# Patient Record
Sex: FEMALE | Race: WHITE | HISPANIC OR LATINO | Employment: OTHER | URBAN - METROPOLITAN AREA
[De-identification: names, ages, dates, MRNs, and addresses within clinical notes are randomized per-mention and may not be internally consistent; named-entity substitution may affect disease eponyms.]

---

## 2020-10-01 ENCOUNTER — VIRTUAL VISIT (OUTPATIENT)
Dept: FAMILY MEDICINE | Facility: OTHER | Age: 65
End: 2020-10-01

## 2020-10-01 DIAGNOSIS — Z20.822 SUSPECTED 2019 NOVEL CORONAVIRUS INFECTION: Primary | ICD-10-CM

## 2020-10-01 NOTE — PROGRESS NOTES
"Date: 10/01/2020 08:55:37  Clinician: Rah Osborn  Clinician NPI: [id number]  Patient: Adriana St  Patient : 1955  Patient Address: [address] [place] [address]  Patient Phone: [phone]  Visit Protocol: URI  Patient Summary:  Adraina Wei is a 65 year old ( : 1955 ) female who initiated a [facility] Visit for COVID-19 (Coronavirus) evaluation and screening. When asked the question \"Please sign me up to receive news, health information and promotions from [facility].\", Adriana Wei responded \"Yes\".    Adriana Wei states her symptoms started today.   Her symptoms consist of a headache, enlarged lymph nodes, a cough, anosmia, myalgia, chills, malaise, and a sore throat. Adriana Wei also feels feverish but was unable to measure her temperature.   Symptom details     Cough: Adriana Wei coughs a few times an hour and her cough is more bothersome at night. Phlegm comes into her throat when she coughs. She believes her cough is caused by post-nasal drip. The color of the phlegm is white and green.     Sore throat: Adriana Wei reports having moderate throat pain (4-6 on a 10 point pain scale), does not have exudate on her tonsils, and can swallow liquids. The lymph nodes in her neck are enlarged. A rash has not appeared on the skin since the sore throat started.     Headache: She states the headache is moderate (4-6 on a 10 point pain scale).      Adriana Wei denies having ear pain, wheezing, nasal congestion, vomiting, rhinitis, nausea, facial pain or pressure, teeth pain, ageusia, and diarrhea. She also denies having a sinus infection within the past year, taking antibiotic medication in the past month, and having recent facial or sinus surgery in the past 60 days. She is not experiencing dyspnea.   Precipitating events  Adriana Wei is not sure if she has been exposed to someone with strep throat. She has not recently been exposed to someone " with influenza. Adriana Wei has been in close contact with the following high risk individuals: adults 65 or older and children under the age of 5.   Pertinent COVID-19 (Coronavirus) information  In the past 14 days, Adriana Wei has not worked in a congregate living setting.   She does not work or volunteer as healthcare worker or a  and does not work or volunteer in a healthcare facility.   Adriana Wei also has not lived in a congregate living setting in the past 14 days. She does not live with a healthcare worker.   Adriana Wei has not had a close contact with a laboratory-confirmed COVID-19 patient within 14 days of symptom onset.   Since December 2019, Adriana Wei and has had upper respiratory infection (URI) or influenza-like illness. Has not been diagnosed with lab-confirmed COVID-19 test      Date(s) of previous URI or influenza-like illness (free-text): February     Symptoms Adriana Wei experienced during previous URI or influenza-like illness as reported by the patient (free-text): High fever, body aching, coughing        Pertinent medical history  Adriana Wei does not get yeast infections when she takes antibiotics.   Adriana Wei does not need a return to work/school note.   Weight: 165 lbs   Adriana Wei does not smoke or use smokeless tobacco.   Weight: 165 lbs  Reason for repeat visit for the same protocol within 24 hours:  The rash I have in my foot began way before now  See the History of referred by protocol and completed visits section for details on previous visits (visits currently in queue to be diagnosed will not appear in this section).    MEDICATIONS: No current medications, ALLERGIES: NKDA  Clinician Response:  Dear Adriana Wei,   Your symptoms show that you may have coronavirus (COVID-19). This illness can cause fever, cough and trouble breathing. Many people get a mild case and get better on their own. Some people can get very sick.   "What should I do?  We would like to test you for this virus.   1. Please call [phone] to schedule your visit. Explain that you were referred by [facility] to have a COVID-19 test. Be ready to share your [facility] visit ID number.  The following will serve as your written order for this COVID Test, ordered by me, for the indication of suspected COVID [Z20.828]: The test will be ordered in [employer], our electronic health record, after you are scheduled. It will show as ordered and authorized by Florentin Whitaker MD.  Order: COVID-19 (Coronavirus) PCR for SYMPTOMATIC testing from [facility].      2. When it's time for your COVID test:  Stay at least 6 feet away from others. (If someone will drive you to your test, stay in the backseat, as far away from the  as you can.)   Cover your mouth and nose with a mask, tissue or washcloth.  Go straight to the testing site. Don't make any stops on the way there or back.      3.Starting now: Stay home and away from others (self-isolate) until:   You've had no fever---and no medicine that reduces fever---for one full day (24 hours). And...   Your other symptoms have gotten better. For example, your cough or breathing has improved. And...   At least 10 days have passed since your symptoms started.       During this time, don't leave the house except for testing or medical care.   Stay in your own room, even for meals. Use your own bathroom if you can.   Stay away from others in your home. No hugging, kissing or shaking hands. No visitors.  Don't go to work, school or anywhere else.    Clean \"high touch\" surfaces often (doorknobs, counters, handles, etc.). Use a household cleaning spray or wipes. You'll find a full list of  on the EPA website: www.epa.gov/pesticide-registration/list-n-disinfectants-use-against-sars-cov-2.   Cover your mouth and nose with a mask, tissue or washcloth to avoid spreading germs.  Wash your hands and face often. Use soap and water.  Caregivers in these " groups are at risk for severe illness due to COVID-19:  o People 65 years and older  o People who live in a nursing home or long-term care facility  o People with chronic disease (lung, heart, cancer, diabetes, kidney, liver, immunologic)  o People who have a weakened immune system, including those who:   Are in cancer treatment  Take medicine that weakens the immune system, such as corticosteroids  Had a bone marrow or organ transplant  Have an immune deficiency  Have poorly controlled HIV or AIDS  Are obese (body mass index of 40 or higher)  Smoke regularly   o Caregivers should wear gloves while washing dishes, handling laundry and cleaning bedrooms and bathrooms.  o Use caution when washing and drying laundry: Don't shake dirty laundry, and use the warmest water setting that you can.  o For more tips, go to www.cdc.gov/coronavirus/2019-ncov/downloads/10Things.pdf.    How can I take care of myself?    Get lots of rest. Drink extra fluids (unless a doctor has told you not to).   Take Tylenol (acetaminophen) for fever or pain. If you have liver or kidney problems, ask your family doctor if it's okay to take Tylenol.   Adults can take either:    650 mg (two 325 mg pills) every 4 to 6 hours, or...   1,000 mg (two 500 mg pills) every 8 hours as needed.    Note: Don't take more than 3,000 mg in one day. Acetaminophen is found in many medicines (both prescribed and over-the-counter medicines). Read all labels to be sure you don't take too much.   For children, check the Tylenol bottle for the right dose. The dose is based on the child's age or weight.    If you have other health problems (like cancer, heart failure, an organ transplant or severe kidney disease): Call your specialty clinic if you don't feel better in the next 2 days.       Know when to call 911. Emergency warning signs include:    Trouble breathing or shortness of breath Pain or pressure in the chest that doesn't go away Feeling confused like you haven't  felt before, or not being able to wake up Bluish-colored lips or face.  Where can I get more information?   [facility] -- About COVID-19: [email or web address]   CDC -- What to Do If You're Sick: www.cdc.gov/coronavirus/2019-ncov/about/steps-when-sick.html   [facility] -- Ending Home Isolation: www.cdc.gov/coronavirus/2019-ncov/hcp/disposition-in-home-patients.html   [facility] -- Caring for Someone: www.cdc.gov/coronavirus/2019-ncov/if-you-are-sick/care-for-someone.html   [facility] -- Interim Guidance for Hospital Discharge to Home: www.[facility].[place]/diseases/coronavirus/hcp/hospdischarge.pdf   [facility] clinical trials (COVID-19 research studies): clinicalaffairs.[facility]-clinical-trials    Below are the COVID-19 hotlines at the [facility] of Health ([facility]). Interpreters are available.    For health questions: Call 606-021-7045 or 1-796.433.7415 (7 a.m. to 7 p.m.) For questions about schools and childcare: Call 446-263-6500 or 1-454.393.8616 (7 a.m. to 7 p.m.)    Diagnosis: Other malaise  Diagnosis ICD: R53.81

## 2020-10-02 DIAGNOSIS — Z20.822 SUSPECTED 2019 NOVEL CORONAVIRUS INFECTION: ICD-10-CM

## 2020-10-02 PROCEDURE — U0003 INFECTIOUS AGENT DETECTION BY NUCLEIC ACID (DNA OR RNA); SEVERE ACUTE RESPIRATORY SYNDROME CORONAVIRUS 2 (SARS-COV-2) (CORONAVIRUS DISEASE [COVID-19]), AMPLIFIED PROBE TECHNIQUE, MAKING USE OF HIGH THROUGHPUT TECHNOLOGIES AS DESCRIBED BY CMS-2020-01-R: HCPCS | Performed by: FAMILY MEDICINE

## 2020-10-03 LAB
SARS-COV-2 RNA SPEC QL NAA+PROBE: NOT DETECTED
SPECIMEN SOURCE: NORMAL

## 2020-10-06 ENCOUNTER — NURSE TRIAGE (OUTPATIENT)
Dept: NURSING | Facility: CLINIC | Age: 65
End: 2020-10-06

## 2020-10-06 NOTE — TELEPHONE ENCOUNTER
Pt calling     Coronavirus (COVID-19) Notification    Lab Result   Lab test 2019-nCoV rRt-PCR OR SARS-COV-2 PCR    Nasopharyngeal AND/OR Oropharyngeal swab is NEGATIVE for 2019-nCoV RNA [OR] SARS-COV-2 RNA (COVID-19) RNA    Your result was negative. This means that we didn't find the virus that causes COVID-19 in your sample. A test may show negative when you do actually have the virus. This can happen when the virus is in the early stages of infection, before you feel illness symptoms.    If you have symptoms   Stay home and away from others (self-isolate) until you meet ALL of the guidelines below:    You've had no fever--and no medicine that reduces fever--for 1 full day (24 hours). And      Your other symptoms have gotten better. For example, your cough or breathing has improved. And   ; At least 10 days have passed since your symptoms started. (If you've been told by a doctor that you have a weak immune system, wait 20 days.)         During this time:    Stay home. Don't go to work, school or anywhere else.     Stay in your own room, including for meals. Use your own bathroom if you can.    Stay away from others in your home. No hugging, kissing or shaking hands. No visitors.    Clean  high touch  surfaces often (doorknobs, counters, handles, etc.). Use a household cleaning spray or wipes. You can find a full list on the EPA website at www.epa.gov/pesticide-registration/list-n-disinfectants-use-against-sars-cov-2.    Cover your mouth and nose with a mask, tissue or other face covering to avoid spreading germs.    Wash your hands and face often with soap and water.    Going back to work  Check with your employer for any guidelines to follow for going back to work.  You are sent a letter for your Employer which will serve as formal document notice that you, the employee, tested negative for COVID-19, as of the testing date shown above.    If your symptoms worsen or other concerning symptoms, contact PCP, [facility]  or consider returning to Emergency Dept.    Where can I get more information?    [facility]: www.[email or web address]/[email or web address]/    Coronavirus Basics: www.health.[facility].[place]/diseases/coronavirus/basics.html    [facility] Hotline (349-760-9269)    Tea Byrnes RN  [facility]      Additional Information    Negative: Nursing judgment    Negative: Nursing judgment    Negative: Nursing judgment    Negative: Nursing judgment    Information only question and nurse able to answer    Protocols used: NO PROTOCOL AVAILABLE - INFORMATION ONLY-A-[place]

## 2024-05-23 ENCOUNTER — HOSPITAL ENCOUNTER (OUTPATIENT)
Dept: MAMMOGRAPHY | Facility: CLINIC | Age: 69
Discharge: HOME | End: 2024-05-23
Attending: NURSE PRACTITIONER
Payer: COMMERCIAL

## 2024-05-23 DIAGNOSIS — N63.10 MASS OF RIGHT BREAST, UNSPECIFIED QUADRANT: ICD-10-CM

## 2024-05-23 PROCEDURE — 76642 ULTRASOUND BREAST LIMITED: CPT | Mod: RT

## 2024-05-23 PROCEDURE — 77062 BREAST TOMOSYNTHESIS BI: CPT

## 2024-05-23 NOTE — PROGRESS NOTES
Radiologist, Dr Mikel Borrero, recommends right breast ultrasound guided needle biopsy. Dr Borrero also requested appts for surgical and oncology consults.     While patient was at [facility], I scheduled pt for breast biopsy appt on Thurs 5/30/24, arrival at 0715am for 0730am appt, at [facility], [address], [address], [place], [place]. [phone]. Pt is also scheduled with Dr Jyoti Magana, breast surgeon, and Dr Magaly Bender, medical oncologist, on Tues 6/11/24, arriving at 2:00pm at the [facility].      I reviewed the procedure and the written pre and post breast biopsy pt handouts with patient and gave patient the written pre and post biopsy patient handouts to take home.     Pt verbalizes understanding of procedure and appt locations, dates, and times. Calls welcomed.    Annemarie Guan, RN, BSN, CBCN  [facility]

## 2024-05-23 NOTE — LETTER
Adriana St  [address]   [place] [facility] [address]            May 23, 2024  Date of Exam: 5/23/24    Dear Adriana:    Thank you for your recent visit.    Breast Imaging Result: Based on your recent breast imaging, you have a suspicious area that usually requires a biopsy, at which time a small tissue sample would be taken from your breast.      Your breast tissue is dense:  Breast tissue can be either dense or not dense. Dense tissue makes it harder to find breast cancer on a mammogram and also raises the risk of developing breast cancer. Your breast tissue is dense. In some people with dense tissue, other imaging tests in addition to a mammogram may help find cancers. Talk to your healthcare provider about breast density, risks for breast cancer, and your individual situation.    If you have already made these arrangements, please disregard this letter.    A report of your breast imaging results was sent to: [facility] Sierra    Your breast imaging will become part of your medical file here at [facility] for at least 10 years. You are responsible for informing any new health care team or breast imaging facility of the date and location of this examination.    We appreciate the opportunity to participate in your health care.    Sincerely,  Mikel Borrero MD   [facility]

## 2024-05-30 ENCOUNTER — HOSPITAL ENCOUNTER (OUTPATIENT)
Dept: MAMMOGRAPHY | Facility: CLINIC | Age: 69
Discharge: HOME | End: 2024-05-30
Attending: NURSE PRACTITIONER
Payer: COMMERCIAL

## 2024-05-30 DIAGNOSIS — N63.10 MASS OF RIGHT BREAST, UNSPECIFIED QUADRANT: ICD-10-CM

## 2024-05-30 PROCEDURE — 999N000065 MA POST PROCEDURE RIGHT

## 2024-05-30 PROCEDURE — 272N000431 US BREAST BIOPSY CORE NEEDLE RIGHT

## 2024-05-30 PROCEDURE — 88341 IMHCHEM/IMCYTCHM EA ADD ANTB: CPT | Mod: 26 | Performed by: PATHOLOGY

## 2024-05-30 PROCEDURE — 88305 TISSUE EXAM BY PATHOLOGIST: CPT | Mod: 26 | Performed by: PATHOLOGY

## 2024-05-30 PROCEDURE — 88360 TUMOR IMMUNOHISTOCHEM/MANUAL: CPT | Mod: 26 | Performed by: PATHOLOGY

## 2024-05-30 PROCEDURE — 88342 IMHCHEM/IMCYTCHM 1ST ANTB: CPT | Mod: TC,XU

## 2024-05-30 PROCEDURE — 88342 IMHCHEM/IMCYTCHM 1ST ANTB: CPT | Mod: 26 | Performed by: PATHOLOGY

## 2024-05-30 PROCEDURE — 250N000009 HC RX 250: Performed by: STUDENT IN AN ORGANIZED HEALTH CARE EDUCATION/TRAINING PROGRAM

## 2024-05-30 RX ORDER — LIDOCAINE HYDROCHLORIDE AND EPINEPHRINE 10; 10 MG/ML; UG/ML
10 INJECTION, SOLUTION INFILTRATION; PERINEURAL ONCE
Status: COMPLETED | OUTPATIENT
Start: 2024-05-30 | End: 2024-05-30

## 2024-05-30 RX ORDER — ESTRADIOL 1 MG/1
1 TABLET ORAL DAILY
COMMUNITY
End: 2024-07-01

## 2024-05-30 RX ADMIN — LIDOCAINE HYDROCHLORIDE 10 ML: 10 SOLUTION INTRAVENOUS at 07:56

## 2024-05-30 RX ADMIN — LIDOCAINE HYDROCHLORIDE,EPINEPHRINE BITARTRATE 10 ML: 10; .01 INJECTION, SOLUTION INFILTRATION; PERINEURAL at 07:56

## 2024-05-30 NOTE — PROGRESS NOTES
Adriana arrived at [facility] accompanied by her son. I escorted pt to the [facility] consult room. Pt was identified using full name and . Wristband is on pt wrist. Pt was able to state which procedure and correct side breast biopsy was occurring today. I reviewed the consent form with the pt and pt was given the consent form to review before signing. Pt stated she reads English.     I reviewed post breast biopsy care. Pt acknowledged understanding of post biopsy care. Pt was given written post breast biopsy care handout to take home.    I explained results are expected in 3-5 business days and Breast Center RN will call pt at number pt provided today. Pt does not have active [employer]. Pt verbalizes understanding. Pt requests Release of information to obtain records from visit and pathology today. Seema Montalvo, , at  of [facility], has form for pt to sign.     Pt had no questions or concerns.     I escorted pt to the changing room and pt changed into gown. Pt placed personal belongings in a locker and pt has the glass. I escorted pt to US room and pt sat on chair. I offered pt the aroma therapy of Calming Blend oil and a warm blanket, and pt accepted only the aroma therapy. I notified Eyeg[employer]ove, Sehrman Navarro, pt was ready and waiting in US room.     Annemarie Guan, RN, BSN, CBCN  [facility]

## 2024-06-05 ENCOUNTER — TELEPHONE (OUTPATIENT)
Dept: MAMMOGRAPHY | Facility: CLINIC | Age: 69
End: 2024-06-05
Payer: COMMERCIAL

## 2024-06-05 LAB
PATH REPORT.COMMENTS IMP SPEC: ABNORMAL
PATH REPORT.COMMENTS IMP SPEC: YES
PATH REPORT.FINAL DX SPEC: ABNORMAL
PATH REPORT.GROSS SPEC: ABNORMAL
PATH REPORT.MICROSCOPIC SPEC OTHER STN: ABNORMAL
PATH REPORT.RELEVANT HX SPEC: ABNORMAL
PHOTO IMAGE: ABNORMAL

## 2024-06-05 NOTE — TELEPHONE ENCOUNTER
I telephoned patient, confirming pt identity using name and , to inform patient of malignant pathology from 24, right breast biopsy performed at [facility]. We discussed breast anatomy, pathology findings, and next steps. Patient's questions were answered to the best of my ability.     Patient is scheduled for consults with Breast Surgeon, Dr Jyoti Magana and Medical Oncologist, Dr Magaly Bender, on Tuesday, 24, arriving at 2:00pm, at [facility]. Patient verbalized understanding of appointment details, location, and visitor policy of up to two people being allowed to accompany pt to appt.     I provided emotional support and encouragement. Pt verbalized understanding of information given and acceptance of plan. Calls welcomed.    I left a courtesy message regarding the above with [facility], RN Triage line, for Miranda Connolly APRN, CNP. My phone number was given in the event there are questions or if assistance is needed.    Annemarie Guan, RN, BSN, CBCN  [facility]

## 2024-06-06 NOTE — TELEPHONE ENCOUNTER
RECORDS STATUS - BREAST    [facility]   DATE RE[facility]ED: 6/11     MRI PACS 6/6/24: [facility]   XRAYS PACS 6/6/24: [facility]

## 2024-06-07 ENCOUNTER — TELEPHONE (OUTPATIENT)
Dept: ONCOLOGY | Facility: HOSPITAL | Age: 69
End: 2024-06-07
Payer: COMMERCIAL

## 2024-06-07 ENCOUNTER — PATIENT OUTREACH (OUTPATIENT)
Dept: ONCOLOGY | Facility: HOSPITAL | Age: 69
End: 2024-06-07
Payer: COMMERCIAL

## 2024-06-07 NOTE — TELEPHONE ENCOUNTER
I was asked to help with next steps for this pt with a pathologic fx in her L1. I had Dr. Alexander review to see if this pt needs to go to the ED, is this an urgent situation. He responded that the pt needs a PET scan that the MRI images are not enough to make an assessment. And then she can be seen on Mon at [facility]. I passed this information along to MARTITA Gould. She will work on getting things going for the work up so the pt can hopefully get some pain relief from radiation. FACUNDO Stevens RN, OCN, CBCN

## 2024-06-07 NOTE — PROGRESS NOTES
Received call from Dr. Gilmer Walters MD with [facility], about new patient, Adriana, to be seen in clinic 5/11/24 by Dr. Bender. Informed Dr. Walters that Dr. Bender is out of office but this writer can take message to relay to Dr. Bender when she returns to clinic next week.     Per Dr. Walters, Adriana was seen in clinic yesterday for severe back pain.  MRI done which showed pathologic L1 burst fracture and other metastatic disease. Patient has been in severe pain and was started on oxycodone by Dr. Walters yesterday. Dr. Walters wants Dr. Bender to be aware incase other studies or referrals need to be made. Dr. Gilmer Walters can be reached (Cell # [phone]) with further questions.     Discussed patient situation with Mell Calderon CNP, provider in clinic today. Mell suggests patient should have [facility] ortho onc consults and/or [facility] consult. Would need to be seen in ED with any neuro signs or severe pain in case of spinal cord instability. MARTITA Santana, discussed with Dr. Alexander (separate encounter) and reports that Dr. Alexander suggests Rad Onc referral and PET scan.     Reported suggestions back to Dr. Walters who states she prefers not to place orders at this time. This RN writer said she will pass along update to Dr. Bender who can review when she returns to clinic next week. Dr. Walters is agreeable to this plan. Dr. Walters reports she will discuss with patient when to seek emergency care. High priority inbasket  sent to Dr. Bender and VY Anguiano to review when back in clinic.     Bryanna Gustafson RN  06/07/24  12:41 PM

## 2024-06-10 PROBLEM — Z17.0 MALIGNANT NEOPLASM OF CENTRAL PORTION OF RIGHT BREAST IN FEMALE, ESTROGEN RECEPTOR POSITIVE (H): Status: ACTIVE | Noted: 2024-06-10

## 2024-06-10 PROBLEM — C50.111 MALIGNANT NEOPLASM OF CENTRAL PORTION OF RIGHT BREAST IN FEMALE, ESTROGEN RECEPTOR POSITIVE (H): Status: ACTIVE | Noted: 2024-06-10

## 2024-06-10 NOTE — PROGRESS NOTES
History:  This is a 68 year old female who I'm asked to see by Dr. Walters for evaluation of a right breast cancer.  She presents with her soon-to-be , Kyle, and her son.  This was picked up by the patient in February.  She noticed a lump about the size of a fingernail.  The mass has rapidly grown.  About 5 to 6 weeks ago she then developed some low back pain.  The breast mass was worked up through mammogram and ultrasound.  A needle biopsy was done which shows a grade III invasive ductal carcinoma.  It is estrogen receptor positive, progesterone receptor positive, and HER-2 negative.  She has had decreased appetite.  Once her breast cancer was confirmed, she sought evaluation for her back pain.  She was found to have a pathologic L1 burst compression fracture.  Multiple osseous metastasis were seen.    Past medical history:  Had been on hormone replacement therapy for about 15 years.  Went off of it about a month ago.    Past surgical history:  Appendectomy  Breast augmentation with implant removal  Mastopexy   Cholecystectomy    Medications:    HYDROmorphone (DILAUDID) 2 MG tablet, Take 2 mg by mouth, Disp: , Rfl:     bisacodyl (DULCOLAX) 5 MG EC tablet, Take 5 mg by mouth daily as needed (Patient not taking: Reported on 6/11/2024), Disp: , Rfl:     estradiol (ESTRACE) 1 MG tablet, Take 1 mg by mouth daily (Patient not taking: Reported on 6/11/2024), Disp: , Rfl:     polyethylene glycol (MIRALAX) 17 GM/Dose powder, Take 17 g by mouth (Patient not taking: Reported on 6/11/2024), Disp: , Rfl:     Semaglutide (OZEMPIC, 0.25 OR 0.5 MG/DOSE, SC), Inject 0.25 mg Subcutaneous once a week Apply 0.25mg (19 clicks) subcutaneous in abdominal region in the morning, ONLY once a week for 04 weeks. Subsequently adjust the does as instructed. (Patient not taking: Reported on 6/11/2024), Disp: , Rfl:     UNABLE TO FIND, Take by mouth once a week MEDICATION NAME: Citoneurim 5000 UI. Take once a week. (Patient not taking:  "Reported on 6/11/2024), Disp: , Rfl:     UNABLE TO FIND, MEDICATION NAME: Tylex (paracetamol) 500mg. Pt is taking 1/2 tab or one tab every 4 hrs for pain. (Patient not taking: Reported on 6/11/2024), Disp: , Rfl:     UNABLE TO FIND, MEDICATION NAME: Addera Max 2000U.I. per day. Calcium, vitamin d3, magnesium. (Patient not taking: Reported on 6/11/2024), Disp: , Rfl:     Allergies:  Codeine    Social History:  Denies active alcohol, tobacco, marijuana, and illicit drug use    Family History:  Paternal grandmother had some kind of cancer.  Sister lung cancer, son melanoma.  No family history of breast cancer.    Review of systems:  General ROS: No complaints or constitutional symptoms  Skin: No complaints or symptoms   Hematologic/Lymphatic: No symptoms or complaints  Psychiatric: No symptoms or complaints  Endocrine: No excessive fatigue, no hypermetabolic symptoms reported  Respiratory ROS: No cough, shortness of breath, or wheezing  Cardiovascular ROS: No chest pain or dyspnea on exertion  Breast ROS: Rapidly enlarging right breast mass.  No nipple discharge.  Gastrointestinal ROS: No abdominal pain, nausea, diarrhea, or constipation  Musculoskeletal ROS: Significant for low back pain  Neurological ROS: No focal neurologic defects reported.  Denies new onset headaches.    Physical exam:  Ht 1.6 m (5' 3\")   Wt 73.1 kg (161 lb 3.2 oz)   BMI 28.56 kg/m    General: Alert, cooperative, appears stated age   Skin: Skin color, texture, turgor normal, no rashes or lesions   Lymphatic: No obvious adenopathy, no swelling   Eyes: No scleral icterus, pupils equal  HENT: No traumatic injury to the head or face, no gross abnormalities  Lungs: Normal respiratory effort, breath sounds equal bilaterally  Heart: Regular rate and rhythm  Breasts: Scarring from mastopexy.  Baseball sized mass upper breast 12-1:00 with skin thinning.  No open wounds.  No palpable lymphadenopathy  Abdomen: Soft, non-distended and non-tender to " palpation  Neurologic: Grossly intact    Imaging:  Pertinent images personally reviewed by myself and discussed with the patient.    Radiology reports:  Examinations: MA DIAGNOSTIC BILATERAL W/ LATOSHA, US BREAST RIGHT LIMITED  1-3 QUADRANTS, 5/23/2024 11:04 AM     Comparisons: None.     History/family history: Patient reports a rapidly growing mass in the  right breast, first noticed in February 2024.     BREAST DENSITY: The breasts are heterogeneously dense which may  obscure small masses.     Findings:     Mammogram: At the 12:00 position, posterior depth in the right breast  there is there is an oval, hyperdense mass with microlobulated margins  measuring 5.0 cm. There are no suspicious findings in the left breast.     Ultrasound:  Targeted ultrasound evaluation of the right breast was performed by  the technologist and radiologist. At the site of the patient's  palpable concern at the 12:00 position, 6 cm from nipple there is an  irregular, hypoechoic mass measuring 4.4 x 4.2 x 4.8 cm. The mass  directly abuts the skin. Evaluation of the retroareolar breast at the  6:00 position, demonstrates only normal breast tissue and several  mildly dilated ducts. Evaluation of the right axilla demonstrates only  morphologically normal lymph nodes.                                                                    Impression: BI-RADS CATEGORY: 5 - Highly Suggestive of  Malignancy-Appropriate Action Should Be Taken  Suspicious mass in the right breast at the 12:00 position.  Ultrasound-guided biopsy is recommended.      EXAM: MR SPINE LUMBAR WWO   LOCATION: [facility]   DATE: 6/6/2024     INDICATION: Bilateral Low Back Pain Without Sciatica, Unspecified Chronicity Primary Cancer Of Right Breast (hc)   COMPARISON: 6/6/24   CONTRAST: Clariscan 15 mL   TECHNIQUE: Routine Lumbar Spine MRI without and with IV contrast.     FINDINGS:   Nomenclature is based on 5 lumbar type vertebral bodies with L5-S1 defined on image 50 of  series 5. Alignment is unremarkable.  Pathologic burst fracture at L1 with greater than 50% vertebral body height loss and 9 mm retropulsion of the posterior L1 vertebral body. The lesion at L1 demonstrates posterolateral involvement bilaterally extending into the bilateral pedicles, left transverse process, and left lamina with extraosseous extension into the left ventral paraspinal soft tissues. Additional scattered osseous metastases in the sacrum and lumbar spine without pathologic fracture. Normal distal spinal cord and cauda equina with conus medullaris at L1.     Prevertebral and dorsal paraspinal muscular atrophy. Visualized intra-abdominal structures are unremarkable.     The pathologic L1 burst fracture results in moderate canal stenosis at the level of the L1 vertebral body. No significant canal stenosis at the remaining levels. Mild left L1-L2 foraminal stenosis.     My interpretation:  Large mass in the right upper breast    Pathology:  RIGHT BREAST, 12 O'CLOCK POSITION, 6 CM FROM NIPPLE, ULTRASOUND-GUIDED NEEDLE CORE BIOPSY:     1.INVASIVE DUCTAL CARCINOMA:                a.           GRADE: ESTRELLITA GRADE III (of III)                b.          SCORE: 8 (OF 9)  2.DUCTAL CARCINOMA IN SITU (DCIS): NOT IDENTIFIED  3.ADDITIONAL FINDINGS: MODERATE DESMOPLASTIC STROMAL RESPONSE  4.BREAST CANCER PROGNOSTIC MARKER ANALYSIS:                a.           ESTROGEN RECEPTOR: POSITIVE (95% OF CELLS, STRONG STAINING)                b.          PROGESTERONE RECEPTOR: POSITIVE (15% OF CELLS,                             STRONG STAINING)                c.           HER-2/CATRINA: NEGATIVE (1+ OUT OF 3 MEMBRANE STAINING    IMPRESSION:  Stage IV metastatic right breast cancer    PLAN:   Discussed the treatment options for breast cancer which include surgery, chemotherapy, radiation, and endocrine therapy.  Surgical treatment for stage IV breast cancer would be considered if she needed a palliative mastectomy.  This would be  considered if she continued to progress on systemic therapy and the tumor started to lead to skin necrosis.  Although the tumor is large, a mastectomy could physically be performed.      She will be meeting with Dr. Bender next to discuss systemic therapies.  Dr. Bender will be ordering further staging imaging.  A referral has been placed to radiation to discuss palliative treatment.    She can follow-up with breast surgery as needed.    Jyoti Magana DO  General Surgeon  [facility]  Breast [address] [address]  Office: [phone]  Employed by - [facility] [employer] [facility]

## 2024-06-10 NOTE — PROGRESS NOTES
[facility] Hematology and Oncology Consult Note    Patient: Adriana St  MRN: [id number]  Date of Service: Jun 11, 2024       Reason for Visit    Chief Complaint   Patient presents with    Oncology Clinic Visit     Malignant neoplasm of central portion of right breast in female, estrogen receptor positive         Assessment/Plan    ECOG Performance 0 - Independent    #.  Metastatic right breast cancer ER positive, MT positive, HER2 1+ by IHC.  #.  Pathologic L1 fracture  #.  Large right breast mass  #.  Cancer related pain     I reviewed the complex clinical history, pathology result in detail with them.  I reviewed the nature of the breast cancer.  Current finding is consistent with very advanced stage right breast cancer.  This is no longer curable.  This cancer can be treated with palliative intent therapy.  I discussed about treatment strategies including systemic therapy, radiation therapy and surgery.   I recommended the followings and they agree with the plan.    Plan:  -PET scan and MRI brain to complete staging.  -I discussed that we might need to obtain pathology diagnosis/confirmation of metastatic site to establish the stage of the cancer.  -Referral to radiation oncology urgently for consideration of palliative radiation to painful pathologic L4 vertebral fracture.  -I advised her to decrease Dilaudid to 2 mg every 4-6 hours as needed for pain.  I added Valium 5 mg every 4-6 hours to adjuvant pain medication as well as muscle relaxant.  She will potentially benefit from long-acting pain medication however I would follow-up after radiation is completed.  -I will start dexamethasone 4 mg twice daily for vertebral fracture and pain.  -I advised her to start letrozole 1 mg daily.  I will plan to add CDK 4/6 inhibitor after gathering additional information as above.  -I also advised her to start calcium/vitamin D 1 tablet twice daily.   -Advised her to aggressively manage constipation  with stool softener, MiraLAX and good hydration since she has decreasing mobility and on opioids.  -I will plan to start bisphosphonate for metastatic bone disease  -She will need additional molecular testing, genetic counseling, palliative care referral.  I will schedule those in next visit.    The longitudinal plan of care for the diagnosis(es)/condition(s) as documented were addressed during this visit. Due to the added complexity in care, I will continue to support Adriana Wei in the subsequent management and with ongoing continuity of care.    Encounter Diagnoses:    Problem List Items Addressed This Visit          Oncology Diagnoses    Malignant neoplasm of central portion of right breast in female, estrogen receptor positive (H) - Primary    Relevant Medications    letrozole (FEMARA) 2.5 MG tablet    Other Relevant Orders    PET Oncology (Eyes to Thighs)    MR Brain w/o & w Contrast     Other Visit Diagnoses       Malignant neoplasm metastatic to bone (H)        Relevant Medications    letrozole (FEMARA) 2.5 MG tablet    dexAMETHasone (DECADRON) 4 MG tablet    Other Relevant Orders    PET Oncology (Eyes to Thighs)    MR Brain w/o & w Contrast    Bilateral low back pain without sciatica, unspecified chronicity        Relevant Medications    diazepam (VALIUM) 5 MG tablet    dexAMETHasone (DECADRON) 4 MG tablet    Back muscle spasm        Relevant Medications    diazepam (VALIUM) 5 MG tablet    dexAMETHasone (DECADRON) 4 MG tablet    Pathological fracture of lumbar vertebra, initial encounter        Relevant Medications    dexAMETHasone (DECADRON) 4 MG tablet            ______________________________________________________________________________    Staging History   Cancer Staging   Malignant neoplasm of central portion of right breast in female, estrogen receptor positive (H)  Staging form: Breast, AJCC 8th Edition  - Clinical stage from 6/10/2024: Stage IV (cT2, cN0, cM1, G3, ER+, NM+, HER2-) - Signed by  Magaly Bender MD on 6/10/2024        History    Ms. Adriana St is a very pleasant 68 year old female presented today accompanied by her son and her soon-to-be , Kyle for further evaluation and management of recent diagnosis of breast cancer.  She reported she noticed a small lump in her right breast in February.  It has grown fairly quickly.  She noted back pain about 6 weeks ago which limit her mobility significantly.  She stopped working because of the back pain and unable to walk very well.  She however does not have any tingling, numbness in lower extremities or radiculopathy.  She does not have sensory changes in the perineum.  She does not have bowel or bladder incontinence.  She has constipation.  She has poor appetite.  She was on Ozempic but because of the significant weight loss, she stopped it a month ago.  She currently takes hydromorphone 4 mg every 5 hours for back pain.  It causes a skin itch, therefore she takes Benadryl with it.  She has some constipation but she has been taking MiraLAX.    She subsequently did workup further breast mass and it show invasive ductal carcinoma, grade 3, ER positive (95%, strong), NJ positive (15%, strong), HER2 negative (1+ by IHC).    She presented to the emergency room with worsening back pain on 6/6/2024.  MRI lumbar spine with and without contrast showed pathologic L1 burst fracture with > 50% vertebral body height loss.  Additional scattered osseous metastasis were seen as well.    She has history of prediabetes.  No chronic major medical problems.    She quit smoking 18 years ago.  She occasionally drinks alcohol.  She does not use recreational drugs.  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  She is no longer able to work due to her severe back pain.  She immigrated to [place] about 6 years ago.  She lives with her son and his family.    Family history is significant for some kind of cancer in paternal  "grandmother, lung cancer in her sister, melanoma in her son.  No family history of breast cancer, ovarian cancer, prostate cancer or pancreatic cancer.    Review of systems.  Apart from describing in history, the remainder of comprehensive ROS was negative.    Past History    No past medical history on file.  No past surgical history on file.  No family history on file.  Social History     Socioeconomic History    Marital status:      Social Determinants of Health     Financial Resource Strain: Low Risk  (6/6/2024)    [facility]    Financial Resource Strain     Difficulty of Paying Living Expenses: 3   Food Insecurity: No Food Insecurity (6/6/2024)    [facility]    Food Insecurity     Worried About Running Out of Food in the Last Year: 1   Transportation Needs: No Transportation Needs (6/6/2024)    [facility]    Transportation Needs     Lack of Transportation (Medical): 1   Social Connections: Socially Integrated (6/6/2024)    Received from [facility]    Social Connections     Frequency of Communication with Friends and Family: 0   Housing Stability: Low Risk  (6/6/2024)    Received from [employer]    Housing Stability     Unable to Pay for Housing in the Last Year: 1       Allergies    Allergies   Allergen Reactions    Codeine Itching       Physical Exam    BP (!) 164/96   Pulse 107   Temp 98.7  F (37.1  C) (Tympanic)   Resp 18   Ht 1.6 m (5' 3\")   Wt 73.1 kg (161 lb 3.2 oz)   SpO2 94%   BMI 28.56 kg/m      General: alert, awake, not in acute distress.  She is not laying in exam table.  She came in a wheelchair since she could not walk very far.  HEENT: Head: Normal, normocephalic, atraumatic.  Eye: Normal external eye, conjunctiva, lids cornea, ALEJANDRA.  Nose: Normal external nose, mucus " membranes and septum.  Pharynx: Normal buccal mucosa. Normal pharynx.  Neck / Thyroid: Supple, no masses, nodes, nodules or enlargement.  Lymphatics: No abnormally enlarged lymph nodes.  Chest: Normal chest wall and respirations. Clear to auscultation.  Breasts: About 10 cm protruding mass in the upper inner quadrant of the right breast with purplish skin discoloration.  No open wound.  Heart: S1 S2 RRR, no murmur.   Abdomen: abdomen is soft without significant tenderness, masses, organomegaly or guarding  Extremities: normal strength, tone, and muscle mass  Skin: normal. no rash or abnormalities  CNS: non focal.    Lab Results    No results found for this or any previous visit (from the past 168 hour(s)).    Imaging Results    MR Lumbar Spine w/o & w Contrast    Result Date: 6/6/2024  For Patients: As a result of the 21st Century Cures Act, medical imaging exams and procedure reports are released immediately into your electronic medical record. You may view this report before your referring provider. If you have questions, please contact your health care provider. EXAM: MR SPINE LUMBAR WWO LOCATION: [facility] DATE: 6/6/2024 INDICATION: Bilateral Low Back Pain Without Sciatica, Unspecified Chronicity Primary Cancer Of Right Breast (hc) COMPARISON: 6/6/24 CONTRAST: Clariscan 15 mL TECHNIQUE: Routine Lumbar Spine MRI without and with IV contrast. FINDINGS: Nomenclature is based on 5 lumbar type vertebral bodies with L5-S1 defined on image 50 of series 5. Alignment is unremarkable.  Pathologic burst fracture at L1 with greater than 50% vertebral body height loss and 9 mm retropulsion of the posterior L1 vertebral body. The lesion at L1 demonstrates posterolateral involvement bilaterally extending into the bilateral pedicles, left transverse process, and left lamina with extraosseous extension into the left ventral paraspinal soft tissues. Additional scattered osseous metastases in the sacrum and lumbar spine  without pathologic fracture. Normal distal spinal cord and cauda equina with conus medullaris at L1. Prevertebral and dorsal paraspinal muscular atrophy. Visualized intra-abdominal structures are unremarkable. The pathologic L1 burst fracture results in moderate canal stenosis at the level of the L1 vertebral body. No significant canal stenosis at the remaining levels. Mild left L1-L2 foraminal stenosis.    1.  Pathologic L1 burst fracture with greater than 50% vertebral body height loss. 9 mm retropulsion results in moderate canal stenosis at the level of the vertebral body. Posterolateral involvement bilaterally at L1. 2.  Additional scattered osseous metastases without vertebral body height loss or canal involvement.    XR Lumbar Spine 3 Views [employer]    Result Date: 6/6/2024  For Patients: As a result of the 21st Century Cures Act, medical imaging exams and procedure reports are released immediately into your electronic medical record. You may view this report before your referring provider. If you have questions, please contact your health care provider. EXAM: XR SPINE LUMBAR 3 VIEWS LOCATION: [facility] DATE: 6/6/2024 INDICATION: Bilateral Low Back Pain Without Sciatica, Unspecified Chronicity COMPARISON: None.    L1 compression deformity with approximately 40% height loss, some anterior fragmentation, and retropulsion of fracture fragments into the spinal canal. Fracture likely involves the anterior wall, superior endplate, and possibly the posterior wall. Finding could represent acute, subacute, or chronic process. CT of the lumbar spine should be considered. Alignment otherwise within normal limits. No other findings to suggest acute fracture. Vertebral body heights otherwise maintained. Mild-moderate degenerative changes throughout the lumbar spine with facet arthropathy, vertebral body osteophyte formation, and disc height loss. PHYSICIAN ALERT    US Breast Biopsy Core Needle  Right    Addendum Date: 6/6/2024    Pathology shows invasive ductal carcinoma. This is consistent with imaging findings. A verbal report was given to the patient. Surgical and oncological consultation is recommended.    Result Date: 6/6/2024  US Breast Biopsy Core Needle Right INDICATION: Right breast mass. PROCEDURE: Informed consent was obtained from the patient. Ultrasound was used to localize the mass at the 12 o'clock position of the right breast, 6 cm from the nipple.  The skin was marked, then prepped and draped in a sterile fashion. 1% lidocaine was used for local anesthesia. Under direct sonographic guidance, a 14-gauge coaxial needle was used to obtain 3 core biopsies.  A biopsy marking clip was then placed.  Digital mammograms performed in a separate room, using separate equipment, to document clip placement. The mammogram showed the clip to be in good position. The patient tolerated this well; there are no immediate complications.    IMPRESSION: 1. Ultrasound-guided biopsy of mass in the right breast. Pathology pending. 2. Post procedure mammogram for marker placement     MA Post Procedure Right    Addendum Date: 6/6/2024    Pathology shows invasive ductal carcinoma. This is consistent with imaging findings. A verbal report was given to the patient. Surgical and oncological consultation is recommended.    Result Date: 6/6/2024  US Breast Biopsy Core Needle Right INDICATION: Right breast mass. PROCEDURE: Informed consent was obtained from the patient. Ultrasound was used to localize the mass at the 12 o'clock position of the right breast, 6 cm from the nipple.  The skin was marked, then prepped and draped in a sterile fashion. 1% lidocaine was used for local anesthesia. Under direct sonographic guidance, a 14-gauge coaxial needle was used to obtain 3 core biopsies.  A biopsy marking clip was then placed.  Digital mammograms performed in a separate room, using separate equipment, to document clip  placement. The mammogram showed the clip to be in good position. The patient tolerated this well; there are no immediate complications.    IMPRESSION: 1. Ultrasound-guided biopsy of mass in the right breast. Pathology pending. 2. Post procedure mammogram for marker placement     MA Diagnostic Bilateral w/Latosha    Result Date: 5/23/2024  Examinations: MA DIAGNOSTIC BILATERAL W/ LATOSHA, US BREAST RIGHT LIMITED 1-3 QUADRANTS, 5/23/2024 11:04 AM Comparisons: None. History/family history: Patient reports a rapidly growing mass in the right breast, first noticed in February 2024. BREAST DENSITY: The breasts are heterogeneously dense which may obscure small masses. Findings:   Mammogram: At the 12:00 position, posterior depth in the right breast there is there is an oval, hyperdense mass with microlobulated margins measuring 5.0 cm. There are no suspicious findings in the left breast. Ultrasound: Targeted ultrasound evaluation of the right breast was performed by the technologist and radiologist. At the site of the patient's palpable concern at the 12:00 position, 6 cm from nipple there is an irregular, hypoechoic mass measuring 4.4 x 4.2 x 4.8 cm. The mass directly abuts the skin. Evaluation of the retroareolar breast at the 6:00 position, demonstrates only normal breast tissue and several mildly dilated ducts. Evaluation of the right axilla demonstrates only morphologically normal lymph nodes.      Impression: BI-RADS CATEGORY: 5 - Highly Suggestive of Malignancy-Appropriate Action Should Be Taken Suspicious mass in the right breast at the 12:00 position. Ultrasound-guided biopsy is recommended. RECOMMENDED FOLLOW-UP: Biopsy. The patient was given the results of the examination. KIRSTIN SANTO MD   SYSTEM ID:  [id number]    US Breast Right Limited 1-3 Quadrants    Result Date: 5/23/2024  Examinations: MA DIAGNOSTIC BILATERAL W/ LATOSHA, US BREAST RIGHT LIMITED 1-3 QUADRANTS, 5/23/2024 11:04 AM Comparisons: None. History/family  history: Patient reports a rapidly growing mass in the right breast, first noticed in February 2024. BREAST DENSITY: The breasts are heterogeneously dense which may obscure small masses. Findings:   Mammogram: At the 12:00 position, posterior depth in the right breast there is there is an oval, hyperdense mass with microlobulated margins measuring 5.0 cm. There are no suspicious findings in the left breast. Ultrasound: Targeted ultrasound evaluation of the right breast was performed by the technologist and radiologist. At the site of the patient's palpable concern at the 12:00 position, 6 cm from nipple there is an irregular, hypoechoic mass measuring 4.4 x 4.2 x 4.8 cm. The mass directly abuts the skin. Evaluation of the retroareolar breast at the 6:00 position, demonstrates only normal breast tissue and several mildly dilated ducts. Evaluation of the right axilla demonstrates only morphologically normal lymph nodes.      Impression: BI-RADS CATEGORY: 5 - Highly Suggestive of Malignancy-Appropriate Action Should Be Taken Suspicious mass in the right breast at the 12:00 position. Ultrasound-guided biopsy is recommended. RECOMMENDED FOLLOW-UP: Biopsy. The patient was given the results of the examination. KIRSTIN SANTO MD   SYSTEM ID:  [id number]     60 minutes spent on the date of the encounter doing chart review, history and exam, documentation, communication of the treatment plan with the care team and further activities as noted above.    Signed by: Magaly Bender MD

## 2024-06-11 ENCOUNTER — PATIENT OUTREACH (OUTPATIENT)
Dept: ONCOLOGY | Facility: HOSPITAL | Age: 69
End: 2024-06-11

## 2024-06-11 ENCOUNTER — PRE VISIT (OUTPATIENT)
Dept: ONCOLOGY | Facility: HOSPITAL | Age: 69
End: 2024-06-11
Payer: COMMERCIAL

## 2024-06-11 ENCOUNTER — OFFICE VISIT (OUTPATIENT)
Dept: SURGERY | Facility: HOSPITAL | Age: 69
End: 2024-06-11
Attending: NURSE PRACTITIONER
Payer: COMMERCIAL

## 2024-06-11 ENCOUNTER — OFFICE VISIT (OUTPATIENT)
Dept: ONCOLOGY | Facility: HOSPITAL | Age: 69
End: 2024-06-11
Attending: NURSE PRACTITIONER
Payer: COMMERCIAL

## 2024-06-11 VITALS
HEART RATE: 107 BPM | TEMPERATURE: 98.7 F | RESPIRATION RATE: 18 BRPM | BODY MASS INDEX: 28.56 KG/M2 | DIASTOLIC BLOOD PRESSURE: 96 MMHG | SYSTOLIC BLOOD PRESSURE: 164 MMHG | WEIGHT: 161.2 LBS | HEIGHT: 63 IN | OXYGEN SATURATION: 94 %

## 2024-06-11 VITALS — WEIGHT: 161.2 LBS | BODY MASS INDEX: 28.56 KG/M2 | HEIGHT: 63 IN

## 2024-06-11 DIAGNOSIS — M84.48XA PATHOLOGICAL FRACTURE OF LUMBAR VERTEBRA, INITIAL ENCOUNTER: ICD-10-CM

## 2024-06-11 DIAGNOSIS — C79.51 CARCINOMA OF RIGHT BREAST METASTATIC TO BONE (H): Primary | ICD-10-CM

## 2024-06-11 DIAGNOSIS — C50.111 MALIGNANT NEOPLASM OF CENTRAL PORTION OF RIGHT BREAST IN FEMALE, ESTROGEN RECEPTOR POSITIVE (H): Primary | ICD-10-CM

## 2024-06-11 DIAGNOSIS — C79.51 MALIGNANT NEOPLASM METASTATIC TO BONE (H): ICD-10-CM

## 2024-06-11 DIAGNOSIS — M62.830 BACK MUSCLE SPASM: ICD-10-CM

## 2024-06-11 DIAGNOSIS — C50.911 CARCINOMA OF RIGHT BREAST METASTATIC TO BONE (H): Primary | ICD-10-CM

## 2024-06-11 DIAGNOSIS — M54.50 BILATERAL LOW BACK PAIN WITHOUT SCIATICA, UNSPECIFIED CHRONICITY: ICD-10-CM

## 2024-06-11 DIAGNOSIS — Z17.0 MALIGNANT NEOPLASM OF CENTRAL PORTION OF RIGHT BREAST IN FEMALE, ESTROGEN RECEPTOR POSITIVE (H): Primary | ICD-10-CM

## 2024-06-11 PROCEDURE — G0463 HOSPITAL OUTPT CLINIC VISIT: HCPCS | Mod: 27 | Performed by: INTERNAL MEDICINE

## 2024-06-11 PROCEDURE — 99205 OFFICE O/P NEW HI 60 MIN: CPT | Performed by: INTERNAL MEDICINE

## 2024-06-11 PROCEDURE — G2211 COMPLEX E/M VISIT ADD ON: HCPCS | Performed by: INTERNAL MEDICINE

## 2024-06-11 PROCEDURE — G0463 HOSPITAL OUTPT CLINIC VISIT: HCPCS | Performed by: SURGERY

## 2024-06-11 PROCEDURE — 99244 OFF/OP CNSLTJ NEW/EST MOD 40: CPT | Performed by: SURGERY

## 2024-06-11 RX ORDER — LETROZOLE 2.5 MG/1
2.5 TABLET, FILM COATED ORAL DAILY
Qty: 90 TABLET | Refills: 1 | Status: SHIPPED | OUTPATIENT
Start: 2024-06-11 | End: 2024-09-09

## 2024-06-11 RX ORDER — DIAZEPAM 5 MG
5 TABLET ORAL EVERY 6 HOURS PRN
Qty: 30 TABLET | Refills: 0 | Status: SHIPPED | OUTPATIENT
Start: 2024-06-11 | End: 2024-07-01

## 2024-06-11 RX ORDER — HYDROMORPHONE HYDROCHLORIDE 2 MG/1
2 TABLET ORAL 2 TIMES DAILY
COMMUNITY
Start: 2024-06-11 | End: 2024-09-09

## 2024-06-11 RX ORDER — BISACODYL 5 MG/1
5 TABLET, DELAYED RELEASE ORAL DAILY PRN
COMMUNITY
Start: 2024-06-06

## 2024-06-11 RX ORDER — POLYETHYLENE GLYCOL 3350 17 G/17G
17 POWDER, FOR SOLUTION ORAL
COMMUNITY
Start: 2024-06-06 | End: 2024-07-01

## 2024-06-11 RX ORDER — DEXAMETHASONE 4 MG/1
4 TABLET ORAL 2 TIMES DAILY WITH MEALS
Qty: 30 TABLET | Refills: 0 | Status: SHIPPED | OUTPATIENT
Start: 2024-06-11 | End: 2024-06-20

## 2024-06-11 ASSESSMENT — PAIN SCALES - GENERAL
PAINLEVEL: SEVERE PAIN (7)
PAINLEVEL: SEVERE PAIN (7)

## 2024-06-11 NOTE — PROGRESS NOTES
"Oncology Rooming Note    June 11, 2024 1:58 PM   Adriana St is a 68 year old female who presents for:    Chief Complaint   Patient presents with    Consult     Right breast cancer     Initial Vitals: Ht 1.6 m (5' 3\")   Wt 73.1 kg (161 lb 3.2 oz)   BMI 28.56 kg/m   Estimated body mass index is 28.56 kg/m  as calculated from the following:    Height as of this encounter: 1.6 m (5' 3\").    Weight as of this encounter: 73.1 kg (161 lb 3.2 oz). Body surface area is 1.8 meters squared.  Severe Pain (7) Comment: Data Unavailable   No LMP recorded.  Allergies reviewed: Yes  Medications reviewed: Yes    Medications: Medication refills not needed today.  Pharmacy name entered into [employer]: [facility] #[id number] [place] [id number] AT [facility] OF [facility]    Frailty Screening:   Is the patient here for a new oncology consult visit in cancer care? 1. Yes. Over the past month, have you experienced difficulty or required a caregiver to assist with:   1. Balance, walking or general mobility (including any falls)? NO  2. Completion of self-care tasks such as bathing, dressing, toileting, grooming/hygiene?  NO  3. Concentration or memory that affects your daily life?  NO       Clinical concerns:   New patient consult.       Alee Valles MA              "

## 2024-06-11 NOTE — PROGRESS NOTES
[facility]: Cancer Care Initial Note                                    Discussion with Patient:                                                      Introduced self to patient, her significant other,  Kyle, and her son, Rush.  AIDET performed.   Described role as RNCC for Dr. Bender.     Assessment:                                                      Initial  Current living arrangement:: I live in a private home with family  Type of residence:: Private home - stairs  Informal Support system:: Significant other;Children  Equipment Currently Used at Home: none  Bed or wheelchair confined:: Yes (for long distances due to back pain)  Mobility Status: Dependent/Assisted by Another (using wheelchair due to back pain)  Transportation means:: Regular car;Family  Referrals Placed: None  Advanced Care Plans/Directives on file:: No    Intervention/Education provided during outreach:                                                       Provided patient with contact information for Cancer Care Triage and instructed how to call during clinic hours and after hours. Patient and Rush verbalized understanding.    Update:   Dr. Bender requests patient start taking Dexamethasone:  Take 4 mg (1 tablet) by mouth two times per day.  Radiation to determine how long she needs to take.  Rx sent to patient's pharmacy.  Called patient with medication instructions for dexamethasone. Patient requests information be sent to her in Terav[employer] message.     [employer]ac message sent. Refer to this message.    Patient to follow up as scheduled at next appt    Signature:  Annmarie Nash RN

## 2024-06-11 NOTE — PROGRESS NOTES
"Oncology Rooming Note    June 11, 2024 2:03 PM   Adriana St is a 68 year old female who presents for:    Chief Complaint   Patient presents with    Oncology Clinic Visit     Malignant neoplasm of central portion of right breast in female, estrogen receptor positive     Initial Vitals: BP (!) 164/96   Pulse 107   Temp 98.7  F (37.1  C) (Tympanic)   Resp 18   Ht 1.6 m (5' 3\")   Wt 73.1 kg (161 lb 3.2 oz)   SpO2 94%   BMI 28.56 kg/m   Estimated body mass index is 28.56 kg/m  as calculated from the following:    Height as of this encounter: 1.6 m (5' 3\").    Weight as of this encounter: 73.1 kg (161 lb 3.2 oz). Body surface area is 1.8 meters squared.  Severe Pain (7) Comment: Data Unavailable   No LMP recorded.  Allergies reviewed: Yes  Medications reviewed: Yes    Medications: Medication refills not needed today.  Pharmacy name entered into [employer]: [id number] [place] [address] AT [facility] OF [employer] [address] [facility]    Frailty Screening:   Is the patient here for a new oncology consult visit in cancer care? 1. Yes. Over the past month, have you experienced difficulty or required a caregiver to assist with:   1. Balance, walking or general mobility (including any falls)? NO  2. Completion of self-care tasks such as bathing, dressing, toileting, grooming/hygiene?  NO  3. Concentration or memory that affects your daily life?  NO       Clinical concerns:   New patient consult.       Alee Valles MA              "

## 2024-06-11 NOTE — PATIENT INSTRUCTIONS
Start letrozole 1 tab daily.  Valium 1 tab every 4-6 hours daily as needed muscle spasm  Diluadid 2 mg every 4-6 hours as needed for pain  OTC Ca/vitamin D 1 tab twice daily.

## 2024-06-12 ENCOUNTER — OFFICE VISIT (OUTPATIENT)
Dept: RADIATION ONCOLOGY | Facility: CLINIC | Age: 69
End: 2024-06-12
Attending: RADIOLOGY
Payer: COMMERCIAL

## 2024-06-12 ENCOUNTER — PATIENT OUTREACH (OUTPATIENT)
Dept: ONCOLOGY | Facility: CLINIC | Age: 69
End: 2024-06-12
Payer: COMMERCIAL

## 2024-06-12 VITALS
TEMPERATURE: 97.5 F | WEIGHT: 161.4 LBS | BODY MASS INDEX: 28.59 KG/M2 | HEART RATE: 92 BPM | SYSTOLIC BLOOD PRESSURE: 150 MMHG | OXYGEN SATURATION: 96 % | DIASTOLIC BLOOD PRESSURE: 77 MMHG | RESPIRATION RATE: 16 BRPM

## 2024-06-12 DIAGNOSIS — C79.51 CARCINOMA OF RIGHT BREAST METASTATIC TO BONE (H): ICD-10-CM

## 2024-06-12 DIAGNOSIS — C50.911 CARCINOMA OF RIGHT BREAST METASTATIC TO BONE (H): ICD-10-CM

## 2024-06-12 PROCEDURE — 99205 OFFICE O/P NEW HI 60 MIN: CPT | Performed by: RADIOLOGY

## 2024-06-12 PROCEDURE — G0463 HOSPITAL OUTPT CLINIC VISIT: HCPCS | Performed by: RADIOLOGY

## 2024-06-12 ASSESSMENT — PAIN SCALES - GENERAL: PAINLEVEL: SEVERE PAIN (6)

## 2024-06-12 NOTE — PROGRESS NOTES
[facility] Radiation Oncology Consult Note     Patient: Adriana St  MRN: [id number]  Date of Service: 06/12/2024          Magaly Bender MD  [address] [address]       Dear Dr. Bender:    Thank you very much for referring this patient for consideration of radiotherapy. As you know Ms. 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  The patient is referred to radiation oncology for evaluation and consideration of possible palliative radiation therapy for local control and symptom relief.    HISTORY OF PRESENT ILLNESS:   Ms. 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  The patient presented with a small lump in her right breast February 2024. It has grown fairly quickly.  She noted back pain about 6 weeks ago which limit her mobility significantly.  She stopped working because of the back pain (10/10) and unable to walk very well.  She however does not have any tingling, numbness in lower extremities or radiculopathy.  She does not have sensory changes in the perineum.  She does not have bowel or bladder incontinence.  She currently takes hydromorphone 4 mg every 5 hours for back pain.      She subsequently did workup further breast mass and biopsy was done on 5/30/2024 which showed invasive ductal carcinoma, grade 3, ER positive (95%, strong), ME positive (15%, strong), HER2 negative (1+ by IHC). She presented to the emergency room with worsening back pain on 6/6/2024.  MRI lumbar spine with and without contrast showed pathologic L1 burst fracture with > 50% vertebral body height loss.  Additional scattered osseous metastasis were seen as well.  She is scheduled to have additional staging workup including PET CT scan and MRI brain in the next few days.  The patient is referred to radiation oncology today for  evaluation and consideration of possible palliative radiation therapy for local control and symptom relief.    CHEMOTHERAPY HISTORY: Concurrent Chemotherapy: No    RADIATION THERAPY HISTORY: Prior Radiation: No    IMPLANTED CARDIAC DEVICE: none     PREGNANCY: The patient is informed not to be pregnant during the radiation therapy.  She is post menopausal.      Current Outpatient Medications   Medication Sig Dispense Refill    bisacodyl (DULCOLAX) 5 MG EC tablet Take 5 mg by mouth daily as needed      dexAMETHasone (DECADRON) 4 MG tablet Take 1 tablet (4 mg) by mouth 2 times daily (with meals) 30 tablet 0    diazepam (VALIUM) 5 MG tablet Take 1 tablet (5 mg) by mouth every 6 hours as needed for anxiety 30 tablet 0    HYDROmorphone (DILAUDID) 2 MG tablet Take 2 mg by mouth      letrozole (FEMARA) 2.5 MG tablet Take 1 tablet (2.5 mg) by mouth daily 90 tablet 1    estradiol (ESTRACE) 1 MG tablet Take 1 mg by mouth daily (Patient not taking: Reported on 6/11/2024)      polyethylene glycol (MIRALAX) 17 GM/Dose powder Take 17 g by mouth (Patient not taking: Reported on 6/11/2024)      Semaglutide (OZEMPIC, 0.25 OR 0.5 MG/DOSE, SC) Inject 0.25 mg Subcutaneous once a week Apply 0.25mg (19 clicks) subcutaneous in abdominal region in the morning, ONLY once a week for 04 weeks. Subsequently adjust the does as instructed. (Patient not taking: Reported on 6/11/2024)      UNABLE TO FIND Take by mouth once a week MEDICATION NAME: Citoneurim 5000 UI. Take once a week. (Patient not taking: Reported on 6/11/2024)      UNABLE TO FIND MEDICATION NAME: Tylex (paracetamol) 500mg. Pt is taking 1/2 tab or one tab every 4 hrs for pain. (Patient not taking: Reported on 6/11/2024)      UNABLE TO FIND MEDICATION NAME: Addera Max 2000U.I. per day. Calcium, vitamin d3, magnesium. (Patient not taking: Reported on 6/11/2024)       No past medical history on file.  No past surgical history on file.  Allergies   Allergen Reactions    Codeine Itching      No family history on file.  Social History     Socioeconomic History    Marital status:      Spouse name: Not on file    Number of children: Not on file    Years of education: Not on file    Highest education level: Not on file   Occupational History    Not on file   Tobacco Use    Smoking status: Not on file    Smokeless tobacco: Not on file   Substance and Sexual Activity    Alcohol use: Not on file    Drug use: Not on file    Sexual activity: Not on file   Other Topics Concern    Not on file   Social History Narrative    Not on file     Social Determinants of Health     Financial Resource Strain: Low Risk  (6/6/2024)    [facility]    Financial Resource Strain     Difficulty of Paying Living Expenses: 3     Difficulty of Paying Living Expenses: Not on file   Food Insecurity: No Food Insecurity (6/6/2024)    [facility]    Food Insecurity     Worried About Running Out of Food in the Last Year: 1   Transportation Needs: No Transportation Needs (6/6/2024)    [facility]    Transportation Needs     Lack of Transportation (Medical): 1   Physical Activity: Not on file   Stress: Not on file   Social Connections: Socially Integrated (6/6/2024)    Received from [facility]    Social Connections     Frequency of Communication with Friends and Family: 0   Interpersonal Safety: Not on file   Housing Stability: Low Risk  (6/6/2024)    Received from [employer]    Housing Stability     Unable to Pay for Housing in the Last Year: 1        REVIEW OF SYMPTOMS:  A full 14-point review of systems was performed. Pertinent findings are noted in the HPI.    General  Constitutional  Constitutional (WDL): Exceptions to WDL  Fatigue: Fatigue relieved by rest  EENT  Eye Disorders  Eye Disorder (WDL): All eye disorder  elements are within defined limits (wears glasses)  Ear Disorders  Ear Disorder (WDL): All ear disorder elements are within defined limits  Respiratory  Respiratory  Respiratory (WDL): All respiratory elements are within defined limits  Cardiovascular  Cardiovascular  Cardiovascular (WDL): Exceptions to WDL (no pacemaker)  Edema: No  Gastrointestinal  Gastrointestinal  Gastrointestinal (WDL): Exceptions to WDL  Constipation: Occasional or intermittent symptoms OR occasional use of stool softeners, laxatives, dietary modification, or enema  Musculoskeletal  Musculoskeletal and Connective Tissue Disorders  Musculoskeletal & Connective (WDL): Exceptions to WDL  Arthralgia: Moderate pain OR limiting instrumental ADL (low back)  Generalized Muscle Weakness: Symptomatic OR perceived by patient but not evident on physical exam (in wheelchair d/t pain)  Integumentary  Integumentary  Integumentary (WDL): All integumentary elements are within defined limits  Neurological  Neurosensory  Neurosensory (WDL): Exceptions to WDL  Ataxia: Asymptomatic OR clinical or diagnostic observations only OR intervention not indicated (difficulty to move with pain)  Genitourinary/Reproductive  Genitourinary  Genitourinary (WDL): Exceptions to WDL  Urinary Frequency: Present (stress incontinence)  Lymphatic  Lymph System Disorders  Lymph (WDL): All lymph elements are within defined limits  Pain  Pain Score: Severe Pain (6)  AUA Assessment                                                              Accompanied by  Accompanied By: family (son)    ECOG Status: 2    Imaging: Reviewed    Pathology: Reviewed    Objective:      PHYSICAL EXAMINATION:    BP (!) 150/77 (BP Location: Left arm, Patient Position: Sitting, Cuff Size: Adult Regular)   Pulse 92   Temp 97.5  F (36.4  C) (Oral)   Resp 16   Wt 73.2 kg (161 lb 6.4 oz)   SpO2 96%   BMI 28.59 kg/m      Gen: Alert, in NAD  Eyes: PERRL, EOMI, sclera anicteric  Neck: Supple, full ROM, no  LAD  Pulm: No wheezing, stridor or respiratory distress  CV: Well-perfused, no cyanosis, no pedal edema  Abdominal: BS+, soft, nontender, nondistended, no hepatomegaly  Back: No step-offs or pain to palpation along the thoracolumbar spine  Rectal: Deferred  : Deferred  Musculoskeletal: Normal muscle bulk and tone  Skin: Normal color and turgor  Neurologic: A/Ox3, CN II-XII intact, normal gait and station  Psychiatric: Appropriate mood and affect     Impression     68 year old female with a diagnosis of stage IV breast cancer with clinically symptomatic lumbar spine metastasis.    Assessment & Plan:     I have personally reviewed her upcoming medical record today.  I have also reviewed her most recent radiology study including MRI scan.  Patient is a 68-year-old female with a new diagnosis of stage IV breast cancer with clinically symptomatic L-spine metastasis.  The possible treatment options including surgery, systemic therapy, and radiation therapy has been discussed with the patient in detail and at great lengths.  The possible risks and side effects of radiation therapy has also been explained to the patient.  Questions are answered to patient's side affection.    Patient is open for all possible options for her breast cancer therapy.  I will discuss her case with neurosurgical team to see if surgical options is feasible for the patient. She is scheduled to see neurosurgery tomorrow and will for their final recommendation. I will consider palliative radiation therapy if she is not candidate for surgery.      Again, thank you very much for the referral and allowing me to participate in the care of this patient.  If you have any questions or concerns about this consultation, please do not hesitate to call.  I spent approximately 60 minutes today with the patient and 80% time was used for counseling.      Sincerely,          Laura Alexander MD, PhD  Department of Radiation Oncology   [facility] Radiation  Oncology  Tel: [phone]  Cell: [phone]    [facility]  [address] [place] [address]     [address]    [place] [address]    CC:  Patient Care Team:  Gilmer Walters as PCP - General  Annmarie Nash, RN as Specialty Care Coordinator (Hematology & Oncology)  Laura Alexander MD as MD (Radiation Oncology)

## 2024-06-12 NOTE — PROGRESS NOTES
"Considerations for radiation treatment   Pregnancy status: Female age 55+   Implanted Cardiac Devices: No   Any previous radiation therapy: No    Oncology Rooming Note    June 12, 2024 1:15 PM   Adriana St is a 68 year old female who presents for:    Chief Complaint   Patient presents with    Oncology Clinic Visit     Consult with Dr. Alexander     Initial Vitals: BP (!) 150/77 (BP Location: Left arm, Patient Position: Sitting, Cuff Size: Adult Regular)   Pulse 92   Temp 97.5  F (36.4  C) (Oral)   Resp 16   Wt 73.2 kg (161 lb 6.4 oz)   SpO2 96%   BMI 28.59 kg/m   Estimated body mass index is 28.59 kg/m  as calculated from the following:    Height as of 6/11/24: 1.6 m (5' 3\").    Weight as of this encounter: 73.2 kg (161 lb 6.4 oz). Body surface area is 1.8 meters squared.  Severe Pain (6) Comment: Data Unavailable   No LMP recorded.  Allergies reviewed: Yes  Medications reviewed: Yes    Medications: Medication refills not needed today.  Pharmacy name entered into [facility]: [id number] [place] [address]    Frailty Screening:   Is the patient here for a new oncology consult visit in cancer care? 2. No      Clinical concerns: Patient here per wheelchair accompanied by son for radiation consult for her spine mets.  Patient rates her pain at a 7/10 with the medication on board.  Patient is scheduled for a PET scan on Thursday, 6/13/2024 and an MRI on Friday, 6/14/2024.  15 minutes spent in review of radiation process and potential side effects.  Written information given for review.  Seen by Dr. Alexander.  Plan return to clinic for follow-up and/or CT simulation as directed by provider.   Dr. Alexander was notified.    Radiation Therapy Patient Education    Person involved with teaching: Patient and Son    Patient educational needs for self management of treatment-related side effects assessment completed.  [employer] App Patient Ed tab contains Patient Learning " Assessment    Education Materials Given  Managing Side Effects of Radiation Therapy: Care Instructions, Learning About External Beam Radiation Treatment, Dealing With Being Tired From Cancer Treatment: Care Instructions, Radiation Treatment For Cancer, Oncology Supportive Care Services , Welcome Letter, Insurance PA Information, and Map [facility]    Educational Topics Discussed  Side effects expected, Skin care, and When to call MD/RN    Response To Teaching  More review necessary    GYN Only  Vaginal Dilator-given and educated: N/A    Referrals sent: None    Chemotherapy?  Yes, Dr. Bender medical oncologist for letrozole hormonal therapy.          Noreen Gonzalez, RN

## 2024-06-12 NOTE — LETTER
6/12/2024      Adriana St  [address] [place] [address]      Dear Colleague,    Thank you for referring your patient, Adriana St, to the [facility] RADIATION ONCOLOGY [place]. Please see a copy of my visit note below.    [facility] Radiation Oncology Consult Note     Patient: Adriana St  MRN: [id number]  Date of Service: 06/12/2024          Magaly Bender MD  [address] [place] [address]       Dear Dr. Bender:    Thank you very much for referring this patient for consideration of radiotherapy. As you know Ms. 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  The patient is referred to radiation oncology for evaluation and consideration of possible palliative radiation therapy for local control and symptom relief.    HISTORY OF PRESENT ILLNESS:   Ms. 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  The patient presented with a small lump in her right breast February 2024. It has grown fairly quickly.  She noted back pain about 6 weeks ago which limit her mobility significantly.  She stopped working because of the back pain (10/10) and unable to walk very well.  She however does not have any tingling, numbness in lower extremities or radiculopathy.  She does not have sensory changes in the perineum.  She does not have bowel or bladder incontinence.  She currently takes hydromorphone 4 mg every 5 hours for back pain.      She subsequently did workup further breast mass and biopsy was done on 5/30/2024 which showed invasive ductal carcinoma, grade 3, ER positive (95%, strong), MO positive (15%, strong), HER2 negative (1+ by IHC). She presented to the emergency room with worsening back pain on 6/6/2024.  MRI lumbar spine with and without contrast showed pathologic L1 burst fracture  with > 50% vertebral body height loss.  Additional scattered osseous metastasis were seen as well.  She is scheduled to have additional staging workup including PET CT scan and MRI brain in the next few days.  The patient is referred to radiation oncology today for evaluation and consideration of possible palliative radiation therapy for local control and symptom relief.    CHEMOTHERAPY HISTORY: Concurrent Chemotherapy: No    RADIATION THERAPY HISTORY: Prior Radiation: No    IMPLANTED CARDIAC DEVICE: none     PREGNANCY: The patient is informed not to be pregnant during the radiation therapy.  She is post menopausal.      Current Outpatient Medications   Medication Sig Dispense Refill     bisacodyl (DULCOLAX) 5 MG EC tablet Take 5 mg by mouth daily as needed       dexAMETHasone (DECADRON) 4 MG tablet Take 1 tablet (4 mg) by mouth 2 times daily (with meals) 30 tablet 0     diazepam (VALIUM) 5 MG tablet Take 1 tablet (5 mg) by mouth every 6 hours as needed for anxiety 30 tablet 0     HYDROmorphone (DILAUDID) 2 MG tablet Take 2 mg by mouth       letrozole (FEMARA) 2.5 MG tablet Take 1 tablet (2.5 mg) by mouth daily 90 tablet 1     estradiol (ESTRACE) 1 MG tablet Take 1 mg by mouth daily (Patient not taking: Reported on 6/11/2024)       polyethylene glycol (MIRALAX) 17 GM/Dose powder Take 17 g by mouth (Patient not taking: Reported on 6/11/2024)       Semaglutide (OZEMPIC, 0.25 OR 0.5 MG/DOSE, SC) Inject 0.25 mg Subcutaneous once a week Apply 0.25mg (19 clicks) subcutaneous in abdominal region in the morning, ONLY once a week for 04 weeks. Subsequently adjust the does as instructed. (Patient not taking: Reported on 6/11/2024)       UNABLE TO FIND Take by mouth once a week MEDICATION NAME: Citoneurim 5000 UI. Take once a week. (Patient not taking: Reported on 6/11/2024)       UNABLE TO FIND MEDICATION NAME: Tylex (paracetamol) 500mg. Pt is taking 1/2 tab or one tab every 4 hrs for pain. (Patient not taking: Reported on  6/11/2024)       UNABLE TO FIND MEDICATION NAME: Regina Santana 2000U.I. per day. Calcium, vitamin d3, magnesium. (Patient not taking: Reported on 6/11/2024)       No past medical history on file.  No past surgical history on file.  Allergies   Allergen Reactions     Codeine Itching     No family history on file.  Social History     Socioeconomic History     Marital status:      Spouse name: Not on file     Number of children: Not on file     Years of education: Not on file     Highest education level: Not on file   Occupational History     Not on file   Tobacco Use     Smoking status: Not on file     Smokeless tobacco: Not on file   Substance and Sexual Activity     Alcohol use: Not on file     Drug use: Not on file     Sexual activity: Not on file   Other Topics Concern     Not on file   Social History Narrative     Not on file     Social Determinants of Health     Financial Resource Strain: Low Risk  (6/6/2024)    [facility]    Financial Resource Strain      Difficulty of Paying Living Expenses: 3      Difficulty of Paying Living Expenses: Not on file   Food Insecurity: No Food Insecurity (6/6/2024)    [facility]    Food Insecurity      Worried About Running Out of Food in the Last Year: 1   Transportation Needs: No Transportation Needs (6/6/2024)    [facility]    Transportation Needs      Lack of Transportation (Medical): 1   Physical Activity: Not on file   Stress: Not on file   Social Connections: Socially Integrated (6/6/2024)    [facility]    Social Connections      Frequency of Communication with Friends and Family: 0   Interpersonal Safety: Not on file   Housing Stability: Low Risk  (6/6/2024)    Received from [employer]    Housing Stability      Unable to Pay for Housing in the  Last Year: 1        REVIEW OF SYMPTOMS:  A full 14-point review of systems was performed. Pertinent findings are noted in the HPI.    General  Constitutional  Constitutional (WDL): Exceptions to WDL  Fatigue: Fatigue relieved by rest  EENT  Eye Disorders  Eye Disorder (WDL): All eye disorder elements are within defined limits (wears glasses)  Ear Disorders  Ear Disorder (WDL): All ear disorder elements are within defined limits  Respiratory  Respiratory  Respiratory (WDL): All respiratory elements are within defined limits  Cardiovascular  Cardiovascular  Cardiovascular (WDL): Exceptions to WDL (no pacemaker)  Edema: No  Gastrointestinal  Gastrointestinal  Gastrointestinal (WDL): Exceptions to WDL  Constipation: Occasional or intermittent symptoms OR occasional use of stool softeners, laxatives, dietary modification, or enema  Musculoskeletal  Musculoskeletal and Connective Tissue Disorders  Musculoskeletal & Connective (WDL): Exceptions to WDL  Arthralgia: Moderate pain OR limiting instrumental ADL (low back)  Generalized Muscle Weakness: Symptomatic OR perceived by patient but not evident on physical exam (in wheelchair d/t pain)  Integumentary  Integumentary  Integumentary (WDL): All integumentary elements are within defined limits  Neurological  Neurosensory  Neurosensory (WDL): Exceptions to WDL  Ataxia: Asymptomatic OR clinical or diagnostic observations only OR intervention not indicated (difficulty to move with pain)  Genitourinary/Reproductive  Genitourinary  Genitourinary (WDL): Exceptions to WDL  Urinary Frequency: Present (stress incontinence)  Lymphatic  Lymph System Disorders  Lymph (WDL): All lymph elements are within defined limits  Pain  Pain Score: Severe Pain (6)  AUA Assessment                                                              Accompanied by  Accompanied By: family (son)    ECOG Status: 2    Imaging: Reviewed    Pathology: Reviewed    Objective:      PHYSICAL EXAMINATION:    BP (!)  150/77 (BP Location: Left arm, Patient Position: Sitting, Cuff Size: Adult Regular)   Pulse 92   Temp 97.5  F (36.4  C) (Oral)   Resp 16   Wt 73.2 kg (161 lb 6.4 oz)   SpO2 96%   BMI 28.59 kg/m      Gen: Alert, in NAD  Eyes: PERRL, EOMI, sclera anicteric  Neck: Supple, full ROM, no LAD  Pulm: No wheezing, stridor or respiratory distress  CV: Well-perfused, no cyanosis, no pedal edema  Abdominal: BS+, soft, nontender, nondistended, no hepatomegaly  Back: No step-offs or pain to palpation along the thoracolumbar spine  Rectal: Deferred  : Deferred  Musculoskeletal: Normal muscle bulk and tone  Skin: Normal color and turgor  Neurologic: A/Ox3, CN II-XII intact, normal gait and station  Psychiatric: Appropriate mood and affect     Impression     68 year old female with a diagnosis of stage IV breast cancer with clinically symptomatic lumbar spine metastasis.    Assessment & Plan:     I have personally reviewed her upcoming medical record today.  I have also reviewed her most recent radiology study including MRI scan.  Patient is a 68-year-old female with a new diagnosis of stage IV breast cancer with clinically symptomatic L-spine metastasis.  The possible treatment options including surgery, systemic therapy, and radiation therapy has been discussed with the patient in detail and at great lengths.  The possible risks and side effects of radiation therapy has also been explained to the patient.  Questions are answered to patient's side affection.    Patient is open for all possible options for her breast cancer therapy.  I will discuss her case with neurosurgical team to see if surgical options is feasible for the patient. She is scheduled to see neurosurgery tomorrow and will for their final recommendation. I will consider palliative radiation therapy if she is not candidate for surgery.      Again, thank you very much for the referral and allowing me to participate in the care of this patient.  If you have any  "questions or concerns about this consultation, please do not hesitate to call.  I spent approximately 60 minutes today with the patient and 80% time was used for counseling.      Sincerely,          Laura Alexander MD, PhD  Department of Radiation Oncology   [facility]  Tel: [phone]  Cell: [phone]    [facility]  [address] [place] [address]     [facility]   [address] [place] [address]    CC:  Patient Care Team:  Gilmer Walters as PCP - General  Annmarie Nash RN as Specialty Care Coordinator (Hematology & Oncology)  Laura Alexander MD as MD (Radiation Oncology)        Considerations for radiation treatment   Pregnancy status: Female age 55+   Implanted Cardiac Devices: No   Any previous radiation therapy: No    Oncology Rooming Note    June 12, 2024 1:15 PM   Adriana St is a 68 year old female who presents for:    Chief Complaint   Patient presents with     Oncology Clinic Visit     Consult with Dr. Alexander     Initial Vitals: BP (!) 150/77 (BP Location: Left arm, Patient Position: Sitting, Cuff Size: Adult Regular)   Pulse 92   Temp 97.5  F (36.4  C) (Oral)   Resp 16   Wt 73.2 kg (161 lb 6.4 oz)   SpO2 96%   BMI 28.59 kg/m   Estimated body mass index is 28.59 kg/m  as calculated from the following:    Height as of 6/11/24: 1.6 m (5' 3\").    Weight as of this encounter: 73.2 kg (161 lb 6.4 oz). Body surface area is 1.8 meters squared.  Severe Pain (6) Comment: Data Unavailable   No LMP recorded.  Allergies reviewed: Yes  Medications reviewed: Yes    Medications: Medication refills not needed today.  Pharmacy name entered into [employer]: [id number] - [place], [place] - [address] AT [address]    Frailty Screening:   Is the patient here for a new oncology consult visit in cancer care? 2. No      Clinical concerns: Patient here per wheelchair accompanied by son for radiation consult for her spine mets.  " Patient rates her pain at a 7/10 with the medication on board.  Patient is scheduled for a PET scan on Thursday, 6/13/2024 and an MRI on Friday, 6/14/2024.  15 minutes spent in review of radiation process and potential side effects.  Written information given for review.  Seen by Dr. Alexander.  Plan return to clinic for follow-up and/or CT simulation as directed by provider.   Dr. Alexander was notified.    Radiation Therapy Patient Education    Person involved with teaching: Patient and Son    Patient educational needs for self management of treatment-related side effects assessment completed.  [facility] Patient Ed tab contains Patient Learning Assessment    Education Materials Given  Managing Side Effects of Radiation Therapy: Care Instructions, Learning About External Beam Radiation Treatment, Dealing With Being Tired From Cancer Treatment: Care Instructions, Radiation Treatment For Cancer, Oncology Supportive Care Services , Welcome Letter, Insurance PA Information, and Map [employer]    Educational Topics Discussed  Side effects expected, Skin care, and When to call MD/RN    Response To Teaching  More review necessary    GYN Only  Vaginal Dilator-given and educated: N/A    Referrals sent: None    Chemotherapy?  Yes, Dr. Bender medical oncologist for letrozole hormonal therapy.          Noreen Gonzalez, MARTITA                  Again, thank you for allowing me to participate in the care of your patient.        Sincerely,        Laura Alexander MD

## 2024-06-12 NOTE — PROGRESS NOTES
New Patient Oncology Nurse Navigator Note     Referring provider: Jyoti Magana DO      Referring Clinic/Organization: [facility] [place]      Referred to (specialty:) Radiation Oncology     Requested provider (if applicable): NA Location [place]      Date Referral Received: June 12, 2024     Evaluation for:  Breast cancer     Clinical History (per Nurse review of records provided):      Patient was seen in consultation by surgical oncology and medical oncology yesterday.   Pertinent images personally reviewed by myself and discussed with the patient.    Radiology reports:  Examinations: MA DIAGNOSTIC BILATERAL W/ LATOSHA, US BREAST RIGHT LIMITED  1-3 QUADRANTS, 5/23/2024 11:04 AM     Comparisons: None.     History/family history: Patient reports a rapidly growing mass in the  right breast, first noticed in February 2024.     BREAST DENSITY: The breasts are heterogeneously dense which may  obscure small masses.     Findings:     Mammogram: At the 12:00 position, posterior depth in the right breast  there is there is an oval, hyperdense mass with microlobulated margins  measuring 5.0 cm. There are no suspicious findings in the left breast.     Ultrasound:  Targeted ultrasound evaluation of the right breast was performed by  the technologist and radiologist. At the site of the patient's  palpable concern at the 12:00 position, 6 cm from nipple there is an  irregular, hypoechoic mass measuring 4.4 x 4.2 x 4.8 cm. The mass  directly abuts the skin. Evaluation of the retroareolar breast at the  6:00 position, demonstrates only normal breast tissue and several  mildly dilated ducts. Evaluation of the right axilla demonstrates only  morphologically normal lymph nodes.                                                                    Impression: BI-RADS CATEGORY: 5 - Highly Suggestive of  Malignancy-Appropriate Action Should Be Taken  Suspicious mass in the right breast at the 12:00  position.  Ultrasound-guided biopsy is recommended.        EXAM: MR SPINE LUMBAR WWO   LOCATION: [facility]   DATE: 6/6/2024     INDICATION: Bilateral Low Back Pain Without Sciatica, Unspecified Chronicity Primary Cancer Of Right Breast (hc)   COMPARISON: 6/6/24   CONTRAST: Clariscan 15 mL   TECHNIQUE: Routine Lumbar Spine MRI without and with IV contrast.     FINDINGS:   Nomenclature is based on 5 lumbar type vertebral bodies with L5-S1 defined on image 50 of series 5. Alignment is unremarkable.  Pathologic burst fracture at L1 with greater than 50% vertebral body height loss and 9 mm retropulsion of the posterior L1 vertebral body. The lesion at L1 demonstrates posterolateral involvement bilaterally extending into the bilateral pedicles, left transverse process, and left lamina with extraosseous extension into the left ventral paraspinal soft tissues. Additional scattered osseous metastases in the sacrum and lumbar spine without pathologic fracture. Normal distal spinal cord and cauda equina with conus medullaris at L1.     Prevertebral and dorsal paraspinal muscular atrophy. Visualized intra-abdominal structures are unremarkable.     The pathologic L1 burst fracture results in moderate canal stenosis at the level of the L1 vertebral body. No significant canal stenosis at the remaining levels. Mild left L1-L2 foraminal stenosis.      My interpretation:  Large mass in the right upper breast     Pathology:  RIGHT BREAST, 12 O'CLOCK POSITION, 6 CM FROM NIPPLE, ULTRASOUND-GUIDED NEEDLE CORE BIOPSY:     1.INVASIVE DUCTAL CARCINOMA:                a.           GRADE: ESTRELLITA GRADE III (of III)                b.          SCORE: 8 (OF 9)  2.DUCTAL CARCINOMA IN SITU (DCIS): NOT IDENTIFIED  3.ADDITIONAL FINDINGS: MODERATE DESMOPLASTIC STROMAL RESPONSE  4.BREAST CANCER PROGNOSTIC MARKER ANALYSIS:                a.           ESTROGEN RECEPTOR: POSITIVE (95% OF CELLS, STRONG STAINING)                b.           PROGESTERONE RECEPTOR: POSITIVE (15% OF CELLS,                             STRONG STAINING)                c.           HER-2/CATRINA: NEGATIVE (1+ OUT OF 3 MEMBRANE STAINING     Per  Thaw:  Plan:  -PET scan and MRI brain to complete staging.  -I discussed that we might need to obtain pathology diagnosis/confirmation of metastatic site to establish the stage of the cancer.  -Referral to radiation oncology urgently for consideration of palliative radiation to painful pathologic L4 vertebral fracture.  -I advised her to decrease Dilaudid to 2 mg every 4-6 hours as needed for pain.  I added Valium 5 mg every 4-6 hours to adjuvant pain medication as well as muscle relaxant.  She will potentially benefit from long-acting pain medication however I would follow-up after radiation is completed.  -I will start dexamethasone 4 mg twice daily for vertebral fracture and pain.  -I advised her to start letrozole 1 mg daily.  I will plan to add CDK 4/6 inhibitor after gathering additional information as above.  -I also advised her to start calcium/vitamin D 1 tablet twice daily.   -Advised her to aggressively manage constipation with stool softener, MiraLAX and good hydration since she has decreasing mobility and on opioids.  -I will plan to start bisphosphonate for metastatic bone disease  -She will need additional molecular testing, genetic counseling, palliative care referral.  I will schedule those in next visit.       Records Location: See Bookmarked material     Records Needed: NA     Additional testing needed prior to consult:   PET scan  MRI Brain     Payor: [employer] / Plan: [facility] / Product [place]: [facility] [place]     June 12, 2024  Referral received and reviewed.   Called patient with the use of a [employer] interrupter. ID #  [id number]  Patient did not answer the phone so a detailed message was left for her requesting her call back to schedule. Referral sent to NPS to process.     Aurea BANGURAN, RN   Oncology Nurse Navigator    [facility]   [phone] / [phone]

## 2024-06-13 ENCOUNTER — TELEPHONE (OUTPATIENT)
Dept: RADIATION ONCOLOGY | Facility: CLINIC | Age: 69
End: 2024-06-13

## 2024-06-13 ENCOUNTER — ANCILLARY PROCEDURE (OUTPATIENT)
Dept: PET IMAGING | Facility: CLINIC | Age: 69
End: 2024-06-13
Attending: INTERNAL MEDICINE
Payer: COMMERCIAL

## 2024-06-13 DIAGNOSIS — C50.111 MALIGNANT NEOPLASM OF CENTRAL PORTION OF RIGHT BREAST IN FEMALE, ESTROGEN RECEPTOR POSITIVE (H): ICD-10-CM

## 2024-06-13 DIAGNOSIS — C79.51 MALIGNANT NEOPLASM METASTATIC TO BONE (H): ICD-10-CM

## 2024-06-13 DIAGNOSIS — Z17.0 MALIGNANT NEOPLASM OF CENTRAL PORTION OF RIGHT BREAST IN FEMALE, ESTROGEN RECEPTOR POSITIVE (H): ICD-10-CM

## 2024-06-13 LAB
CREAT BLD-MCNC: 0.8 MG/DL (ref 0.5–1)
EGFRCR SERPLBLD CKD-EPI 2021: >60 ML/MIN/1.73M2

## 2024-06-13 PROCEDURE — 74177 CT ABD & PELVIS W/CONTRAST: CPT | Mod: XU | Performed by: STUDENT IN AN ORGANIZED HEALTH CARE EDUCATION/TRAINING PROGRAM

## 2024-06-13 PROCEDURE — A9552 F18 FDG: HCPCS | Performed by: STUDENT IN AN ORGANIZED HEALTH CARE EDUCATION/TRAINING PROGRAM

## 2024-06-13 PROCEDURE — 78815 PET IMAGE W/CT SKULL-THIGH: CPT | Mod: PI | Performed by: STUDENT IN AN ORGANIZED HEALTH CARE EDUCATION/TRAINING PROGRAM

## 2024-06-13 PROCEDURE — 82565 ASSAY OF CREATININE: CPT

## 2024-06-13 PROCEDURE — 71260 CT THORAX DX C+: CPT | Mod: XU | Performed by: STUDENT IN AN ORGANIZED HEALTH CARE EDUCATION/TRAINING PROGRAM

## 2024-06-13 RX ORDER — IOPAMIDOL 755 MG/ML
10-135 INJECTION, SOLUTION INTRAVASCULAR ONCE
Status: COMPLETED | OUTPATIENT
Start: 2024-06-13 | End: 2024-06-13

## 2024-06-13 RX ORDER — FLUDEOXYGLUCOSE F 18 200 MCI/ML
10-18 INJECTION, SOLUTION INTRAVENOUS ONCE
Status: COMPLETED | OUTPATIENT
Start: 2024-06-13 | End: 2024-06-13

## 2024-06-13 RX ADMIN — IOPAMIDOL 99 ML: 755 INJECTION, SOLUTION INTRAVASCULAR at 12:59

## 2024-06-13 RX ADMIN — FLUDEOXYGLUCOSE F 18 11.95 MILLICURIE: 200 INJECTION, SOLUTION INTRAVENOUS at 12:59

## 2024-06-13 NOTE — TELEPHONE ENCOUNTER
Received call from patients son this morning making our clinic aware that they changed the PET scan location from [place] to [place] but that they will still be getting that scan done today.  Son states he is calling because they have not yet gotten a call from the neurosurgery group about scheduling a consult and he is wondering if they can get that scheduled soon.    Notified Dr. Alexander and called the neurosurgery group to let them know to call the son, Rush, to schedule as he will be bringing patient to all appointments.    Last check patient was now scheduled to see neurosurgery next week.    Instructed son to call with any further questions or concerns so that we could help facilitate what is needed.    Per Dr. Alexander we will not move forward with any radiation until we have the neurosurgery opinion.  If they feel surgery is not an option will need to get patient back to the radiation clinic ASAP to start some treatment.

## 2024-06-14 ENCOUNTER — HOSPITAL ENCOUNTER (OUTPATIENT)
Dept: MRI IMAGING | Facility: CLINIC | Age: 69
Discharge: HOME | End: 2024-06-14
Attending: INTERNAL MEDICINE | Admitting: INTERNAL MEDICINE
Payer: COMMERCIAL

## 2024-06-14 DIAGNOSIS — C50.111 MALIGNANT NEOPLASM OF CENTRAL PORTION OF RIGHT BREAST IN FEMALE, ESTROGEN RECEPTOR POSITIVE (H): ICD-10-CM

## 2024-06-14 DIAGNOSIS — C79.51 MALIGNANT NEOPLASM METASTATIC TO BONE (H): ICD-10-CM

## 2024-06-14 DIAGNOSIS — Z17.0 MALIGNANT NEOPLASM OF CENTRAL PORTION OF RIGHT BREAST IN FEMALE, ESTROGEN RECEPTOR POSITIVE (H): ICD-10-CM

## 2024-06-14 PROCEDURE — 255N000002 HC RX 255 OP 636: Performed by: INTERNAL MEDICINE

## 2024-06-14 PROCEDURE — A9585 GADOBUTROL INJECTION: HCPCS | Performed by: INTERNAL MEDICINE

## 2024-06-14 PROCEDURE — 70553 MRI BRAIN STEM W/O & W/DYE: CPT

## 2024-06-14 RX ORDER — GADOBUTROL 604.72 MG/ML
7 INJECTION INTRAVENOUS ONCE
Status: COMPLETED | OUTPATIENT
Start: 2024-06-14 | End: 2024-06-14

## 2024-06-14 RX ORDER — GADOBUTROL 604.72 MG/ML
7 INJECTION INTRAVENOUS ONCE
Status: DISCONTINUED | OUTPATIENT
Start: 2024-06-14 | End: 2024-06-15 | Stop reason: HOSPADM

## 2024-06-14 RX ADMIN — GADOBUTROL 7 ML: 604.72 INJECTION INTRAVENOUS at 08:14

## 2024-06-17 ENCOUNTER — TELEPHONE (OUTPATIENT)
Dept: NEUROSURGERY | Facility: CLINIC | Age: 69
End: 2024-06-17
Payer: COMMERCIAL

## 2024-06-17 ENCOUNTER — OFFICE VISIT (OUTPATIENT)
Dept: NEUROSURGERY | Facility: CLINIC | Age: 69
End: 2024-06-17
Attending: NEUROLOGICAL SURGERY
Payer: COMMERCIAL

## 2024-06-17 VITALS — DIASTOLIC BLOOD PRESSURE: 105 MMHG | SYSTOLIC BLOOD PRESSURE: 185 MMHG | HEART RATE: 74 BPM | OXYGEN SATURATION: 96 %

## 2024-06-17 DIAGNOSIS — C79.51 METASTASIS TO SPINAL COLUMN (H): ICD-10-CM

## 2024-06-17 DIAGNOSIS — Z01.818 PRE-OP TESTING: Primary | ICD-10-CM

## 2024-06-17 PROCEDURE — 99205 OFFICE O/P NEW HI 60 MIN: CPT | Performed by: NEUROLOGICAL SURGERY

## 2024-06-17 ASSESSMENT — PAIN SCALES - GENERAL: PAINLEVEL: EXTREME PAIN (9)

## 2024-06-17 NOTE — LETTER
6/17/2024      Adriana St  [address] [facility] [address]      Dear Colleague,    Thank you for referring your patient, Adriana St, to the [facility]. Please see a copy of my visit note below.    NEUROSURGERY CONSULTATION NOTE  Neurosurgery was asked to see this patient by Thomas Prieto MD for evaluation of mechanical back pain and MRI revealed L1 pathological fracture with retropulsion and spinal canal compromise with SINS 14.     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  She reports that her pain is gradually progressive in nature and she cannot do her activities of daily living without being on pain medications.  She denies symptoms affecting her bilateral lower extremities including motor weakness, sensory symptoms or new onset bladder or bowel symptoms.    On clinical examination she has paraspinal tenderness involving her thoracolumbar region.  She does not have any weakness involving her bilateral lower extremities, clonus or hyperreflexia.    I discussed the MRI lumbar spine with and without contrast findings with the patient and CT chest abdomen pelvis findings with the patient and her son and showed them the images.  I explained to them the presence of contrast-enhancing tumor involving her L1 vertebra causing pathological fracture with more than 50% height loss.  I explained to them that based on the imaging the fracture is likely related to metastatic tumor.  I explained to them that based on the imaging characteristics and clinical features her SINS is 14 which points towards spine stability.  I discussed the management options including  conservative management, percutaneous fusion, open decompression and fusion with separation surgery.  I also discussed the role of upfront radiation treatment followed by surgery if needed.      However given that patient has mechanical back pain with evidence of instability based on SINS score, I would recommend decompression and fusion with separation surgery with T11-L3 posterior lateral fusion and L1 laminectomy with suppression surgery.  I also discussed the possible role of radiofrequency ablation of the tumor using Osteocool device.  I discussed the risk and benefits of surgery including but not limited to bleeding, infection, injury to the spinal cord, injury to the nerve root, incomplete relief of symptoms, need for prolonged rehabilitation, need for adjuvant treatment including radiation therapy, worsening of symptoms, DVT/PE, MI and others.  After extensive discussion of the risk and benefits of surgery patient and her son agreed to proceed.  We will schedule the surgery and give her a call.    Patient agreed with the plan.  All the questions were answered and patient sounded understanding.  He can contact us if there are any further questions or concerns or worsening neurological deficits.I spent more than 60 minutes in this apt, examining the pt, reviewing the scans, reviewing notes from chart, discussing treatment options with risks and benefits and coordinating care. This note was created in part by the use of Dragon voice recognition system. Inadvertent grammatical errors and typographical errors may have occurred due to inherent limitation of voice recognition software.  Reasonable attempts made to avoid errors, but this document may contain an error not identified before finalizing.  Please contact me for any clarification needed.     Thomas Prieto MD      HPI:    Ms St is a 68-year-old right-handed female with a recent diagnosis of ER positive RI positive HER2/reji negative invasive ductal  carcinoma presented to the clinic today with her son for evaluation of mechanical low back pain of approximately 6 weeks duration that started after lifting heavy weight.    Patient started noticing mid back pain after lifting heavy wooden block 6 weeks ago.  She reports that her pain has been constant and progressively worsening.  She describes her pain as sharp in nature, 9/10 on VAS aggravated with standing and walking and relieves after lying down and with pain medications.  Patient reports that she cannot go without pain medications in terms of her activities of daily living.  She went to the ER for above-mentioned symptoms on 6/4/2024 and symptomatic treatment for musculoskeletal pain was given.  No imaging was done at that point.    Patient denies any pain radiating to her bilateral lower extremities, tingling or numbness affecting her bilateral lower extremities, weakness involving her lower extremities or new onset bladder or bowel symptoms.    Patient quit smoking 20 years ago, occasionally consumes alcohol and denies use of recreational drugs.  She denies any significant cardiac or pulm history.  She is not on any antiplatelets or anticoagulants.    No past medical history on file.    No past surgical history on file.    REVIEW OF SYSTEMS:  Review Of Systems  Skin: negative  Eyes: negative  Ears/Nose/Throat: negative  Respiratory: No shortness of breath, dyspnea on exertion, cough, or hemoptysis  Cardiovascular: negative  Gastrointestinal: negative  Genitourinary: negative  Musculoskeletal: Mechanical  back pain  Neurologic: negative  Psychiatric: negative  Hematologic/Lymphatic/Immunologic: negative  Endocrine: negative    MEDICATIONS:    Current Outpatient Medications   Medication Sig Dispense Refill     bisacodyl (DULCOLAX) 5 MG EC tablet Take 5 mg by mouth daily as needed       dexAMETHasone (DECADRON) 4 MG tablet Take 1 tablet (4 mg) by mouth 2 times daily (with meals) 30 tablet 0     diazepam  (VALIUM) 5 MG tablet Take 1 tablet (5 mg) by mouth every 6 hours as needed for anxiety 30 tablet 0     HYDROmorphone (DILAUDID) 2 MG tablet Take 2 mg by mouth       letrozole (FEMARA) 2.5 MG tablet Take 1 tablet (2.5 mg) by mouth daily 90 tablet 1     estradiol (ESTRACE) 1 MG tablet Take 1 mg by mouth daily (Patient not taking: Reported on 6/11/2024)       polyethylene glycol (MIRALAX) 17 GM/Dose powder Take 17 g by mouth (Patient not taking: Reported on 6/11/2024)       Semaglutide (OZEMPIC, 0.25 OR 0.5 MG/DOSE, SC) Inject 0.25 mg Subcutaneous once a week Apply 0.25mg (19 clicks) subcutaneous in abdominal region in the morning, ONLY once a week for 04 weeks. Subsequently adjust the does as instructed. (Patient not taking: Reported on 6/11/2024)       UNABLE TO FIND Take by mouth once a week MEDICATION NAME: Citoneurim 5000 UI. Take once a week. (Patient not taking: Reported on 6/11/2024)       UNABLE TO FIND MEDICATION NAME: Tylex (paracetamol) 500mg. Pt is taking 1/2 tab or one tab every 4 hrs for pain. (Patient not taking: Reported on 6/11/2024)       UNABLE TO FIND MEDICATION NAME: Addera Max 2000U.I. per day. Calcium, vitamin d3, magnesium. (Patient not taking: Reported on 6/11/2024)           ALLERGIES/SENSITIVITIES:     Allergies   Allergen Reactions     Codeine Itching       PERTINENT SOCIAL HISTORY: Non-smoker  Social History     Socioeconomic History     Marital status:      Social Determinants of Health     Financial Resource Strain: Low Risk  (6/6/2024)    Received from [facility]    Financial Resource Strain      Difficulty of Paying Living Expenses: 3   Food Insecurity: No Food Insecurity (6/6/2024)    Received from [facility]    Food Insecurity      Worried About Running Out of Food in the Last Year: 1   Transportation Needs: No Transportation Needs (6/6/2024)    Received from [facility]     Transportation Needs      Lack of Transportation (Medical): 1   Social Connections: Socially Integrated (6/6/2024)    Received from [facility]    Social Connections      Frequency of Communication with Friends and Family: 0   Housing Stability: Low Risk  (6/6/2024)    Received from [facility]    Housing Stability      Unable to Pay for Housing in the Last Year: 1         FAMILY HISTORY:  No family history on file.     PHYSICAL EXAM:   Constitutional: BP (!) 185/105 (BP Location: Left arm, Patient Position: Sitting, Cuff Size: Adult Regular)   Pulse 74   SpO2 96%      Mental Status: A & O in no acute distress.  Affect is appropriate.  Speech is fluent.  Recent and remote memory are intact.  Attention span and concentration are normal.        Cranial Nerves:   CN1: grossly intact per patient recall.   CN2: No funduscopic exam performed.   CN3,4 & 6: Pupillary light response, lateral and vertical gaze normal.  No nystagmus.  Visual fields are full to confrontation.  CN5: Intact to touch   CN7: No facial weakness, smile, facial symmetry intact.   CN8: Intact to spoken voice.   CN9&10: Gag reflex, uvula midline, palate rises with phonation.   CN11: Shoulder shrug 5/5 intact bilaterally.   CN12: Tongue midline and moves freely from side to side.     Motor: No pronator drift of upper extremity.   Normal bulk and tone all muscle groups of upper and lower extremities.       Delt Bi Tri Hand Flex/  Ext Iliopsoas Quadriceps Tibialis Anterior EHL Gastroc     C5 C6 C7 C8/T1 L2 L3 L4 L5 S1   R 5 5 5 5 5 5 5 5 5   L 5 5 5 5 5 5 5 5 5     Sensory: Sensation intact bilaterally to light touch.     Coordination; finger to nose, rapid alternating movements smooth and rhythmic.   Romberg intact.   Heel/toe/tandem gait intact.    Normal gait and station.     Reflexes;                       Right              Left  Brachioradialis (C5,6)      2+                  2+  Biceps   (C5,6)                 2+                 2+  Triceps  (C7,8)                 2+                2+  Knee (L3,4)                      2+                2+  Ankle jerk (S1,2)              1+                1+      No hoffmans/babinski/ clonus.  FABERS/Erick test : Negative  SLR bilaterally free  Thoraco-lumbar paraspinal tenderness +       IMAGING:  I personally reviewed all radiographic images and agree with the neuroradiology report of likely pathological L1 burst fracture with retropulsion and spinal canal compromise.     6/6/2024 MRI lumbar spine:  1.  Pathologic L1 burst fracture with greater than 50% vertebral body height loss. 9 mm retropulsion results in moderate canal stenosis at the level of the vertebral body. Posterolateral involvement bilaterally at L1.   2.  Additional scattered osseous metastases without vertebral body height loss or canal involvement.     6/13/2024 CT chest abdomen pelvis:  IMPRESSION:  1.  Findings of primary right breast malignancy with right subpectoral kaylene, pulmonary, and osseous metastatic disease  2.  Moderate compression deformity of the L1 vertebral body with associated uptake, probably pathologic, with about 7 mm retropulsion into the spinal canal, as noted on recent MRI.  3.  Hypermetabolic right thyroid nodule. Recommend thyroid ultrasound.  4.  Possible focal wall thickening of the transverse colon with associated uptake. Although this could be related to peristalsis, consider follow-up colonoscopy to exclude primary colonic neoplasm, or close attention on follow-up.              Cc:   Gilmer Walters                Again, thank you for allowing me to participate in the care of your patient.        Sincerely,        Thomas Prieto MD

## 2024-06-17 NOTE — TELEPHONE ENCOUNTER
SÁNCHEZ Harper requests patient to be rescheduled to Dr. Prieto instead. Currently patient is scheduled for 6/19 with Vickie. Writer called patient to discuss opening today with Dr. Prieto at [facility] (per Vickie) and she had advised to speak to her son as he is who will bring her to appointments. Writer then called son Rush but got VASILE fonseca to call back to r/s at [phone] to discuss openings today or next week

## 2024-06-17 NOTE — PATIENT INSTRUCTIONS
Thoracic decompression and fusion      Please review COMPLETE information about your proposed surgery, pre-operative requirements, post-operative course and expectations - available in a folder provided to you in clinic!    Your surgery scheduler will call you within 3 business days to begin the process of scheduling your surgery and appointments.     Pre-Operative    Pre-operative physical / Lab work with primary care physician within 30 days of surgical date. We prefer the pre-op exam to be done 2 weeks prior to surgery. We also prefer pre-op lab work be done at [facility] outpatient lab, 2 weeks prior to surgery.     If all pre-op appointments/test are not completed prior to your surgery date, you will be asked to reschedule your surgery.           As part of preparation for your upcoming procedure your primary care doctor may order a test to rule out a COVID-19 infection. This is no longer a requirement prior to surgery.     Readiness Visits    Prior to surgery, you may have a telephone or in person readiness visit with our RN team to discuss your upcomming surgery, results of your pre-op physical, and lab work.   If you will require a collar/neck brace after surgery, you will be fitted for one at your readiness visit prior to surgery (scheduled by the surgery scheduler).     Shower procedure    Hibiclens shower: Use one packet the night before surgery and one packet the morning of surgery for a whole body shower. Avoid face, hair, and genitals.      Eating/Drinking    Stop all solid foods 8 hours before surgery.  Stop all clear liquids 2 hours prior to arrival time     Clear liquids include water, clear juice, black coffee, or clear tea without milk, Gatorade, clear soda.     Medications - please refer to the pre-operative medication instructions sheet in your folder    Hold Aspirin, NSAIDs (Advil/Ibuprofen, Indocin, Naproxen,Nuprin,Relafen/Nabumetone, Diclofenac,Meloxicam, Aleve,  Celebrex) and all vitamins and supplements x 7-10 days prior to surgical date  You can take Tylenol (Acetaminophen) for pain up until the date of your surgery   Do not exceed 3,000 mg per day   Any other medications prescribed, please discuss with your primary care provider at your pre-operative physical. Please discuss when/if it is safe for you to stop taking blood thinners with your primary care provider.   We will NOT provide pain medications prior to surgery. We will prescribe post-op pain medications for up to 6 weeks after surgery.       FMLA/Short-term disability    If you are currently employed, you will likely need to be off work for 4-6 weeks for post-op recovery and healing.  Please fax any FMLA/short term disability paperwork to [phone], mail it into the clinic, drop it off in person, or send via a [employer] message.   You may also call our clinic with the date in which you'd like to return to work, and we can provide a work letter to your employer  We will support Short-Term Disability up to 12 weeks, beginning the date of your surgery. We do NOT support Long-Term Disability/Social Security Benefits.     Pain Management after surgery    Dealing with pain    As your body heals, you might feel a stabbing, burning, or aching pain. You may also have some numbness.  Everyone feels pain differently, we may ask you to rate your pain using a pain scale. This will let us know how much pain you feel.   Keep in mind that medicine won't take away all of your pain. It helps to try other ways to relax and ease pain.     Things to help with pain    After surgery, we will give you medicine for your pain. These medications work well, but they can make you drowsy, itchy, or sick to your stomach, and constipated. Try to take narcotics with food if they cause nausea.   For mild to moderate pain, you can take medication such as Tylenol or Ibuprofen. These can be used with narcotics and muscle relaxants. *If you have had  a fusion: do NOT use NSAIDs for 6 months after surgery.   Do NOT drive while taking narcotic pain medication  Do NOT drink alcohol while using narcotic pain medication  You can utilize ice as needed (no longer than 20 minutes at one time) you may apply this over your covered incision  Utilize heat for muscle spasms, do not apply heat over your incision  If a muscle relaxer is prescribed, please do NOT take it at the same time as your narcotic pain medication. Take them at least 90 minutes apart.   You may also use pain cream/patches on sore muscles. Do NOT apply these on your incision. Patches may be cut in 1/2 if needed.     *After your surgery, if you will be staying in-patient, a nursing team will be monitoring you closely throughout your stay and communicate your health status to your surgeon and other providers.  You will be seen by Advanced Practice Providers (e.g., nurse practitioners, clinic nurse specialists, and physician assistants) who will check on you regularly to assess the status of your surgical recovery.     Incision Care    Look at your incision site every day. You  may need a mirror, or family member to help you.   Do not submerge your incision in water such as pools, hot tubs, baths for at least 6 weeks or until incision is healed  You may get your incision wet in the shower. Allow water and soap to run over incision, and gently pat dry.   Remove the dressing the day after you are discharged from the hospital. Keep the incision clean and dry at all times. This may require several bandage changes.   Contact us right away if you have:   Fever over 101 degrees farenheit  Green or yellow drainage (pus) from your incision or increased bloody drainage   Redness, swelling, or warmth at your surgery site   Notify the clinic [phone].    Activity Restrictions    For the first 6 weeks, no lifting,pushing, or pulling > 5-10 pounds, no bending, twisting.  Use the stairs in moderation   Walking: Walking  is the best way to start exercise after surgery. Take short frequent walks. You may gradually increase the distance as tolerated. If you feel pain, decrease your activity, but DO NOT stop walking. Walking will help you regain strength.  Avoid prolonged positioning for longer than 30 minutes (change positions frequently while awake)  No contact sports until after follow up visit  No high impact activities such as; running/jogging, snowmobile or 4 hernandez riding or any other recreational vehicles until deemed safe by your surgeon/care team.   Please call the clinic if you develop any of the following symptoms:  Swelling and/or warmth in one or both legs  Pain or tenderness in your leg, ankle, foot, or arm   Red or discolored/pale skin     Post-Op Follow Up Appointments    We will call you to schedule these appointments after your discharge from the hospital.   Incision assessment within 2 weeks with a Registered Nurse   6 week post-op with a Nurse Practitioner/Physician Assistant. Your surgeon will be available on this day.

## 2024-06-17 NOTE — PROGRESS NOTES
NEUROSURGERY CONSULTATION NOTE  Neurosurgery was asked to see this patient by Thomas Prieto MD for evaluation of mechanical back pain and MRI revealed L1 pathological fracture with retropulsion and spinal canal compromise with SINS 14.     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  She reports that her pain is gradually progressive in nature and she cannot do her activities of daily living without being on pain medications.  She denies symptoms affecting her bilateral lower extremities including motor weakness, sensory symptoms or new onset bladder or bowel symptoms.    On clinical examination she has paraspinal tenderness involving her thoracolumbar region.  She does not have any weakness involving her bilateral lower extremities, clonus or hyperreflexia.    I discussed the MRI lumbar spine with and without contrast findings with the patient and CT chest abdomen pelvis findings with the patient and her son and showed them the images.  I explained to them the presence of contrast-enhancing tumor involving her L1 vertebra causing pathological fracture with more than 50% height loss.  I explained to them that based on the imaging the fracture is likely related to metastatic tumor.  I explained to them that based on the imaging characteristics and clinical features her SINS is 14 which points towards spine stability.  I discussed the management options including conservative management, percutaneous fusion, open decompression and fusion with separation surgery.  I also discussed the role of upfront radiation treatment followed by surgery if needed.      However given that patient has mechanical back pain with evidence of  instability based on SINS score, I would recommend decompression and fusion with separation surgery with T11-L3 posterior lateral fusion and L1 laminectomy with suppression surgery.  I also discussed the possible role of radiofrequency ablation of the tumor using Osteocool device.  I discussed the risk and benefits of surgery including but not limited to bleeding, infection, injury to the spinal cord, injury to the nerve root, incomplete relief of symptoms, need for prolonged rehabilitation, need for adjuvant treatment including radiation therapy, worsening of symptoms, DVT/PE, MI and others.  After extensive discussion of the risk and benefits of surgery patient and her son agreed to proceed.  We will schedule the surgery and give her a call.    Patient agreed with the plan.  All the questions were answered and patient sounded understanding.  He can contact us if there are any further questions or concerns or worsening neurological deficits.I spent more than 60 minutes in this apt, examining the pt, reviewing the scans, reviewing notes from chart, discussing treatment options with risks and benefits and coordinating care. This note was created in part by the use of Dragon voice recognition system. Inadvertent grammatical errors and typographical errors may have occurred due to inherent limitation of voice recognition software.  Reasonable attempts made to avoid errors, but this document may contain an error not identified before finalizing.  Please contact me for any clarification needed.     Thomas Prieto MD      HPI:    Ms St is a 68-year-old right-handed female with a recent diagnosis of ER positive AR positive HER2/reji negative invasive ductal carcinoma presented to the clinic today with her son for evaluation of mechanical low back pain of approximately 6 weeks duration that started after lifting heavy weight.    Patient started noticing mid back pain after lifting heavy wooden block 6 weeks ago.  She  reports that her pain has been constant and progressively worsening.  She describes her pain as sharp in nature, 9/10 on VAS aggravated with standing and walking and relieves after lying down and with pain medications.  Patient reports that she cannot go without pain medications in terms of her activities of daily living.  She went to the ER for above-mentioned symptoms on 6/4/2024 and symptomatic treatment for musculoskeletal pain was given.  No imaging was done at that point.    Patient denies any pain radiating to her bilateral lower extremities, tingling or numbness affecting her bilateral lower extremities, weakness involving her lower extremities or new onset bladder or bowel symptoms.    Patient quit smoking 20 years ago, occasionally consumes alcohol and denies use of recreational drugs.  She denies any significant cardiac or pulm history.  She is not on any antiplatelets or anticoagulants.    No past medical history on file.    No past surgical history on file.    REVIEW OF SYSTEMS:  Review Of Systems  Skin: negative  Eyes: negative  Ears/Nose/Throat: negative  Respiratory: No shortness of breath, dyspnea on exertion, cough, or hemoptysis  Cardiovascular: negative  Gastrointestinal: negative  Genitourinary: negative  Musculoskeletal: Mechanical  back pain  Neurologic: negative  Psychiatric: negative  Hematologic/Lymphatic/Immunologic: negative  Endocrine: negative    MEDICATIONS:    Current Outpatient Medications   Medication Sig Dispense Refill    bisacodyl (DULCOLAX) 5 MG EC tablet Take 5 mg by mouth daily as needed      dexAMETHasone (DECADRON) 4 MG tablet Take 1 tablet (4 mg) by mouth 2 times daily (with meals) 30 tablet 0    diazepam (VALIUM) 5 MG tablet Take 1 tablet (5 mg) by mouth every 6 hours as needed for anxiety 30 tablet 0    HYDROmorphone (DILAUDID) 2 MG tablet Take 2 mg by mouth      letrozole (FEMARA) 2.5 MG tablet Take 1 tablet (2.5 mg) by mouth daily 90 tablet 1    estradiol (ESTRACE) 1 MG  tablet Take 1 mg by mouth daily (Patient not taking: Reported on 6/11/2024)      polyethylene glycol (MIRALAX) 17 GM/Dose powder Take 17 g by mouth (Patient not taking: Reported on 6/11/2024)      Semaglutide (OZEMPIC, 0.25 OR 0.5 MG/DOSE, SC) Inject 0.25 mg Subcutaneous once a week Apply 0.25mg (19 clicks) subcutaneous in abdominal region in the morning, ONLY once a week for 04 weeks. Subsequently adjust the does as instructed. (Patient not taking: Reported on 6/11/2024)      UNABLE TO FIND Take by mouth once a week MEDICATION NAME: Citoneurim 5000 UI. Take once a week. (Patient not taking: Reported on 6/11/2024)      UNABLE TO FIND MEDICATION NAME: Tylex (paracetamol) 500mg. Pt is taking 1/2 tab or one tab every 4 hrs for pain. (Patient not taking: Reported on 6/11/2024)      UNABLE TO FIND MEDICATION NAME: Addera Max 2000U.I. per day. Calcium, vitamin d3, magnesium. (Patient not taking: Reported on 6/11/2024)           ALLERGIES/SENSITIVITIES:     Allergies   Allergen Reactions    Codeine Itching       PERTINENT SOCIAL HISTORY: Non-smoker  Social History     Socioeconomic History    Marital status:      Social Determinants of Health     Financial Resource Strain: Low Risk  (6/6/2024)    Received from [facility]    Financial Resource Strain     Difficulty of Paying Living Expenses: 3   Food Insecurity: No Food Insecurity (6/6/2024)    Received from [facility]    Food Insecurity     Worried About Running Out of Food in the Last Year: 1   Transportation Needs: No Transportation Needs (6/6/2024)    Received from [facility]    Transportation Needs     Lack of Transportation (Medical): 1   Social Connections: Socially Integrated (6/6/2024)    Received from [facility]    Social Connections     Frequency of Communication with Friends and Family: 0   Housing Stability: Low Risk   (6/6/2024)    Received from [facility]    Housing Stability     Unable to Pay for Housing in the Last Year: 1         FAMILY HISTORY:  No family history on file.     PHYSICAL EXAM:   Constitutional: BP (!) 185/105 (BP Location: Left arm, Patient Position: Sitting, Cuff Size: Adult Regular)   Pulse 74   SpO2 96%      Mental Status: A & O in no acute distress.  Affect is appropriate.  Speech is fluent.  Recent and remote memory are intact.  Attention span and concentration are normal.        Cranial Nerves:   CN1: grossly intact per patient recall.   CN2: No funduscopic exam performed.   CN3,4 & 6: Pupillary light response, lateral and vertical gaze normal.  No nystagmus.  Visual fields are full to confrontation.  CN5: Intact to touch   CN7: No facial weakness, smile, facial symmetry intact.   CN8: Intact to spoken voice.   CN9&10: Gag reflex, uvula midline, palate rises with phonation.   CN11: Shoulder shrug 5/5 intact bilaterally.   CN12: Tongue midline and moves freely from side to side.     Motor: No pronator drift of upper extremity.   Normal bulk and tone all muscle groups of upper and lower extremities.       Delt Bi Tri Hand Flex/  Ext Iliopsoas Quadriceps Tibialis Anterior EHL Gastroc     C5 C6 C7 C8/T1 L2 L3 L4 L5 S1   R 5 5 5 5 5 5 5 5 5   L 5 5 5 5 5 5 5 5 5     Sensory: Sensation intact bilaterally to light touch.     Coordination; finger to nose, rapid alternating movements smooth and rhythmic.   Romberg intact.   Heel/toe/tandem gait intact.    Normal gait and station.     Reflexes;                       Right              Left  Brachioradialis (C5,6)      2+                 2+  Biceps   (C5,6)                 2+                 2+  Triceps  (C7,8)                 2+                2+  Knee (L3,4)                      2+                2+  Ankle jerk (S1,2)              1+                1+      No hoffmans/babinski/ clonus.  FABERS/Erick test : Negative  SLR  bilaterally free  Thoraco-lumbar paraspinal tenderness +       IMAGING:  I personally reviewed all radiographic images and agree with the neuroradiology report of likely pathological L1 burst fracture with retropulsion and spinal canal compromise.     6/6/2024 MRI lumbar spine:  1.  Pathologic L1 burst fracture with greater than 50% vertebral body height loss. 9 mm retropulsion results in moderate canal stenosis at the level of the vertebral body. Posterolateral involvement bilaterally at L1.   2.  Additional scattered osseous metastases without vertebral body height loss or canal involvement.     6/13/2024 CT chest abdomen pelvis:  IMPRESSION:  1.  Findings of primary right breast malignancy with right subpectoral kaylene, pulmonary, and osseous metastatic disease  2.  Moderate compression deformity of the L1 vertebral body with associated uptake, probably pathologic, with about 7 mm retropulsion into the spinal canal, as noted on recent MRI.  3.  Hypermetabolic right thyroid nodule. Recommend thyroid ultrasound.  4.  Possible focal wall thickening of the transverse colon with associated uptake. Although this could be related to peristalsis, consider follow-up colonoscopy to exclude primary colonic neoplasm, or close attention on follow-up.              Cc:   Gilmer Walters

## 2024-06-17 NOTE — NURSING NOTE
"Adriana St is a 68 year old female who presents for:  Chief Complaint   Patient presents with    RECHECK     Right side low-back pain 9/10 w/o medication         Initial Vitals:  BP (!) 185/105 (BP Location: Left arm, Patient Position: Sitting, Cuff Size: Adult Regular)   Pulse 74   SpO2 96%  Estimated body mass index is 28.59 kg/m  as calculated from the following:    Height as of 6/11/24: 5' 3\" (1.6 m).    Weight as of 6/12/24: 161 lb 6.4 oz (73.2 kg).. There is no height or weight on file to calculate BSA. BP completed using cuff size: regular  Extreme Pain (9)    Bandar Jack    "

## 2024-06-18 ENCOUNTER — TELEPHONE (OUTPATIENT)
Dept: NEUROSURGERY | Facility: CLINIC | Age: 69
End: 2024-06-18
Payer: COMMERCIAL

## 2024-06-18 ENCOUNTER — ONCOLOGY VISIT (OUTPATIENT)
Dept: ONCOLOGY | Facility: HOSPITAL | Age: 69
End: 2024-06-18
Attending: INTERNAL MEDICINE
Payer: COMMERCIAL

## 2024-06-18 ENCOUNTER — PATIENT OUTREACH (OUTPATIENT)
Dept: ONCOLOGY | Facility: CLINIC | Age: 69
End: 2024-06-18
Payer: COMMERCIAL

## 2024-06-18 VITALS
HEART RATE: 80 BPM | OXYGEN SATURATION: 97 % | WEIGHT: 162.4 LBS | SYSTOLIC BLOOD PRESSURE: 159 MMHG | BODY MASS INDEX: 28.77 KG/M2 | DIASTOLIC BLOOD PRESSURE: 92 MMHG | RESPIRATION RATE: 16 BRPM | TEMPERATURE: 97.8 F

## 2024-06-18 DIAGNOSIS — Z17.0 MALIGNANT NEOPLASM OF CENTRAL PORTION OF RIGHT BREAST IN FEMALE, ESTROGEN RECEPTOR POSITIVE (H): Primary | ICD-10-CM

## 2024-06-18 DIAGNOSIS — C50.111 MALIGNANT NEOPLASM OF CENTRAL PORTION OF RIGHT BREAST IN FEMALE, ESTROGEN RECEPTOR POSITIVE (H): Primary | ICD-10-CM

## 2024-06-18 DIAGNOSIS — C79.51 MALIGNANT NEOPLASM METASTATIC TO BONE (H): ICD-10-CM

## 2024-06-18 PROCEDURE — G0463 HOSPITAL OUTPT CLINIC VISIT: HCPCS | Performed by: INTERNAL MEDICINE

## 2024-06-18 PROCEDURE — 99215 OFFICE O/P EST HI 40 MIN: CPT | Performed by: INTERNAL MEDICINE

## 2024-06-18 PROCEDURE — G2211 COMPLEX E/M VISIT ADD ON: HCPCS | Performed by: INTERNAL MEDICINE

## 2024-06-18 RX ORDER — PROCHLORPERAZINE MALEATE 10 MG
10 TABLET ORAL EVERY 6 HOURS PRN
Qty: 30 TABLET | Refills: 2 | Status: SHIPPED | OUTPATIENT
Start: 2024-06-24 | End: 2024-07-01

## 2024-06-18 ASSESSMENT — PAIN SCALES - GENERAL: PAINLEVEL: SEVERE PAIN (7)

## 2024-06-18 NOTE — PROGRESS NOTES
Writer received Cancer Risk Management Program referral, referred for:    Malignant neoplasm of central portion of right breast in female, estrogen receptor positive.     Referred By    Provider Department Location Magaly Reddy MD [facility] [phone]        Reviewed for appropriate plan, and sent to New Patient Scheduling for completion.

## 2024-06-18 NOTE — LETTER
6/18/2024      Adriana St  [address] [facility] [place] [address]      Dear Colleague,    Thank you for referring your patient, Adriana St, to the [facility] [place]. Please see a copy of my visit note below.    [facility] Hematology and Oncology Progress Note    Patient: Adriana St  MRN: [id number]  Date of Service: Jun 18, 2024         Reason for Visit    Chief Complaint   Patient presents with     Oncology Clinic Visit       Assessment and Plan     Cancer Staging   Malignant neoplasm of central portion of right breast in female, estrogen receptor positive (H)  Staging form: Breast, AJCC 8th Edition  - Clinical stage from 6/10/2024: Stage IV (cT2, cN0, cM1, G3, ER+, WY+, HER2-) - Signed by Magaly Bender MD on 6/10/2024      ECOG Performance    0 - Independent     Pain  Pain Score: Severe Pain (7)    #.  Metastatic right breast cancer ER positive, WY positive, HER2 1+ by IHC.  #.  Pathologic L1 fracture  #.  Large right breast mass  #.  Cancer related pain     Reviewed PET scan and MRI images and report in detail with the patient and her son.  It is confirmed metastatic ER positive, HER2 negative breast cancer.   I also reviewed notes by Dr. Alexander and Dr. Prieto.   I recommended her to continue letrozole daily.  She tolerates well without any side effects.  I will plan to start ribociclib after completion of surgery and radiation.  I reviewed the side effects and schedule.   I explained to her that she can continue or hold letrozole with surgery.  She will be indicated for thromboprophylaxis after surgery either pharmacologic or mechanical.    She has not started calcium/vitamin D daily.  I also advised her to start calcium/vitamin D 1 tablet twice daily.  I reviewed the recommended doses.  I will plan to add bisphosphonate for metastatic bone disease a couple months after surgery.    I requested breast cancer NGS panel.      I also  requested genetic counseling and testing for metastatic breast cancer.      Follow-up with me about a few weeks after completion of surgery/radiation, prior to starting ribociclib.  She will need labs and EKG prior to starting it.    The longitudinal plan of care for the diagnosis(es)/condition(s) as documented were addressed during this visit. Due to the added complexity in care, I will continue to support Adriana Wei in the subsequent management and with ongoing continuity of care.    Encounter Diagnoses:    Problem List Items Addressed This Visit          Oncology Diagnoses    Malignant neoplasm of central portion of right breast in female, estrogen receptor positive (H) - Primary    Relevant Medications    prochlorperazine (COMPAZINE) 10 MG tablet (Start on 6/24/2024)    ribociclib (KISQALI) (600 MG DOSE) 200 MG tablet therapy pack (Start on 8/14/2024)    Other Relevant Orders    Oncology Single Gene NGS Tissue    Adult Oncology/Hematology  Referral    CBC with platelets differential    Comprehensive metabolic panel    Magnesium    Phosphorus    CBC with platelets differential    Comprehensive metabolic panel    Magnesium    Phosphorus    Malignant neoplasm metastatic to bone (H)    Relevant Medications    prochlorperazine (COMPAZINE) 10 MG tablet (Start on 6/24/2024)    ribociclib (KISQALI) (600 MG DOSE) 200 MG tablet therapy pack (Start on 8/14/2024)    Other Relevant Orders    CBC with platelets differential    Comprehensive metabolic panel    Magnesium    Phosphorus    CBC with platelets differential    Comprehensive metabolic panel    Magnesium    Phosphorus        CC: Gilmer Walters   ______________________________________________________________________________  Oncologic history  5/2024- She presented with a large right breast mass, and progressive back pain.     5/30/2024-right breast mass biopsy showed invasive ductal carcinoma, grade 3, ER positive (95%, strong), AR positive (15%, strong),  HER2 negative (1+ by IHC).    6/6/2024- MRI lumbar spine with and without contrast showed pathologic L1 burst fracture with > 50% vertebral body height loss.  Additional scattered osseous metastasis were seen as well.    6/13/2024-PET scan showed right breast malignancy with right subpectoral kaylene, pulmonary and osseous metastasis.  Moderate compression deformity of the L1 vertebral body with associated uptake probably pathologic, with about 7 mm retropulsion into the spinal canal.   Hypermetabolic right thyroid nodule.   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  Presumed right frontal falcine meningioma.  No evidence for intracranial metastatic disease.      History of Present Illness    Ms. Adriana St presented today accompanied by her son.  She feels her back pain is slightly better since the start of dexamethasone.  She met with Dr. Prieto from neurosurgery and she is offered L1 bilateral laminectomy and medial facetectomy.  She also met with Dr. Alexander from radiation oncology for consideration of radiation after surgery.    She does not have any new neurological symptoms.    Review of systems  Apart from describing in HPI, the remainder of comprehensive ROS was negative.    Past History    No past medical history on file.    No past surgical history on file.    Physical Exam    BP (!) 159/92   Pulse 80   Temp 97.8  F (36.6  C) (Oral)   Resp 16   Wt 73.7 kg (162 lb 6.4 oz)   SpO2 97%   BMI 28.77 kg/m      General: alert, awake, not in acute distress  HEENT: Head: Normal, normocephalic, atraumatic.  Eye: Normal external eye, conjunctiva, lids cornea, ALEJANDRA.  Nose: Normal external nose, mucus membranes and septum.  Pharynx: Normal buccal mucosa. Normal pharynx.  Neck / Thyroid: Supple, no masses,  nodes, nodules or enlargement.  Lymphatics: No abnormally enlarged lymph nodes.  Extremities: normal strength, tone, and muscle mass  Skin: normal. no rash or abnormalities  CNS: non focal.    Lab Results    Recent Results (from the past 240 hour(s))   Creatinine POCT    Collection Time: 06/13/24 12:57 PM   Result Value Ref Range    Creatinine POCT 0.8 0.5 - 1.0 mg/dL    GFR, ESTIMATED POCT >60 >60 mL/min/1.73m2         Imaging    MR Brain w/o & w Contrast    Result Date: 6/14/2024  MRI OF THE BRAIN WITHOUT AND WITH CONTRAST  6/14/2024 9:07 AM COMPARISON: None. HISTORY:  Malignant neoplasm of central portion of right breast in female, estrogen receptor positive (H); Malignant neoplasm of central portion of right breast in female, estrogen receptor positive (H); Malignant neoplasm metastatic to bone (H) TECHNIQUE: Axial diffusion-weighted with ADC map, axial T2-weighted with fat saturation, axial T1-weighted, axial turboFLAIR and coronal T1-weighted images of the brain were acquired without intravenous contrast.  Following intravenous administration of gadolinium (7 mL Gadavist), axial T1-weighted images of the brain were acquired. FINDINGS: There is mild diffuse cerebral volume loss. There are numerous tiny scattered focal areas of abnormal T2 signal hyperintensity in the cerebral white matter bilaterally that are consistent with sequela of chronic small vessel ischemic disease. There is an enhancing extra-axial nodule arising from the right frontal falx measuring 1 cm in long axis. While this most likely represents a meningioma, dural-based metastasis cannot be completely excluded and continued surveillance is recommended. There is abnormal T1 signal hypointensity, T2 signal hyperintensity and abnormal heterogeneous contrast enhancement involving the right side of the clivus and right occipital condyle. This represents bony metastatic disease until proven otherwise. No other abnormal contrast enhancement within the  brain, its coverings, the calvaria or skull base. The ventricles and basal cisterns are within normal limits in configuration given the degree of cerebral volume loss.  There is no midline shift.  There are no extra-axial fluid collections.  There is no evidence for stroke or acute intracranial hemorrhage. There is no sinusitis or mastoiditis.     IMPRESSION: 1. Presumed bony metastatic disease involving the right side of the clivus and the right occipital condyle. 2. Presumed right frontal falcine meningioma. No evidence for intracranial metastatic disease. 3. Diffuse cerebral volume loss and cerebral white matter changes consistent with chronic small vessel ischemic disease. 4. No evidence for acute intracranial pathology. HARESH OSBORN MD   SYSTEM ID:  [id number]    PET Oncology (Eyes to Thighs)    Result Date: 6/13/2024  EXAM: PET ONCOLOGY (EYES TO THIGHS), CT CHEST/ABDOMEN/PELVIS W CONTRAST LOCATION: [facility] DATE: 6/13/2024 INDICATION: Initial treatment evaluation for malignant neoplasm of central portion of right female breast. COMPARISON: 6/6/2024. CONTRAST: Isovue-370 99 mL IV TECHNIQUE: Serum glucose level 100 mg/dL. One hour post intravenous administration of 11.95 mCi F-18 FDG, PET imaging was performed from the skull vertex to mid thighs, utilizing attenuation correction with concurrent axial CT and PET/CT image fusion. Separate diagnostic CT of the chest, abdomen, and pelvis was performed. Dose reduction techniques were used. PET/CT FINDINGS: Large, enhancing mass in the central portion of the right breast measuring 5.2 x 4.8 x 5.7 cm (SUV max 7.4). There is a 10 mm FDG avid right subpectoral lymph node (SUV max 3.6). FDG avid anterior left upper lobe subpleural nodular opacity (SUV max 2.9) measuring 13 x 6 mm as well as a 7 mm subpleural nodule in the right lung base (SUV max 2.8). Several hypermetabolic bone lesions including T4 vertebral body extending into the posterior  elements (SUV max 6.2), left lateral ninth rib (SUV max 5.2), sternum (SUV max 5.5),, right sacral ala (SUV max 4.7), right ischium (SUV max 5.9), and right proximal femur (SUV max 4.5). Possible focal wall thickening of the transverse colon with associated uptake (SUV max 6.9). FDG avid right thyroid nodule (max SUV 6.6), a third of which represent primary thyroid neoplasms. This is amenable to ultrasound-guided histologic sampling if desired. Esophagitis. Moderate compression deformity of the L1 vertebral body with associated uptake and retropulsion into the spinal canal measuring 7 mm. CT FINDINGS: Subcentimeter focus of enhancement within the paramedian right frontal lobe along the falx without definite increased uptake above background, possibly meningioma. No significant coronary artery calcium. Tiny sliding hiatal hernia. Cholecystectomy with some biliary reservoir effect. Uterine fibroids. Multilevel degenerative disease in the spine.     IMPRESSION: 1.  Findings of primary right breast malignancy with right subpectoral kaylene, pulmonary, and osseous metastatic disease 2.  Moderate compression deformity of the L1 vertebral body with associated uptake, probably pathologic, with about 7 mm retropulsion into the spinal canal, as noted on recent MRI. 3.  Hypermetabolic right thyroid nodule. Recommend thyroid ultrasound. 4.  Possible focal wall thickening of the transverse colon with associated uptake. Although this could be related to peristalsis, consider follow-up colonoscopy to exclude primary colonic neoplasm, or close attention on follow-up.    CT Chest/Abdomen/Pelvis w Contrast    Result Date: 6/13/2024  EXAM: PET ONCOLOGY (EYES TO THIGHS), CT CHEST/ABDOMEN/PELVIS W CONTRAST LOCATION: [facility] DATE: 6/13/2024 INDICATION: Initial treatment evaluation for malignant neoplasm of central portion of right female breast. COMPARISON: 6/6/2024. CONTRAST: Isovue-370 99 mL IV TECHNIQUE:  Serum glucose level 100 mg/dL. One hour post intravenous administration of 11.95 mCi F-18 FDG, PET imaging was performed from the skull vertex to mid thighs, utilizing attenuation correction with concurrent axial CT and PET/CT image fusion. Separate diagnostic CT of the chest, abdomen, and pelvis was performed. Dose reduction techniques were used. PET/CT FINDINGS: Large, enhancing mass in the central portion of the right breast measuring 5.2 x 4.8 x 5.7 cm (SUV max 7.4). There is a 10 mm FDG avid right subpectoral lymph node (SUV max 3.6). FDG avid anterior left upper lobe subpleural nodular opacity (SUV max 2.9) measuring 13 x 6 mm as well as a 7 mm subpleural nodule in the right lung base (SUV max 2.8). Several hypermetabolic bone lesions including T4 vertebral body extending into the posterior elements (SUV max 6.2), left lateral ninth rib (SUV max 5.2), sternum (SUV max 5.5),, right sacral ala (SUV max 4.7), right ischium (SUV max 5.9), and right proximal femur (SUV max 4.5). Possible focal wall thickening of the transverse colon with associated uptake (SUV max 6.9). FDG avid right thyroid nodule (max SUV 6.6), a third of which represent primary thyroid neoplasms. This is amenable to ultrasound-guided histologic sampling if desired. Esophagitis. Moderate compression deformity of the L1 vertebral body with associated uptake and retropulsion into the spinal canal measuring 7 mm. CT FINDINGS: Subcentimeter focus of enhancement within the paramedian right frontal lobe along the falx without definite increased uptake above background, possibly meningioma. No significant coronary artery calcium. Tiny sliding hiatal hernia. Cholecystectomy with some biliary reservoir effect. Uterine fibroids. Multilevel degenerative disease in the spine.     IMPRESSION: 1.  Findings of primary right breast malignancy with right subpectoral kaylene, pulmonary, and osseous metastatic disease 2.  Moderate compression deformity of the L1  vertebral body with associated uptake, probably pathologic, with about 7 mm retropulsion into the spinal canal, as noted on recent MRI. 3.  Hypermetabolic right thyroid nodule. Recommend thyroid ultrasound. 4.  Possible focal wall thickening of the transverse colon with associated uptake. Although this could be related to peristalsis, consider follow-up colonoscopy to exclude primary colonic neoplasm, or close attention on follow-up.    MR Lumbar Spine w/o & w Contrast    Result Date: 6/6/2024  For Patients: As a result of the 21st Century Cures Act, medical imaging exams and procedure reports are released immediately into your electronic medical record. You may view this report before your referring provider. If you have questions, please contact your health care provider. EXAM: MR SPINE LUMBAR WWO LOCATION: [facility] DATE: 6/6/2024 INDICATION: Bilateral Low Back Pain Without Sciatica, Unspecified Chronicity Primary Cancer Of Right Breast (hc) COMPARISON: 6/6/24 CONTRAST: Clariscan 15 mL TECHNIQUE: Routine Lumbar Spine MRI without and with IV contrast. FINDINGS: Nomenclature is based on 5 lumbar type vertebral bodies with L5-S1 defined on image 50 of series 5. Alignment is unremarkable.  Pathologic burst fracture at L1 with greater than 50% vertebral body height loss and 9 mm retropulsion of the posterior L1 vertebral body. The lesion at L1 demonstrates posterolateral involvement bilaterally extending into the bilateral pedicles, left transverse process, and left lamina with extraosseous extension into the left ventral paraspinal soft tissues. Additional scattered osseous metastases in the sacrum and lumbar spine without pathologic fracture. Normal distal spinal cord and cauda equina with conus medullaris at L1. Prevertebral and dorsal paraspinal muscular atrophy. Visualized intra-abdominal structures are unremarkable. The pathologic L1 burst fracture results in moderate canal stenosis at the level of the  L1 vertebral body. No significant canal stenosis at the remaining levels. Mild left L1-L2 foraminal stenosis.    1.  Pathologic L1 burst fracture with greater than 50% vertebral body height loss. 9 mm retropulsion results in moderate canal stenosis at the level of the vertebral body. Posterolateral involvement bilaterally at L1. 2.  Additional scattered osseous metastases without vertebral body height loss or canal involvement.    XR Lumbar Spine 3 Views Job Care    Result Date: 6/6/2024  For Patients: As a result of the 21st Century Cures Act, medical imaging exams and procedure reports are released immediately into your electronic medical record. You may view this report before your referring provider. If you have questions, please contact your health care provider. EXAM: XR SPINE LUMBAR 3 VIEWS LOCATION: [facility] DATE: 6/6/2024 INDICATION: Bilateral Low Back Pain Without Sciatica, Unspecified Chronicity COMPARISON: None.    L1 compression deformity with approximately 40% height loss, some anterior fragmentation, and retropulsion of fracture fragments into the spinal canal. Fracture likely involves the anterior wall, superior endplate, and possibly the posterior wall. Finding could represent acute, subacute, or chronic process. CT of the lumbar spine should be considered. Alignment otherwise within normal limits. No other findings to suggest acute fracture. Vertebral body heights otherwise maintained. Mild-moderate degenerative changes throughout the lumbar spine with facet arthropathy, vertebral body osteophyte formation, and disc height loss. PHYSICIAN ALERT    US Breast Biopsy Core Needle Right    Addendum Date: 6/6/2024    Pathology shows invasive ductal carcinoma. This is consistent with imaging findings. A verbal report was given to the patient. Surgical and oncological consultation is recommended.    Result Date: 6/6/2024  US Breast Biopsy Core Needle Right INDICATION: Right breast  mass. PROCEDURE: Informed consent was obtained from the patient. Ultrasound was used to localize the mass at the 12 o'clock position of the right breast, 6 cm from the nipple.  The skin was marked, then prepped and draped in a sterile fashion. 1% lidocaine was used for local anesthesia. Under direct sonographic guidance, a 14-gauge coaxial needle was used to obtain 3 core biopsies.  A biopsy marking clip was then placed.  Digital mammograms performed in a separate room, using separate equipment, to document clip placement. The mammogram showed the clip to be in good position. The patient tolerated this well; there are no immediate complications.    IMPRESSION: 1. Ultrasound-guided biopsy of mass in the right breast. Pathology pending. 2. Post procedure mammogram for marker placement     MA Post Procedure Right    Addendum Date: 6/6/2024    Pathology shows invasive ductal carcinoma. This is consistent with imaging findings. A verbal report was given to the patient. Surgical and oncological consultation is recommended.    Result Date: 6/6/2024  US Breast Biopsy Core Needle Right INDICATION: Right breast mass. PROCEDURE: Informed consent was obtained from the patient. Ultrasound was used to localize the mass at the 12 o'clock position of the right breast, 6 cm from the nipple.  The skin was marked, then prepped and draped in a sterile fashion. 1% lidocaine was used for local anesthesia. Under direct sonographic guidance, a 14-gauge coaxial needle was used to obtain 3 core biopsies.  A biopsy marking clip was then placed.  Digital mammograms performed in a separate room, using separate equipment, to document clip placement. The mammogram showed the clip to be in good position. The patient tolerated this well; there are no immediate complications.    IMPRESSION: 1. Ultrasound-guided biopsy of mass in the right breast. Pathology pending. 2. Post procedure mammogram for marker placement     MA Diagnostic Bilateral  w/Latosha    Result Date: 5/23/2024  Examinations: MA DIAGNOSTIC BILATERAL W/ LATOSHA, US BREAST RIGHT LIMITED 1-3 QUADRANTS, 5/23/2024 11:04 AM Comparisons: None. History/family history: Patient reports a rapidly growing mass in the right breast, first noticed in February 2024. BREAST DENSITY: The breasts are heterogeneously dense which may obscure small masses. Findings:   Mammogram: At the 12:00 position, posterior depth in the right breast there is there is an oval, hyperdense mass with microlobulated margins measuring 5.0 cm. There are no suspicious findings in the left breast. Ultrasound: Targeted ultrasound evaluation of the right breast was performed by the technologist and radiologist. At the site of the patient's palpable concern at the 12:00 position, 6 cm from nipple there is an irregular, hypoechoic mass measuring 4.4 x 4.2 x 4.8 cm. The mass directly abuts the skin. Evaluation of the retroareolar breast at the 6:00 position, demonstrates only normal breast tissue and several mildly dilated ducts. Evaluation of the right axilla demonstrates only morphologically normal lymph nodes.      Impression: BI-RADS CATEGORY: 5 - Highly Suggestive of Malignancy-Appropriate Action Should Be Taken Suspicious mass in the right breast at the 12:00 position. Ultrasound-guided biopsy is recommended. RECOMMENDED FOLLOW-UP: Biopsy. The patient was given the results of the examination. KIRSTIN SANTO MD   SYSTEM ID:  [id number]    US Breast Right Limited 1-3 Quadrants    Result Date: 5/23/2024  Examinations: MA DIAGNOSTIC BILATERAL W/ LATOSHA, US BREAST RIGHT LIMITED 1-3 QUADRANTS, 5/23/2024 11:04 AM Comparisons: None. History/family history: Patient reports a rapidly growing mass in the right breast, first noticed in February 2024. BREAST DENSITY: The breasts are heterogeneously dense which may obscure small masses. Findings:   Mammogram: At the 12:00 position, posterior depth in the right breast there is there is an oval, hyperdense  "mass with microlobulated margins measuring 5.0 cm. There are no suspicious findings in the left breast. Ultrasound: Targeted ultrasound evaluation of the right breast was performed by the technologist and radiologist. At the site of the patient's palpable concern at the 12:00 position, 6 cm from nipple there is an irregular, hypoechoic mass measuring 4.4 x 4.2 x 4.8 cm. The mass directly abuts the skin. Evaluation of the retroareolar breast at the 6:00 position, demonstrates only normal breast tissue and several mildly dilated ducts. Evaluation of the right axilla demonstrates only morphologically normal lymph nodes.      Impression: BI-RADS CATEGORY: 5 - Highly Suggestive of Malignancy-Appropriate Action Should Be Taken Suspicious mass in the right breast at the 12:00 position. Ultrasound-guided biopsy is recommended. RECOMMENDED FOLLOW-UP: Biopsy. The patient was given the results of the examination. KIRSTIN SANTO MD   SYSTEM ID:  [id number]     42 minutes spent on the date of the encounter doing chart review, history and exam, documentation, communication of the treatment plan with the care team and further activities as noted above.    Signed by: aMgaly Bender MD    Oncology Rooming Note    June 18, 2024 1:15 PM   Adriana St is a 68 year old female who presents for:    Chief Complaint   Patient presents with     Oncology Clinic Visit     Initial Vitals: BP (!) 159/92   Pulse 80   Temp 97.8  F (36.6  C) (Oral)   Resp 16   Wt 73.7 kg (162 lb 6.4 oz)   SpO2 97%   BMI 28.77 kg/m   Estimated body mass index is 28.77 kg/m  as calculated from the following:    Height as of 6/11/24: 1.6 m (5' 3\").    Weight as of this encounter: 73.7 kg (162 lb 6.4 oz). Body surface area is 1.81 meters squared.  Severe Pain (7) Comment: Data Unavailable   No LMP recorded.  Allergies reviewed: Yes  Medications reviewed: Yes    Medications: Medication refills not needed today.  Pharmacy name entered into EPIC: " [id number] - [place], [place] - [address] AT [address]    Frailty Screening:   Is the patient here for a new oncology consult visit in cancer care? 2. No      Clinical concerns: Adriana Wei is here for follow up and is guarded in how she walks and sits quite gingerly.       Deisy Stevens RN                Again, thank you for allowing me to participate in the care of your patient.        Sincerely,        Magaly Bender MD

## 2024-06-18 NOTE — PROGRESS NOTES
[facility] Hematology and Oncology Progress Note    Patient: Adriana St  MRN: [id number]  Date of Service: Jun 18, 2024         Reason for Visit    Chief Complaint   Patient presents with    Oncology Clinic Visit       Assessment and Plan     Cancer Staging   Malignant neoplasm of central portion of right breast in female, estrogen receptor positive (H)  Staging form: Breast, AJCC 8th Edition  - Clinical stage from 6/10/2024: Stage IV (cT2, cN0, cM1, G3, ER+, TN+, HER2-) - Signed by Magaly Bender MD on 6/10/2024      ECOG Performance    0 - Independent     Pain  Pain Score: Severe Pain (7)    #.  Metastatic right breast cancer ER positive, TN positive, HER2 1+ by IHC.  #.  Pathologic L1 fracture  #.  Large right breast mass  #.  Cancer related pain     Reviewed PET scan and MRI images and report in detail with the patient and her son.  It is confirmed metastatic ER positive, HER2 negative breast cancer.   I also reviewed notes by Dr. Alexander and Dr. Prieto.   I recommended her to continue letrozole daily.  She tolerates well without any side effects.  I will plan to start ribociclib after completion of surgery and radiation.  I reviewed the side effects and schedule.   I explained to her that she can continue or hold letrozole with surgery.  She will be indicated for thromboprophylaxis after surgery either pharmacologic or mechanical.    She has not started calcium/vitamin D daily.  I also advised her to start calcium/vitamin D 1 tablet twice daily.  I reviewed the recommended doses.  I will plan to add bisphosphonate for metastatic bone disease a couple months after surgery.    I requested breast cancer NGS panel.      I also requested genetic counseling and testing for metastatic breast cancer.      Follow-up with me about a few weeks after completion of surgery/radiation, prior to starting ribociclib.  She will need labs and EKG prior to starting it.    The longitudinal plan of care for the  diagnosis(es)/condition(s) as documented were addressed during this visit. Due to the added complexity in care, I will continue to support Adriana Wei in the subsequent management and with ongoing continuity of care.    Encounter Diagnoses:    Problem List Items Addressed This Visit          Oncology Diagnoses    Malignant neoplasm of central portion of right breast in female, estrogen receptor positive (H) - Primary    Relevant Medications    prochlorperazine (COMPAZINE) 10 MG tablet (Start on 6/24/2024)    ribociclib (KISQALI) (600 MG DOSE) 200 MG tablet therapy pack (Start on 8/14/2024)    Other Relevant Orders    Oncology Single Gene NGS Tissue    Adult Oncology/Hematology  Referral    CBC with platelets differential    Comprehensive metabolic panel    Magnesium    Phosphorus    CBC with platelets differential    Comprehensive metabolic panel    Magnesium    Phosphorus    Malignant neoplasm metastatic to bone (H)    Relevant Medications    prochlorperazine (COMPAZINE) 10 MG tablet (Start on 6/24/2024)    ribociclib (KISQALI) (600 MG DOSE) 200 MG tablet therapy pack (Start on 8/14/2024)    Other Relevant Orders    CBC with platelets differential    Comprehensive metabolic panel    Magnesium    Phosphorus    CBC with platelets differential    Comprehensive metabolic panel    Magnesium    Phosphorus        CC: Gilmer Walters   ______________________________________________________________________________  Oncologic history  5/2024- She presented with a large right breast mass, and progressive back pain.     5/30/2024-right breast mass biopsy showed invasive ductal carcinoma, grade 3, ER positive (95%, strong), NM positive (15%, strong), HER2 negative (1+ by IHC).    6/6/2024- MRI lumbar spine with and without contrast showed pathologic L1 burst fracture with > 50% vertebral body height loss.  Additional scattered osseous metastasis were seen as well.    6/13/2024-PET scan showed right breast malignancy  with right subpectoral kaylene, pulmonary and osseous metastasis.  Moderate compression deformity of the L1 vertebral body with associated uptake probably pathologic, with about 7 mm retropulsion into the spinal canal.   Hypermetabolic right thyroid nodule.   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  Presumed right frontal falcine meningioma.  No evidence for intracranial metastatic disease.      History of Present Illness    Ms. Adriana St presented today accompanied by her son.  She feels her back pain is slightly better since the start of dexamethasone.  She met with Dr. Prieto from neurosurgery and she is offered L1 bilateral laminectomy and medial facetectomy.  She also met with Dr. Alexander from radiation oncology for consideration of radiation after surgery.    She does not have any new neurological symptoms.    Review of systems  Apart from describing in HPI, the remainder of comprehensive ROS was negative.    Past History    No past medical history on file.    No past surgical history on file.    Physical Exam    BP (!) 159/92   Pulse 80   Temp 97.8  F (36.6  C) (Oral)   Resp 16   Wt 73.7 kg (162 lb 6.4 oz)   SpO2 97%   BMI 28.77 kg/m      General: alert, awake, not in acute distress  HEENT: Head: Normal, normocephalic, atraumatic.  Eye: Normal external eye, conjunctiva, lids cornea, ALEJANDRA.  Nose: Normal external nose, mucus membranes and septum.  Pharynx: Normal buccal mucosa. Normal pharynx.  Neck / Thyroid: Supple, no masses, nodes, nodules or enlargement.  Lymphatics: No abnormally enlarged lymph nodes.  Extremities: normal strength, tone, and muscle mass  Skin: normal. no rash or abnormalities  CNS: non focal.    Lab Results    Recent Results (from the past 240 hour(s))   Creatinine POCT     Collection Time: 06/13/24 12:57 PM   Result Value Ref Range    Creatinine POCT 0.8 0.5 - 1.0 mg/dL    GFR, ESTIMATED POCT >60 >60 mL/min/1.73m2         Imaging    MR Brain w/o & w Contrast    Result Date: 6/14/2024  MRI OF THE BRAIN WITHOUT AND WITH CONTRAST  6/14/2024 9:07 AM COMPARISON: None. HISTORY:  Malignant neoplasm of central portion of right breast in female, estrogen receptor positive (H); Malignant neoplasm of central portion of right breast in female, estrogen receptor positive (H); Malignant neoplasm metastatic to bone (H) TECHNIQUE: Axial diffusion-weighted with ADC map, axial T2-weighted with fat saturation, axial T1-weighted, axial turboFLAIR and coronal T1-weighted images of the brain were acquired without intravenous contrast.  Following intravenous administration of gadolinium (7 mL Gadavist), axial T1-weighted images of the brain were acquired. FINDINGS: There is mild diffuse cerebral volume loss. There are numerous tiny scattered focal areas of abnormal T2 signal hyperintensity in the cerebral white matter bilaterally that are consistent with sequela of chronic small vessel ischemic disease. There is an enhancing extra-axial nodule arising from the right frontal falx measuring 1 cm in long axis. While this most likely represents a meningioma, dural-based metastasis cannot be completely excluded and continued surveillance is recommended. There is abnormal T1 signal hypointensity, T2 signal hyperintensity and abnormal heterogeneous contrast enhancement involving the right side of the clivus and right occipital condyle. This represents bony metastatic disease until proven otherwise. No other abnormal contrast enhancement within the brain, its coverings, the calvaria or skull base. The ventricles and basal cisterns are within normal limits in configuration given the degree of cerebral volume loss.  There is no midline shift.  There are no extra-axial fluid collections.  There is no evidence for  stroke or acute intracranial hemorrhage. There is no sinusitis or mastoiditis.     IMPRESSION: 1. Presumed bony metastatic disease involving the right side of the clivus and the right occipital condyle. 2. Presumed right frontal falcine meningioma. No evidence for intracranial metastatic disease. 3. Diffuse cerebral volume loss and cerebral white matter changes consistent with chronic small vessel ischemic disease. 4. No evidence for acute intracranial pathology. HARESH OSBORN MD   SYSTEM ID:  [id number]    PET Oncology (Eyes to Thighs)    Result Date: 6/13/2024  EXAM: PET ONCOLOGY (EYES TO THIGHS), CT CHEST/ABDOMEN/PELVIS W CONTRAST LOCATION: [facility] DATE: 6/13/2024 INDICATION: Initial treatment evaluation for malignant neoplasm of central portion of right female breast. COMPARISON: 6/6/2024. CONTRAST: Isovue-370 99 mL IV TECHNIQUE: Serum glucose level 100 mg/dL. One hour post intravenous administration of 11.95 mCi F-18 FDG, PET imaging was performed from the skull vertex to mid thighs, utilizing attenuation correction with concurrent axial CT and PET/CT image fusion. Separate diagnostic CT of the chest, abdomen, and pelvis was performed. Dose reduction techniques were used. PET/CT FINDINGS: Large, enhancing mass in the central portion of the right breast measuring 5.2 x 4.8 x 5.7 cm (SUV max 7.4). There is a 10 mm FDG avid right subpectoral lymph node (SUV max 3.6). FDG avid anterior left upper lobe subpleural nodular opacity (SUV max 2.9) measuring 13 x 6 mm as well as a 7 mm subpleural nodule in the right lung base (SUV max 2.8). Several hypermetabolic bone lesions including T4 vertebral body extending into the posterior elements (SUV max 6.2), left lateral ninth rib (SUV max 5.2), sternum (SUV max 5.5),, right sacral ala (SUV max 4.7), right ischium (SUV max 5.9), and right proximal femur (SUV max 4.5). Possible focal wall thickening of the transverse colon with associated uptake  (SUV max 6.9). FDG avid right thyroid nodule (max SUV 6.6), a third of which represent primary thyroid neoplasms. This is amenable to ultrasound-guided histologic sampling if desired. Esophagitis. Moderate compression deformity of the L1 vertebral body with associated uptake and retropulsion into the spinal canal measuring 7 mm. CT FINDINGS: Subcentimeter focus of enhancement within the paramedian right frontal lobe along the falx without definite increased uptake above background, possibly meningioma. No significant coronary artery calcium. Tiny sliding hiatal hernia. Cholecystectomy with some biliary reservoir effect. Uterine fibroids. Multilevel degenerative disease in the spine.     IMPRESSION: 1.  Findings of primary right breast malignancy with right subpectoral kaylene, pulmonary, and osseous metastatic disease 2.  Moderate compression deformity of the L1 vertebral body with associated uptake, probably pathologic, with about 7 mm retropulsion into the spinal canal, as noted on recent MRI. 3.  Hypermetabolic right thyroid nodule. Recommend thyroid ultrasound. 4.  Possible focal wall thickening of the transverse colon with associated uptake. Although this could be related to peristalsis, consider follow-up colonoscopy to exclude primary colonic neoplasm, or close attention on follow-up.    CT Chest/Abdomen/Pelvis w Contrast    Result Date: 6/13/2024  EXAM: PET ONCOLOGY (EYES TO THIGHS), CT CHEST/ABDOMEN/PELVIS W CONTRAST LOCATION: [facility] DATE: 6/13/2024 INDICATION: Initial treatment evaluation for malignant neoplasm of central portion of right female breast. COMPARISON: 6/6/2024. CONTRAST: Isovue-370 99 mL IV TECHNIQUE: Serum glucose level 100 mg/dL. One hour post intravenous administration of 11.95 mCi F-18 FDG, PET imaging was performed from the skull vertex to mid thighs, utilizing attenuation correction with concurrent axial CT and PET/CT image fusion. Separate diagnostic CT of  the chest, abdomen, and pelvis was performed. Dose reduction techniques were used. PET/CT FINDINGS: Large, enhancing mass in the central portion of the right breast measuring 5.2 x 4.8 x 5.7 cm (SUV max 7.4). There is a 10 mm FDG avid right subpectoral lymph node (SUV max 3.6). FDG avid anterior left upper lobe subpleural nodular opacity (SUV max 2.9) measuring 13 x 6 mm as well as a 7 mm subpleural nodule in the right lung base (SUV max 2.8). Several hypermetabolic bone lesions including T4 vertebral body extending into the posterior elements (SUV max 6.2), left lateral ninth rib (SUV max 5.2), sternum (SUV max 5.5),, right sacral ala (SUV max 4.7), right ischium (SUV max 5.9), and right proximal femur (SUV max 4.5). Possible focal wall thickening of the transverse colon with associated uptake (SUV max 6.9). FDG avid right thyroid nodule (max SUV 6.6), a third of which represent primary thyroid neoplasms. This is amenable to ultrasound-guided histologic sampling if desired. Esophagitis. Moderate compression deformity of the L1 vertebral body with associated uptake and retropulsion into the spinal canal measuring 7 mm. CT FINDINGS: Subcentimeter focus of enhancement within the paramedian right frontal lobe along the falx without definite increased uptake above background, possibly meningioma. No significant coronary artery calcium. Tiny sliding hiatal hernia. Cholecystectomy with some biliary reservoir effect. Uterine fibroids. Multilevel degenerative disease in the spine.     IMPRESSION: 1.  Findings of primary right breast malignancy with right subpectoral kaylene, pulmonary, and osseous metastatic disease 2.  Moderate compression deformity of the L1 vertebral body with associated uptake, probably pathologic, with about 7 mm retropulsion into the spinal canal, as noted on recent MRI. 3.  Hypermetabolic right thyroid nodule. Recommend thyroid ultrasound. 4.  Possible focal wall thickening of the transverse colon with  associated uptake. Although this could be related to peristalsis, consider follow-up colonoscopy to exclude primary colonic neoplasm, or close attention on follow-up.    MR Lumbar Spine w/o & w Contrast    Result Date: 6/6/2024  For Patients: As a result of the 21st Century Cures Act, medical imaging exams and procedure reports are released immediately into your electronic medical record. You may view this report before your referring provider. If you have questions, please contact your health care provider. EXAM: MR SPINE LUMBAR WWO LOCATION: [facility] DATE: 6/6/2024 INDICATION: Bilateral Low Back Pain Without Sciatica, Unspecified Chronicity Primary Cancer Of Right Breast (hc) COMPARISON: 6/6/24 CONTRAST: Clariscan 15 mL TECHNIQUE: Routine Lumbar Spine MRI without and with IV contrast. FINDINGS: Nomenclature is based on 5 lumbar type vertebral bodies with L5-S1 defined on image 50 of series 5. Alignment is unremarkable.  Pathologic burst fracture at L1 with greater than 50% vertebral body height loss and 9 mm retropulsion of the posterior L1 vertebral body. The lesion at L1 demonstrates posterolateral involvement bilaterally extending into the bilateral pedicles, left transverse process, and left lamina with extraosseous extension into the left ventral paraspinal soft tissues. Additional scattered osseous metastases in the sacrum and lumbar spine without pathologic fracture. Normal distal spinal cord and cauda equina with conus medullaris at L1. Prevertebral and dorsal paraspinal muscular atrophy. Visualized intra-abdominal structures are unremarkable. The pathologic L1 burst fracture results in moderate canal stenosis at the level of the L1 vertebral body. No significant canal stenosis at the remaining levels. Mild left L1-L2 foraminal stenosis.    1.  Pathologic L1 burst fracture with greater than 50% vertebral body height loss. 9 mm retropulsion results in moderate canal stenosis at the level of the  vertebral body. Posterolateral involvement bilaterally at L1. 2.  Additional scattered osseous metastases without vertebral body height loss or canal involvement.    XR Lumbar Spine 3 Views Job Care    Result Date: 6/6/2024  For Patients: As a result of the 21st Century Cures Act, medical imaging exams and procedure reports are released immediately into your electronic medical record. You may view this report before your referring provider. If you have questions, please contact your health care provider. EXAM: XR SPINE LUMBAR 3 VIEWS LOCATION: [facility] DATE: 6/6/2024 INDICATION: Bilateral Low Back Pain Without Sciatica, Unspecified Chronicity COMPARISON: None.    L1 compression deformity with approximately 40% height loss, some anterior fragmentation, and retropulsion of fracture fragments into the spinal canal. Fracture likely involves the anterior wall, superior endplate, and possibly the posterior wall. Finding could represent acute, subacute, or chronic process. CT of the lumbar spine should be considered. Alignment otherwise within normal limits. No other findings to suggest acute fracture. Vertebral body heights otherwise maintained. Mild-moderate degenerative changes throughout the lumbar spine with facet arthropathy, vertebral body osteophyte formation, and disc height loss. PHYSICIAN ALERT    US Breast Biopsy Core Needle Right    Addendum Date: 6/6/2024    Pathology shows invasive ductal carcinoma. This is consistent with imaging findings. A verbal report was given to the patient. Surgical and oncological consultation is recommended.    Result Date: 6/6/2024  US Breast Biopsy Core Needle Right INDICATION: Right breast mass. PROCEDURE: Informed consent was obtained from the patient. Ultrasound was used to localize the mass at the 12 o'clock position of the right breast, 6 cm from the nipple.  The skin was marked, then prepped and draped in a sterile fashion. 1% lidocaine was used  for local anesthesia. Under direct sonographic guidance, a 14-gauge coaxial needle was used to obtain 3 core biopsies.  A biopsy marking clip was then placed.  Digital mammograms performed in a separate room, using separate equipment, to document clip placement. The mammogram showed the clip to be in good position. The patient tolerated this well; there are no immediate complications.    IMPRESSION: 1. Ultrasound-guided biopsy of mass in the right breast. Pathology pending. 2. Post procedure mammogram for marker placement     MA Post Procedure Right    Addendum Date: 6/6/2024    Pathology shows invasive ductal carcinoma. This is consistent with imaging findings. A verbal report was given to the patient. Surgical and oncological consultation is recommended.    Result Date: 6/6/2024  US Breast Biopsy Core Needle Right INDICATION: Right breast mass. PROCEDURE: Informed consent was obtained from the patient. Ultrasound was used to localize the mass at the 12 o'clock position of the right breast, 6 cm from the nipple.  The skin was marked, then prepped and draped in a sterile fashion. 1% lidocaine was used for local anesthesia. Under direct sonographic guidance, a 14-gauge coaxial needle was used to obtain 3 core biopsies.  A biopsy marking clip was then placed.  Digital mammograms performed in a separate room, using separate equipment, to document clip placement. The mammogram showed the clip to be in good position. The patient tolerated this well; there are no immediate complications.    IMPRESSION: 1. Ultrasound-guided biopsy of mass in the right breast. Pathology pending. 2. Post procedure mammogram for marker placement     MA Diagnostic Bilateral w/Latosha    Result Date: 5/23/2024  Examinations: MA DIAGNOSTIC BILATERAL W/ LATOSHA, US BREAST RIGHT LIMITED 1-3 QUADRANTS, 5/23/2024 11:04 AM Comparisons: None. History/family history: Patient reports a rapidly growing mass in the right breast, first noticed in February 2024.  BREAST DENSITY: The breasts are heterogeneously dense which may obscure small masses. Findings:   Mammogram: At the 12:00 position, posterior depth in the right breast there is there is an oval, hyperdense mass with microlobulated margins measuring 5.0 cm. There are no suspicious findings in the left breast. Ultrasound: Targeted ultrasound evaluation of the right breast was performed by the technologist and radiologist. At the site of the patient's palpable concern at the 12:00 position, 6 cm from nipple there is an irregular, hypoechoic mass measuring 4.4 x 4.2 x 4.8 cm. The mass directly abuts the skin. Evaluation of the retroareolar breast at the 6:00 position, demonstrates only normal breast tissue and several mildly dilated ducts. Evaluation of the right axilla demonstrates only morphologically normal lymph nodes.      Impression: BI-RADS CATEGORY: 5 - Highly Suggestive of Malignancy-Appropriate Action Should Be Taken Suspicious mass in the right breast at the 12:00 position. Ultrasound-guided biopsy is recommended. RECOMMENDED FOLLOW-UP: Biopsy. The patient was given the results of the examination. KIRSTIN SANTO MD   SYSTEM ID:  [id number]    US Breast Right Limited 1-3 Quadrants    Result Date: 5/23/2024  Examinations: MA DIAGNOSTIC BILATERAL W/ LATOSHA, US BREAST RIGHT LIMITED 1-3 QUADRANTS, 5/23/2024 11:04 AM Comparisons: None. History/family history: Patient reports a rapidly growing mass in the right breast, first noticed in February 2024. BREAST DENSITY: The breasts are heterogeneously dense which may obscure small masses. Findings:   Mammogram: At the 12:00 position, posterior depth in the right breast there is there is an oval, hyperdense mass with microlobulated margins measuring 5.0 cm. There are no suspicious findings in the left breast. Ultrasound: Targeted ultrasound evaluation of the right breast was performed by the technologist and radiologist. At the site of the patient's palpable concern at the  12:00 position, 6 cm from nipple there is an irregular, hypoechoic mass measuring 4.4 x 4.2 x 4.8 cm. The mass directly abuts the skin. Evaluation of the retroareolar breast at the 6:00 position, demonstrates only normal breast tissue and several mildly dilated ducts. Evaluation of the right axilla demonstrates only morphologically normal lymph nodes.      Impression: BI-RADS CATEGORY: 5 - Highly Suggestive of Malignancy-Appropriate Action Should Be Taken Suspicious mass in the right breast at the 12:00 position. Ultrasound-guided biopsy is recommended. RECOMMENDED FOLLOW-UP: Biopsy. The patient was given the results of the examination. KIRSTIN SANTO MD   SYSTEM ID:  [id number]     42 minutes spent on the date of the encounter doing chart review, history and exam, documentation, communication of the treatment plan with the care team and further activities as noted above.    Signed by: Magaly Bender MD

## 2024-06-18 NOTE — TELEPHONE ENCOUNTER
Called patient regarding scheduling surgery. Offered her 6/26. She stated her sister is coming from [place] of the [place] 6/27 to take care of her so would like to schedule 7/3 if possible. I advised her might not have the OR time on 7/3. She stated if we can't do 7/3 to do 6/26. Advised her I would work on getting it scheduled and call her back with surgery details.

## 2024-06-18 NOTE — TELEPHONE ENCOUNTER
Patient is scheduled for surgery 7/3/24. Gave patient surgery details and she verbalized understanding.

## 2024-06-18 NOTE — PROGRESS NOTES
"Oncology Rooming Note    June 18, 2024 1:15 PM   Adriana St is a 68 year old female who presents for:    Chief Complaint   Patient presents with    Oncology Clinic Visit     Initial Vitals: BP (!) 159/92   Pulse 80   Temp 97.8  F (36.6  C) (Oral)   Resp 16   Wt 73.7 kg (162 lb 6.4 oz)   SpO2 97%   BMI 28.77 kg/m   Estimated body mass index is 28.77 kg/m  as calculated from the following:    Height as of 6/11/24: 1.6 m (5' 3\").    Weight as of this encounter: 73.7 kg (162 lb 6.4 oz). Body surface area is 1.81 meters squared.  Severe Pain (7) Comment: Data Unavailable   No LMP recorded.  Allergies reviewed: Yes  Medications reviewed: Yes    Medications: Medication refills not needed today.  Pharmacy name entered into [employer]: [id number] - [place] [address] AT SEC OF [address] [facility]    Frailty Screening:   Is the patient here for a new oncology consult visit in cancer care? 2. No      Clinical concerns: Adriana Wei is here for follow up and is guarded in how she walks and sits quite gingerly.       Deisy Stevens RN              "

## 2024-06-19 ENCOUNTER — TELEPHONE (OUTPATIENT)
Dept: ONCOLOGY | Facility: HOSPITAL | Age: 69
End: 2024-06-19

## 2024-06-19 NOTE — TELEPHONE ENCOUNTER
PA Initiation    Medication: KISQALI (600 MG DOSE) 200 MG PO TBPK  Insurance Company: [employer] - Phone [phone] Fax [phone]  Start Date: 6/19/2024

## 2024-06-20 ENCOUNTER — PATIENT OUTREACH (OUTPATIENT)
Dept: ONCOLOGY | Facility: HOSPITAL | Age: 69
End: 2024-06-20
Payer: COMMERCIAL

## 2024-06-20 ENCOUNTER — MYC MEDICAL ADVICE (OUTPATIENT)
Dept: ONCOLOGY | Facility: HOSPITAL | Age: 69
End: 2024-06-20
Payer: COMMERCIAL

## 2024-06-20 DIAGNOSIS — M84.48XA PATHOLOGICAL FRACTURE OF LUMBAR VERTEBRA, INITIAL ENCOUNTER: ICD-10-CM

## 2024-06-20 RX ORDER — DEXAMETHASONE 2 MG/1
TABLET ORAL
Qty: 21 TABLET | Refills: 0 | Status: ON HOLD | OUTPATIENT
Start: 2024-06-20 | End: 2024-07-07

## 2024-06-20 NOTE — PROGRESS NOTES
[facility]: Cancer Care                                                                                          Patient sent Mychart  stating her dexamethasone is nearly done (3 days left). She is wondering if another refill should be sent?  Patient reports surgery is scheduled for 7/3/24 at 0800.    Discussed verbally with Dr. Bender. She will send refil to start tapering dexamethason.    Message sent to Dr. Bender. Dexamethasone Rx sent to [facility].    Mychart message sent to patient with dexamethasone taper instructions. Refer to this message.    Signature:  Annmarie Nash RN

## 2024-06-20 NOTE — TELEPHONE ENCOUNTER
MEDICATION APPEAL APPROVED    Medication: KISQALI (600 MG DOSE) 200 MG PO TBPK  Authorization Effective Date:    Authorization Expiration Date:    Approved Dose/Quantity: 63/28days  Reference #: [id number]   Expected CoPay: $ 0     Filling Pharmacy: [facility] - [place], [place] - [address]

## 2024-06-24 LAB
INTERPRETATION: NORMAL
LAB DIRECTOR COMMENTS: NORMAL
LAB DIRECTOR DISCLAIMER: NORMAL
LAB DIRECTOR INTERPRETATION: NORMAL
LAB DIRECTOR METHODOLOGY: NORMAL
LAB DIRECTOR RESULTS: NORMAL
SIGNIFICANT RESULTS: NORMAL
SPECIMEN DESCRIPTION: NORMAL
SPECIMEN DESCRIPTION: NORMAL
TEST DETAILS, MDL: NORMAL

## 2024-06-24 PROCEDURE — G0452 MOLECULAR PATHOLOGY INTERPR: HCPCS | Mod: 26 | Performed by: PATHOLOGY

## 2024-06-24 PROCEDURE — 81309 PIK3CA GENE TRGT SEQ ALYS: CPT

## 2024-06-24 PROCEDURE — G0452 MOLECULAR PATHOLOGY INTERPR: HCPCS | Mod: 26 | Performed by: STUDENT IN AN ORGANIZED HEALTH CARE EDUCATION/TRAINING PROGRAM

## 2024-06-24 PROCEDURE — 81456 SO/HL 51/>GSAP RNA ALYS: CPT

## 2024-06-26 ENCOUNTER — TRANSFERRED RECORDS (OUTPATIENT)
Dept: HEALTH INFORMATION MANAGEMENT | Facility: CLINIC | Age: 69
End: 2024-06-26
Payer: COMMERCIAL

## 2024-06-27 ENCOUNTER — VIRTUAL VISIT (OUTPATIENT)
Dept: ONCOLOGY | Facility: CLINIC | Age: 69
End: 2024-06-27
Attending: INTERNAL MEDICINE
Payer: COMMERCIAL

## 2024-06-27 DIAGNOSIS — Z17.0 MALIGNANT NEOPLASM OF CENTRAL PORTION OF RIGHT BREAST IN FEMALE, ESTROGEN RECEPTOR POSITIVE (H): Primary | ICD-10-CM

## 2024-06-27 DIAGNOSIS — C50.111 MALIGNANT NEOPLASM OF CENTRAL PORTION OF RIGHT BREAST IN FEMALE, ESTROGEN RECEPTOR POSITIVE (H): Primary | ICD-10-CM

## 2024-06-27 PROCEDURE — 96040 HC GENETIC COUNSELING, EACH 30 MINUTES: CPT | Mod: TEL,95 | Performed by: GENETIC COUNSELOR, MS

## 2024-06-27 NOTE — PROGRESS NOTES
Virtual Visit Details    Type of service:  Telephone Visit   Phone call duration: 36 minutes   Originating Location (pt. Location): Home  Distant Location (provider location):  Off-site    6/27/2024    Referring Provider: Magaly Bender MD    Present for Today's Visit: Adriana    Presenting Information:   I met with Adriana St today for genetic counseling (telephone visit) to discuss her personal and family history of cancer.  She is here today to review this history, cancer screening recommendations, and available genetic testing options.    Personal History:  Adriana is a 68 year old female. She was diagnosed with a right breast cancer in May 2024. 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 She was found to have right subpectoral kaylene, pulmonary and osseous metastasis on PET scan. She met with Dr. Prieto from neurosurgery and was recommended L1 bilateral laminectomy and medial facetectomy which is scheduled for 7/3. Plan is for ribociclib and radiation to follow.       She had her first menstrual period at age 13, her first child at age 26, and reports that she went through menopause in her late 40's. Adriana has her ovaries, fallopian tubes and uterus in place.  She reports that she was on hormone replacement therapy for about 15 years and discontinued about 1 month ago.      She reports that she has never had a colonoscopy. She does not regularly do any other cancer screening at this time.  Adriana reported past tobacco use (quit about 20 years ago), and social alcohol use.    Family History: Cancer family history and pertinent information reviewed below (Please see scanned pedigree for detailed family history information)  Son, age 42, has a history of melanoma behind his ear diagnosed at age 40.   Brother, age 55, has a history of thyroid cancer diagnosed at age 50.   Sister, age 65, with a recent diagnosis of lung cancer. She dies have smoking history.    Father passed at age 97 with a history of multiple non-melanoma skin cancers.   Paternal aunt passed from leukemia at an unknown age.   Paternal aunt with a history of an unknown type of cancer.   Paternal uncle with a history of an unknown type of cancer.   Paternal first-cousin with a history of a gastrointestinal cancer diagnosed after age 50.   Paternal grandmother passed in her late 60's with a history of a bone cancer.   Adriana reports that there are additional cancers in her family, but she cannot recall the specific details at this time. She also reports that she has limited information about her maternal relatives' health histories.   She reports that there may be some [employer] ancestry on her paternal side. There is no reported consanguinity.    Discussion:  Adriana's personal and family history of may be suggestive of a hereditary cancer syndrome.  We discussed the natural history and genetics of hereditary cancers. A detailed handout regarding the information we discussed will be sent to Adriana via [employer] and in [place] mail. Topics included: inheritance pattern, cancer risks, cancer screening recommendations, and also risks, benefits and limitations of testing.  We reviewed that there are genes associated with increased breast cancer risk, such as the most common cause of hereditary breast cancer is Hereditary Breast and Ovarian Cancer (HBOC) syndrome, which is caused by mutations in the genes BRCA1 and BRCA2. Women with a mutation in either of these genes are at increased risk for breast and ovarian cancer. There is also an increased risk for a second primary breast cancer for women. Men with a mutation in either BRCA1 or BRCA2 are at increased risk for male breast and prostate cancer. Both women and men may also be at increased risk for pancreatic cancer and melanoma.  Medical Management: For Adriana, we reviewed that the information from genetic testing may determine:  additional cancer screening for which  Adriana may qualify (i.e. mammogram and breast MRI, more frequent colonoscopies, more frequent dermatologic exams, etc.),  options for risk reducing surgeries Adriana could consider (i.e. bilateral mastectomy, surgery to remove her ovaries and/or uterus, etc.),    and targeted chemotherapies for Adriana's active cancer, or if she were to develop certain cancers in the future (i.e. immunotherapy for individuals with Cervantes syndrome, PARP inhibitors, etc.)  Based on our current understanding of Adriana's personal and family history, it is unclear whether she meets National Comprehensive Cancer Network guidelines for genetic testing.   Adriana shared that she believes she will be able to get additional information about the cancer histories in her family. I encouraged her to do this so that we can solidify a testing plan for her. Once she has this information, she will reach back out to me to discuss.     Plan:  1) No testing ordered today.   2) Adriana will work on getting additional information about her family history of cancer and will reach out to me (via [employer] or my contact info below) to review and come up with a testing plan.     Norma Stone MS, CGC  Licensed, Certified Genetic Counselor  [facility]  [email or web address]

## 2024-06-27 NOTE — NURSING NOTE
Is the patient currently in the state of [place]? YES    Visit mode:VIDEO    If the visit is dropped, the patient can be reconnected by: TELEPHONE VISIT: Phone number:   Telephone Information:   Mobile [phone]       Will anyone else be joining the visit? NO  (If patient encounters technical issues they should call [phone]    How would you like to obtain your AVS? MyChart    Are changes needed to the allergy or medication list? No    Are refills needed on medications prescribed by this physician? NO    Reason for visit: No chief complaint on file.    Linda MAURICIOF

## 2024-06-27 NOTE — LETTER
6/27/2024      Adriana St  [address] [facility] [address]      Dear Colleague,    Thank you for referring your patient, Adriana St, to the [facility]. Please see a copy of my visit note below.    Virtual Visit Details    Type of service:  Telephone Visit   Phone call duration: 36 minutes   Originating Location (pt. Location): Home  Distant Location (provider location):  Off-site    6/27/2024    Referring Provider: Magaly Bender MD    Present for Today's Visit: Adriana    Presenting Information:   I met with Adriana St today for genetic counseling (telephone visit) to discuss her personal and family history of cancer.  She is here today to review this history, cancer screening recommendations, and available genetic testing options.    Personal History:  Adriana is a 68 year old female. She was diagnosed with a right breast cancer in May 2024. Pathology showed invasive ductal carcinoma (ER positive, MS positive, and HER2 negative). She was found to have right subpectoral kaylene, pulmonary and osseous metastasis on PET scan. She met with Dr. Prieto from neurosurgery and was recommended L1 bilateral laminectomy and medial facetectomy which is scheduled for 7/3. Plan is for ribociclib and radiation to follow.       She had her first menstrual period at age 13, her first child at age 26, and reports that she went through menopause in her late 40's. Adriana has her ovaries, fallopian tubes and uterus in place.  She reports that she was on hormone replacement therapy for about 15 years and discontinued about 1 month ago.      She reports that she has never had a colonoscopy. She does not regularly do any other cancer screening at this time.  Adriana reported past tobacco use (quit about 20 years ago), and social alcohol use.    Family History: Cancer family history and pertinent information reviewed below (Please see scanned pedigree for detailed family  history information)  Son, age 42, has a history of melanoma behind his ear diagnosed at age 40.   Brother, age 55, has a history of thyroid cancer diagnosed at age 50.   Sister, age 65, with a recent diagnosis of lung cancer. She dies have smoking history.   Father passed at age 97 with a history of multiple non-melanoma skin cancers.   Paternal aunt passed from leukemia at an unknown age.   Paternal aunt with a history of an unknown type of cancer.   Paternal uncle with a history of an unknown type of cancer.   Paternal first-cousin with a history of a gastrointestinal cancer diagnosed after age 50.   Paternal grandmother passed in her late 60's with a history of a bone cancer.   Adriana reports that there are additional cancers in her family, but she cannot recall the specific details at this time. She also reports that she has limited information about her maternal relatives' health histories.   She reports that there may be some [employer] ancestry on her paternal side. There is no reported consanguinity.    Discussion:  Adriana's personal and family history of may be suggestive of a hereditary cancer syndrome.  We discussed the natural history and genetics of hereditary cancers. A detailed handout regarding the information we discussed will be sent to Adriana via [employer] and in [place] mail. Topics included: inheritance pattern, cancer risks, cancer screening recommendations, and also risks, benefits and limitations of testing.  We reviewed that there are genes associated with increased breast cancer risk, such as the most common cause of hereditary breast cancer is Hereditary Breast and Ovarian Cancer (HBOC) syndrome, which is caused by mutations in the genes BRCA1 and BRCA2. Women with a mutation in either of these genes are at increased risk for breast and ovarian cancer. There is also an increased risk for a second primary breast cancer for women. Men with a mutation in either BRCA1 or BRCA2 are at increased risk for  male breast and prostate cancer. Both women and men may also be at increased risk for pancreatic cancer and melanoma.  Medical Management: For Adriana, we reviewed that the information from genetic testing may determine:  additional cancer screening for which Adriana may qualify (i.e. mammogram and breast MRI, more frequent colonoscopies, more frequent dermatologic exams, etc.),  options for risk reducing surgeries Adriana could consider (i.e. bilateral mastectomy, surgery to remove her ovaries and/or uterus, etc.),    and targeted chemotherapies for Adriana's active cancer, or if she were to develop certain cancers in the future (i.e. immunotherapy for individuals with Cervantes syndrome, PARP inhibitors, etc.)  Based on our current understanding of Adriana's personal and family history, it is unclear whether she meets National Comprehensive Cancer Network guidelines for genetic testing.   Adriana shared that she believes she will be able to get additional information about the cancer histories in her family. I encouraged her to do this so that we can solidify a testing plan for her. Once she has this information, she will reach back out to me to discuss.     Plan:  1) No testing ordered today.   2) Adriana will work on getting additional information about her family history of cancer and will reach out to me (via [employer] or my contact info below) to review and come up with a testing plan.     Norma Stone MS, [facility] [place] [place]  Licensed, Certified Genetic Counselor  [facility]  [email or web address]        Again, thank you for allowing me to participate in the care of your patient.        Sincerely,        Norma Jackson GC

## 2024-06-28 NOTE — PATIENT INSTRUCTIONS
Assessing Cancer Risk  Cancer is a common diagnosis which impacts many families.  Individuals may develop cancer due to environmental factors (such as exposures and lifestyle), aging, genetic predisposition, or a combination of these factors.      Only about 5-10% of cancers are thought to be due to an inherited cancer susceptibility gene.    These families often have:  Several people with the same or related types of cancer  Cancers diagnosed at a young age (before age 50)  Individuals with more than one primary cancer  Multiple generations of the family affected with cancer    Comprehensive Breast and Gynecologic Cancer Panel  We each inherit two copies of every gene in our bodies: one from our mother, and one from our father. Each gene has a specific job to do.  When a gene has a mistake or  mutation  in it, it does not work like it should.     Some people may be candidates for genetic testing of more than one gene.  For these families, genetic testing using a cancer panel may be offered. These panels will test different genes at once known to increase the risk for breast, ovarian, uterine, and/or other cancers.    This handout will review common hereditary breast and gynecologic cancer syndromes. The genes that will be discussed in this handout are: BAUDILIO, BRCA1, BRCA2, BRIP1, CDH1, CHEK2, MLH1, MSH2, MSH6, PMS2, EPCAM, PTEN, PALB2, RAD51C, RAD51D, and TP53.    The purpose of this handout is to serve as a brief summary of the breast and gynecologic cancer risk genes that have published clinical management guidelines for individuals who are found to carry a mutation. Inheriting a mutation does not mean a person will develop cancer, but it does significantly increase their risk above the general population risk.     ______________________________________________________________________________    Hereditary Breast and Ovarian Cancer Syndrome (BRCA1 and BRCA2)  A single mutation in one of the copies of BRCA1 or  BRCA2 increases the risk for breast and ovarian cancer, among others.  The risk for pancreatic cancer and melanoma may also be slightly increased in some families.  The chart below shows the chance that someone with a BRCA mutation would develop cancer in his or her lifetime1,2,3,4.       Lifetime Cancer Risks    General Population BRCA1  BRCA2   Breast  12% >60% >60%   Ovarian  1-2% 39-58% 13-29%   Prostate 12% 7-26% 19-61%   Male Breast 0.1% 0.2-1.2% 1.8-7.1%   Pancreas 1-2% Up to 5% 5-10%     A person s ethnic background is also important to consider, as individuals of Ashkenazi Orthodox ancestry have a higher chance of having a BRCA gene mutation.  There are three BRCA mutations that occur more frequently in this population.      Ceravntes Syndrome (MLH1, MSH2, MSH6, PMS2, and EPCAM)  Currently five genes are known to cause Cervantes Syndrome: MLH1, MSH2, MSH6, PMS2, and EPCAM.  A single mutation in one of the Cervantes Syndrome genes increases the risk for colon, endometrial, ovarian, and stomach cancers.  Other cancers that occur less commonly in Cervantes Syndrome include urinary tract, skin, and brain cancers.  The chart below shows the chance that a person with Cervantes syndrome would develop cancer in his or her lifetime5.      Lifetime Cancer Risks    General Population Cervantes Syndrome   Colon 5% 10-61%   Endometrial 3% 13-57%   Ovarian 1-2% 1-38%   Stomach <1% 1-9%   *Cancer risk varies depending on Cervantes syndrome gene found      Cowden Syndrome (PTEN)  Cowden syndrome is a hereditary condition that increases the risk for breast, thyroid, endometrial, colon, and kidney cancer.  Cowden syndrome is caused by a mutation in the PTEN gene.  A single mutation in one of the copies of PTEN causes Cowden syndrome and increases cancer risk.  The chart below shows the chance that someone with a PTEN mutation would develop cancer in their lifetime6,7.  Other benign features seen in some individuals with Cowden syndrome include benign  skin lesions (facial papules, keratoses, lipomas), learning disability, autism, thyroid nodules, colon polyps, and larger head size.     Lifetime Cancer Risks    General Population Cowden   Breast 12% 40-60%*   Thyroid 1% Up to 38%   Renal 1-2% Up to 35%   Endometrial 3% Up to 28%   Colon 5% Up to 9%   Melanoma 2-3% Up to 6%   *Emerging data suggests the risk for breast cancer could be greater than 60%               Li-Fraumeni Syndrome (TP53)  Li-Fraumeni Syndrome (LFS) is a cancer predisposition syndrome caused by a mutation in the TP53 gene. A single mutation in one of the copies of TP53 increases the risk for multiple cancers. Individuals with LFS are at an increased risk for developing cancer at a young age. The lifetime risk for development of a LFS-associated cancer is 50% by age 30 and 90% by age 60.   Core Cancers: Sarcomas, Breast, Brain, Lung, Leukemias/Lymphomas, Adrenocortical carcinomas  Other Cancers: Gastrointestinal, Thyroid, Skin, Genitourinary       Hereditary Diffuse Gastric Cancer (CDH1)  Currently, one gene is known to cause hereditary diffuse gastric cancer (HDGC): CDH1.  Individuals with HDGC are at increased risk for diffuse gastric cancer and lobular breast cancer. Of people diagnosed with HDGC, 30-50% have a mutation in the CDH1 gene.  This suggests there are likely other genes that may cause HDGC that have not been identified yet.      Lifetime Cancer Risks    General Population HDGC   Diffuse Gastric  <1% ~80%   Breast 12% 41-60%       Additional Genes    BAUDILIO  BAUDILIO is a moderate-risk breast cancer gene. Women who have a mutation in BAUDILIO can have between a 2-4 fold increased risk for breast cancer compared to the general population8. BAUDILIO mutations have also been associated with increased risk for pancreatic cancer between 5-10%9. Individuals who inherit two BAUDILIO mutations have a condition called ataxia-telangiectasia (AT).  This rare autosomal recessive condition affects the nervous system  and immune system, and is associated with progressive cerebellar ataxia beginning in childhood. Individuals with ataxia-telangiectasia often have a weakened immune system and have an increased risk for childhood cancers.    PALB2  Mutations in PALB2 have been shown to increase the risk of breast cancer up to 41-60% in some families; where individuals fall within this risk range is dependent upon family [id number]. PALB2 mutations have also been associated with increased risk for pancreatic cancer between 5-10%.  Individuals who inherit two PALB2 mutations--one from their mother and one from their father--have a condition called Fanconi Anemia.  This rare autosomal recessive condition is associated with short stature, developmental delay, bone marrow failure, and increased risk for childhood cancers.    CHEK2   CHEK2 is a moderate-risk breast cancer gene.  Women who have a mutation in CHEK2 have around a 2-4 fold increased risk for breast cancer compared to the general population, and this risk may be higher depending upon family history.11,12,13 The risk of colon cancer may be twice as high as the general population risk of colon cancer of 5%. Mutations in CHEK2 have also been shown to increase the risk of other cancers, including prostate, however these cancer risks are currently not well understood.    BRIP1, RAD51C and RAD51D  Mutations in RAD51C and RAD51D have been shown to increase the risk of ovarian cancer and breast cancer 14,. Mutations in BRIP1 have been shown to increase the risk of ovarian cancer and possibly female breast cancer 15 .       Lifetime Cancer Risk    General Population        BRIP1   RAD51C  RAD51D   Breast 12% Not well defined 20-40% 20-40%   Ovarian 1-2% 5-15% 10-15% 10-20%     ______________________________________________________________  Inheritance  All of the cancer syndromes reviewed above are inherited in an autosomal dominant pattern.  This means that if a parent has a mutation,  each of their children will have a 50% chance of inheriting that same mutation. Therefore, each child --male or female-- would have a 50% chance of being at increased risk for developing cancer.    Image obtained from Genetics Home Reference, 2013     Mutations in some genes can occur de daniele, which means that a person s mutation occurred for the first time in them and was not inherited from a parent.  Now that they have the mutation, however, it can be passed on to future generations.    Genetic Testing  Genetic testing involves a blood test and will look for any harmful mutations that are associated with increased cancer risk.  If possible, it is recommended that the person(s) who has had cancer be tested before other family members.  That person will give us the most useful information about whether or not a specific gene is associated with the cancer in the family.    Results  There are three possible results of genetic testing:  Positive--a harmful mutation was identified in one or more of the genes  Negative--no mutations were identified in any of the genes tested  Variant of unknown significance--a variation in one of the genes was identified, but it is unclear how this impacts cancer risk in the family    Advantages and Disadvantages   There are advantages and disadvantages to genetic testing.    Advantages  May clarify your cancer risk  Can help you make medical decisions  May explain the cancers in your family  May give useful information to your family members (if you share your results)    Disadvantages  Possible negative emotional impact of learning about inherited cancer risk  Uncertainty in interpreting a negative test result in some situations  Possible genetic discrimination concerns (see below)    Genetic Information Nondiscrimination Act (MEERA)  The Genetic Information Nondiscrimination Act of 2008 (MEERA) is a federal law that protects individuals from health insurance or employment discrimination  based on a genetic test result alone (with some exceptions, including employers with fewer than 15 employees, and ).  Although rare, MEERA  does not cover discrimination protections in terms of life insurance, long term care, or disability insurances.  Visit the [employer] Human Social [employer] Research Hixson website to learn more.    Reducing Cancer Risk  All of the genes described in this handout have nationally recognized cancer screening guidelines that would be recommended for individuals who test positive.  In addition to increased cancer screening, surgeries may be offered or recommended to reduce cancer risk.  Recommendations are based upon an individual s genetic test result as well as their personal and family history of cancer.    Questions to Think About Regarding Genetic Testing:  What effect will the test result have on me and my relationship with my family members if I have an inherited gene mutation?  If I don t have a gene mutation?  Should I share my test results, and how will my family react to this news, which may also affect them?  Are my children ready to learn new information that may one day affect their own health?    Hereditary Cancer Resources    FORCE: Facing Our Risk of Cancer Empowered facingourrisk.org   Bright Pink bebrightpink.org   Li-Fraumeni Syndrome Association lfsassociation.org   PTEN World PTENworld.com   No stomach for cancer, Inc. nostomachforcancer.org   Stomach cancer relief network Scrnet.org   Collaborative Group of the Americas on Inherited Colorectal Cancer (CGA) cgaicc.com    Cancer Care cancercare.org   American Cancer Society (ACS) cancer.org   National Cancer Hixson (NCI) cancer.gov     Please call us if you have any questions or concerns.   Cancer Risk Management Program [phone] ([phone])    References  Kindra Addison PDP, Raymond S, Jessica CAMPUZANO, Marianna ESPANA, Leslie MONTILLA, Jameson N, Nancy H, Camila O, Meseret A, Rita B, Buzz P, Radha S,  Phoenix DM, Bolivar N, Yash E, Patricia H, Bill E, Alycia J, Zoie J, Loretta B, Becki H, Sameerci S, Eekelia H, Jeremías H, Eleanor K, Annetta OP. Average risks of breast and ovarian cancer associated with BRCA1 or BRCA2 mutations detected in case series unselected for family history: a combined analysis of 222 studies. Am J Hum Roseann. 2003;72:1117-30.  Alise N, Love ROBLERO, Cecily G.  BRCA1 and BRCA2 Hereditary Breast and Ovarian Cancer. Gene Reviews online. 2013.  Brandon YC, Chay S, Gita G, Duarte S. Breast cancer risk among male BRCA1 and BRCA2 mutation carriers. J Natl Cancer Inst. 2007;99:1811-4.  Rajesh TORRE, Rhonda I, Danie J, Ronnie E, Curt ER, Rekha F. Risk of breast cancer in male BRCA2 carriers. J Med Roseann. 2010;47:710-1.  National Comprehensive Cancer Network. Clinical practice guidelines in oncology, colorectal cancer screening. Available online (registration required). 2015.  Sánchez MH, Denia J, Gerardo J, Janice LA, Orlotrinity MS, Eng C. Lifetime cancer risks in individuals with germline PTEN mutations. Clin Cancer Res. 2012;18:400-7.  Jack TAO. Cowden Syndrome: A Critical Review of the Clinical Literature. J Roseann . 2009:18:13-27.  Elinor A, Zac D, Christine S, Bryanna P, Chagtai T, Jamal M, Rommel B, Bruno H, Gregorio R, Chasity K, Nancy L, Rajesh TORRE, Phoenix RODRIGUEZ, Ray DF, Braden MR, The Breast Cancer Susceptibility Collaboration (UK) & Matthew N. BAUDILIO mutations that cause ataxia-telangiectasia are breast cancer susceptibility alleles. Nature Genetics. 2006;38:873-875  Be MCKINLEY , Adan Y, Nataliya HOLT, Janneth LOPEZ, Jason GM , Lionel ML, Portilloinger S, Herrmann AG, Syngal S, Salvatore ML, Sujata J , Adrien R, Georges SZ, Ino JR, Lino VE, Prachi M, Vogelstein B, Nguyen N, Jason RH, Fer KW, and Meggan AP. BAUDILIO mutations in patients with hereditary pancreatic cancer. Cancer Discover. 2012;2:41-46  Bri ARENAS et al. Breast-Cancer Risk in Families with  Mutations in PALB2. NEJM. 2014; 371(6):497-506.  CHEK2 Breast Cancer Case-Control Consortium. CHEK2*1100delC and susceptibility to breast cancer: A collaborative analysis involving 10,860 breast cancer cases and 9,065 controls from 10 studies. Am J Hum Roseann, 74 (2004), pp. 4035-7789  Oscar T, Chaka S, Emanuel K, et al. Spectrum of Mutations in BRCA1, BRCA2, CHEK2, and TP53 in Families at High Risk of Breast Cancer. AIXA. 2006;295(12):1511-8186.   Leonardo C, Jorge D, Hilda A, et al. Risk of breast cancer in women with a CHEK2 mutation with and without a family history of breast cancer. J Clin Oncol. 2011;29:6958-7912.  Joselo H, Tyler E, Jourdan SJ, et al. Contribution of germline mutations in the RAD51B, RAD51C, and RAD51D genes to ovarian cancer in the population. J Clin Oncol. 2015;33(26):2392-2782. Doi:10.1200/JCO.2015.61.2408.  Martha T, Lalitha DF, Isela P, et al. Mutations in BRIP1 confer high risk of ovarian cancer. Bindu Roseann. 2011;43(11):7993-4406. doi:10.1038/ng.955.

## 2024-07-01 ENCOUNTER — TELEPHONE (OUTPATIENT)
Dept: NEUROSURGERY | Facility: CLINIC | Age: 69
End: 2024-07-01
Payer: COMMERCIAL

## 2024-07-01 NOTE — PROGRESS NOTES
PTA medications updated by Medication Scribe prior to surgery via phone call with patient (last doses completed by Nurse)     Medication history sources: Patient, Surescripts, and H&P  In the past week, patient estimated taking medication this percent of the time: Greater than 90%      Significant changes made to the medication list:  Patient reports no longer taking the following meds (med scribe removed from PTA med list): Valium, compazine, estrace, Miralax, Kisqali,  Ozempic, Citomeurim, Tylex, Addera Max.      Additional medication history information:   None    Medication reconciliation completed by provider prior to medication history? No    Time spent in this activity: 50 minutes    The information provided in this note is only as accurate as the sources available at the time of update(s)      Prior to Admission medications    Medication Sig Last Dose Taking? Auth Provider Long Term End Date   bisacodyl (DULCOLAX) 5 MG EC tablet Take 5 mg by mouth daily as needed (Gentle laxative) Unknown at prn Yes Reported, Patient     dexAMETHasone (DECADRON) 2 MG tablet Take 1 tablet (2 mg) by mouth 2 times daily (with meals) for 7 days, THEN 1 tablet (2 mg) daily (with breakfast) for 7 days.  at am Yes Magaly Bender MD Yes 7/4/24   HYDROmorphone (DILAUDID) 2 MG tablet Take 2 mg by mouth 2 times daily  at am Yes Reported, Patient     letrozole (FEMARA) 2.5 MG tablet Take 1 tablet (2.5 mg) by mouth daily  at pm Yes Magaly Bender MD Yes        Medication history completed by: Vy Khoury LPN

## 2024-07-01 NOTE — TELEPHONE ENCOUNTER
Left message via     Attempt x2 left second message via  requesting callback to discuss instructions for surgery, post-op course, expectations, and follow up plan.    Reviewed H&P from 6/26/24 - cleared for surgery  Labs, EKG - within acceptable limits - WBC elevated due to steroid therapy. Discussed with Dr. Prieto and approval given. EKG normal sinus    MRI done on 6/6/24 - in [place]    To OR as planned.     Procedure - THORACIC 11 TO LUMBAR 3 POSTEROLATERAL FUSION USING ALLOGRAFT,LUMBAR 1 BILATERAL LAMINECTOMY AND MEDIAL FACETECTOMY AND SEPARATION SURGERY, POSSIBLE USE OF OSTEOCOOL DEVICE.     [facility] [place] - Dr. Prieto    Date - 7/3/24    Start - 8:00 AM    Check in - 6:00 AM

## 2024-07-03 ENCOUNTER — ANESTHESIA (OUTPATIENT)
Dept: SURGERY | Facility: CLINIC | Age: 69
End: 2024-07-03
Payer: COMMERCIAL

## 2024-07-03 ENCOUNTER — APPOINTMENT (OUTPATIENT)
Dept: GENERAL RADIOLOGY | Facility: CLINIC | Age: 69
End: 2024-07-03
Attending: NEUROLOGICAL SURGERY
Payer: COMMERCIAL

## 2024-07-03 ENCOUNTER — ANESTHESIA EVENT (OUTPATIENT)
Dept: SURGERY | Facility: CLINIC | Age: 69
End: 2024-07-03
Payer: COMMERCIAL

## 2024-07-03 ENCOUNTER — HOSPITAL ENCOUNTER (INPATIENT)
Facility: CLINIC | Age: 69
LOS: 5 days | Discharge: HOME HEALTH | End: 2024-07-08
Attending: NEUROLOGICAL SURGERY | Admitting: NEUROLOGICAL SURGERY
Payer: COMMERCIAL

## 2024-07-03 DIAGNOSIS — M43.25 FUSION OF SPINE, THORACOLUMBAR REGION: ICD-10-CM

## 2024-07-03 DIAGNOSIS — M84.48XA PATHOLOGICAL FRACTURE OF LUMBAR VERTEBRA, INITIAL ENCOUNTER: ICD-10-CM

## 2024-07-03 DIAGNOSIS — K59.03 DRUG-INDUCED CONSTIPATION: Primary | ICD-10-CM

## 2024-07-03 LAB
ABO/RH(D): NORMAL
ANTIBODY SCREEN: NEGATIVE
GLUCOSE BLDC GLUCOMTR-MCNC: 162 MG/DL (ref 70–99)
GLUCOSE BLDC GLUCOMTR-MCNC: 174 MG/DL (ref 70–99)
GLUCOSE SERPL-MCNC: 128 MG/DL (ref 70–99)
HBA1C MFR BLD: 6.6 %
OSMOLALITY UR: 724 MMOL/KG (ref 100–1200)
SODIUM SERPL-SCNC: 130 MMOL/L (ref 135–145)
SODIUM UR-SCNC: 147 MMOL/L
SPECIMEN EXPIRATION DATE: NORMAL
TSH SERPL DL<=0.005 MIU/L-ACNC: 1.33 UIU/ML (ref 0.3–4.2)
URATE SERPL-MCNC: 4.2 MG/DL (ref 2.4–5.7)

## 2024-07-03 PROCEDURE — 88360 TUMOR IMMUNOHISTOCHEM/MANUAL: CPT | Mod: 26 | Performed by: PATHOLOGY

## 2024-07-03 PROCEDURE — 84443 ASSAY THYROID STIM HORMONE: CPT | Performed by: INTERNAL MEDICINE

## 2024-07-03 PROCEDURE — 258N000003 HC RX IP 258 OP 636: Performed by: ANESTHESIOLOGY

## 2024-07-03 PROCEDURE — 0S500ZZ DESTRUCTION OF LUMBAR VERTEBRAL JOINT, OPEN APPROACH: ICD-10-PCS | Performed by: NEUROLOGICAL SURGERY

## 2024-07-03 PROCEDURE — 84550 ASSAY OF BLOOD/URIC ACID: CPT | Performed by: INTERNAL MEDICINE

## 2024-07-03 PROCEDURE — 250N000013 HC RX MED GY IP 250 OP 250 PS 637

## 2024-07-03 PROCEDURE — 250N000009 HC RX 250: Performed by: NURSE ANESTHETIST, CERTIFIED REGISTERED

## 2024-07-03 PROCEDURE — 00NX0ZZ RELEASE THORACIC SPINAL CORD, OPEN APPROACH: ICD-10-PCS | Performed by: NEUROLOGICAL SURGERY

## 2024-07-03 PROCEDURE — 710N000009 HC RECOVERY PHASE 1, LEVEL 1, PER MIN: Performed by: NEUROLOGICAL SURGERY

## 2024-07-03 PROCEDURE — 272N000002 HC OR SUPPLY OTHER OPNP: Performed by: NEUROLOGICAL SURGERY

## 2024-07-03 PROCEDURE — C1713 ANCHOR/SCREW BN/BN,TIS/BN: HCPCS | Performed by: NEUROLOGICAL SURGERY

## 2024-07-03 PROCEDURE — 88342 IMHCHEM/IMCYTCHM 1ST ANTB: CPT | Mod: 26 | Performed by: PATHOLOGY

## 2024-07-03 PROCEDURE — 36415 COLL VENOUS BLD VENIPUNCTURE: CPT | Performed by: ANESTHESIOLOGY

## 2024-07-03 PROCEDURE — 99207 PR NO BILLABLE SERVICE THIS VISIT: CPT | Performed by: INTERNAL MEDICINE

## 2024-07-03 PROCEDURE — 250N000025 HC SEVOFLURANE, PER MIN: Performed by: NEUROLOGICAL SURGERY

## 2024-07-03 PROCEDURE — 22612 ARTHRD PST TQ 1NTRSPC LUMBAR: CPT | Performed by: NURSE ANESTHETIST, CERTIFIED REGISTERED

## 2024-07-03 PROCEDURE — 258N000003 HC RX IP 258 OP 636

## 2024-07-03 PROCEDURE — 250N000011 HC RX IP 250 OP 636: Performed by: NEUROLOGICAL SURGERY

## 2024-07-03 PROCEDURE — 83036 HEMOGLOBIN GLYCOSYLATED A1C: CPT | Performed by: INTERNAL MEDICINE

## 2024-07-03 PROCEDURE — 250N000012 HC RX MED GY IP 250 OP 636 PS 637: Performed by: ANESTHESIOLOGY

## 2024-07-03 PROCEDURE — P9045 ALBUMIN (HUMAN), 5%, 250 ML: HCPCS | Performed by: NURSE ANESTHETIST, CERTIFIED REGISTERED

## 2024-07-03 PROCEDURE — 88331 PATH CONSLTJ SURG 1 BLK 1SPC: CPT | Mod: TC | Performed by: NEUROLOGICAL SURGERY

## 2024-07-03 PROCEDURE — 250N000011 HC RX IP 250 OP 636: Performed by: ANESTHESIOLOGY

## 2024-07-03 PROCEDURE — 00NY0ZZ RELEASE LUMBAR SPINAL CORD, OPEN APPROACH: ICD-10-PCS | Performed by: NEUROLOGICAL SURGERY

## 2024-07-03 PROCEDURE — 8E0WXBF COMPUTER ASSISTED PROCEDURE OF TRUNK REGION, WITH FLUOROSCOPY: ICD-10-PCS | Performed by: NEUROLOGICAL SURGERY

## 2024-07-03 PROCEDURE — 22612 ARTHRD PST TQ 1NTRSPC LUMBAR: CPT | Performed by: ANESTHESIOLOGY

## 2024-07-03 PROCEDURE — 250N000011 HC RX IP 250 OP 636

## 2024-07-03 PROCEDURE — 120N000001 HC R&B MED SURG/OB

## 2024-07-03 PROCEDURE — 88307 TISSUE EXAM BY PATHOLOGIST: CPT | Mod: 26 | Performed by: PATHOLOGY

## 2024-07-03 PROCEDURE — 83935 ASSAY OF URINE OSMOLALITY: CPT | Performed by: INTERNAL MEDICINE

## 2024-07-03 PROCEDURE — 0SG10K1 FUSION OF 2 OR MORE LUMBAR VERTEBRAL JOINTS WITH NONAUTOLOGOUS TISSUE SUBSTITUTE, POSTERIOR APPROACH, POSTERIOR COLUMN, OPEN APPROACH: ICD-10-PCS | Performed by: NEUROLOGICAL SURGERY

## 2024-07-03 PROCEDURE — 258N000003 HC RX IP 258 OP 636: Performed by: NURSE ANESTHETIST, CERTIFIED REGISTERED

## 2024-07-03 PROCEDURE — 36415 COLL VENOUS BLD VENIPUNCTURE: CPT | Performed by: INTERNAL MEDICINE

## 2024-07-03 PROCEDURE — 250N000011 HC RX IP 250 OP 636: Performed by: NURSE ANESTHETIST, CERTIFIED REGISTERED

## 2024-07-03 PROCEDURE — 250N000009 HC RX 250: Performed by: NEUROLOGICAL SURGERY

## 2024-07-03 PROCEDURE — 82947 ASSAY GLUCOSE BLOOD QUANT: CPT | Performed by: ANESTHESIOLOGY

## 2024-07-03 PROCEDURE — 0RGA0K1 FUSION OF THORACOLUMBAR VERTEBRAL JOINT WITH NONAUTOLOGOUS TISSUE SUBSTITUTE, POSTERIOR APPROACH, POSTERIOR COLUMN, OPEN APPROACH: ICD-10-PCS | Performed by: NEUROLOGICAL SURGERY

## 2024-07-03 PROCEDURE — 84300 ASSAY OF URINE SODIUM: CPT | Performed by: INTERNAL MEDICINE

## 2024-07-03 PROCEDURE — 250N000013 HC RX MED GY IP 250 OP 250 PS 637: Performed by: ANESTHESIOLOGY

## 2024-07-03 PROCEDURE — 370N000017 HC ANESTHESIA TECHNICAL FEE, PER MIN: Performed by: NEUROLOGICAL SURGERY

## 2024-07-03 PROCEDURE — 999N000179 XR SURGERY CARM FLUORO LESS THAN 5 MIN W STILLS

## 2024-07-03 PROCEDURE — 88342 IMHCHEM/IMCYTCHM 1ST ANTB: CPT | Mod: TC | Performed by: NEUROLOGICAL SURGERY

## 2024-07-03 PROCEDURE — 88331 PATH CONSLTJ SURG 1 BLK 1SPC: CPT | Mod: 26 | Performed by: PATHOLOGY

## 2024-07-03 PROCEDURE — C1762 CONN TISS, HUMAN(INC FASCIA): HCPCS | Performed by: NEUROLOGICAL SURGERY

## 2024-07-03 PROCEDURE — 360N000086 HC SURGERY LEVEL 6 W/ FLUORO, PER MIN: Performed by: NEUROLOGICAL SURGERY

## 2024-07-03 PROCEDURE — 999N000141 HC STATISTIC PRE-PROCEDURE NURSING ASSESSMENT: Performed by: NEUROLOGICAL SURGERY

## 2024-07-03 PROCEDURE — 0RG60K1 FUSION OF THORACIC VERTEBRAL JOINT WITH NONAUTOLOGOUS TISSUE SUBSTITUTE, POSTERIOR APPROACH, POSTERIOR COLUMN, OPEN APPROACH: ICD-10-PCS | Performed by: NEUROLOGICAL SURGERY

## 2024-07-03 PROCEDURE — 84295 ASSAY OF SERUM SODIUM: CPT | Performed by: ANESTHESIOLOGY

## 2024-07-03 PROCEDURE — 272N000001 HC OR GENERAL SUPPLY STERILE: Performed by: NEUROLOGICAL SURGERY

## 2024-07-03 PROCEDURE — 86900 BLOOD TYPING SEROLOGIC ABO: CPT | Performed by: ANESTHESIOLOGY

## 2024-07-03 DEVICE — GRAFT BN CANC 15CC CRSH 1-10MM 800103: Type: IMPLANTABLE DEVICE | Site: THORACIC | Status: FUNCTIONAL

## 2024-07-03 DEVICE — IMP SCR MEDT 5.5/6.0MM SOLERA 5.5X40MM MA 55840005540: Type: IMPLANTABLE DEVICE | Site: THORACIC | Status: FUNCTIONAL

## 2024-07-03 DEVICE — IMPLANTABLE DEVICE: Type: IMPLANTABLE DEVICE | Site: THORACIC | Status: FUNCTIONAL

## 2024-07-03 DEVICE — IMP SCR MEDT 5.5/6.0MM SOLERA 4.5X40MM MA 55840004540: Type: IMPLANTABLE DEVICE | Site: THORACIC | Status: FUNCTIONAL

## 2024-07-03 DEVICE — GRAFT BN CANC 30CC CRSH 1-10MM 800104: Type: IMPLANTABLE DEVICE | Site: THORACIC | Status: FUNCTIONAL

## 2024-07-03 DEVICE — IMP SCR SET MEDT SOLERA BREAK OFF 5.5MM TI 5540030: Type: IMPLANTABLE DEVICE | Site: THORACIC | Status: FUNCTIONAL

## 2024-07-03 RX ORDER — AMOXICILLIN 250 MG
1 CAPSULE ORAL 2 TIMES DAILY
Status: DISCONTINUED | OUTPATIENT
Start: 2024-07-03 | End: 2024-07-08 | Stop reason: HOSPADM

## 2024-07-03 RX ORDER — NALOXONE HYDROCHLORIDE 0.4 MG/ML
0.4 INJECTION, SOLUTION INTRAMUSCULAR; INTRAVENOUS; SUBCUTANEOUS
Status: DISCONTINUED | OUTPATIENT
Start: 2024-07-03 | End: 2024-07-08 | Stop reason: HOSPADM

## 2024-07-03 RX ORDER — FENTANYL CITRATE 0.05 MG/ML
25 INJECTION, SOLUTION INTRAMUSCULAR; INTRAVENOUS EVERY 5 MIN PRN
Status: DISCONTINUED | OUTPATIENT
Start: 2024-07-03 | End: 2024-07-03 | Stop reason: HOSPADM

## 2024-07-03 RX ORDER — POLYETHYLENE GLYCOL 3350 17 G/17G
17 POWDER, FOR SOLUTION ORAL DAILY
Status: DISCONTINUED | OUTPATIENT
Start: 2024-07-04 | End: 2024-07-08 | Stop reason: HOSPADM

## 2024-07-03 RX ORDER — LIDOCAINE 40 MG/G
CREAM TOPICAL
Status: DISCONTINUED | OUTPATIENT
Start: 2024-07-03 | End: 2024-07-08 | Stop reason: HOSPADM

## 2024-07-03 RX ORDER — CEFAZOLIN SODIUM/WATER 2 G/20 ML
2 SYRINGE (ML) INTRAVENOUS
Status: COMPLETED | OUTPATIENT
Start: 2024-07-03 | End: 2024-07-03

## 2024-07-03 RX ORDER — DEXTROSE MONOHYDRATE 25 G/50ML
25-50 INJECTION, SOLUTION INTRAVENOUS
Status: DISCONTINUED | OUTPATIENT
Start: 2024-07-03 | End: 2024-07-08 | Stop reason: HOSPADM

## 2024-07-03 RX ORDER — SODIUM CHLORIDE, SODIUM LACTATE, POTASSIUM CHLORIDE, CALCIUM CHLORIDE 600; 310; 30; 20 MG/100ML; MG/100ML; MG/100ML; MG/100ML
INJECTION, SOLUTION INTRAVENOUS CONTINUOUS
Status: DISCONTINUED | OUTPATIENT
Start: 2024-07-03 | End: 2024-07-03 | Stop reason: HOSPADM

## 2024-07-03 RX ORDER — PROPOFOL 10 MG/ML
INJECTION, EMULSION INTRAVENOUS CONTINUOUS PRN
Status: DISCONTINUED | OUTPATIENT
Start: 2024-07-03 | End: 2024-07-03

## 2024-07-03 RX ORDER — PROPOFOL 10 MG/ML
INJECTION, EMULSION INTRAVENOUS PRN
Status: DISCONTINUED | OUTPATIENT
Start: 2024-07-03 | End: 2024-07-03

## 2024-07-03 RX ORDER — GABAPENTIN 100 MG/1
100 CAPSULE ORAL
Status: COMPLETED | OUTPATIENT
Start: 2024-07-03 | End: 2024-07-03

## 2024-07-03 RX ORDER — PROCHLORPERAZINE MALEATE 5 MG
5 TABLET ORAL EVERY 6 HOURS PRN
Status: DISCONTINUED | OUTPATIENT
Start: 2024-07-03 | End: 2024-07-08 | Stop reason: HOSPADM

## 2024-07-03 RX ORDER — ACETAMINOPHEN 325 MG/1
650 TABLET ORAL EVERY 4 HOURS PRN
Status: DISCONTINUED | OUTPATIENT
Start: 2024-07-06 | End: 2024-07-08 | Stop reason: HOSPADM

## 2024-07-03 RX ORDER — HYDROMORPHONE HCL IN WATER/PF 6 MG/30 ML
0.2 PATIENT CONTROLLED ANALGESIA SYRINGE INTRAVENOUS EVERY 5 MIN PRN
Status: DISCONTINUED | OUTPATIENT
Start: 2024-07-03 | End: 2024-07-03 | Stop reason: HOSPADM

## 2024-07-03 RX ORDER — ONDANSETRON 4 MG/1
4 TABLET, ORALLY DISINTEGRATING ORAL EVERY 30 MIN PRN
Status: DISCONTINUED | OUTPATIENT
Start: 2024-07-03 | End: 2024-07-03 | Stop reason: HOSPADM

## 2024-07-03 RX ORDER — METHOCARBAMOL 750 MG/1
750 TABLET, FILM COATED ORAL EVERY 6 HOURS PRN
Status: DISCONTINUED | OUTPATIENT
Start: 2024-07-03 | End: 2024-07-08 | Stop reason: HOSPADM

## 2024-07-03 RX ORDER — ONDANSETRON 4 MG/1
4 TABLET, ORALLY DISINTEGRATING ORAL EVERY 6 HOURS PRN
Status: DISCONTINUED | OUTPATIENT
Start: 2024-07-03 | End: 2024-07-08 | Stop reason: HOSPADM

## 2024-07-03 RX ORDER — ONDANSETRON 2 MG/ML
INJECTION INTRAMUSCULAR; INTRAVENOUS PRN
Status: DISCONTINUED | OUTPATIENT
Start: 2024-07-03 | End: 2024-07-03

## 2024-07-03 RX ORDER — SODIUM CHLORIDE, SODIUM LACTATE, POTASSIUM CHLORIDE, CALCIUM CHLORIDE 600; 310; 30; 20 MG/100ML; MG/100ML; MG/100ML; MG/100ML
INJECTION, SOLUTION INTRAVENOUS CONTINUOUS PRN
Status: DISCONTINUED | OUTPATIENT
Start: 2024-07-03 | End: 2024-07-03

## 2024-07-03 RX ORDER — BISACODYL 10 MG
10 SUPPOSITORY, RECTAL RECTAL DAILY PRN
Status: DISCONTINUED | OUTPATIENT
Start: 2024-07-06 | End: 2024-07-08 | Stop reason: HOSPADM

## 2024-07-03 RX ORDER — ONDANSETRON 2 MG/ML
4 INJECTION INTRAMUSCULAR; INTRAVENOUS EVERY 6 HOURS PRN
Status: DISCONTINUED | OUTPATIENT
Start: 2024-07-03 | End: 2024-07-08 | Stop reason: HOSPADM

## 2024-07-03 RX ORDER — BUPIVACAINE HYDROCHLORIDE AND EPINEPHRINE 2.5; 5 MG/ML; UG/ML
INJECTION, SOLUTION INFILTRATION; PERINEURAL PRN
Status: DISCONTINUED | OUTPATIENT
Start: 2024-07-03 | End: 2024-07-03 | Stop reason: HOSPADM

## 2024-07-03 RX ORDER — HYDROMORPHONE HCL IN WATER/PF 6 MG/30 ML
0.4 PATIENT CONTROLLED ANALGESIA SYRINGE INTRAVENOUS
Status: DISCONTINUED | OUTPATIENT
Start: 2024-07-03 | End: 2024-07-08 | Stop reason: HOSPADM

## 2024-07-03 RX ORDER — NALOXONE HYDROCHLORIDE 0.4 MG/ML
0.1 INJECTION, SOLUTION INTRAMUSCULAR; INTRAVENOUS; SUBCUTANEOUS
Status: DISCONTINUED | OUTPATIENT
Start: 2024-07-03 | End: 2024-07-03 | Stop reason: HOSPADM

## 2024-07-03 RX ORDER — NICOTINE POLACRILEX 4 MG
15-30 LOZENGE BUCCAL
Status: DISCONTINUED | OUTPATIENT
Start: 2024-07-03 | End: 2024-07-08 | Stop reason: HOSPADM

## 2024-07-03 RX ORDER — SODIUM CHLORIDE 9 MG/ML
INJECTION, SOLUTION INTRAVENOUS CONTINUOUS
Status: DISCONTINUED | OUTPATIENT
Start: 2024-07-03 | End: 2024-07-04

## 2024-07-03 RX ORDER — NALOXONE HYDROCHLORIDE 0.4 MG/ML
0.2 INJECTION, SOLUTION INTRAMUSCULAR; INTRAVENOUS; SUBCUTANEOUS
Status: DISCONTINUED | OUTPATIENT
Start: 2024-07-03 | End: 2024-07-08 | Stop reason: HOSPADM

## 2024-07-03 RX ORDER — CEFAZOLIN SODIUM/WATER 2 G/20 ML
2 SYRINGE (ML) INTRAVENOUS SEE ADMIN INSTRUCTIONS
Status: DISCONTINUED | OUTPATIENT
Start: 2024-07-03 | End: 2024-07-03 | Stop reason: HOSPADM

## 2024-07-03 RX ORDER — FENTANYL CITRATE 0.05 MG/ML
50 INJECTION, SOLUTION INTRAMUSCULAR; INTRAVENOUS EVERY 5 MIN PRN
Status: DISCONTINUED | OUTPATIENT
Start: 2024-07-03 | End: 2024-07-03 | Stop reason: HOSPADM

## 2024-07-03 RX ORDER — OXYCODONE HYDROCHLORIDE 5 MG/1
5 TABLET ORAL EVERY 4 HOURS PRN
Status: DISCONTINUED | OUTPATIENT
Start: 2024-07-03 | End: 2024-07-08 | Stop reason: HOSPADM

## 2024-07-03 RX ORDER — HYDROMORPHONE HCL IN WATER/PF 6 MG/30 ML
0.4 PATIENT CONTROLLED ANALGESIA SYRINGE INTRAVENOUS EVERY 5 MIN PRN
Status: DISCONTINUED | OUTPATIENT
Start: 2024-07-03 | End: 2024-07-03 | Stop reason: HOSPADM

## 2024-07-03 RX ORDER — APREPITANT 40 MG/1
40 CAPSULE ORAL ONCE
Status: COMPLETED | OUTPATIENT
Start: 2024-07-03 | End: 2024-07-03

## 2024-07-03 RX ORDER — OXYCODONE HYDROCHLORIDE 5 MG/1
10 TABLET ORAL EVERY 4 HOURS PRN
Status: DISCONTINUED | OUTPATIENT
Start: 2024-07-03 | End: 2024-07-08 | Stop reason: HOSPADM

## 2024-07-03 RX ORDER — DEXAMETHASONE SODIUM PHOSPHATE 4 MG/ML
INJECTION, SOLUTION INTRA-ARTICULAR; INTRALESIONAL; INTRAMUSCULAR; INTRAVENOUS; SOFT TISSUE PRN
Status: DISCONTINUED | OUTPATIENT
Start: 2024-07-03 | End: 2024-07-03

## 2024-07-03 RX ORDER — KETAMINE HYDROCHLORIDE 10 MG/ML
INJECTION INTRAMUSCULAR; INTRAVENOUS PRN
Status: DISCONTINUED | OUTPATIENT
Start: 2024-07-03 | End: 2024-07-03

## 2024-07-03 RX ORDER — DEXMEDETOMIDINE HYDROCHLORIDE 4 UG/ML
INJECTION, SOLUTION INTRAVENOUS PRN
Status: DISCONTINUED | OUTPATIENT
Start: 2024-07-03 | End: 2024-07-03

## 2024-07-03 RX ORDER — LETROZOLE 2.5 MG/1
2.5 TABLET, FILM COATED ORAL DAILY
Status: DISCONTINUED | OUTPATIENT
Start: 2024-07-03 | End: 2024-07-08 | Stop reason: HOSPADM

## 2024-07-03 RX ORDER — FENTANYL CITRATE 50 UG/ML
INJECTION, SOLUTION INTRAMUSCULAR; INTRAVENOUS PRN
Status: DISCONTINUED | OUTPATIENT
Start: 2024-07-03 | End: 2024-07-03

## 2024-07-03 RX ORDER — ACETAMINOPHEN 500 MG
1000 TABLET ORAL ONCE
Status: COMPLETED | OUTPATIENT
Start: 2024-07-03 | End: 2024-07-03

## 2024-07-03 RX ORDER — LIDOCAINE HYDROCHLORIDE 20 MG/ML
INJECTION, SOLUTION INFILTRATION; PERINEURAL PRN
Status: DISCONTINUED | OUTPATIENT
Start: 2024-07-03 | End: 2024-07-03

## 2024-07-03 RX ORDER — ONDANSETRON 2 MG/ML
4 INJECTION INTRAMUSCULAR; INTRAVENOUS EVERY 30 MIN PRN
Status: DISCONTINUED | OUTPATIENT
Start: 2024-07-03 | End: 2024-07-03 | Stop reason: HOSPADM

## 2024-07-03 RX ORDER — HYDROMORPHONE HCL IN WATER/PF 6 MG/30 ML
0.2 PATIENT CONTROLLED ANALGESIA SYRINGE INTRAVENOUS
Status: DISCONTINUED | OUTPATIENT
Start: 2024-07-03 | End: 2024-07-08 | Stop reason: HOSPADM

## 2024-07-03 RX ORDER — CEFAZOLIN SODIUM 1 G/3ML
1 INJECTION, POWDER, FOR SOLUTION INTRAMUSCULAR; INTRAVENOUS EVERY 8 HOURS
Status: COMPLETED | OUTPATIENT
Start: 2024-07-03 | End: 2024-07-04

## 2024-07-03 RX ORDER — DEXAMETHASONE SODIUM PHOSPHATE 4 MG/ML
4 INJECTION, SOLUTION INTRA-ARTICULAR; INTRALESIONAL; INTRAMUSCULAR; INTRAVENOUS; SOFT TISSUE
Status: DISCONTINUED | OUTPATIENT
Start: 2024-07-03 | End: 2024-07-03 | Stop reason: HOSPADM

## 2024-07-03 RX ORDER — ACETAMINOPHEN 325 MG/1
975 TABLET ORAL EVERY 8 HOURS
Status: COMPLETED | OUTPATIENT
Start: 2024-07-03 | End: 2024-07-06

## 2024-07-03 RX ADMIN — PHENYLEPHRINE HYDROCHLORIDE 100 MCG: 10 INJECTION INTRAVENOUS at 08:20

## 2024-07-03 RX ADMIN — Medication 20 MG: at 08:53

## 2024-07-03 RX ADMIN — SODIUM CHLORIDE, POTASSIUM CHLORIDE, SODIUM LACTATE AND CALCIUM CHLORIDE: 600; 310; 30; 20 INJECTION, SOLUTION INTRAVENOUS at 13:32

## 2024-07-03 RX ADMIN — APREPITANT 40 MG: 40 CAPSULE ORAL at 06:55

## 2024-07-03 RX ADMIN — PHENYLEPHRINE HYDROCHLORIDE 100 MCG: 10 INJECTION INTRAVENOUS at 13:19

## 2024-07-03 RX ADMIN — SODIUM CHLORIDE: 9 INJECTION, SOLUTION INTRAVENOUS at 16:00

## 2024-07-03 RX ADMIN — SUGAMMADEX 200 MG: 100 INJECTION, SOLUTION INTRAVENOUS at 13:44

## 2024-07-03 RX ADMIN — PHENYLEPHRINE HYDROCHLORIDE 100 MCG: 10 INJECTION INTRAVENOUS at 13:24

## 2024-07-03 RX ADMIN — DEXMEDETOMIDINE HYDROCHLORIDE 8 MCG: 200 INJECTION INTRAVENOUS at 13:15

## 2024-07-03 RX ADMIN — DEXMEDETOMIDINE HYDROCHLORIDE 12 MCG: 200 INJECTION INTRAVENOUS at 09:29

## 2024-07-03 RX ADMIN — ACETAMINOPHEN 1000 MG: 500 TABLET, FILM COATED ORAL at 06:55

## 2024-07-03 RX ADMIN — SODIUM CHLORIDE, POTASSIUM CHLORIDE, SODIUM LACTATE AND CALCIUM CHLORIDE: 600; 310; 30; 20 INJECTION, SOLUTION INTRAVENOUS at 07:41

## 2024-07-03 RX ADMIN — PHENYLEPHRINE HYDROCHLORIDE 100 MCG: 10 INJECTION INTRAVENOUS at 09:06

## 2024-07-03 RX ADMIN — ALBUMIN (HUMAN): 12.5 SOLUTION INTRAVENOUS at 09:19

## 2024-07-03 RX ADMIN — Medication 2 G: at 12:09

## 2024-07-03 RX ADMIN — ALBUMIN (HUMAN): 12.5 SOLUTION INTRAVENOUS at 13:17

## 2024-07-03 RX ADMIN — PHENYLEPHRINE HYDROCHLORIDE 100 MCG: 10 INJECTION INTRAVENOUS at 09:17

## 2024-07-03 RX ADMIN — SENNOSIDES AND DOCUSATE SODIUM 1 TABLET: 50; 8.6 TABLET ORAL at 20:37

## 2024-07-03 RX ADMIN — PHENYLEPHRINE HYDROCHLORIDE 200 MCG: 10 INJECTION INTRAVENOUS at 13:38

## 2024-07-03 RX ADMIN — FENTANYL CITRATE 50 MCG: 50 INJECTION, SOLUTION INTRAMUSCULAR; INTRAVENOUS at 14:23

## 2024-07-03 RX ADMIN — PROPOFOL 150 MG: 10 INJECTION, EMULSION INTRAVENOUS at 08:11

## 2024-07-03 RX ADMIN — Medication 10 MG: at 11:00

## 2024-07-03 RX ADMIN — PHENYLEPHRINE HYDROCHLORIDE 0.5 MCG/KG/MIN: 10 INJECTION INTRAVENOUS at 09:22

## 2024-07-03 RX ADMIN — ROCURONIUM BROMIDE 15 MG: 50 INJECTION, SOLUTION INTRAVENOUS at 10:58

## 2024-07-03 RX ADMIN — ACETAMINOPHEN 975 MG: 325 TABLET, FILM COATED ORAL at 22:24

## 2024-07-03 RX ADMIN — ROCURONIUM BROMIDE 20 MG: 50 INJECTION, SOLUTION INTRAVENOUS at 09:27

## 2024-07-03 RX ADMIN — ROCURONIUM BROMIDE 15 MG: 50 INJECTION, SOLUTION INTRAVENOUS at 10:37

## 2024-07-03 RX ADMIN — CEFAZOLIN 1 G: 1 INJECTION, POWDER, FOR SOLUTION INTRAMUSCULAR; INTRAVENOUS at 20:32

## 2024-07-03 RX ADMIN — DEXAMETHASONE SODIUM PHOSPHATE 4 MG: 4 INJECTION, SOLUTION INTRA-ARTICULAR; INTRALESIONAL; INTRAMUSCULAR; INTRAVENOUS; SOFT TISSUE at 08:23

## 2024-07-03 RX ADMIN — HYDROMORPHONE HYDROCHLORIDE 0.5 MG: 1 INJECTION, SOLUTION INTRAMUSCULAR; INTRAVENOUS; SUBCUTANEOUS at 09:29

## 2024-07-03 RX ADMIN — ROCURONIUM BROMIDE 20 MG: 50 INJECTION, SOLUTION INTRAVENOUS at 08:51

## 2024-07-03 RX ADMIN — GABAPENTIN 100 MG: 100 CAPSULE ORAL at 06:55

## 2024-07-03 RX ADMIN — LETROZOLE 2.5 MG: 2.5 TABLET ORAL at 20:37

## 2024-07-03 RX ADMIN — Medication 10 MG: at 12:10

## 2024-07-03 RX ADMIN — HYDROMORPHONE HYDROCHLORIDE 0.4 MG: 0.2 INJECTION, SOLUTION INTRAMUSCULAR; INTRAVENOUS; SUBCUTANEOUS at 16:40

## 2024-07-03 RX ADMIN — SODIUM CHLORIDE, POTASSIUM CHLORIDE, SODIUM LACTATE AND CALCIUM CHLORIDE: 600; 310; 30; 20 INJECTION, SOLUTION INTRAVENOUS at 10:20

## 2024-07-03 RX ADMIN — DEXMEDETOMIDINE HYDROCHLORIDE 12 MCG: 200 INJECTION INTRAVENOUS at 13:13

## 2024-07-03 RX ADMIN — MIDAZOLAM 2 MG: 1 INJECTION INTRAMUSCULAR; INTRAVENOUS at 07:59

## 2024-07-03 RX ADMIN — HYDROMORPHONE HYDROCHLORIDE 0.5 MG: 1 INJECTION, SOLUTION INTRAMUSCULAR; INTRAVENOUS; SUBCUTANEOUS at 10:50

## 2024-07-03 RX ADMIN — FENTANYL CITRATE 50 MCG: 50 INJECTION INTRAMUSCULAR; INTRAVENOUS at 08:55

## 2024-07-03 RX ADMIN — OXYCODONE HYDROCHLORIDE 5 MG: 5 TABLET ORAL at 20:52

## 2024-07-03 RX ADMIN — LIDOCAINE HYDROCHLORIDE 100 MG: 20 INJECTION, SOLUTION INFILTRATION; PERINEURAL at 08:11

## 2024-07-03 RX ADMIN — FENTANYL CITRATE 50 MCG: 50 INJECTION INTRAMUSCULAR; INTRAVENOUS at 08:11

## 2024-07-03 RX ADMIN — DEXMEDETOMIDINE HYDROCHLORIDE 8 MCG: 200 INJECTION INTRAVENOUS at 09:01

## 2024-07-03 RX ADMIN — PROPOFOL 50 MCG/KG/MIN: 10 INJECTION, EMULSION INTRAVENOUS at 08:30

## 2024-07-03 RX ADMIN — METHOCARBAMOL 750 MG: 750 TABLET ORAL at 17:53

## 2024-07-03 RX ADMIN — ROCURONIUM BROMIDE 50 MG: 50 INJECTION, SOLUTION INTRAVENOUS at 08:11

## 2024-07-03 RX ADMIN — PHENYLEPHRINE HYDROCHLORIDE 100 MCG: 10 INJECTION INTRAVENOUS at 13:27

## 2024-07-03 RX ADMIN — ROCURONIUM BROMIDE 10 MG: 50 INJECTION, SOLUTION INTRAVENOUS at 12:02

## 2024-07-03 RX ADMIN — PHENYLEPHRINE HYDROCHLORIDE 100 MCG: 10 INJECTION INTRAVENOUS at 12:24

## 2024-07-03 RX ADMIN — Medication 2 G: at 08:06

## 2024-07-03 RX ADMIN — PHENYLEPHRINE HYDROCHLORIDE 100 MCG: 10 INJECTION INTRAVENOUS at 08:45

## 2024-07-03 RX ADMIN — PHENYLEPHRINE HYDROCHLORIDE 100 MCG: 10 INJECTION INTRAVENOUS at 12:53

## 2024-07-03 RX ADMIN — Medication 10 MG: at 10:00

## 2024-07-03 RX ADMIN — ONDANSETRON 4 MG: 2 INJECTION INTRAMUSCULAR; INTRAVENOUS at 12:53

## 2024-07-03 RX ADMIN — PHENYLEPHRINE HYDROCHLORIDE 100 MCG: 10 INJECTION INTRAVENOUS at 08:53

## 2024-07-03 RX ADMIN — SODIUM CHLORIDE, POTASSIUM CHLORIDE, SODIUM LACTATE AND CALCIUM CHLORIDE: 600; 310; 30; 20 INJECTION, SOLUTION INTRAVENOUS at 08:20

## 2024-07-03 RX ADMIN — PROPOFOL 30 MG: 10 INJECTION, EMULSION INTRAVENOUS at 09:30

## 2024-07-03 ASSESSMENT — ACTIVITIES OF DAILY LIVING (ADL)
ADLS_ACUITY_SCORE: 22
ADLS_ACUITY_SCORE: 22
ADLS_ACUITY_SCORE: 33
ADLS_ACUITY_SCORE: 22
ADLS_ACUITY_SCORE: 35
ADLS_ACUITY_SCORE: 22

## 2024-07-03 ASSESSMENT — LIFESTYLE VARIABLES: TOBACCO_USE: 1

## 2024-07-03 ASSESSMENT — ENCOUNTER SYMPTOMS: SEIZURES: 0

## 2024-07-03 NOTE — ANESTHESIA PREPROCEDURE EVALUATION
Anesthesia Pre-Procedure Evaluation    Patient: Adriana St   MRN: [id number] : 1955        Procedure : Procedure(s):  THORACIC 11 TO LUMBAR 3 POSTEROLATERAL FUSION USING ALLOGRAFT,LUMBAR 1 BILATERAL LAMINECTOMY AND MEDIAL FACETECTOMY AND SEPARATION SURGERY, POSSIBLE USE OF OSTEOCOOL DEVICE.          Past Medical History:   Diagnosis Date    Malignant neoplasm metastatic to bone (H)     Malignant neoplasm of central portion of right breast in female, estrogen receptor positive (H)     Pathologic lumbar vertebral fracture     PONV (postoperative nausea and vomiting)       Past Surgical History:   Procedure Laterality Date    APPENDECTOMY      BREAST AUGMENTATION      CHOLECYSTECTOMY        Allergies   Allergen Reactions    Codeine Itching      Social History     Tobacco Use    Smoking status: Former     Types: Cigarettes     Start date: 1995     Quit date: 1985     Years since quittin.0    Smokeless tobacco: Never   Substance Use Topics    Alcohol use: Yes     Comment: social      Wt Readings from Last 1 Encounters:   24 73.7 kg (162 lb 6.4 oz)        Anesthesia Evaluation   Pt has had prior anesthetic.     History of anesthetic complications  - PONV.      ROS/MED HX  ENT/Pulmonary:     (+)                tobacco use, Past use,                    (-) asthma and sleep apnea   Neurologic:    (-) no seizures and no CVA   Cardiovascular:    (-) hypertension   METS/Exercise Tolerance: >4 METS    Hematologic:    (-) anemia   Musculoskeletal: Comment: Lumbar vertebral fracture      GI/Hepatic:    (-) GERD and liver disease   Renal/Genitourinary:    (-) renal disease   Endo:    (-) Type II DM and thyroid disease   Psychiatric/Substance Use:     (+)    H/O chronic opiod use .     Infectious Disease:       Malignancy:   (+) Malignancy (with metastasis to spine), History of Breast.Breast CA Active status post.      Other:      (+)  , H/O Chronic Pain,         Physical Exam    Airway      "   Mallampati: II   TM distance: > 3 FB   Neck ROM: full   Mouth opening: > 3 cm    Respiratory Devices and Support         Dental  no notable dental history     (+) Modest Abnormalities - crowns, retainers, 1 or 2 missing teeth      Cardiovascular          Rhythm and rate: regular and normal     Pulmonary   pulmonary exam normal                OUTSIDE LABS:  CBC: No results found for: \"WBC\", \"HGB\", \"HCT\", \"PLT\"  BMP:   Lab Results   Component Value Date    CR 0.8 06/13/2024     COAGS: No results found for: \"PTT\", \"INR\", \"FIBR\"  POC: No results found for: \"BGM\", \"HCG\", \"HCGS\"  HEPATIC: No results found for: \"ALBUMIN\", \"PROTTOTAL\", \"ALT\", \"AST\", \"GGT\", \"ALKPHOS\", \"BILITOTAL\", \"BILIDIRECT\", \"SANDEEP\"  OTHER: No results found for: \"PH\", \"LACT\", \"A1C\", \"GAYATHRI\", \"PHOS\", \"MAG\", \"LIPASE\", \"AMYLASE\", \"TSH\", \"T4\", \"T3\", \"CRP\", \"SED\"    Anesthesia Plan    ASA Status:  2    NPO Status:  NPO Appropriate    Anesthesia Type: General.     - Airway: ETT   Induction: Intravenous, Propofol.      Techniques and Equipment:     - Lines/Monitors: 2nd IV, Arterial Line     - Blood: T&S     Consents    Anesthesia Plan(s) and associated risks, benefits, and realistic alternatives discussed. Questions answered and patient/representative(s) expressed understanding.     - Discussed:     - Discussed with:  Patient            Postoperative Care    Pain management: IV analgesics.   PONV prophylaxis: Ondansetron (or other 5HT-3), Dexamethasone or Solumedrol, Background Propofol Infusion, Aprepitant     Comments:               Felton Patel MD    I have reviewed the pertinent notes and labs in the chart from the past 30 days and (re)examined the patient.  Any updates or changes from those notes are reflected in this note.              # Overweight: Estimated body mass index is 28.77 kg/m  as calculated from the following:    Height as of 6/11/24: 1.6 m (5' 3\").    Weight as of 6/18/24: 73.7 kg (162 lb 6.4 oz).      "

## 2024-07-03 NOTE — PROGRESS NOTES
Neurosurgery progress note:     POD0 THORACIC 11 TO LUMBAR 3 POSTEROLATERAL FUSION USING ALLOGRAFT,LUMBAR 1 BILATERAL LAMINECTOMY AND MEDIAL FACETECTOMY AND SEPARATION SURGERY, USE OF OSTEOCOOL DEVICE.     Plan:   -Admit   -Upright XR when able  -Abx x 3 doses  -Hemovac x 1  -Up w/ assist. No brace required.   -PT/OT/Incentive spirometry   -May remove mcdonald when ambulatory per nurse driven protocol   -Hospitalist consulted, appreciate assistance     Discussed with Dr. Conner Pedroza PA-C  [facility] Neurosurgery  [address] [id number] [phone]

## 2024-07-03 NOTE — BRIEF OP NOTE
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   Surgeon(s): Surgeons and Role:     * Thomas Prieto MD - Primary     * Caroline Pedroza PA-C - Assisting   Estimated blood loss: 300 mL    Specimens: ID Type Source Tests Collected by Time Destination   1 : L1 epidural tumor Tissue Spine, Lumbar SURGICAL PATHOLOGY EXAM Thomas Prieto MD 7/3/2024 11:03 AM    2 : L1 epidural tumor Tissue Spine, Lumbar SURGICAL PATHOLOGY EXAM Thomas Prieto MD 7/3/2024 11:10 AM       Findings: Satisfactory decompression and instrumentation achieved.

## 2024-07-03 NOTE — ANESTHESIA POSTPROCEDURE EVALUATION
Patient: Adriana St    Procedure: Procedure(s):  THORACIC 11 TO LUMBAR 3 POSTEROLATERAL FUSION USING ALLOGRAFT,LUMBAR 1 BILATERAL LAMINECTOMY AND MEDIAL FACETECTOMY AND SEPARATION SURGERY, USE OF OSTEOCOOL DEVICE.       Anesthesia Type:  General    Note:  Disposition: Inpatient   Postop Pain Control: Uneventful            Sign Out: Well controlled pain   PONV: No   Neuro/Psych: Uneventful            Sign Out: Acceptable/Baseline neuro status   Airway/Respiratory: Uneventful            Sign Out: Acceptable/Baseline resp. status   CV/Hemodynamics: Uneventful            Sign Out: Acceptable CV status; No obvious hypovolemia; No obvious fluid overload   Other NRE: NONE   DID A NON-ROUTINE EVENT OCCUR? No           Last vitals:  Vitals Value Taken Time   /86 07/03/24 1445   Temp 37.1  C (98.7  F) 07/03/24 1400   Pulse 76 07/03/24 1459   Resp 12 07/03/24 1459   SpO2 97 % 07/03/24 1459       Electronically Signed By: Lorenzo Hernández DO  July 3, 2024  4:37 PM

## 2024-07-03 NOTE — PROGRESS NOTES
Notified provider about indwelling mcdonald catheter discussed removal or continued need.    Did provider choose to remove indwelling mcdonald catheter? No    Provider's mcdonald indication for keeping indwelling mcdonald catheter: Patient needs to be more mobile     Is there an order for indwelling mcdonald catheter? No, but provider was paged to please place one.     *If there is a plan to keep mcdonald catheter in place at discharge daily notification with provider is not necessary, but please add a notation in the treatment team sticky note that the patient will be discharging with the catheter.

## 2024-07-03 NOTE — ANESTHESIA CARE TRANSFER NOTE
Patient: Adriana St    Procedure: Procedure(s):  THORACIC 11 TO LUMBAR 3 POSTEROLATERAL FUSION USING ALLOGRAFT,LUMBAR 1 BILATERAL LAMINECTOMY AND MEDIAL FACETECTOMY AND SEPARATION SURGERY, USE OF OSTEOCOOL DEVICE.       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    Anesthesia Type:   General     Note:    Oropharynx: oropharynx clear of all foreign objects and spontaneously breathing  Level of Consciousness: drowsy  Oxygen Supplementation: face mask  Level of Supplemental Oxygen (L/min / FiO2): 6  Independent Airway: airway patency satisfactory and stable  Dentition: dentition unchanged  Vital Signs Stable: post-procedure vital signs reviewed and stable  Report to RN Given: handoff report given  Patient transferred to: PACU  Comments: Patient comfortable  Handoff Report: Identifed the Patient, Identified the Reponsible Provider, Reviewed the pertinent medical history, Discussed the surgical course, Reviewed Intra-OP anesthesia mangement and issues during anesthesia, Set expectations for post-procedure period and Allowed opportunity for questions and acknowledgement of understanding      Vitals:  Vitals Value Taken Time   /80 07/03/24 1352   Temp     Pulse 86 07/03/24 1356   Resp 8 07/03/24 1356   SpO2 98 % 07/03/24 1356   Vitals shown include unfiled device data.    Electronically Signed By: MARTINE Garcia CRNA  July 3, 2024  1:57 PM

## 2024-07-03 NOTE — OR NURSING
OK by JYOTHI Hernández to remove art line and transfer to the floor. One belonging bag returned to pt. Víctor Rush called with room number.

## 2024-07-03 NOTE — ANESTHESIA PROCEDURE NOTES
Airway       Patient location during procedure: OR ([facility] - Operating Room or Procedural Area)       Procedure Start/Stop Times: 7/3/2024 8:14 AM  Staff -        Anesthesiologist:  Felton Patel MD       CRNA: Betty Colin APRN CRNA       Performed By: anesthesiologistIndications and Patient Condition       Indications for airway management: lo-procedural       Induction type:intravenous       Mask difficulty assessment: 2 - vent by mask + OA or adjuvant +/- NMBA    Final Airway Details       Final airway type: endotracheal airway       Successful airway: ETT - single  Endotracheal Airway Details        ETT size (mm): 7.0       Cuffed: yes       Successful intubation technique: direct laryngoscopy       DL Blade Type: Glover 2       Grade View of Cords: 1       Adjucts: stylet       Position: Right       Measured from: gums/teeth       Secured at (cm): 19       Bite block used: None    Post intubation assessment        Number of attempts at approach: 2 (X1 by YANETZ, poor view)       Number of other approaches attempted: 0       Secured with: tape       Ease of procedure: easy (grade 2B)       Dentition: Intact and Unchanged    Medication(s) Administered   Medication Administration Time: 7/3/2024 8:14 AM

## 2024-07-03 NOTE — PROGRESS NOTES
"Routine Hospitalist consult for medical mgt .     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     /77 (BP Location: Right arm)   Pulse 79   Temp 98.3  F (36.8  C) (Oral)   Resp 16   Ht 1.6 m (5' 3\")   Wt 72.6 kg (160 lb)   SpO2 98%   BMI 28.34 kg/m      Lab Results   Component Value Date     (L) 07/03/2024    CR 0.8 06/13/2024     (H) 07/03/2024       Pathologic lumbar vertebral fracture  S/p thoracic 11 to lumbar 3 posterior lateral fusion using allograft, L1 bilateral laminectomy and medial facetectomy and separation surgery, use osteocool device by Dr. Prieto for metastasis to spinal column.   Routine post-operative care, including pain mgt, drain mgt, activity, diet, and DVT prophylaxis, per surgical team.     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   Continue on PTA letrozole  Post op pain mgt per NSG. Taking dilaudid 2 mg BID PTA.     Mild hyponatremia, 141 6/4, 129 on 6/26, 133 on 7/1 and 130 07/03/24   Urine osmolality, urine sodium.   Uric acid, TSH added on to labs.   BMP, osmolality in AM.  Currently on NS 75 ml/h until fluids well tolerated.     Recent Labs   Lab 07/03/24  0639   *     Hyperglycemia 180s at preop, likely related to decadron  Follow BS QID with low sliding scale insulin ordered  Not currently on steroids.   Check A1c.         "

## 2024-07-03 NOTE — ANESTHESIA PROCEDURE NOTES
Arterial Line Procedure Note    Pre-Procedure   Staff -        Anesthesiologist:  Felton Patel MD       Performed By: Anesthesiologist       Location: pre-op       Pre-Anesthestic Checklist: patient identified, IV checked, site marked, risks and benefits discussed, informed consent, monitors and equipment checked, pre-op evaluation and at physician/surgeon's request  Timeout:       Correct Patient: Yes        Correct Procedure: Yes        Correct Site: Yes        Correct Position: Yes   Line Placement:   This line was placed Post Induction  Procedure   Procedure: arterial line       Laterality: left       Insertion Site: radial.  Sterile Prep        All elements of maximal sterile barrier technique followed       Patient Prep/Sterile Barriers: hand hygiene, sterile gloves, hat, mask, draped, sterile gown, draped       Skin prep: Chloraprep  Insertion/Injection        Technique: Seldinger Technique        Catheter Type/Size: 20 gauge, 1.75 in/4.5 cm quick cath (integral wire)  Narrative        Tegaderm dressing used.       Complications: None apparent,        Arterial waveform: Yes        IBP within 10% of NIBP: Yes

## 2024-07-03 NOTE — OP NOTE
Date of surgery: 07/03/24     Surgeon: Thomas Prieto MD    Assistant: Caroline Pedroza PA-C - Assisting    (Note: The assistant was present for and assisted with the entire surgery, and his/her role as an assistant was crucial for aid in positioning, exposure, suctioning, retraction, and closure).  No qualified resident was able to move for the assistance      Preoperative diagnosis: L1-2 osteodiscitis, vertebral osteomyelitis, L2 vertebral body fracture with spinal cord compression.  Postoperative diagnosis:  L1-2 osteodiscitis, vertebral osteomyelitis, L2 vertebral body fracture with spinal cord compression.        Procedure:  1.  T12-L1, L1-2  bilateral laminectomies and medial facetectomies for decompression of spinal cord and left transpedicular separation of the tumor from the thecal sac.    2.  T11-L3 posterior segmental instrumentation with insertion of bilateral pedicle screws and rods (T11: bilateral 5.5 X 40 mm pedicle screws;T12: bilateral 4.5 X 40 mm pedicle screws;  bilateral L2 and L3 5.5 x 45 mm pedicle screws [employer],  130 mm Ti 5.5 rods bilateral, cross-link at L1)  3.  Right L1 transpedicular placement of Osteocool device for radiofrequency ablation of L1 vertebral body tumor.  4.  T11-T12 posterolateral arthrodesis and fusion   5.  T12-L1 posterolateral arthrodesis and fusion   6.  L1-L2 posterolateral arthrodesis and fusion   7.  L2-L3 posterolateral arthrodesis and fusion   8.  Use of Apptentive O-arm intraoperative CT scan and Stealth navigation  9.  Use of intraoperative fluoroscopy.  10. 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  She  reports that her pain is gradually progressive in nature and she cannot do her activities of daily living without being on pain medications.  She denies symptoms affecting her bilateral lower extremities including motor weakness, sensory symptoms or new onset bladder or bowel symptoms. I discussed the MRI lumbar spine with and without contrast findings with the patient and CT chest abdomen pelvis findings with the patient and her son and showed them the images.  I explained to them the presence of contrast-enhancing tumor involving her L1 vertebra causing pathological fracture with more than 50% height loss.  I explained to them that based on the imaging the fracture is likely related to metastatic tumor.  I explained to them that based on the imaging characteristics and clinical features her SINS is 14 which points towards spine stability.  I discussed the management options including conservative management, percutaneous fusion, open decompression and fusion with separation surgery.  I also discussed the role of upfront radiation treatment followed by surgery if needed.       However given that patient has mechanical back pain with evidence of instability based on SINS score, I would recommend decompression and fusion with separation surgery with T11-L3 posterior lateral fusion and L1 laminectomy with suppression surgery.  I also discussed the possible role of radiofrequency ablation of the tumor using Osteocool device.  I discussed the risk and benefits of surgery including but not limited to bleeding, infection, injury to the spinal cord, injury to the nerve root, incomplete relief of symptoms, need for prolonged rehabilitation, need for adjuvant treatment including radiation therapy, worsening of symptoms, DVT/PE, MI and others.  After extensive discussion of the risk and benefits of surgery patient and her son agreed to proceed.      Description of surgery:   The patient was wheeled to the OR, general  anesthesia was induced, intravenous and arterial lines were placed.  The patient was positioned prone on a Andrew table with appropriate padding of pressure points.  Sterile prepping and draping procedures were performed.  Antibiotics were administered and timeout was performed.      A midline thoracolumbar incision was performed.  The monopolar was used to expose the spinous processes, lamina, facets, and transverse processes from T11-L3 in subperiosteal fashion.  An O-arm intraoperative CT scan was performed.  Stealth navigation was registered, and under navigation, bilateral pedicle screws were inserted at T11, T12, L2 and L3.  Right L3 screw was identified to be lateral to the pedicle and was repositioned.  Repeat intraoperative CT scan confirmed a satisfactory position of all the pedicle screws.       Following satisfactory placement of pedicle screws, I placed titanium jay jay on the right side and started with the spinal cord decompression by performing bilateral laminectomies at T12-L1 and L1-L2 using high-speed drill and Kerrison rongeurs. The Kerrison rongeurs were  used to remove the ligamentum flavum with decompression of the thecal sac.   Following satisfactory decompression of the spinal cord I used left transpedicular approach and tumor was accessed.  I sent the tumor for frozen examination which came back as likely metastatic carcinoma.  Using transpedicular approach I decompressed the lateral part of the thecal sac and reached ventral to the thecal sac and was decompressed.      Following satisfactory decompression of the thecal sac and separation surgery, I proceeded with the placement of titanium jay jay on the  left side and used right transpedicular approach to place Ostecool working channels for radiofrequency ablation under fluoroscopic guidance. Following satisfactory placement of the working channel, radiofrequency ablation of the tumor was done at 70 degree celsius for approximately 6 minutes.   These were then subsequently removed following the completion of the RF ablation portion of the procedure.     I then placed titanium rods on both sides and distracted across the L1 vertebral body for ligamentotaxis. The satisfactory position of the hardware was confirmed using intraoperative fluoroscope. The wound was copiously irrigated with antibiotic irrigation. Hemostasis was meticulously achieved.  The bilateral transverse processes and facets were arthrodesed from T11-L3, and magnetos allograft and crushed cancellous chips was packed for posterolateral fusion.  #10 Hemovac drains X1 was placed subfascially. The fascia was closed with 0-Vicryl sutures, and the dermal layer was closed with 2-0 vicryl sutures. The skin was closed using 3- monocryl. Counts were correct x 2     There were no intraprocedural complications. Patient withstood the procedure well and transferred to PACU following extubation.  I updated patient's son after the surgery.    Thomas Prieto MD

## 2024-07-03 NOTE — PLAN OF CARE
Goal Outcome Evaluation:  Pt is Aox4, not oob this shift. Pain controlled with IV dilaudid and roboxin. NS running at 75ml/hr. Discharge pending.

## 2024-07-04 ENCOUNTER — APPOINTMENT (OUTPATIENT)
Dept: PHYSICAL THERAPY | Facility: CLINIC | Age: 69
End: 2024-07-04
Attending: NEUROLOGICAL SURGERY
Payer: COMMERCIAL

## 2024-07-04 LAB
ANION GAP SERPL CALCULATED.3IONS-SCNC: 6 MMOL/L (ref 7–15)
BUN SERPL-MCNC: 23.6 MG/DL (ref 8–23)
CALCIUM SERPL-MCNC: 7.8 MG/DL (ref 8.8–10.2)
CHLORIDE SERPL-SCNC: 102 MMOL/L (ref 98–107)
CREAT SERPL-MCNC: 0.57 MG/DL (ref 0.51–0.95)
DEPRECATED HCO3 PLAS-SCNC: 27 MMOL/L (ref 22–29)
EGFRCR SERPLBLD CKD-EPI 2021: >90 ML/MIN/1.73M2
GLUCOSE BLDC GLUCOMTR-MCNC: 100 MG/DL (ref 70–99)
GLUCOSE BLDC GLUCOMTR-MCNC: 105 MG/DL (ref 70–99)
GLUCOSE BLDC GLUCOMTR-MCNC: 107 MG/DL (ref 70–99)
GLUCOSE BLDC GLUCOMTR-MCNC: 118 MG/DL (ref 70–99)
GLUCOSE BLDC GLUCOMTR-MCNC: 126 MG/DL (ref 70–99)
GLUCOSE SERPL-MCNC: 108 MG/DL (ref 70–99)
HGB BLD-MCNC: 10 G/DL (ref 11.7–15.7)
OSMOLALITY SERPL: 280 MMOL/KG (ref 280–301)
POTASSIUM SERPL-SCNC: 3.9 MMOL/L (ref 3.4–5.3)
SODIUM SERPL-SCNC: 135 MMOL/L (ref 135–145)

## 2024-07-04 PROCEDURE — 97161 PT EVAL LOW COMPLEX 20 MIN: CPT | Mod: GP

## 2024-07-04 PROCEDURE — 36415 COLL VENOUS BLD VENIPUNCTURE: CPT | Performed by: INTERNAL MEDICINE

## 2024-07-04 PROCEDURE — 120N000001 HC R&B MED SURG/OB

## 2024-07-04 PROCEDURE — 250N000011 HC RX IP 250 OP 636

## 2024-07-04 PROCEDURE — 85018 HEMOGLOBIN: CPT | Performed by: INTERNAL MEDICINE

## 2024-07-04 PROCEDURE — 97530 THERAPEUTIC ACTIVITIES: CPT | Mod: GP

## 2024-07-04 PROCEDURE — 83930 ASSAY OF BLOOD OSMOLALITY: CPT | Performed by: INTERNAL MEDICINE

## 2024-07-04 PROCEDURE — 82374 ASSAY BLOOD CARBON DIOXIDE: CPT | Performed by: INTERNAL MEDICINE

## 2024-07-04 PROCEDURE — 250N000013 HC RX MED GY IP 250 OP 250 PS 637

## 2024-07-04 PROCEDURE — 99232 SBSQ HOSP IP/OBS MODERATE 35: CPT | Performed by: STUDENT IN AN ORGANIZED HEALTH CARE EDUCATION/TRAINING PROGRAM

## 2024-07-04 PROCEDURE — 258N000003 HC RX IP 258 OP 636

## 2024-07-04 PROCEDURE — 250N000013 HC RX MED GY IP 250 OP 250 PS 637: Performed by: PHYSICIAN ASSISTANT

## 2024-07-04 PROCEDURE — 97116 GAIT TRAINING THERAPY: CPT | Mod: GP

## 2024-07-04 RX ORDER — LIDOCAINE 4 G/G
1 PATCH TOPICAL EVERY 24 HOURS
Status: DISCONTINUED | OUTPATIENT
Start: 2024-07-04 | End: 2024-07-08 | Stop reason: HOSPADM

## 2024-07-04 RX ORDER — LIDOCAINE 4 G/G
1 PATCH TOPICAL
Status: DISCONTINUED | OUTPATIENT
Start: 2024-07-05 | End: 2024-07-04

## 2024-07-04 RX ADMIN — OXYCODONE HYDROCHLORIDE 10 MG: 5 TABLET ORAL at 16:57

## 2024-07-04 RX ADMIN — LETROZOLE 2.5 MG: 2.5 TABLET ORAL at 21:04

## 2024-07-04 RX ADMIN — ACETAMINOPHEN 975 MG: 325 TABLET, FILM COATED ORAL at 13:42

## 2024-07-04 RX ADMIN — OXYCODONE HYDROCHLORIDE 10 MG: 5 TABLET ORAL at 21:04

## 2024-07-04 RX ADMIN — METHOCARBAMOL 750 MG: 750 TABLET ORAL at 10:08

## 2024-07-04 RX ADMIN — LIDOCAINE 1 PATCH: 4 PATCH TOPICAL at 13:41

## 2024-07-04 RX ADMIN — OXYCODONE HYDROCHLORIDE 10 MG: 5 TABLET ORAL at 10:08

## 2024-07-04 RX ADMIN — OXYCODONE HYDROCHLORIDE 5 MG: 5 TABLET ORAL at 04:40

## 2024-07-04 RX ADMIN — HYDROMORPHONE HYDROCHLORIDE 0.4 MG: 0.2 INJECTION, SOLUTION INTRAMUSCULAR; INTRAVENOUS; SUBCUTANEOUS at 19:14

## 2024-07-04 RX ADMIN — SENNOSIDES AND DOCUSATE SODIUM 1 TABLET: 50; 8.6 TABLET ORAL at 10:08

## 2024-07-04 RX ADMIN — SENNOSIDES AND DOCUSATE SODIUM 1 TABLET: 50; 8.6 TABLET ORAL at 21:04

## 2024-07-04 RX ADMIN — ACETAMINOPHEN 975 MG: 325 TABLET, FILM COATED ORAL at 21:04

## 2024-07-04 RX ADMIN — CEFAZOLIN 1 G: 1 INJECTION, POWDER, FOR SOLUTION INTRAMUSCULAR; INTRAVENOUS at 04:30

## 2024-07-04 RX ADMIN — CEFAZOLIN 1 G: 1 INJECTION, POWDER, FOR SOLUTION INTRAMUSCULAR; INTRAVENOUS at 13:42

## 2024-07-04 RX ADMIN — METHOCARBAMOL 750 MG: 750 TABLET ORAL at 16:57

## 2024-07-04 RX ADMIN — ACETAMINOPHEN 975 MG: 325 TABLET, FILM COATED ORAL at 07:03

## 2024-07-04 RX ADMIN — SODIUM CHLORIDE: 9 INJECTION, SOLUTION INTRAVENOUS at 04:30

## 2024-07-04 ASSESSMENT — ACTIVITIES OF DAILY LIVING (ADL)
ADLS_ACUITY_SCORE: 22
ADLS_ACUITY_SCORE: 25
ADLS_ACUITY_SCORE: 22
ADLS_ACUITY_SCORE: 22
ADLS_ACUITY_SCORE: 25
ADLS_ACUITY_SCORE: 22
ADLS_ACUITY_SCORE: 25
ADLS_ACUITY_SCORE: 22
ADLS_ACUITY_SCORE: 25

## 2024-07-04 NOTE — PLAN OF CARE
Goal Outcome Evaluation:  Pt is Aox4, up A1GBW, regular diet. Burden pulled this shift due to void. Pain controlled by oxycodone and roboxin. Ambulated in zhao x2 this shift. Hemovac in place with large output (NS aware). Discharge home pending.                        no

## 2024-07-04 NOTE — PLAN OF CARE
Goal Outcome Evaluation:    Summary: POD 1 T11-L3 laminectomy    Orientation: A/Ox4, speaks english-just speak slower    Vitals/Tele: VSS RA    IV Access/drains: R PIV SL, L PIV infusing NS 75ml/hr    Pain: oxycodone 2x, effective    Diet: regular    Mobility: not OOB this shift per patient request, ready to get up in the AM    GI/: mcdonald GOP, no bm this shift    Wound/Skin: scatter bruising    Consults: neuro    Discharge Plan: pending    * not mcdonald can be removed when pt becomes ambulatory.       See Flow sheets for assessment

## 2024-07-04 NOTE — PROGRESS NOTES
"   07/04/24 0917   Appointment Info   Signing Clinician's Name / Credentials (PT) Manohar Jordan, PT, DPT       Present no   Language   (Finnish  listed in chart, but patient communicates well in english and declined use of )   Living Environment   People in Home child(kayleen), adult   Current Living Arrangements house   Home Accessibility stairs to enter home;stairs within home   Number of Stairs, Main Entrance 3   Stair Railings, Main Entrance railings safe and in good condition   Number of Stairs, Within Home, Primary greater than 10 stairs  (12)   Stair Railings, Within Home, Primary railings safe and in good condition   Transportation Anticipated car, drives self;family or friend will provide   Living Environment Comments Pt lives in a house with her adult son. Of note, patient's sister is also in town and is a physical therapist. Sister plans to stay with patient for a month during her recovery. 3 [address], patient's bedroom is in the basement.   Self-Care   Usual Activity Tolerance good   Current Activity Tolerance fair   Equipment Currently Used at Home none   Fall history within last six months no   Activity/Exercise/Self-Care Comment Pt reporting IND at baseline with mobility and ADLs without use of AD.   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 and pathologic lumbar vertebral fracture she is POD 1 T11-L3 fusion.\"   Existing Precautions/Restrictions fall;spinal   General Observations no lifting more than 5-10 pounds, spinal precautions   Cognition   Affect/Mental Status (Cognition) WNL   Orientation " Status (Cognition) oriented x 4   Follows Commands (Cognition) WNL   Pain Assessment   Patient Currently in Pain Yes, see Vital Sign flowsheet  (pt noting very high pain levels in spine during session)   Integumentary/Edema   Integumentary/Edema no deficits were identifed   Integumentary/Edema Comments incision on spine CDI, drain in place and secured   Posture    Posture Forward head position;Protracted shoulders   Range of Motion (ROM)   Range of Motion ROM deficits secondary to pain;ROM deficits secondary to surgical procedure   ROM Comment lumbar and thoracic spine AROM limited 2/2 surgical procedure   Strength (Manual Muscle Testing)   Strength (Manual Muscle Testing) Deficits observed during functional mobility   Strength Comments moderate functional strength deficits 2/2 pain and weakness   Bed Mobility   Comment, (Bed Mobility) mod A supine>sit   Transfers   Comment, (Transfers) mod A sit>stand w/ FWW   Gait/Stairs (Locomotion)   Distance in Feet (Gait) 5' eval   Comment, (Gait/Stairs) pt ambulated 5' w/ FWW and min A for eval   Balance   Balance Comments impaired dynamic balance   Sensory Examination   Sensory Perception patient reports no sensory changes   Clinical Impression   Criteria for Skilled Therapeutic Intervention Yes, treatment indicated   PT Diagnosis (PT) impaired functional mobility   Influenced by the following impairments impaired strength, balance, activity tolerance, post-op pain levels   Functional limitations due to impairments limited IND with mobility   Clinical Presentation (PT Evaluation Complexity) stable   Clinical Presentation Rationale clinical judgement   Clinical Decision Making (Complexity) low complexity   Planned Therapy Interventions (PT) balance training;bed mobility training;gait training;home exercise program;neuromuscular re-education;patient/family education;ROM (range of motion);stair training;strengthening;transfer training;progressive activity/exercise;home program  guidelines   Risk & Benefits of therapy have been explained evaluation/treatment results reviewed;care plan/treatment goals reviewed;risks/benefits reviewed;current/potential barriers reviewed;participants voiced agreement with care plan;participants included;patient   PT Total Evaluation Time   PT Eval, Low Complexity Minutes (06439) 10   Physical Therapy Goals   PT Frequency 2x/day   PT Predicted Duration/Target Date for Goal Attainment 07/11/24   PT Goals Bed Mobility;Transfers;Gait;Stairs   PT: Bed Mobility Supervision/stand-by assist;Supine to/from sit   PT: Transfers Supervision/stand-by assist;Sit to/from stand;Bed to/from chair;Assistive device   PT: Gait Supervision/stand-by assist;Rolling walker;150 feet   PT: Stairs Supervision/stand-by assist;Greater than 10 stairs;Rail on left   Interventions   Interventions Quick Adds Gait Training;Therapeutic Activity;Therapeutic Procedure   Therapeutic Procedure/Exercise   Ther. Procedure: strength, endurance, ROM, flexibillity Minutes (58321) 5   Symptoms Noted During/After Treatment increased pain   Treatment Detail/Skilled Intervention Pt performed calf raises, toe raises, and LAW x5 repetitions each while seated at edge of bed. Cueing to move slow and controlled through the full available ROM.   Therapeutic Activity   Therapeutic Activities: dynamic activities to improve functional performance Minutes (32406) 16   Symptoms Noted During/After Treatment Fatigue;Increased pain   Treatment Detail/Skilled Intervention Greeted pt upon arrival to room. Pt agreeable to working with PT. Time spent explaining spinal precautions including no bending, lifting, or twisting. Handout provided. Supine>sit w/ mod A via logroll technique. Pt spent 4 minutes seated at edge of bed with large increase in pain, stabilized with seated rest at edge of bed. Sit>stand w/ mod A and FWW. Cueing for safe and efficient sit<>stand procedure including scooting to the front edge of the chair, to  lean forward with nose over toes, and hand and foot placement. Pt needing 3 attempts to come to standing 2/2 pain levels, but able to complete. Following ambulation, stand>sit to bed w/ CGA. Sit>supine w/ min A for assist with lower extremities. Pt left with all needs met and call light within reach. RN updated.   Gait Training   Gait Training Minutes (35196) 12   Symptoms Noted During/After Treatment (Gait Training) increased pain;shortness of breath;fatigue   Treatment Detail/Skilled Intervention Pt ambulated 15' w/ FWW and min A, progressing to mostly CGA throughout. Cueing to move in a slow and controlled manner with use of FWW close to body with increased use of upper extremities with good adherence. Cueing to relax patient's shoulders as pt demonstrating tense shoulder shrugging with mobility.   Distance in Feet 15'   PT Discharge Planning   PT Plan bed mobility via logroll, transfers, longer distance ambulation w/ FWW   PT Discharge Recommendation (DC Rec) home with assist;home with home care physical therapy   PT Rationale for DC Rec Patient is well below baseline mobility levels at this time. Currently able to transfer and ambulate short distances w/ FWW and assist of 1. Anticipate patient will progress to supervision level assist and discharge home safely with assist from son and sister. Of note, patient's sister will be in town for 1 month to assist at home. Patient's sister is also a physical therapist. Recommend discharge with HHPT to continue increasing safety and independence with functional mobility to return to PLOF.   PT Brief overview of current status A x1 w/ FWW   PT Equipment Needed at Discharge walker, rolling  (pt does not own FWW)   Total Session Time   Timed Code Treatment Minutes 33   Total Session Time (sum of timed and untimed services) 43

## 2024-07-04 NOTE — PROGRESS NOTES
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      Pathologic lumbar vertebral fracture  S/p thoracic 11 to lumbar 3 posterior lateral fusion using allograft, L1 bilateral laminectomy and medial facetectomy and separation surgery, use osteocool device by Dr. Prieto for metastasis to spinal column.   Routine post-operative care, including pain mgt, drain mgt, activity, diet, and DVT prophylaxis, per surgical team.     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   Continue on PTA letrozole  Post op pain mgt per NSG. Taking dilaudid 2 mg BID PTA.     Mild hyponatremia, 141 6/4, 129 on 6/26, 133 on 7/1 and 130 07/03/24   Plan:  Uric acid, TSH added on to labs -> both wnl  Na now normalized, can stop IVF    Hyperglycemia 180s at preop, likely related to decadron  Follow BS QID with low sliding scale insulin ordered  Not currently on steroids.   Check A1c -> 6.6          Diet: Advance Diet as Tolerated: Regular Diet Adult    DVT Prophylaxis: Pneumatic Compression Devices  Burden Catheter: PRESENT, indication: Surgical procedure  Lines: None     Cardiac Monitoring: None  Code Status: Full Code      Clinically Significant Risk Factors                              # DMII: A1C = 6.6 % (Ref range: <5.7 %) within past 6 months, PRESENT ON ADMISSION  # Overweight: Estimated body mass index is 28.34 kg/m  as calculated from the following:    Height as of this encounter: 1.6 m (5' 3\").    Weight as of this encounter: 72.6 kg (160 lb)., PRESENT ON ADMISSION            Disposition Plan     Medically Ready for Discharge: Anticipated in 2-4 Days       "       Sharath Maldonado MD  Hospitalist Service  [facility]  Securely message with [employer] (more info)  Text page via [employer] Paging/Directory   ______________________________________________________________________    Interval History     No acute events overnight  Main complaint is incisional pain  No CP/SOB  No fevers  Good appetite  No nausea / vomiting   No new weakness or numbness  No new complaints    Physical Exam   Vital Signs: Temp: 97.6  F (36.4  C) Temp src: Oral BP: 116/63 Pulse: 72   Resp: 16 SpO2: 96 % O2 Device: None (Room air) Oxygen Delivery: 2 LPM  Weight: 160 lbs 0 oz    Constitutional: awake, alert, cooperative, no apparent distress.   Respiratory: CTABL   Cardiovascular: RRR with no m/r/g   GI: Normal bowel sounds, soft, non-distended, non-tender.  Musculoskeletal: There is no redness, warmth, or swelling of the joints.   Neurologic: Awake, alert, oriented to name, place and time. Nacho Motor is 5 out of 5 bilaterally. Sensory is intact.   Neuropsychiatric: normal mood and affect  ----------------------------------------------------------------------------------------    Medical Decision Making       40 MINUTES SPENT BY ME on the date of service doing chart review, history, exam, documentation & further activities per the note.      Data   ------------------------- PAST 24 HR DATA REVIEWED -----------------------------------------------    I have personally reviewed the following data over the past 24 hrs:    N/A  \   10.0 (L)   / N/A     135 102 23.6 (H) /  118 (H)   3.9 27 0.57 \     TSH: N/A T4: N/A A1C: 6.6 (H)       Imaging results reviewed over the past 24 hrs:   No results found for this or any previous visit (from the past 24 hour(s)).  ------------------------- ENCOUNTER LABS ----------------------------------------------------------------  Recent Labs   Lab 07/04/24  1229 07/04/24  0823 07/04/24  0813 07/03/24  1827 07/03/24  0639   HGB  --   --  10.0*  --   --    NA   --   --  135  --  130*   POTASSIUM  --   --  3.9  --   --    CHLORIDE  --   --  102  --   --    CO2  --   --  27  --   --    BUN  --   --  23.6*  --   --    CR  --   --  0.57  --   --    ANIONGAP  --   --  6*  --   --    GAYATHRI  --   --  7.8*  --   --    * 100* 108*   < > 128*    < > = values in this interval not displayed.       Most Recent 3 CBC's:  Recent Labs   Lab Test 07/04/24  0813   HGB 10.0*     Most Recent 3 BMP's:  Recent Labs   Lab Test 07/04/24  1229 07/04/24  0823 07/04/24  0813 07/03/24  1827 07/03/24  0639 06/13/24  1257   0000   NA  --   --  135  --  130*  --   --    POTASSIUM  --   --  3.9  --   --   --   --    CHLORIDE  --   --  102  --   --   --   --    CO2  --   --  27  --   --   --   --    BUN  --   --  23.6*  --   --   --   --    CR  --   --  0.57  --   --  0.8  --    ANIONGAP  --   --  6*  --   --   --   --    GAYATHRI  --   --  7.8*  --   --   --   --    * 100* 108*   < > 128*  --    < >    < > = values in this interval not displayed.     Most Recent 2 LFT's:No lab results found.  Most Recent 3 INR's:No lab results found.  Most Recent 3 Troponin's:No lab results found.  Most Recent 3 BNP's:No lab results found.  Most Recent D-dimer:No lab results found.  Most Recent Cholesterol Panel:No lab results found.  Most Recent 6 Bacteria Isolates From Any Culture (See EPIC Reports for Culture Details):No lab results found.  Most Recent TSH and T4:  Recent Labs   Lab Test 07/03/24  0639   TSH 1.33     Most Recent Urinalysis:No lab results found.  Most Recent ESR & CRP:No lab results found.

## 2024-07-04 NOTE — PROGRESS NOTES
[facility]    Neurosurgery Progress Note    Date of Service (when I saw the patient): 07/04/2024     Assessment & Plan     Procedure(s):  THORACIC 11 TO LUMBAR 3 POSTEROLATERAL FUSION USING ALLOGRAFT,LUMBAR 1 BILATERAL LAMINECTOMY AND MEDIAL FACETECTOMY AND SEPARATION SURGERY, USE OF OSTEOCOOL DEVICE.   -1 Day Post-Op    States that she is doing okay has complaint of incisional pain but denies radicular lower extremity pain numbness tingling or weakness presently     Plan:  Keep drain in place  Remove mcdonald - PVRs x 3 to be completed  Increase activity as tolerated with PT/OT  SCDs, lovenox to begin 48hrs postoperative after drain is removed   Xray to be completed  Routine wound care  Continue with current prn pain medications and muscle relaxants will add lidocaine patches   Tentative discharge 1-2 days pending drain out and pain control     I have discussed the following assessment and plan Dr. Prieto who is in agreement with initial plan and will follow up with further consultation recommendations.    DINO Walker [facility] [place] [address]  Tel [phone]  Text page via [facility] [employer] [employer] Paging/Directory     Interval History   States that she is doing okay has complaint of incisional pain but denies radicular lower extremity pain numbness tingling or weakness presently     Physical Exam   Temp: 97.6  F (36.4  C) Temp src: Oral BP: 116/63 Pulse: 72   Resp: 16 SpO2: 96 % O2 Device: None (Room air) Oxygen Delivery: 2 LPM  Vitals:    07/03/24 0752   Weight: 72.6 kg (160 lb)     Vital Signs with Ranges  Temp:  [97.6  F (36.4  C)-98.7  F (37.1  C)] 97.6  F (36.4  C)  Pulse:  [72-87] 72  Resp:  [10-20] 16  BP: (108-145)/(51-93) 116/63  MAP:  [88 mmHg-101 mmHg] 95 mmHg  Arterial Line BP: (117-133)/(64-76) 125/71  SpO2:  [95 %-99 %] 96 %  I/O last 3 completed shifts:  In: 3300 [I.V.:2800]  Out: 3635 [Urine:2875;  "Drains:460; Blood:300]     , Blood pressure 116/63, pulse 72, temperature 97.6  F (36.4  C), temperature source Oral, resp. rate 16, height 1.6 m (5' 3\"), weight 72.6 kg (160 lb), SpO2 96%.  160 lbs 0 oz  HEENT:  Normocephalic.  PERRLA.  EOM s intact.    Neck:  Supple, non-tender, without lymphadenopathy.  Heart:  No peripheral edema  Lungs:  No SOB  Abdomen:  Soft, non-tender, non-distended.   Skin:  Warm and dry, good capillary refill.  Extremities:  Good radial and dorsalis pedis pulses bilaterally, no edema, cyanosis or clubbing.    NEUROLOGICAL EXAMINATION:   Mental status: alert and oriented x3 speech clear and appropriate   Cranial nerves: II-XII intact  Motor strength:   Biceps: 5/5 right, 5/5 left   Wrist extensors:5/5 right, 5/5 left   Triceps: 5/5 right, 5/5 left   Deltoids: 5/5 right, 5/5 left   Finger flexion: 5/5 right, 5/5 left   Finger abduction:5/5 right, 5/5 left   Hand : 5/5 right, 5/5 left   Hip flexors: 5/5 right, 5/5 left   Quadriceps: 5/5 right, 5/5 left  Ankle dorsiflexion: 5/5 right, 5/5 left    Ankle plantar flexion:5/5 right, 5/5 left   Extensor hallicus longus: 5/5 right, 5/5 left   Sensation:  intact  Reflexes:  Negative Babinski.  Negative Clonus.    Coordination:  Smooth finger to nose testing.   Negative pronator drift.    Gait:  not tested    Medications   Current Facility-Administered Medications   Medication Dose Route Frequency Provider Last Rate Last Admin    sodium chloride 0.9 % infusion   Intravenous Continuous Carolien Pedroza PA-C 75 mL/hr at 07/04/24 0430 New Bag at 07/04/24 0430     Current Facility-Administered Medications   Medication Dose Route Frequency Provider Last Rate Last Admin    acetaminophen (TYLENOL) tablet 975 mg  975 mg Oral Q8H Caroline Pedroza PA-C   975 mg at 07/04/24 0703    ceFAZolin (ANCEF) 1 g vial to attach to  ml bag for ADULT or 50 ml bag for PEDS  1 g Intravenous Q8H Caroline Pedroza PA-C   1 g at 07/04/24 0430    insulin " "aspart (NovoLOG) injection (RAPID ACTING)  1-3 Units Subcutaneous TID AC Melanie Maharaj MD        insulin aspart (NovoLOG) injection (RAPID ACTING)  1-3 Units Subcutaneous At Bedtime Melanie Maharaj MD        letrozole (FEMARA) tablet 2.5 mg  2.5 mg Oral Daily Caroline Pedroza PA-C   2.5 mg at 07/03/24 2037    polyethylene glycol (MIRALAX) Packet 17 g  17 g Oral Daily Caroline Pedroza PA-C        senna-docusate (SENOKOT-S/PERICOLACE) 8.6-50 MG per tablet 1 tablet  1 tablet Oral BID Caroline Pedroza PA-C   1 tablet at 07/04/24 1008    sodium chloride (PF) 0.9% PF flush 3 mL  3 mL Intracatheter Q8H Caroline Pedroza PA-C           Data     CBC RESULTS:   Recent Labs   Lab Test 07/04/24  0813   HGB 10.0*     Basic Metabolic Panel:  Lab Results   Component Value Date     07/04/2024      Lab Results   Component Value Date    POTASSIUM 3.9 07/04/2024     Lab Results   Component Value Date    CHLORIDE 102 07/04/2024     Lab Results   Component Value Date    GAYATHRI 7.8 07/04/2024     Lab Results   Component Value Date    CO2 27 07/04/2024     Lab Results   Component Value Date    BUN 23.6 07/04/2024     Lab Results   Component Value Date    CR 0.57 07/04/2024     Lab Results   Component Value Date     07/04/2024     07/04/2024     INR:  No results found for: \"INR\"     "

## 2024-07-05 ENCOUNTER — APPOINTMENT (OUTPATIENT)
Dept: PHYSICAL THERAPY | Facility: CLINIC | Age: 69
End: 2024-07-05
Attending: NEUROLOGICAL SURGERY
Payer: COMMERCIAL

## 2024-07-05 LAB
GLUCOSE BLDC GLUCOMTR-MCNC: 106 MG/DL (ref 70–99)
GLUCOSE BLDC GLUCOMTR-MCNC: 130 MG/DL (ref 70–99)
GLUCOSE BLDC GLUCOMTR-MCNC: 144 MG/DL (ref 70–99)
GLUCOSE BLDC GLUCOMTR-MCNC: 263 MG/DL (ref 70–99)
GLUCOSE BLDC GLUCOMTR-MCNC: 95 MG/DL (ref 70–99)

## 2024-07-05 PROCEDURE — 250N000013 HC RX MED GY IP 250 OP 250 PS 637: Performed by: PHYSICIAN ASSISTANT

## 2024-07-05 PROCEDURE — 97530 THERAPEUTIC ACTIVITIES: CPT | Mod: GP

## 2024-07-05 PROCEDURE — 97116 GAIT TRAINING THERAPY: CPT | Mod: GP

## 2024-07-05 PROCEDURE — 250N000013 HC RX MED GY IP 250 OP 250 PS 637

## 2024-07-05 PROCEDURE — 99232 SBSQ HOSP IP/OBS MODERATE 35: CPT | Performed by: STUDENT IN AN ORGANIZED HEALTH CARE EDUCATION/TRAINING PROGRAM

## 2024-07-05 PROCEDURE — 120N000001 HC R&B MED SURG/OB

## 2024-07-05 RX ADMIN — POLYETHYLENE GLYCOL 3350 17 G: 17 POWDER, FOR SOLUTION ORAL at 08:09

## 2024-07-05 RX ADMIN — OXYCODONE HYDROCHLORIDE 10 MG: 5 TABLET ORAL at 01:55

## 2024-07-05 RX ADMIN — METHOCARBAMOL 750 MG: 750 TABLET ORAL at 08:10

## 2024-07-05 RX ADMIN — OXYCODONE HYDROCHLORIDE 10 MG: 5 TABLET ORAL at 17:13

## 2024-07-05 RX ADMIN — LETROZOLE 2.5 MG: 2.5 TABLET ORAL at 21:44

## 2024-07-05 RX ADMIN — LIDOCAINE 1 PATCH: 4 PATCH TOPICAL at 12:29

## 2024-07-05 RX ADMIN — OXYCODONE HYDROCHLORIDE 10 MG: 5 TABLET ORAL at 12:31

## 2024-07-05 RX ADMIN — OXYCODONE HYDROCHLORIDE 10 MG: 5 TABLET ORAL at 08:10

## 2024-07-05 RX ADMIN — SENNOSIDES AND DOCUSATE SODIUM 1 TABLET: 50; 8.6 TABLET ORAL at 08:10

## 2024-07-05 RX ADMIN — SENNOSIDES AND DOCUSATE SODIUM 1 TABLET: 50; 8.6 TABLET ORAL at 21:44

## 2024-07-05 RX ADMIN — ACETAMINOPHEN 975 MG: 325 TABLET, FILM COATED ORAL at 21:44

## 2024-07-05 RX ADMIN — ACETAMINOPHEN 975 MG: 325 TABLET, FILM COATED ORAL at 05:20

## 2024-07-05 RX ADMIN — ACETAMINOPHEN 975 MG: 325 TABLET, FILM COATED ORAL at 14:21

## 2024-07-05 RX ADMIN — METHOCARBAMOL 750 MG: 750 TABLET ORAL at 14:21

## 2024-07-05 ASSESSMENT — ACTIVITIES OF DAILY LIVING (ADL)
ADLS_ACUITY_SCORE: 24
ADLS_ACUITY_SCORE: 25
ADLS_ACUITY_SCORE: 24
ADLS_ACUITY_SCORE: 24
ADLS_ACUITY_SCORE: 25
ADLS_ACUITY_SCORE: 24
ADLS_ACUITY_SCORE: 24
ADLS_ACUITY_SCORE: 25
ADLS_ACUITY_SCORE: 24
ADLS_ACUITY_SCORE: 25
ADLS_ACUITY_SCORE: 24
ADLS_ACUITY_SCORE: 25
ADLS_ACUITY_SCORE: 24
ADLS_ACUITY_SCORE: 24

## 2024-07-05 NOTE — PLAN OF CARE
Goal Outcome Evaluation:      Plan of Care Reviewed With: patient    Overall Patient Progress: improvingOverall Patient Progress: improving    Patient vital signs are at baseline: Yes, on RA  Patient able to ambulate as they were prior to admission or with assist devices provided by therapies during their stay:  Yes  Patient MUST void prior to discharge:  Yes  Patient able to tolerate oral intake:  Yes  Pain has adequate pain control using Oral analgesics:  Yes  Does patient have an identified :  Yes  Has goal D/C date and time been discussed with patient:  No,  TBD     Patient POD# 1 from T11-L3 Posterior Fusion, HMV in place, Dressing with scant drain at  around HMV exit area. Patient A&Ox4. BS check with insulin per parameter. PIV SL. XR of Thoracic Lumbar suppose to be done on this shift, when pt was brought tot the xray , pt stated that she was unable to scotch to the edge of the chair to be able to stand up right, Pt said she felt stuck on the chair with sweat, so possible XR tomorrow. Assist x1 GB/W. Will continue to monitor.

## 2024-07-05 NOTE — PROGRESS NOTES
" [facility]    Medicine Progress Note - Hospitalist Service    Date of Admission:  7/3/2024  Date of Service: 07/05/2024    Assessment & Plan     Adriana St is a 68 year old woman with history of metastatic breast cancer to the spine and pathologic lumbar vertebral fracture she is s/p spine surgery without complication as outlined below.      Pathologic lumbar vertebral fracture  S/p thoracic 11 to lumbar 3 posterior lateral fusion using allograft, L1 bilateral laminectomy and medial facetectomy and separation surgery, use osteocool device by Dr. Prieto for metastasis to spinal column.   Routine post-operative care, including pain mgt, drain mgt, activity, diet, and DVT prophylaxis, per surgical team.     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   Continue on PTA letrozole  Post op pain mgt per NSG. Taking dilaudid 2 mg BID PTA.     Mild hyponatremia, 141 6/4, 129 on 6/26, 133 on 7/1 and 130 07/03/24   Plan:  Uric acid, TSH added on to labs -> both wnl  Na now normalized, can stop IVF    Hyperglycemia 180s at preop, likely related to decadron  Follow BS QID with low sliding scale insulin ordered  Not currently on steroids.   Checked A1c -> 6.6          Diet: Advance Diet as Tolerated: Regular Diet Adult    DVT Prophylaxis: Pneumatic Compression Devices  Burden Catheter: Not present  Lines: None     Cardiac Monitoring: None  Code Status: Full Code      Clinically Significant Risk Factors                              # DMII: A1C = 6.6 % (Ref range: <5.7 %) within past 6 months, PRESENT ON ADMISSION  # Overweight: Estimated body mass index is 28.34 kg/m  as calculated from the following:    Height as of this encounter: 1.6 m (5' 3\").    Weight as of this encounter: 72.6 kg (160 lb)., PRESENT ON ADMISSION            Disposition Plan     Medically Ready for Discharge: Anticipated in 2-4 Days             Sharath Maldonado MD  Hospitalist " Service  [facility]  Securely message with [employer] (more info)  Text page via [employer] Paging/Directory   ______________________________________________________________________    Interval History     No acute events overnight  Main complaint is incisional pain, but very minimal today  No CP/SOB  No fevers  Good appetite  No nausea / vomiting   No new weakness or numbness  Burden removed, voiding well  No new complaints    Physical Exam   Vital Signs: Temp: 98.1  F (36.7  C) Temp src: Oral BP: 110/59 Pulse: 104   Resp: 16 SpO2: 94 % O2 Device: None (Room air)    Weight: 160 lbs 0 oz    Constitutional: awake, alert, cooperative, no apparent distress.   Respiratory: CTABL   Cardiovascular: RRR with no m/r/g   GI: Normal bowel sounds, soft, non-distended, non-tender.  Musculoskeletal: There is no redness, warmth, or swelling of the joints.   Neurologic: Awake, alert, oriented to name, place and time. Nacho Motor is 5 out of 5 bilaterally. Sensory is intact.   Neuropsychiatric: normal mood and affect  ----------------------------------------------------------------------------------------    Medical Decision Making       35 MINUTES SPENT BY ME on the date of service doing chart review, history, exam, documentation & further activities per the note.      Data   ------------------------- PAST 24 HR DATA REVIEWED -----------------------------------------------        Imaging results reviewed over the past 24 hrs:   No results found for this or any previous visit (from the past 24 hour(s)).  ------------------------- ENCOUNTER LABS ----------------------------------------------------------------  Recent Labs   Lab 07/05/24  1156 07/05/24  0628 07/05/24  0225 07/04/24  0823 07/04/24  0813 07/03/24  1827 07/03/24  0639   HGB  --   --   --   --  10.0*  --   --    NA  --   --   --   --  135  --  130*   POTASSIUM  --   --   --   --  3.9  --   --    CHLORIDE  --   --   --   --  102  --   --    CO2  --   --    --   --  27  --   --    BUN  --   --   --   --  23.6*  --   --    CR  --   --   --   --  0.57  --   --    ANIONGAP  --   --   --   --  6*  --   --    GAYATHRI  --   --   --   --  7.8*  --   --    * 106* 130*   < > 108*   < > 128*    < > = values in this interval not displayed.       Most Recent 3 CBC's:  Recent Labs   Lab Test 07/04/24  0813   HGB 10.0*     Most Recent 3 BMP's:  Recent Labs   Lab Test 07/05/24  1156 07/05/24  0628 07/05/24  0225 07/04/24  0823 07/04/24  0813 07/03/24  1827 07/03/24  0639 06/13/24  1257   0000   NA  --   --   --   --  135  --  130*  --   --    POTASSIUM  --   --   --   --  3.9  --   --   --   --    CHLORIDE  --   --   --   --  102  --   --   --   --    CO2  --   --   --   --  27  --   --   --   --    BUN  --   --   --   --  23.6*  --   --   --   --    CR  --   --   --   --  0.57  --   --  0.8  --    ANIONGAP  --   --   --   --  6*  --   --   --   --    GAYATHRI  --   --   --   --  7.8*  --   --   --   --    * 106* 130*   < > 108*   < > 128*  --    < >    < > = values in this interval not displayed.     Most Recent 2 LFT's:No lab results found.  Most Recent 3 INR's:No lab results found.  Most Recent 3 Troponin's:No lab results found.  Most Recent 3 BNP's:No lab results found.  Most Recent D-dimer:No lab results found.  Most Recent Cholesterol Panel:No lab results found.  Most Recent 6 Bacteria Isolates From Any Culture (See EPIC Reports for Culture Details):No lab results found.  Most Recent TSH and T4:  Recent Labs   Lab Test 07/03/24  0639   TSH 1.33     Most Recent Urinalysis:No lab results found.  Most Recent ESR & CRP:No lab results found.

## 2024-07-05 NOTE — PLAN OF CARE
Goal Outcome Evaluation:      Plan of Care Reviewed With: patient    Overall Patient Progress: improvingOverall Patient Progress: improving    Patient vital signs are at baseline: Yes, on RA  Patient able to ambulate as they were prior to admission or with assist devices provided by therapies during their stay:  Yes  Patient MUST void prior to discharge:  Yes  Patient able to tolerate oral intake:  Yes  Pain has adequate pain control using Oral analgesics:  Yes  Does patient have an identified :  Yes  Has goal D/C date and time been discussed with patient:  No,  TBD     Patient POD# 1 from T11-L3 Posterior Fusion, HMV in place, Dressing with scant drain at  around HMV exit area. Patient A&Ox4. BS check with insulin per parameter. PIV SL. Per NS PA notes, pt to have Upright XR when able , XR was called but states that will call when they are ready. Assist x1 GB/W. Will continue to monitor.

## 2024-07-05 NOTE — PLAN OF CARE
Goal Outcome Evaluation:       Patient vital signs are at baseline: Yes  Patient able to ambulate as they were prior to admission or with assist devices provided by therapies during their stay:  Yes  Patient MUST void prior to discharge:  Yes  Patient able to tolerate oral intake:  Yes  Pain has adequate pain control using Oral analgesics:  Yes  Does patient have an identified :  Yes  Has goal D/C date and time been discussed with patient:  Yes       Pt is Aox4, up A1GBW, regular diet. Pain controlled by oxycodone and roboxin. . Hemovac in place, output this morning is 75ml. Discharge home pending.

## 2024-07-05 NOTE — CONSULTS
Care Management Initial Consult    General Information  Assessment completed with: Patient,    Type of CM/SW Visit: Initial Assessment    Primary Care Provider verified and updated as needed: Yes   Readmission within the last 30 days:        Reason for Consult: discharge planning  Advance Care Planning:            Communication Assessment  Patient's communication style: spoken language (English or Bilingual)    Hearing Difficulty or Deaf: no   Wear Glasses or Blind: yes    Cognitive  Cognitive/Neuro/Behavioral: WDL  Level of Consciousness: alert  Arousal Level: opens eyes spontaneously  Orientation: oriented x 4  Mood/Behavior: calm, cooperative  Best Language: 0 - No aphasia  Speech: logical    Living Environment:   People in home: child(kayleen), dependent, child(kayleen), adult  [place]  Current living Arrangements: house      Able to return to prior arrangements: yes       Family/Social Support:  Care provided by: self, child(kayleen)  Provides care for: child(kayleen)  Marital Status:   Children, Sibling(s)          Description of Support System: Supportive, Involved    Support Assessment: Adequate family and caregiver support, Adequate social supports    Current Resources:   Patient receiving home care services: No     Community Resources: None  Equipment currently used at home: none  Supplies currently used at home: None    Employment/Financial:  Employment Status:          Financial Concerns:             Does the patient's insurance plan have a 3 day qualifying hospital stay waiver?  No    Lifestyle & Psychosocial Needs:  Social Determinants of Health     Food Insecurity: No Food Insecurity (6/6/2024)    Received from [facility]    Food Insecurity     Worried About Running Out of Food in the Last Year: 1   Depression: Not at risk (6/17/2024)    PHQ-2     PHQ-2 Score: 0   Housing Stability: Low Risk  (6/6/2024)    Received from [facility]    Housing  Stability     Unable to Pay for Housing in the Last Year: 1   Tobacco Use: Medium Risk (6/27/2024)    Patient History     Smoking Tobacco Use: Former     Smokeless Tobacco Use: Never     Passive Exposure: Not on file   Financial Resource Strain: Low Risk  (6/6/2024)    Received from [facility]    Financial Resource Strain     Difficulty of Paying Living Expenses: 3     Difficulty of Paying Living Expenses: Not on file   Alcohol Use: Not on file   Transportation Needs: No Transportation Needs (6/6/2024)    Received from [employer]    Transportation Needs     Lack of Transportation (Medical): 1   Physical Activity: Not on file   Interpersonal Safety: Not on file   Stress: Not on file   Social Connections: Socially Integrated (6/6/2024)    Received from [facility]    Social Connections     Frequency of Communication with Friends and Family: 0   Health Literacy: Not on file       Functional Status:  Prior to admission patient needed assistance:              Mental Health Status:  Mental Health Status: No Current Concerns       Chemical Dependency Status:                Values/Beliefs:  Spiritual, Cultural Beliefs, Mandaeism Practices, Values that affect care:                 Additional Information:  Writer met with patient at bedside and introduced self and role in discharge planning.  Patient shared that she lives with her adult  son who works and her grandson also come to stay with them and she helps with grand son.  Patient's sister is in town from [place] and will help with grandson and housework until patient recovers.  Patient sister is a physical Therapist.  Patient agrees to referrals for [facility]dee PT be sent to [facility].  Writer sent referrals via JOANIE Kilpatrick RN

## 2024-07-05 NOTE — PROGRESS NOTES
Neurosurgery progress note    Drain with 75 mL out overnight    Patient states she is feeling well, was able to get up and walk, eating and drinking well, Mcdonald catheter removed.  Denies any lower extremity symptoms.    Exam    Alert and oriented no acute distress  Bandage clean dry and intact, slight bloody drainage around the drain exit site.    Bilateral lower extremities with 5 out of 5 strength    Assessment    POD 1 sp     THORACIC 11 TO LUMBAR 3 POSTEROLATERAL FUSION USING ALLOGRAFT,LUMBAR 1 BILATERAL LAMINECTOMY AND MEDIAL FACETECTOMY AND SEPARATION SURGERY, USE OF OSTEOCOOL DEVICE.     Plan    -Upright XR when able  -Abx x 3 doses  -Continue Hemovac x 1  -Up w/ assist. No brace required.   -PT/OT/Incentive spirometry   -May remove mcdonald when ambulatory per nurse driven protocol   -Hospitalist consulted, appreciate assistance

## 2024-07-06 ENCOUNTER — APPOINTMENT (OUTPATIENT)
Dept: PHYSICAL THERAPY | Facility: CLINIC | Age: 69
End: 2024-07-06
Attending: NEUROLOGICAL SURGERY
Payer: COMMERCIAL

## 2024-07-06 ENCOUNTER — APPOINTMENT (OUTPATIENT)
Dept: OCCUPATIONAL THERAPY | Facility: CLINIC | Age: 69
End: 2024-07-06
Payer: COMMERCIAL

## 2024-07-06 ENCOUNTER — APPOINTMENT (OUTPATIENT)
Dept: GENERAL RADIOLOGY | Facility: CLINIC | Age: 69
End: 2024-07-06
Payer: COMMERCIAL

## 2024-07-06 PROBLEM — M84.48XA PATHOLOGIC LUMBAR VERTEBRAL FRACTURE: Status: ACTIVE | Noted: 2024-07-06

## 2024-07-06 LAB
GLUCOSE BLDC GLUCOMTR-MCNC: 109 MG/DL (ref 70–99)
GLUCOSE BLDC GLUCOMTR-MCNC: 117 MG/DL (ref 70–99)
GLUCOSE BLDC GLUCOMTR-MCNC: 117 MG/DL (ref 70–99)
GLUCOSE BLDC GLUCOMTR-MCNC: 134 MG/DL (ref 70–99)
GLUCOSE BLDC GLUCOMTR-MCNC: 138 MG/DL (ref 70–99)
PLATELET # BLD AUTO: 100 10E3/UL (ref 150–450)

## 2024-07-06 PROCEDURE — 250N000013 HC RX MED GY IP 250 OP 250 PS 637: Performed by: PHYSICIAN ASSISTANT

## 2024-07-06 PROCEDURE — 99232 SBSQ HOSP IP/OBS MODERATE 35: CPT | Performed by: INTERNAL MEDICINE

## 2024-07-06 PROCEDURE — 120N000001 HC R&B MED SURG/OB

## 2024-07-06 PROCEDURE — 97530 THERAPEUTIC ACTIVITIES: CPT | Mod: GP

## 2024-07-06 PROCEDURE — 250N000011 HC RX IP 250 OP 636: Performed by: PHYSICIAN ASSISTANT

## 2024-07-06 PROCEDURE — 250N000013 HC RX MED GY IP 250 OP 250 PS 637

## 2024-07-06 PROCEDURE — 97165 OT EVAL LOW COMPLEX 30 MIN: CPT | Mod: GO

## 2024-07-06 PROCEDURE — 97116 GAIT TRAINING THERAPY: CPT | Mod: GP

## 2024-07-06 PROCEDURE — 72080 X-RAY EXAM THORACOLMB 2/> VW: CPT

## 2024-07-06 PROCEDURE — 97530 THERAPEUTIC ACTIVITIES: CPT | Mod: GO

## 2024-07-06 PROCEDURE — 36415 COLL VENOUS BLD VENIPUNCTURE: CPT | Performed by: NEUROLOGICAL SURGERY

## 2024-07-06 PROCEDURE — 85049 AUTOMATED PLATELET COUNT: CPT | Performed by: NEUROLOGICAL SURGERY

## 2024-07-06 PROCEDURE — 97535 SELF CARE MNGMENT TRAINING: CPT | Mod: GO

## 2024-07-06 RX ORDER — ENOXAPARIN SODIUM 100 MG/ML
40 INJECTION SUBCUTANEOUS EVERY 24 HOURS
Status: DISCONTINUED | OUTPATIENT
Start: 2024-07-06 | End: 2024-07-08 | Stop reason: HOSPADM

## 2024-07-06 RX ADMIN — SENNOSIDES AND DOCUSATE SODIUM 1 TABLET: 50; 8.6 TABLET ORAL at 08:54

## 2024-07-06 RX ADMIN — SENNOSIDES AND DOCUSATE SODIUM 1 TABLET: 50; 8.6 TABLET ORAL at 21:20

## 2024-07-06 RX ADMIN — ACETAMINOPHEN 975 MG: 325 TABLET, FILM COATED ORAL at 13:13

## 2024-07-06 RX ADMIN — ENOXAPARIN SODIUM 40 MG: 40 INJECTION SUBCUTANEOUS at 13:47

## 2024-07-06 RX ADMIN — POLYETHYLENE GLYCOL 3350 17 G: 17 POWDER, FOR SOLUTION ORAL at 08:54

## 2024-07-06 RX ADMIN — OXYCODONE HYDROCHLORIDE 5 MG: 5 TABLET ORAL at 06:01

## 2024-07-06 RX ADMIN — OXYCODONE HYDROCHLORIDE 10 MG: 5 TABLET ORAL at 11:03

## 2024-07-06 RX ADMIN — ACETAMINOPHEN 975 MG: 325 TABLET, FILM COATED ORAL at 06:00

## 2024-07-06 RX ADMIN — OXYCODONE HYDROCHLORIDE 5 MG: 5 TABLET ORAL at 21:21

## 2024-07-06 RX ADMIN — OXYCODONE HYDROCHLORIDE 5 MG: 5 TABLET ORAL at 02:16

## 2024-07-06 RX ADMIN — METHOCARBAMOL 750 MG: 750 TABLET ORAL at 13:47

## 2024-07-06 RX ADMIN — LIDOCAINE 1 PATCH: 4 PATCH TOPICAL at 13:14

## 2024-07-06 RX ADMIN — OXYCODONE HYDROCHLORIDE 5 MG: 5 TABLET ORAL at 17:30

## 2024-07-06 RX ADMIN — LETROZOLE 2.5 MG: 2.5 TABLET ORAL at 21:19

## 2024-07-06 ASSESSMENT — ACTIVITIES OF DAILY LIVING (ADL)
ADLS_ACUITY_SCORE: 24

## 2024-07-06 NOTE — PROGRESS NOTES
07/06/24 1158   Appointment Info   Signing Clinician's Name / Credentials (OT) Alisha Gerard, OTR/L   Rehab Comments (OT) spinal prec       Present no   Language Pt able to speak English, denied need for interperter and family present   Living Environment   People in Home child(kayleen), adult   Current Living Arrangements house   Home Accessibility stairs to enter home;stairs within home   Number of Stairs, Main Entrance 3   Stair Railings, Main Entrance railings safe and in good condition   Number of Stairs, Within Home, Primary greater than 10 stairs   Stair Railings, Within Home, Primary railings safe and in good condition   Transportation Anticipated family or friend will provide   Living Environment Comments Pt lives in a house with her adult son. Of note, patient's sister is also in town and is a physical therapist. Sister plans to stay with patient for a month during her recovery. 3 MISBAH, patient's bedroom is in the basement. Has walk in shower.   Self-Care   Usual Activity Tolerance good   Current Activity Tolerance fair   Equipment Currently Used at Home none   Fall history within last six months no   Activity/Exercise/Self-Care Comment Pt reporting IND at baseline with mobility and ADLs without use of AD.   Instrumental Activities of Daily Living (IADL)   IADL Comments IND for IADLs, family able to assist at discharge   General Information   Onset of Illness/Injury or Date of Surgery 07/03/24   Referring Physician Caroline Pedroza PA-C   Patient/Family Therapy Goal Statement (OT) to return home, better manage pain   Additional Occupational Profile Info/Pertinent History of Current Problem S/p thoracic 11 to lumbar 3 posterior lateral fusion PODO 3   Existing Precautions/Restrictions fall;spinal   Left Upper Extremity (Weight-bearing Status) partial weight-bearing (PWB)  (spinal prec)   Right Upper Extremity (Weight-bearing Status) partial weight-bearing (PWB)  (spinal prec)    Cognitive Status Examination   Orientation Status orientation to person, place and time   Visual Perception   Visual Impairment/Limitations corrective lenses full-time   Sensory   Sensory Quick Adds sensation intact   Pain Assessment   Patient Currently in Pain Yes, see Vital Sign flowsheet   Posture   Posture forward head position;protracted shoulders   Range of Motion Comprehensive   Comment, General Range of Motion B UE WFL, LE limited per spinal prec/ pain   Strength Comprehensive (MMT)   General Manual Muscle Testing (MMT) Assessment no strength deficits identified   Coordination   Upper Extremity Coordination No deficits were identified   Bed Mobility   Comment (Bed Mobility) Log roll with bed features and railing, min A x 1 provided   Transfers   Transfer Comments STS with CGA/walker   Balance   Balance Comments no overt LOB, rec walker for increased stability and safety   Activities of Daily Living   BADL Assessment/Intervention upper body dressing;lower body dressing;bathing;grooming;toileting   Bathing Assessment/Intervention   [place] Level (Bathing) set up;supervision;verbal cues   Comment, (Bathing) Per clinical judgment, close CGA/ cues for transfer   Assistive Devices (Bathing) long-handled sponge;shower chair   Upper Body Dressing Assessment/Training   Comment, (Upper Body Dressing) Per clinical judgment   [place] Level (Upper Body Dressing) set up;supervision;verbal cues   Lower Body Dressing Assessment/Training   Position (Lower Body Dressing) supported sitting   [place] Level (Lower Body Dressing) set up;modified independence;verbal cues;supervision;pants/bottoms;socks;don;doff  (reacher/sock aide)   Grooming Assessment/Training   [place] Level (Grooming) set up;modified independence;contact guard assist   Toileting   [place] Level (Toileting) modified independence;set up;supervision;contact guard assist   Clinical Impression   Criteria for Skilled Therapeutic  Interventions Met (OT) Yes, treatment indicated   OT Diagnosis Decreased ADL ind   OT Problem List-Impairments impacting ADL problems related to;mobility;pain;post-surgical precautions   Assessment of Occupational Performance 3-5 Performance Deficits   Identified Performance Deficits func mobility, showering, toileting, bathing, g/h   Planned Therapy Interventions (OT) ADL retraining;transfer training;home program guidelines;progressive activity/exercise;risk factor education   Clinical Decision Making Complexity (OT) problem focused assessment/low complexity   Risk & Benefits of therapy have been explained care plan/treatment goals reviewed;evaluation/treatment results reviewed;risks/benefits reviewed;current/potential barriers reviewed;participants voiced agreement with care plan;participants included;patient;son;sibling   Clinical Impression Comments Pt presents below baseline limited by pain, post op spinal prec. Would benefit from skilled IP OT to progress safety with ADLs and to rec appropriate discharge.   OT Total Evaluation Time   OT Eval, Low Complexity Minutes (80893) 8   OT Goals   Therapy Frequency (OT) Daily   OT Predicted Duration/Target Date for Goal Attainment 07/20/24   OT Goals Hygiene/Grooming;Upper Body Dressing;Lower Body Dressing;Toilet Transfer/Toileting;OT Goal 1;OT Goal 2;Lower Body Bathing   OT: Hygiene/Grooming modified independent;supervision/stand-by assist;while standing;within precautions   OT: Upper Body Dressing Modified independent;including set-up/clothing retrieval;within precautions   OT: Lower Body Dressing Modified independent;within precautions;using adaptive equipment;including set-up/clothing retrieval   OT: Lower Body Bathing Modified independent;using adaptive equipment   OT: Toilet Transfer/Toileting Modified independent;cleaning and garment management;using adaptive equipment   OT: Goal 1 Pt will verbalize all spinal prec with 100% accuracy to ensure safe resumption of  "ADLs at discharge.   OT: Goal 2 Pt will verbalize agreement and understanding to recommended AE/DME for increased ind and safety at discharge for completing ADLs.  (goal met)   Interventions   Interventions Quick Adds Self-Care/Home Management;Therapeutic Activity   Self-Care/Home Management   Self-Care/Home Mgmt/ADL, Compensatory, Meal Prep Minutes (12556) 25   Symptoms Noted During/After Treatment (Meal Preparation/Planning Training) increased pain   Treatment Detail/Skilled Intervention Pt greeted for OT eval/treat, pt with son and sister. Highly agreeable. Edu on role. OT reviewed spinal prec with hand out issued \"self care after spine surgery\". Brief review of edu, no questions. Bed mobility with log roll initated. At this time, RN present and able to take out drain while pt sidelying. OT retreived AE/DME hand out in anticipation of reviewing equipment needs for safety and ind. Resumed log roll with min A x 1, VCs, use of bed features/bed rail. Pt hoping to count to 3 during transfer. OT provided edu on use of sock aide and reacher for LB dressing to maintain spinal prec. Visual and verbal demo, pt demonstrated useage for donning/doffing socks and pants with set up and VCs. Attempted to take off pants in standing despite cues to sit again. CGA provided for safety, no LOB, pt aware to complete in sitting at home for increased safety. Toilet tx with VCs, raised toilet seat, and CGA. Pt preferred to have sister assist with tasks (i.e. toilet hygiene/fixing hair at sink) and sister available to assist at discharge. Pt did not break spinal prec throughout ADL tasks this date.   Therapeutic Activities   Therapeutic Activity Minutes (60502) 10   Symptoms noted during/after treatment increased pain   Treatment Detail/Skilled Intervention Pt expressed wanting to cont to stand/walk, as it felt \"good\". OT engaged pt in func amb to assess tolerance for I/ADL activity. Pt tolerated approx 6 mins cont amb, slowly moving. " Pain rated 5/10. Family walking with pt/therapist. Discussion throughout regarding AE/DME type. Stated a raised seat without handles would work best in set up. Edu regarding where to purchase, family hoping to receive from hospital if items are more convenient, hoping some could be covered by insurance. Will cont to update pt/family regarding these needs.   OT Discharge Planning   OT Plan Review spinal prec. Family interested in knowing price differential/insurance options for shower chair and RTS if issued from hospital. Review LB dressing with AE as needed - have pt complete FB dressing with retreival of items.   OT Discharge Recommendation (DC Rec) home with assist   OT Rationale for DC Rec Pt is below baseline, primarily impacted by pain post op. Has supportive family staying with her including her sister who is a PT from [place]. Mobilizing and completing ADLs with SBA/CGA and set up/modified ind.   OT Brief overview of current status SBA/CGA walker for mobility, Set up/mod I for LB dressing with AE   OT Equipment Needed at Discharge shower chair;reacher;raised toilet seat;walker, standard  (sock aide)   Total Session Time   Timed Code Treatment Minutes 35   Total Session Time (sum of timed and untimed services) 43

## 2024-07-06 NOTE — PROGRESS NOTES
Summary: POD 2 T11-L3 laminectomy     Orientation: A/Ox4      Vitals/Tele: VSS RA     IV Access/drains:  Hemovac, PIV SL     Pain: Oxy     Diet: regular     Mobility: A1GB/W     GI/: No BM  this shift     Wound/Skin: Scattered Bruises, Dressing CDI     Discharge Plan: pending     See Flow sheets for assessment

## 2024-07-06 NOTE — PROGRESS NOTES
"[facility]    Neurosurgery Progress Note    Date of Service (when I saw the patient): 07/06/2024     Assessment & Plan     Procedure(s):  THORACIC 11 TO LUMBAR 3 POSTEROLATERAL FUSION USING ALLOGRAFT,LUMBAR 1 BILATERAL LAMINECTOMY AND MEDIAL FACETECTOMY AND SEPARATION SURGERY, USE OF OSTEOCOOL DEVICE.   -3 Days Post-Op    Patient states that she is doing well has no current complaints noted that in certain positions will have slight increase in back pain. Denies any radicular lower extremity pain numbness tingling or weakness, voiding without difficulty, passing gas     Plan:  Remove drain   Xray to be completed  Increase activity as tolerated with PT/OT  Lovenox 40mg daily to start today once drain removed   IS q2hrs while awake  Bowel regimen   Tentative discharge tomorrow pending therapy and pain control       I have discussed the following assessment and plan Dr. Prieto who is in agreement with initial plan and will follow up with further consultation recommendations.    Marquita Murillo PA-C  [facility] [place] [address]  Tel [phone]  Text page via [employer] Paging/Directory     Interval History   States she is doing well no current complaints     Physical Exam   Temp: 98.1  F (36.7  C) Temp src: Oral BP: 113/69 Pulse: 94   Resp: 16 SpO2: 96 % O2 Device: None (Room air)    Vitals:    07/03/24 0752   Weight: 72.6 kg (160 lb)     Vital Signs with Ranges  Temp:  [98  F (36.7  C)-98.6  F (37  C)] 98.1  F (36.7  C)  Pulse:  [] 94  Resp:  [16] 16  BP: (110-137)/(59-74) 113/69  SpO2:  [94 %-97 %] 96 %  I/O last 3 completed shifts:  In: 600 [P.O.:600]  Out: 50 [Drains:50]     , Blood pressure 113/69, pulse 94, temperature 98.1  F (36.7  C), temperature source Oral, resp. rate 16, height 1.6 m (5' 3\"), weight 72.6 kg (160 lb), SpO2 96%.  160 lbs 0 oz  HEENT:  Normocephalic.  PERRLA.  EOM s intact.    Neck:  " Supple, non-tender, without lymphadenopathy.  Heart:  No peripheral edema  Lungs:  No SOB  Abdomen:  Soft, non-tender, non-distended.   Skin:  Warm and dry, good capillary refill.  Extremities:  Good radial and dorsalis pedis pulses bilaterally, no edema, cyanosis or clubbing.    NEUROLOGICAL EXAMINATION:   Mental status: alert and oriented x3 speech clear and appropriate   Cranial nerves: II-XII intact  Motor   Biceps: 5/5 right, 5/5 left   Wrist extensors:5/5 right, 5/5 left   Triceps: 5/5 right, 5/5 left   Deltoids: 5/5 right, 5/5 left   Finger flexion: 5/5 right, 5/5 left   Finger abduction:5/5 right, 5/5 left   Hand : 5/5 right, 5/5 left   Hip flexors: 5/5 right, 5/5 left   Quadriceps: 5/5 right, 5/5 left  Ankle dorsiflexion: 5/5 right, 5/5 left    Ankle plantar flexion:5/5 right, 5/5 left   Extensor hallicus longus: 5/5 right, 5/5 left   Sensation:  intact  Reflexes:  Negative Babinski.  Negative Clonus.    Coordination:  Smooth finger to nose testing.   Negative pronator drift.    Gait:  not tested    Medications   Current Facility-Administered Medications   Medication Dose Route Frequency Provider Last Rate Last Admin     Current Facility-Administered Medications   Medication Dose Route Frequency Provider Last Rate Last Admin    acetaminophen (TYLENOL) tablet 975 mg  975 mg Oral Q8H Caroline Pedroza PA-C   975 mg at 07/06/24 0600    insulin aspart (NovoLOG) injection (RAPID ACTING)  1-3 Units Subcutaneous TID AC Melanie Maharaj MD   2 Units at 07/05/24 1715    insulin aspart (NovoLOG) injection (RAPID ACTING)  1-3 Units Subcutaneous At Bedtime Melanie Maharaj MD        letrozole (FEMARA) tablet 2.5 mg  2.5 mg Oral Daily Caroline Pedroza PA-C   2.5 mg at 07/05/24 2144    Lidocaine (LIDOCARE) 4 % Patch 1 patch  1 patch Transdermal Q24H Marquita Murillo PA-C   1 patch at 07/05/24 1229    polyethylene glycol (MIRALAX) Packet 17 g  17 g Oral Daily Caroline Pedroza PA-C   17 g at 07/06/24 0854  "   senna-docusate (SENOKOT-S/PERICOLACE) 8.6-50 MG per tablet 1 tablet  1 tablet Oral BID Caroline Pedroza PA-C   1 tablet at 07/06/24 0854    sodium chloride (PF) 0.9% PF flush 3 mL  3 mL Intracatheter Q8H Caroline Pedroza PA-C   3 mL at 07/06/24 0602       Data     CBC RESULTS:   Recent Labs   Lab Test 07/04/24  0813   HGB 10.0*     Basic Metabolic Panel:  Lab Results   Component Value Date     07/04/2024      Lab Results   Component Value Date    POTASSIUM 3.9 07/04/2024     Lab Results   Component Value Date    CHLORIDE 102 07/04/2024     Lab Results   Component Value Date    GAYATHRI 7.8 07/04/2024     Lab Results   Component Value Date    CO2 27 07/04/2024     Lab Results   Component Value Date    BUN 23.6 07/04/2024     Lab Results   Component Value Date    CR 0.57 07/04/2024     Lab Results   Component Value Date     07/06/2024     INR:  No results found for: \"INR\"     "

## 2024-07-06 NOTE — PROGRESS NOTES
"[facility]    Medicine Progress Note - Hospitalist Service    Date of Admission:  7/3/2024    Assessment & Plan     68 year old woman with history of metastatic breast cancer to the spine and pathologic lumbar vertebral fracture she is s/p spine surgery without complication as outlined below.       Pathologic lumbar vertebral fracture  S/p thoracic 11 to lumbar 3 posterior lateral fusion using allograft, L1 bilateral laminectomy and medial facetectomy and separation surgery, use osteocool device by Dr. Prieto for metastasis to spinal column.   Routine post-operative care, including pain mgt, drain mgt, activity, diet, and DVT prophylaxis, per surgical team.      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   Continue on PTA letrozole  Post op pain mgt per NSG. Taking dilaudid 2 mg BID PTA.      Mild hyponatremia,  130 07/03/24      Uric acid, TSH added on to labs -> both wnl  Na has since normalized, s/p IVF     Hyperglycemia 180s at preop, likely related to decadron    Not currently on steroids:  Reduce BS check to bid, cont ISS for now  Checked A1c -> 6.6             Diet: Advance Diet as Tolerated: Regular Diet Adult    DVT Prophylaxis: Enoxaparin (Lovenox) SQ  Burden Catheter: Not present  Lines: None     Cardiac Monitoring: None  Code Status: Full Code      Clinically Significant Risk Factors                # Thrombocytopenia: Lowest platelets = 100 in last 2 days, will monitor for bleeding               # DMII: A1C = 6.6 % (Ref range: <5.7 %) within past 6 months, PRESENT ON ADMISSION  # Overweight: Estimated body mass index is 28.34 kg/m  as calculated from the following:    Height as of this encounter: 1.6 m (5' 3\").    Weight as of this encounter: 72.6 kg (160 lb)., PRESENT ON ADMISSION            Disposition Plan     Medically Ready for Discharge: Anticipated Tomorrow  Per phys therapy and pain control            Ngoc Roberts, " MD  Hospitalist Service  [facility]  Securely message with [employer] (more info)  Text page via [employer] Paging/Directory   ______________________________________________________________________    Interval History   Drain removed today.   Pain controlled on meds and with rest/positioning.   Started therapy today    Physical Exam   Vital Signs: Temp: 98.1  F (36.7  C) Temp src: Oral BP: 113/69 Pulse: 94   Resp: 16 SpO2: 96 % O2 Device: None (Room air)    Weight: 160 lbs 0 oz  Constitutional: awake, alert, cooperative, no apparent distress.   Respiratory: CTABL   Cardiovascular: RRR with no m/r/g   Breasts:  Firm, golf ball sized tumor at UOQ of R breast   GI: Normal bowel sounds, soft, non-distended, non-tender.  Musculoskeletal: There is no redness, warmth, or swelling of the joints.    Drain at upper thoracic area, a few cc of bloody contents.    Neurologic: Awake, alert, oriented to name, place and time. Nacho Motor is 5 out of 5 bilaterally. Sensory is intact.   Neuropsychiatric: normal mood and affect  ----------------------------------------    Medical Decision Making       45 MINUTES SPENT BY ME on the date of service doing chart review, history, exam, documentation & further activities per the note.      Data     I have personally reviewed the following data over the past 24 hrs:    N/A  \   N/A   / 100 (L)     N/A N/A N/A /  109 (H)   N/A N/A N/A \       Imaging results reviewed over the past 24 hrs:   No results found for this or any previous visit (from the past 24 hour(s)).

## 2024-07-06 NOTE — PLAN OF CARE
Goal Outcome Evaluation:    Patient vital signs are at baseline: Yes, on RA  Patient able to ambulate as they were prior to admission or with assist devices provided by therapies during their stay:  Yes  Patient MUST void prior to discharge:  Yes  Patient able to tolerate oral intake:  Yes  Pain has adequate pain control using Oral analgesics:  Yes. PRN Robaxin x1, PRN Oxy x2. Medication helpful.  Does patient have an identified :  Yes  Has goal D/C date and time been discussed with patient:  No,  TBD     Patient POD# 2 from T11-L3 Posterior Fusion, HMV removed on day shift, Dressing changed, CDI. Patient A&Ox4. Accuchecks 117, 109, 134. PIV SL. XR of Thoracic Lumbar completed see results. Assist x1 GB/W. Will continue to monitor. Family at bedside.

## 2024-07-07 ENCOUNTER — APPOINTMENT (OUTPATIENT)
Dept: PHYSICAL THERAPY | Facility: CLINIC | Age: 69
End: 2024-07-07
Attending: NEUROLOGICAL SURGERY
Payer: COMMERCIAL

## 2024-07-07 ENCOUNTER — APPOINTMENT (OUTPATIENT)
Dept: OCCUPATIONAL THERAPY | Facility: CLINIC | Age: 69
End: 2024-07-07
Attending: NEUROLOGICAL SURGERY
Payer: COMMERCIAL

## 2024-07-07 LAB
GLUCOSE BLDC GLUCOMTR-MCNC: 113 MG/DL (ref 70–99)
GLUCOSE BLDC GLUCOMTR-MCNC: 129 MG/DL (ref 70–99)
GLUCOSE BLDC GLUCOMTR-MCNC: 184 MG/DL (ref 70–99)
GLUCOSE BLDC GLUCOMTR-MCNC: 97 MG/DL (ref 70–99)

## 2024-07-07 PROCEDURE — 97116 GAIT TRAINING THERAPY: CPT | Mod: GP

## 2024-07-07 PROCEDURE — 120N000001 HC R&B MED SURG/OB

## 2024-07-07 PROCEDURE — 250N000013 HC RX MED GY IP 250 OP 250 PS 637: Performed by: PHYSICIAN ASSISTANT

## 2024-07-07 PROCEDURE — 250N000011 HC RX IP 250 OP 636: Performed by: PHYSICIAN ASSISTANT

## 2024-07-07 PROCEDURE — 97535 SELF CARE MNGMENT TRAINING: CPT | Mod: GO

## 2024-07-07 PROCEDURE — 250N000013 HC RX MED GY IP 250 OP 250 PS 637

## 2024-07-07 PROCEDURE — 97530 THERAPEUTIC ACTIVITIES: CPT | Mod: GP

## 2024-07-07 PROCEDURE — 99232 SBSQ HOSP IP/OBS MODERATE 35: CPT | Performed by: INTERNAL MEDICINE

## 2024-07-07 RX ORDER — BISACODYL 10 MG
10 SUPPOSITORY, RECTAL RECTAL DAILY PRN
Qty: 5 SUPPOSITORY | Refills: 0 | Status: SHIPPED | OUTPATIENT
Start: 2024-07-07

## 2024-07-07 RX ORDER — POLYETHYLENE GLYCOL 3350 17 G/17G
17 POWDER, FOR SOLUTION ORAL 2 TIMES DAILY
Qty: 510 G | Refills: 1 | Status: SHIPPED | OUTPATIENT
Start: 2024-07-07

## 2024-07-07 RX ORDER — ACETAMINOPHEN 325 MG/1
650 TABLET ORAL EVERY 4 HOURS PRN
Qty: 100 TABLET | Refills: 0 | Status: SHIPPED | OUTPATIENT
Start: 2024-07-07

## 2024-07-07 RX ORDER — LIDOCAINE 4 G/G
1 PATCH TOPICAL EVERY 24 HOURS
Qty: 30 PATCH | Refills: 0 | Status: SHIPPED | OUTPATIENT
Start: 2024-07-07

## 2024-07-07 RX ORDER — OXYCODONE HYDROCHLORIDE 5 MG/1
10 TABLET ORAL EVERY 4 HOURS PRN
Qty: 24 TABLET | Refills: 0 | Status: SHIPPED | OUTPATIENT
Start: 2024-07-07 | End: 2024-07-08

## 2024-07-07 RX ORDER — AMOXICILLIN 250 MG
1 CAPSULE ORAL 2 TIMES DAILY
Qty: 60 TABLET | Refills: 0 | Status: SHIPPED | OUTPATIENT
Start: 2024-07-07

## 2024-07-07 RX ORDER — METHOCARBAMOL 750 MG/1
750 TABLET, FILM COATED ORAL EVERY 6 HOURS PRN
Qty: 90 TABLET | Refills: 0 | Status: SHIPPED | OUTPATIENT
Start: 2024-07-07 | End: 2024-08-14

## 2024-07-07 RX ADMIN — METHOCARBAMOL 750 MG: 750 TABLET ORAL at 21:43

## 2024-07-07 RX ADMIN — SENNOSIDES AND DOCUSATE SODIUM 1 TABLET: 50; 8.6 TABLET ORAL at 08:10

## 2024-07-07 RX ADMIN — OXYCODONE HYDROCHLORIDE 5 MG: 5 TABLET ORAL at 02:24

## 2024-07-07 RX ADMIN — METHOCARBAMOL 750 MG: 750 TABLET ORAL at 06:41

## 2024-07-07 RX ADMIN — ENOXAPARIN SODIUM 40 MG: 40 INJECTION SUBCUTANEOUS at 14:11

## 2024-07-07 RX ADMIN — OXYCODONE HYDROCHLORIDE 10 MG: 5 TABLET ORAL at 08:10

## 2024-07-07 RX ADMIN — OXYCODONE HYDROCHLORIDE 5 MG: 5 TABLET ORAL at 21:43

## 2024-07-07 RX ADMIN — MAGNESIUM HYDROXIDE 30 ML: 400 SUSPENSION ORAL at 12:24

## 2024-07-07 RX ADMIN — POLYETHYLENE GLYCOL 3350 17 G: 17 POWDER, FOR SOLUTION ORAL at 08:12

## 2024-07-07 RX ADMIN — LIDOCAINE 1 PATCH: 4 PATCH TOPICAL at 12:25

## 2024-07-07 RX ADMIN — BISACODYL 10 MG: 10 SUPPOSITORY RECTAL at 14:49

## 2024-07-07 RX ADMIN — LETROZOLE 2.5 MG: 2.5 TABLET ORAL at 21:43

## 2024-07-07 RX ADMIN — OXYCODONE HYDROCHLORIDE 10 MG: 5 TABLET ORAL at 14:10

## 2024-07-07 ASSESSMENT — ACTIVITIES OF DAILY LIVING (ADL)
ADLS_ACUITY_SCORE: 24

## 2024-07-07 NOTE — PROGRESS NOTES
Care Management Discharge Note    Discharge Date: 07/07/2024       Discharge Disposition: Home Care    Discharge Services:      Discharge DME: Walker    Discharge Transportation: family or friend will provide    Private pay costs discussed: Not applicable    Does the patient's insurance plan have a 3 day qualifying hospital stay waiver?  No    PAS Confirmation Code:    Patient/family educated on Medicare website which has current facility and service quality ratings: yes    Education Provided on the Discharge Plan:    Persons Notified of Discharge Plans: Bedside RN, Hospitalist  Patient/Family in Agreement with the Plan: yes    Handoff Referral Completed: Yes    Additional Information:  Writer spoke with Pt and Pt's Sister and informed them that we are still trying to find a [facility] agency to accept her. 6 agencies have declined her and CM will know more on Monday. Pt stated that her sister is a Physiotherapist and is staying until Aug 8th to be able to help her. Writer asked if OP therapies would be ok and they both agreed. Pt would need to follow up with her PCP if HHC is needed to continue after her sister leaves as she won't have a way to get to and from appointments. Pt's Son will  Pt at discharge.         Isai Fay, RN, BSN, Care Coordinator

## 2024-07-07 NOTE — PROGRESS NOTES
[facility]    Medicine Progress Note - Hospitalist Service    Date of Admission:  7/3/2024    Assessment & Plan   68 year old woman with history of metastatic breast cancer to the spine and pathologic lumbar vertebral fracture she is s/p spine surgery without complication as outlined below.       Pathologic lumbar vertebral fracture  S/p thoracic 11 to lumbar 3 posterior lateral fusion using allograft, L1 bilateral laminectomy and medial facetectomy and separation surgery, use osteocool device by Dr. Prieto for metastasis to spinal column.   Routine post-operative care, including pain mgt, drain mgt, activity, diet, and DVT prophylaxis, per surgical team.   Pain is treated with prn oxycodone  Patient has not had BM yet (7/7 evening)is concerned.  Is ambulating and aggressive bowel regimen in place.        Pt/family purchasing RTS, shower chair, sock aide, reacher. Wanting to instal grab bars in shower. Will be issued a walker prior to discharge. Family able to assist 24/7 at discharge.      Pt stated that her sister is a Physiotherapist and is staying until Aug 8th to be able to help her. Writer asked if OP therapies would be ok and they both agreed.     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   Continue on PTA letrozole  Post op pain mgt per NSG. Taking dilaudid 2 mg BID PTA.      Mild hyponatremia,  130 07/03/24      Uric acid, TSH added on to labs -> both wnl  Na has since normalized, s/p IVF     Hyperglycemia 180s at preop, likely related to decadron    Not currently on steroids:  Reduce BS check to bid, cont ISS for now  Checked A1c -> 6.6             Diet: Advance Diet as Tolerated: Regular Diet Adult  Diet    DVT Prophylaxis: Enoxaparin (Lovenox) SQ  Burden Catheter: Not present  Lines: None     Cardiac Monitoring: None  Code Status: Full Code      Clinically Significant Risk Factors                # Thrombocytopenia: Lowest  "platelets = 100 in last 2 days, will monitor for bleeding               # DMII: A1C = 6.6 % (Ref range: <5.7 %) within past 6 months   # Overweight: Estimated body mass index is 28.34 kg/m  as calculated from the following:    Height as of this encounter: 1.6 m (5' 3\").    Weight as of this encounter: 72.6 kg (160 lb).             Disposition Plan     Medically Ready for Discharge: Anticipated Tomorrow             Ngoc Roberts MD  Hospitalist Service  [facility]  Securely message with [employer] (more info)  Text page via [facility] Paging/Directory   ______________________________________________________________________    Interval History   No BM yet.  Abd fullness.   Ambulating with AX1      Physical Exam   Vital Signs: Temp: 97.9  F (36.6  C) Temp src: Oral BP: 123/77 Pulse: 111   Resp: 16 SpO2: 98 % O2 Device: None (Room air)    Weight: 160 lbs 0 oz    Constitutional: awake, alert, cooperative, no apparent distress, and appears stated age  Musculoskeletal: corset on.   Neurologic: ambulates with AX1 using walker.  Slow.    Neuropsychiatric: General: normal, calm, and normal eye contact  Affect: normal and pleasant  Memory and insight: normal, memory for past and recent events intact, and thought process normal    Medical Decision Making       33 MINUTES SPENT BY ME on the date of service doing chart review, history, exam, documentation & further activities per the note.      Data         Imaging results reviewed over the past 24 hrs:   No results found for this or any previous visit (from the past 24 hour(s)).  "

## 2024-07-07 NOTE — PROGRESS NOTES
[facility]    Neurosurgery Progress Note    Date of Service (when I saw the patient): 07/07/2024     Assessment & Plan     Procedure(s):  THORACIC 11 TO LUMBAR 3 POSTEROLATERAL FUSION USING ALLOGRAFT,LUMBAR 1 BILATERAL LAMINECTOMY AND MEDIAL FACETECTOMY AND SEPARATION SURGERY, USE OF OSTEOCOOL DEVICE.   -4 Days Post-Op    Patient states that she is doing well has no current complaints. Denies back pain presently. Denies any radicular lower extremity pain numbness tingling or weakness, voiding without difficulty, passing gas     Plan:  Increase activity as tolerated with PT/OT  Lovenox 40mg daily to started yesterday  IS q2hrs while awake  Bowel regimen   Okay to discharge later today   Will attempt to have bowel movement prior to discharge with suppository if needed     Addendum:   No bowel movement with suppository  Currently passing gas without abdominal tenderness   Will hold on discharge until tomorrow and continue with bowel regimen       I have discussed the following assessment and plan Dr. Prieto who is in agreement with initial plan and will follow up with further consultation recommendations.    DINO Walker [facility] [place] [address]  Tel [phone]  Text page via [facility] Paging/Directory     Interval History   States she is doing well no current complaints     Physical Exam   Temp: 98.1  F (36.7  C) Temp src: Oral BP: 113/69 Pulse: 94   Resp: 16 SpO2: 96 % O2 Device: None (Room air)    Vitals:    07/03/24 0752   Weight: 72.6 kg (160 lb)     Vital Signs with Ranges  Temp:  [98  F (36.7  C)-98.6  F (37  C)] 98.1  F (36.7  C)  Pulse:  [] 94  Resp:  [16] 16  BP: (110-137)/(59-74) 113/69  SpO2:  [94 %-97 %] 96 %  I/O last 3 completed shifts:  In: 600 [P.O.:600]  Out: 50 [Drains:50]     , Blood pressure 113/69, pulse 94, temperature 98.1  F (36.7  C), temperature source Oral, resp. rate  "16, height 1.6 m (5' 3\"), weight 72.6 kg (160 lb), SpO2 96%.  160 lbs 0 oz  HEENT:  Normocephalic.  PERRLA.  EOM s intact.    Neck:  Supple, non-tender, without lymphadenopathy.  Heart:  No peripheral edema  Lungs:  No SOB  Abdomen:  Soft, non-tender, non-distended.   Skin:  Warm and dry, good capillary refill.  Extremities:  Good radial and dorsalis pedis pulses bilaterally, no edema, cyanosis or clubbing.    NEUROLOGICAL EXAMINATION:   Mental status: alert and oriented x3 speech clear and appropriate   Cranial nerves: II-XII intact  Motor   Biceps: 5/5 right, 5/5 left   Wrist extensors:5/5 right, 5/5 left   Triceps: 5/5 right, 5/5 left   Deltoids: 5/5 right, 5/5 left   Finger flexion: 5/5 right, 5/5 left   Finger abduction:5/5 right, 5/5 left   Hand : 5/5 right, 5/5 left   Hip flexors: 5/5 right, 5/5 left   Quadriceps: 5/5 right, 5/5 left  Ankle dorsiflexion: 5/5 right, 5/5 left    Ankle plantar flexion:5/5 right, 5/5 left   Extensor hallicus longus: 5/5 right, 5/5 left   Sensation:  intact  Reflexes:  Negative Babinski.  Negative Clonus.    Coordination:  Smooth finger to nose testing.   Negative pronator drift.    Gait:  not tested    Xray reviewed with good hardware placement and stable alignment   IMPRESSION: Redemonstrated pathologic compression fracture at L1 with moderate loss of height, posterior stabilization and instrumentation with bipedicle screws from T11 through L3. No evidence of hardware failure. Alignment unchanged compared to prior.     Medications   Current Facility-Administered Medications   Medication Dose Route Frequency Provider Last Rate Last Admin     Current Facility-Administered Medications   Medication Dose Route Frequency Provider Last Rate Last Admin    acetaminophen (TYLENOL) tablet 975 mg  975 mg Oral Q8H Caroline Pedroza PA-C   975 mg at 07/06/24 0600    insulin aspart (NovoLOG) injection (RAPID ACTING)  1-3 Units Subcutaneous TID AC Melanie Maharaj MD   2 Units at " "07/05/24 1715    insulin aspart (NovoLOG) injection (RAPID ACTING)  1-3 Units Subcutaneous At Bedtime Melanie Maharaj MD        letrozole (FEMARA) tablet 2.5 mg  2.5 mg Oral Daily Caroline Pedroza PA-C   2.5 mg at 07/05/24 2144    Lidocaine (LIDOCARE) 4 % Patch 1 patch  1 patch Transdermal Q24H Marquita Murillo PA-C   1 patch at 07/05/24 1229    polyethylene glycol (MIRALAX) Packet 17 g  17 g Oral Daily Caroline Pedroza PA-C   17 g at 07/06/24 0854    senna-docusate (SENOKOT-S/PERICOLACE) 8.6-50 MG per tablet 1 tablet  1 tablet Oral BID Caroline Pedroza PA-C   1 tablet at 07/06/24 0854    sodium chloride (PF) 0.9% PF flush 3 mL  3 mL Intracatheter Q8H Caroline Pedroza PA-C   3 mL at 07/06/24 0602       Data     CBC RESULTS:   Recent Labs   Lab Test 07/04/24  0813   HGB 10.0*     Basic Metabolic Panel:  Lab Results   Component Value Date     07/04/2024      Lab Results   Component Value Date    POTASSIUM 3.9 07/04/2024     Lab Results   Component Value Date    CHLORIDE 102 07/04/2024     Lab Results   Component Value Date    GAYATHRI 7.8 07/04/2024     Lab Results   Component Value Date    CO2 27 07/04/2024     Lab Results   Component Value Date    BUN 23.6 07/04/2024     Lab Results   Component Value Date    CR 0.57 07/04/2024     Lab Results   Component Value Date     07/06/2024     INR:  No results found for: \"INR\"     "

## 2024-07-07 NOTE — PROGRESS NOTES
Hospitalist requested me to send a message Neuro team if patient is okay to discharge.   PT/OT will be an outpatient     Writer-Charge nurse

## 2024-07-07 NOTE — PLAN OF CARE
Occupational Therapy Discharge Summary    Reason for therapy discharge:    All goals and outcomes met, no further needs identified.  No further expectations of functional progress. Pt/family verbalized no further concerns and ready for discharge.    Progress towards therapy goal(s). [facility] for goal details.  Goals met    Therapy recommendation(s):    No further OT needs. Per chart, pt will have OP PT. Does not need OP OT.  Pt/family purchasing RTS, shower chair, sock aide, reacher. Wanting to instal grab bars in shower. Will be issued a walker prior to discharge. Family able to assist 24/7 at discharge.

## 2024-07-07 NOTE — PLAN OF CARE
Goal Outcome Evaluation:      Plan of Care Reviewed With: patient    Overall Patient Progress: improvingOverall Patient Progress: improving    Outcome Evaluation: [place] D/C todayafter having BM    Patient is POD#4 from T11-L3 Posterior Fusion, DCI. CMS intact. Pain managed with schedule and PRN medications. PRN MOM and Suppository given on this shift with minimum effect. Pt suppose to be discharge home today but due to no BM instead Pt will discharge tomorrow NS. PIV SL.

## 2024-07-07 NOTE — PLAN OF CARE
Goal Outcome Evaluation:    Overall Patient Progress: improvingOverall Patient Progress: improving      Patient vital signs are at baseline: Yes, on RA  Patient able to ambulate as they were prior to admission or with assist devices provided by therapies during their stay:  Yes  Patient MUST void prior to discharge:  Yes  Patient able to tolerate oral intake:  Yes  Pain has adequate pain control using Oral analgesics:  Yes.PRN Oxy x2,r Medication helpful.  Does patient have an identified :  Yes  Has goal D/C date and time been discussed with patient:  No,  TBD      Patient POD# 3 from T11-L3 Posterior Fusion, A&OX4, A1 GB/W, Dressing to the back CDI,  PIV SL, BG ACHS, pain managed with PRN Oxy with adequate relief, family at bedside, discharge pending, safe deposition plan.

## 2024-07-08 ENCOUNTER — APPOINTMENT (OUTPATIENT)
Dept: PHYSICAL THERAPY | Facility: CLINIC | Age: 69
End: 2024-07-08
Attending: NEUROLOGICAL SURGERY
Payer: COMMERCIAL

## 2024-07-08 ENCOUNTER — APPOINTMENT (OUTPATIENT)
Dept: RADIATION ONCOLOGY | Facility: HOSPITAL | Age: 69
End: 2024-07-08
Attending: RADIOLOGY
Payer: COMMERCIAL

## 2024-07-08 VITALS
SYSTOLIC BLOOD PRESSURE: 132 MMHG | DIASTOLIC BLOOD PRESSURE: 81 MMHG | TEMPERATURE: 99 F | OXYGEN SATURATION: 95 % | WEIGHT: 160 LBS | HEIGHT: 63 IN | RESPIRATION RATE: 16 BRPM | HEART RATE: 104 BPM | BODY MASS INDEX: 28.35 KG/M2

## 2024-07-08 PROBLEM — M48.50XA PATHOLOGIC COMPRESSION FRACTURE OF SPINE (H): Status: ACTIVE | Noted: 2024-07-08

## 2024-07-08 LAB
GLUCOSE BLDC GLUCOMTR-MCNC: 139 MG/DL (ref 70–99)
GLUCOSE BLDC GLUCOMTR-MCNC: 147 MG/DL (ref 70–99)
PATH REPORT.COMMENTS IMP SPEC: ABNORMAL
PATH REPORT.COMMENTS IMP SPEC: YES
PATH REPORT.FINAL DX SPEC: ABNORMAL
PATH REPORT.GROSS SPEC: ABNORMAL
PATH REPORT.INTRAOP OBS SPEC DOC: ABNORMAL
PATH REPORT.MICROSCOPIC SPEC OTHER STN: ABNORMAL
PATH REPORT.MICROSCOPIC SPEC OTHER STN: ABNORMAL
PATH REPORT.RELEVANT HX SPEC: ABNORMAL
PHOTO IMAGE: ABNORMAL

## 2024-07-08 PROCEDURE — 250N000013 HC RX MED GY IP 250 OP 250 PS 637

## 2024-07-08 PROCEDURE — 97530 THERAPEUTIC ACTIVITIES: CPT | Mod: GP

## 2024-07-08 PROCEDURE — 97116 GAIT TRAINING THERAPY: CPT | Mod: GP

## 2024-07-08 PROCEDURE — 99239 HOSP IP/OBS DSCHRG MGMT >30: CPT | Performed by: INTERNAL MEDICINE

## 2024-07-08 PROCEDURE — 77263 THER RADIOLOGY TX PLNG CPLX: CPT | Performed by: RADIOLOGY

## 2024-07-08 RX ORDER — OXYCODONE HYDROCHLORIDE 5 MG/1
10 TABLET ORAL EVERY 4 HOURS PRN
Qty: 90 TABLET | Refills: 0 | Status: SHIPPED | OUTPATIENT
Start: 2024-07-08

## 2024-07-08 RX ADMIN — ACETAMINOPHEN 650 MG: 325 TABLET, FILM COATED ORAL at 10:03

## 2024-07-08 RX ADMIN — METHOCARBAMOL 750 MG: 750 TABLET ORAL at 07:03

## 2024-07-08 RX ADMIN — OXYCODONE HYDROCHLORIDE 5 MG: 5 TABLET ORAL at 07:03

## 2024-07-08 RX ADMIN — OXYCODONE HYDROCHLORIDE 5 MG: 5 TABLET ORAL at 11:16

## 2024-07-08 RX ADMIN — SENNOSIDES AND DOCUSATE SODIUM 1 TABLET: 50; 8.6 TABLET ORAL at 08:26

## 2024-07-08 RX ADMIN — POLYETHYLENE GLYCOL 3350 17 G: 17 POWDER, FOR SOLUTION ORAL at 08:26

## 2024-07-08 ASSESSMENT — ACTIVITIES OF DAILY LIVING (ADL)
ADLS_ACUITY_SCORE: 24
ADLS_ACUITY_SCORE: 25
ADLS_ACUITY_SCORE: 24
ADLS_ACUITY_SCORE: 25
ADLS_ACUITY_SCORE: 24
ADLS_ACUITY_SCORE: 25
ADLS_ACUITY_SCORE: 31
ADLS_ACUITY_SCORE: 25
ADLS_ACUITY_SCORE: 24
ADLS_ACUITY_SCORE: 24

## 2024-07-08 NOTE — DISCHARGE SUMMARY
"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    Weight as of this encounter: 72.6 kg (160 lb).       Follow-ups Needed After Discharge   Follow-up Appointments     Follow-up and recommended labs and tests       Follow up as scheduled should your appointments need to be adjusted   please contact the office [phone]        Follow-up and recommended labs and tests       Follow up with primary care provider, Gilmer Walters, within 7 days for   hospital follow- up.  No follow up labs or test are needed.     Ensure   bowel regimen is okay,  ensure pain is okay             Unresulted Labs Ordered in the Past 30 Days of this Admission       No orders found from 6/3/2024 to 7/4/2024.             Discharge Disposition   Admited to home care:   Agency:  [facility]  Discharged to home  Condition at discharge: Stable    Hospital Course   68 year old woman with history of metastatic breast cancer to the spine and pathologic lumbar vertebral fracture she is s/p spine surgery without complication as outlined below.       Pathologic lumbar vertebral fracture  S/p thoracic 11 to lumbar 3 posterior lateral fusion using allograft, L1 bilateral laminectomy and medial facetectomy and separation surgery, use osteocool device by Dr. Prieto for metastasis to spinal column.   Routine " post-operative care, including pain mgt, drain mgt, activity, diet, and DVT prophylaxis, per surgical team.   Pain is treated with prn oxycodone:  #90 5 mg tabs sent home at d/c   Patient has had one small BM prior to d/c:   Is ambulating and aggressive bowel regimen in place.        Pt/family purchasing RTS, shower chair, sock aide, reacher. Wanting to instal grab bars in shower. Will be issued a walker prior to discharge. Family able to assist 24/7 at discharge.      Pt stated that her sister is a Physiotherapist and is staying until Aug 8th to be able to help her.   Family is agreeable to [facility] phys [facility]     Primary cancer of the right breast with metastasis to the spine, and cancer-associated pain.   Continue on PTA letrozole  Post op pain mgt per NSG. Taking dilaudid 2 mg BID PTA.      Mild hyponatremia,  130 07/03/24      Uric acid, TSH added on to labs -> both wnl  Na has since normalized, s/p IVF     Hyperglycemia 180s at preop, likely related to decadron    Not currently on steroids:  Reduce BS check to bid, cont ISS for now  Checked A1c -> 6.6       Consultations This Hospital Stay   OCCUPATIONAL THERAPY ADULT IP CONSULT  PHYSICAL THERAPY ADULT IP CONSULT  HOSPITALIST IP CONSULT  CARE MANAGEMENT / SOCIAL WORK IP CONSULT    Code Status   Full Code    Time Spent on this Encounter   I, Ngoc Roberts MD, personally saw the patient today and spent greater than 30 minutes discharging this patient.       Ngoc Roberts MD  [facility] ORTHOPEDICS SPINE  [address] [address]  Phone: [phone]  Fax: [phone]  ______________________________________________________________________    Physical Exam   Vital Signs: Temp: 99  F (37.2  C) Temp src: Oral BP: 132/81 Pulse: 104   Resp: 16 SpO2: 95 % O2 Device: None (Room air)    Weight: 160 lbs 0 oz   Brace on, ambulating with walker and standby assist.        Primary Care Physician   Gilmer LOPEZ  [facility]    Discharge Orders      Home Care Referral      Reason for your hospital stay    Thoracolumbar fusion     Follow-up and recommended labs and tests     Follow up as scheduled should your appointments need to be adjusted please contact the office [phone]     Activity    *No lifting, pushing or pulling greater than 5-10 pounds (this is about a gallon of milk).  *No repetitive bending, twisting, or jarring activities  *No overhead work  *No aerobic or strenuous activity  *No activities with increased risk of falls  *You may move about your home as tolerated  *You may walk up and down stairs as tolerated  *You may increase your activity slowly over the next 4-6 weeks    WALKING PROGRAM: As you can tolerate, walk daily-start with 5-10 minutes of continuous walking. This is in addition to the walking that you do as part of your daily activities. Increase the time that you walk by 5 minutes every couple of days. Do not exceed 30-45 minutes of continuous walking until seen in follow-up. Walking is the best exercise after surgery.  **Listen to your body, if you find that you are more painful or fatigued, you may need to proceed more slowly.    **Do not smoke or expose yourself to second hand smoke. Cigarette smoke can delay healing and cause complications.     DRIVING:  We recommend that you do not drive while taking medications for pain or muscle spasms. Always read and follow the advice on your prescription bottle. If you have questions, speak with your pharmacist.  We recommend that you do not drive while wearing a brace, as it could limit your range of motion.    WORK: If you plan to return to work before your 4-6 week appointment, call and discuss with one of the nurses in the neurosurgery office.     USE OF ICE OR  HEAT:  *Icing can decrease post op swelling, thereby helping with post op pain control. Always protect your skin to prevent frostbite or burn.    *For the first 48 hours we recommend ice to the  surgical area for 15-20 minutes at a time several times a day.      *Any longer than 15-20 minutes can cause damage to the tissues, including frostbite.     *Allow area to warm for at least 45 minutes or an hour between treatments.    *Ice as frequently as you wish, so long as the area is warm to touch and has normal sensation before repeating.    *After 48 hours, you may use heat or ice, which ever feels best to you.  Always remember to protect your skin, and to use no greater than 15-20 minutes at a time.     *You can make your own ice bags at home by mixing 3 parts water with 1 part rubbing alcohol in a Ziplock bag and placing in the freezer.  It will not freeze solid.      BOWEL MOVEMENT:  If you did not have a bowel movement (pooped) before you were discharged from the hospital, and do not have a bowel movement within 2 days of discharge. Please call our office and request to speak to a nurse.    Your insurance may not cover medications to treat or prevent  constipation.      There are many  over the counter medications for constipation including: Colace, Senakot, Dulcolax, and Miralax. In addition to medications eat a high fiber diet and drink plenty of water.    If you are taking narcotics, you should being taking medication daily for constipation.      If you develop loose or runny stools, stop taking the medications for constipation.     When to contact your care team    Call [phone] with the following symptoms:    *Temperature 101(fever) or greater or chills  *Redness, swelling or increased drainage from the wound  *Worsening pain not relieved by the pain prescription given  *Worsening or new onset of weakness, or numbness and tingling  *Loss or change in your ability to control bowel or bladder function  *Change in your ability to walk, talk, see or think  *If you did not have a bowel movement before leaving the hospital and your bowels have not moved within 48 hours of your discharge    !!!! IF  YOU HAVE A SERIOUS OR THREATENING EMERGENCY, CALL 911 OR COME TO THE EMERGENCY ROOM.  IF YOUR CONDITIONS ALLOWS COME TO THE HOSPITAL WHERE YOUR SURGERY WAS PERFORMED.    QUESTIONS OR CONCERNS:   OUR OFFICE HOURS ARE FROM 9:00 A.M -4:30 P.M. MONDAY-FRIDAY.  AFTER OFFICE HOURS, CALLS ARE ANSWERED BY THE ON-CALL PROVIDER. PLEASE CALL WITH ROUTINE QUESTIONS DURING OFFICE HOURS. THE ON-CALL PROVIDER WILL NOT REFILL PRESCRIPTIONS.     Wound care and dressings    WOUND CARE:  Keep a dressing on the wound until the sutures are removed.  No lotions, soaps, powders, ointments, creams, or patches on incision until the wound is well healed.    Change the dressing daily, more frequently if necessary to keep the wound dry.  If you notice increased or persistent drainage from your wound, contact our office.  You may use an extra large band-aid or 4x4 and tape.  Both can be purchased at your pharmacy.    Sutures are usually removed in 1-2 weeks.  They are sometimes left longer.    Wash your hands before changing the dressing or touching the wound. If someone is helping you change the dressing, ensure that the person washes his/her hands.  Good handwashing can decrease the risk of infection.  If you are unable to see the wound, have someone check the wound daily for redness, swelling or drainage.  A small amount of drainage is normal.       Two days after surgery begin showering. Wash your wound daily. Remove all dressings prior to your shower.  Apply mild soap directly to the wound, form a light lather and rinse with running water.     Do not submerge the wound under water. No baths or swimming for 6 weeks.     If you develop redness, swelling, drainage, or temp 101 or greater, please call our office at [phone].     Reason for your hospital stay    Pathologic lumbar vertebral fracture  S/p thoracic 11 to lumbar 3 posterior lateral fusion     Follow-up and recommended labs and tests     Follow up with primary care provider,  Gilmer Walters within 7 days for hospital follow- up.  No follow up labs or test are needed.     Ensure bowel regimen is okay,  ensure pain is okay     Activity    Your activity upon discharge: ambulate in house using walker.    Home phys therapy     Walker Order for DME - ONLY FOR DME     Diet    Follow this diet upon discharge: Regular     Diet    Follow this diet upon discharge: Orders Placed This Encounter      Advance Diet as Tolerated: Regular Diet Adult      Diet       Significant Results and Procedures   Most Recent 3 CBC's:  Recent Labs   Lab Test 07/06/24  1248 07/04/24  0813   HGB  --  10.0*   *  --      Most Recent 3 BMP's:  Recent Labs   Lab Test 07/08/24  0732 07/08/24  0237 07/07/24  2154 07/04/24  0823 07/04/24  0813 07/03/24  1827 07/03/24  0639 06/13/24  1257   0000   NA  --   --   --   --  135  --  130*  --   --    POTASSIUM  --   --   --   --  3.9  --   --   --   --    CHLORIDE  --   --   --   --  102  --   --   --   --    CO2  --   --   --   --  27  --   --   --   --    BUN  --   --   --   --  23.6*  --   --   --   --    CR  --   --   --   --  0.57  --   --  0.8  --    ANIONGAP  --   --   --   --  6*  --   --   --   --    GAYATHRI  --   --   --   --  7.8*  --   --   --   --    * 147* 184*   < > 108*   < > 128*  --    < >    < > = values in this interval not displayed.     Most Recent 2 LFT's:No lab results found.,   Results for orders placed or performed during the hospital encounter of 07/03/24   XR Thor/Lumb Standing 2 Views (Scoli)    Narrative    EXAM: XR THORACIC LUMBAR STANDING 2 VIEWS  LOCATION: [facility]  DATE: 7/6/2024    INDICATION: Post op instrumentation  COMPARISON: MRI 6/6/2024, PET CT 6/13/2024      Impression    IMPRESSION: Redemonstrated pathologic compression fracture at L1 with moderate loss of height, posterior stabilization and instrumentation with bipedicle screws from T11 through L3. No evidence of hardware failure. Alignment unchanged  compared to prior.        Discharge Medications   Discharge Medication List as of 7/8/2024 11:34 AM        START taking these medications    Details   acetaminophen (TYLENOL) 325 MG tablet Take 2 tablets (650 mg) by mouth every 4 hours as needed for other (For optimal non-opioid multimodal pain management to improve pain control.), Disp-100 tablet, R-0, E-Prescribe      bisacodyl (DULCOLAX) 10 MG suppository Place 1 suppository (10 mg) rectally daily as needed for constipation (Use if magnesium hydroxide (MILK of MAGNESIA) is not effective after 24 hours. May discontinue if patient having bowel movement.), Disp-5 suppository, R-0, E-Prescribe      Lidocaine (LIDOCARE) 4 % Patch Place 1 patch onto the skin every 24 hours To prevent lidocaine toxicity, patient should be patch free for 12 hrs daily.Disp-30 patch, [id number]      magnesium hydroxide (MILK OF MAGNESIA) 400 MG/5ML suspension Take 30 mLs by mouth daily as needed for constipation (Use if polyethylene glycol (Miralax) is not effective after 24 hours.), Disp-354 mL, R-0, E-Prescribe      methocarbamol (ROBAXIN) 750 MG tablet Take 1 tablet (750 mg) by mouth every 6 hours as needed for muscle spasms, Disp-90 tablet, R-0, E-Prescribe      polyethylene glycol (MIRALAX) 17 GM/Dose powder Take 17 g by mouth 2 times daily (Hold or stop if stools get too loose), Disp-510 g, R-1, E-Prescribe      senna-docusate (SENOKOT-S/PERICOLACE) 8.6-50 MG tablet Take 1 tablet by mouth 2 times daily -- for bowels.    Hold if the stools start to get too loose, Disp-60 tablet, R-0, E-Prescribe           CONTINUE these medications which have CHANGED    Details   oxyCODONE (ROXICODONE) 5 MG tablet Take 2 tablets (10 mg) by mouth every 4 hours as needed for moderate pain, Disp-90 tablet, R-0, E-PrescribeCANCER pain.   Quantity changed.  Thank you           CONTINUE these medications which have NOT CHANGED    Details   bisacodyl (DULCOLAX) 5 MG EC tablet Take 5 mg by mouth daily as  needed (Gentle laxative), Historical      HYDROmorphone (DILAUDID) 2 MG tablet Take 2 mg by mouth 2 times daily, Historical      letrozole (FEMARA) 2.5 MG tablet Take 1 tablet (2.5 mg) by mouth daily, Disp-90 tablet, R-1, E-Prescribe           STOP taking these medications       dexAMETHasone (DECADRON) 2 MG tablet Comments:   Reason for Stopping:             Allergies   Allergies   Allergen Reactions    Codeine Itching

## 2024-07-08 NOTE — PLAN OF CARE
Physical Therapy Discharge Summary    Reason for therapy discharge:    All goals and outcomes met, no further needs identified.    Progress towards therapy goal(s). See goals on Care Plan in [facility] electronic health record for goal details.  Goals met    Therapy recommendation(s):    Continued therapy is recommended.  Rationale/Recommendations:  Pt remains below baseline. A x 1 bed mobility, SBA gait with FWW. Will issue FWW for DC. Recommend HHPT at DC to improve IND wiht mobility as pt below baseline at this time.

## 2024-07-08 NOTE — PLAN OF CARE
Goal Outcome Evaluation:    Overall Patient Progress: improvingOverall Patient Progress: improving      Patient POD# 4 from T11-L3 Posterior Fusion, A&OX4, A1 GB/W,  PIV SL, BG ACHS,  Dressing to the back CDI,pain managed with PRN Oxy with adequate relief, family at bedside, discharge pending, safe deposition plan.

## 2024-07-08 NOTE — PROGRESS NOTES
Discharge instructions reviewed with the patient. Spinal precautions reviewed.Pain management reviewed. Patient and family member are able to teach back pain management,  Home safety tips reviewed. All personal belongings accounted for.

## 2024-07-10 ENCOUNTER — PATIENT OUTREACH (OUTPATIENT)
Dept: CARE COORDINATION | Facility: CLINIC | Age: 69
End: 2024-07-10
Payer: COMMERCIAL

## 2024-07-10 NOTE — PROGRESS NOTES
Connected Care Resource Center:   [facility] Resource Center Contact  [facility]/Voicemail     Clinical Data: Post-Discharge Outreach     Outreach attempted x 2.  left message on patient's voicemail, providing [facility]'s central phone number of [phone] ([phone]) for questions/concerns and/or to schedule an appt with an [facility] provider, if they do not have a PCP.      Plan:  [facility] will do no further outreaches at this time.       KERWIN Ann  Connected Care Resource [place], [facility]    *Connected Care Resource Team does NOT follow patient ongoing. Referrals are identified based on internal discharge reports and the outreach is to ensure patient has an understanding of their discharge instructions.

## 2024-07-14 ENCOUNTER — HEALTH MAINTENANCE LETTER (OUTPATIENT)
Age: 69
End: 2024-07-14

## 2024-07-15 ENCOUNTER — ALLIED HEALTH/NURSE VISIT (OUTPATIENT)
Dept: NEUROSURGERY | Facility: CLINIC | Age: 69
End: 2024-07-15
Payer: COMMERCIAL

## 2024-07-15 VITALS — DIASTOLIC BLOOD PRESSURE: 80 MMHG | SYSTOLIC BLOOD PRESSURE: 124 MMHG | HEART RATE: 76 BPM | RESPIRATION RATE: 18 BRPM

## 2024-07-15 DIAGNOSIS — C79.51 METASTASIS TO SPINAL COLUMN (H): Primary | ICD-10-CM

## 2024-07-15 PROCEDURE — 99024 POSTOP FOLLOW-UP VISIT: CPT

## 2024-07-15 RX ORDER — METHYLPREDNISOLONE 4 MG
TABLET, DOSE PACK ORAL
Qty: 21 TABLET | Refills: 0 | Status: SHIPPED | OUTPATIENT
Start: 2024-07-15 | End: 2024-08-14

## 2024-07-15 RX ORDER — METHOCARBAMOL 750 MG/1
750 TABLET, FILM COATED ORAL 4 TIMES DAILY PRN
Qty: 40 TABLET | Refills: 0 | Status: SHIPPED | OUTPATIENT
Start: 2024-07-15

## 2024-07-15 NOTE — PROGRESS NOTES
Adriana St is status post T12-L1, L1-2  bilateral laminectomies and medial facetectomies for decompression of spinal cord and left transpedicular separation of the tumor from the thecal sac;  T11-L3 posterior segmental instrumentation with insertion of bilateral pedicle screws and rods; Right L1 transpedicular placement of Osteocool device for radiofrequency ablation of L1 vertebral body tumor; T11-L3 posterolateral arthrodesis and fusion on 07/03/2024 with Dr. Prieto.   Preoperatively presented with mechanical low back pain associated with contrast-enhancing tumor, likely related to ductal carcinoma,  involving her L1 vertebra causing pathological fracture with more than 50% height loss.    Today she returns in follow up for wound check. She is accompanied by her son and sister. She is doing reasonably well - reports much improved back pain. Endorses intermittent pain in right buttock and lateral thigh. Denies numbness or tingling in LE. Notes improved strength in her legs. Uses a walker for safety, gait and balance are stable.     POC discussed with Dr. Prieto who saw Adriana during her appt in clinic today.    Plan: MDP and Robaxin 750 mg.  Follow up with oncology/rad oncology as scheduled.      Surgical wound WNL - CDI, no signs of infection or skin breakdown.  Incision well-healed: good skin approximation, no redness or visible/palpable edema, no tenderness to palpation.  PT. AF, denies fever, chills or sweats.  Pt. reports that the symptoms are improved from pre-op.    Laura Azar, RN, CNRN

## 2024-07-16 ENCOUNTER — TELEPHONE (OUTPATIENT)
Dept: RADIATION ONCOLOGY | Facility: CLINIC | Age: 69
End: 2024-07-16
Payer: COMMERCIAL

## 2024-07-16 NOTE — TELEPHONE ENCOUNTER
Rush called at this time. He and pt saw Dr. Prieto yesterday who prescribed a Medrol DosePak for new R buttock pain radiating around to the front and down to the R knee. They started the steroid this morning and also upped her Oxycodone from 5mg to 10mg which is within prescribed limits.     Rush wanted me to send this msg to Dr. Alexander to make sure he didn't want to do any diagnostic scans along with the simulation upcoming next week. I told him I would pass along the msg.     I encouraged him to keep up with those medications as prescribed. Pt's also using moist warm packs which help.     Brooklyn Xiong, RN  Radiation Oncology

## 2024-07-18 ENCOUNTER — OFFICE VISIT (OUTPATIENT)
Dept: RADIATION ONCOLOGY | Facility: HOSPITAL | Age: 69
End: 2024-07-18
Attending: RADIOLOGY
Payer: COMMERCIAL

## 2024-07-18 VITALS
OXYGEN SATURATION: 98 % | TEMPERATURE: 98.5 F | SYSTOLIC BLOOD PRESSURE: 172 MMHG | HEART RATE: 104 BPM | RESPIRATION RATE: 20 BRPM | DIASTOLIC BLOOD PRESSURE: 103 MMHG

## 2024-07-18 DIAGNOSIS — C79.51 CARCINOMA OF RIGHT BREAST METASTATIC TO BONE (H): Primary | ICD-10-CM

## 2024-07-18 DIAGNOSIS — C50.911 CARCINOMA OF RIGHT BREAST METASTATIC TO BONE (H): Primary | ICD-10-CM

## 2024-07-18 PROCEDURE — 99214 OFFICE O/P EST MOD 30 MIN: CPT | Mod: 25 | Performed by: RADIOLOGY

## 2024-07-18 PROCEDURE — G0463 HOSPITAL OUTPT CLINIC VISIT: HCPCS | Mod: 25 | Performed by: RADIOLOGY

## 2024-07-18 PROCEDURE — 77290 THER RAD SIMULAJ FIELD CPLX: CPT | Performed by: RADIOLOGY

## 2024-07-18 PROCEDURE — 77334 RADIATION TREATMENT AID(S): CPT | Performed by: RADIOLOGY

## 2024-07-18 PROCEDURE — 77334 RADIATION TREATMENT AID(S): CPT | Mod: 26 | Performed by: RADIOLOGY

## 2024-07-18 PROCEDURE — 77290 THER RAD SIMULAJ FIELD CPLX: CPT | Mod: 26 | Performed by: RADIOLOGY

## 2024-07-18 ASSESSMENT — PAIN SCALES - GENERAL: PAINLEVEL: MILD PAIN (2)

## 2024-07-18 NOTE — PROGRESS NOTES
[facility] Radiation Oncology Follow Up     Patient: Adriana St  MRN: [id number]  Date of Service: 07/18/2024         Ms. 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  The patient presented with a small lump in her right breast February 2024. It has grown fairly quickly.  She noted back pain about 6 weeks ago which limit her mobility significantly.  She stopped working because of the back pain (10/10) and unable to walk very well.  She however does not have any tingling, numbness in lower extremities or radiculopathy.  She does not have sensory changes in the perineum.  She does not have bowel or bladder incontinence.  She currently takes hydromorphone 4 mg every 5 hours for back pain.      She subsequently did workup further breast mass and biopsy was done on 5/30/2024 which showed invasive ductal carcinoma, grade 3, ER positive (95%, strong), RI positive (15%, strong), HER2 negative (1+ by IHC). She presented to the emergency room with worsening back pain on 6/6/2024.  MRI lumbar spine with and without contrast showed pathologic L1 burst fracture with > 50% vertebral body height loss.  Additional scattered osseous metastasis were seen as well.  The patient had a staging PET CT scan on 6/13/2024 which showed right breast cancer with right subpectoral kaylene, pulmonary, and osseous metastatic disease. Moderate compression deformity of the L1 vertebral body with associated uptake, probably pathologic, with about 7 mm retropulsion into the spinal canal, as noted on recent MRI.  MRI brain on 6/14/2024 showed no evidence of brain metastasis.  Patient was initially seen and evaluated on 6/12/2024 for palliative radiation therapy.  Patient then proceeded with surgery with decompression of spinal cord metastasis on 7/3/2024.  The pathology from L1 spine showed positive for malignancy compatible with breast primary.   Patient has been recovering well since then.  Her pain has been significantly improved.  She is here today for evaluation and consideration of possible postop radiation therapy.     CHEMOTHERAPY HISTORY: Concurrent Chemotherapy: No     RADIATION THERAPY HISTORY: Prior Radiation: No     IMPLANTED CARDIAC DEVICE: none      PREGNANCY: The patient is informed not to be pregnant during the radiation therapy.  She is post menopausal.    Medications were reviewed and are up to date on EPIC.    The following portions of the patient's history were reviewed and updated as appropriate: allergies, current medications, past family history, past medical history, past social history, past surgical history and problem list.    Review of Systems:      General  Constitutional  Constitutional (WDL): Exceptions to WDL  Fatigue: Fatigue relieved by rest  EENT  Eye Disorders  Eye Disorder (WDL): Assessment not pertinent to visit  Ear Disorders  Ear Disorder (WDL): Assessment not pertinent to visit  Respiratory  Respiratory  Respiratory (WDL): All respiratory elements are within defined limits  Cardiovascular  Cardiovascular  Cardiovascular (WDL): Exceptions to WDL (HTN)  Gastrointestinal  Gastrointestinal  Gastrointestinal (WDL): All gastrointestinal elements are within defined limits  Musculoskeletal  Musculoskeletal and Connective Tissue Disorders  Musculoskeletal & Connective (WDL): Exceptions to WDL  Arthralgia: Mild pain  Bone Pain: Mild pain  Generalized Muscle Weakness: Symptomatic OR perceived by patient but not evident on physical exam  Integumentary  Integumentary  Integumentary (WDL): All integumentary elements are within defined limits  Neurological  Neurosensory  Neurosensory (WDL): Exceptions to WDL  Peripheral Motor Neuropathy: Asymptomatic OR clinical or diagnostic observations only  Ataxia: Asymptomatic OR clinical or diagnostic observations only OR intervention not  indicated  Genitourinary/Reproductive  Genitourinary  Genitourinary (WDL): All genitourinary elements are within defined limits  Lymphatic  Lymph System Disorders  Lymph (WDL): All lymph elements are within defined limits  Pain  Pain Score: Mild Pain (2)  AUA Assessment                                                              Accompanied by  Accompanied By: family (son and sister)    Objective:      PHYSICAL EXAMINATION:    BP (!) 172/103   Pulse 104   Temp 98.5  F (36.9  C)   Resp 20   SpO2 98%     Gen: Alert, in NAD  Eyes: PERRL, EOMI, sclera anicteric  Pulm: No wheezing, stridor or respiratory distress  CV: Well-perfused, no cyanosis, no pedal edema  Back: No step-offs or pain to palpation along the thoracolumbar spine  Rectal: Deferred  : Deferred  Musculoskeletal: Normal muscle bulk and tone  Skin: Normal color and turgor  Neurologic: A/Ox3, CN II-XII intact, normal gait and station  Psychiatric: Appropriate mood and affect     Imaging: Reviewed     Impression     68 year old female with a diagnosis of stage IV breast cancer with clinically symptomatic lumbar spine metastasis, status post surgery.     Assessment & Plan:     I have personally reviewed her upcoming medical record today.  I have also reviewed her most recent radiology study including MRI scan.  Patient is a 68-year-old female with a new diagnosis of stage IV breast cancer with clinically symptomatic L-spine metastasis, status post surgery.  The possible treatment options including surgery, systemic therapy, and radiation therapy has been discussed with the patient in detail and at great lengths.  The possible risks and side effects of radiation therapy has also been explained to the patient.  Questions are answered to patient's side affection.  I agree the patient is a good candidate and indicated for postop radiation therapy for her lumbar spine metastasis.  The radiation therapy is offered to the patient and she is willing to proceed  being aware of potential risks and side effects involved.  Patient is scheduled return to radiation oncology [place] today for simulation.  I plan to give her radiation therapy to a total dose of 3000 cGy in 10 treatments targeted to the T12-L2 spine with margin.      Pain Management Plan: NA    I spent approximately 30 minutes today with the patient and 80% time was used for counseling.               Laura Alexander MD  Department of Radiation Oncology   [facility] Radiation Oncology  Tel: [phone]  Page: [phone]    [facility]  [address] [place], [place] [id number] [place] [address]   [place] [address]    CC:  Patient Care Team:  Gilmer Walters as PCP - General  Annmarie Nash, RN as Specialty Care Coordinator (Hematology & Oncology)  Laura Alexander MD as MD (Radiation Oncology)  Thomas Prieto MD as MD (Neurological Surgery)  Magaly Bender MD as MD (Hematology)  Magaly Bender MD as Assigned Cancer Care Provider  Jyoti Magana DO as Assigned Surgical Provider  Thomas Prieto MD as Assigned Neuroscience Provider

## 2024-07-18 NOTE — PROCEDURES
Clinical Treatment Planning Note    This is a 58-year-old female with diagnosis of Stage IV esophageal cancer with clinically symptomatic L-spine metastases, status post surgery.  The palliative radiation therapy is recommended to the patient. The patient is simulated today in the supine position with head rest and under knee cushion to help keep the same position during the daily radiation therapy. 2-3 field will be used to set up radiation therapy fields to include the T12-L1 2 spine with margin. I plan to give her radiation therapy at 300 cGy each fraction to a total of 3000 cGy in 10 treatments.        Laura Alexander MD, PhD  Department of Radiation Oncology   [facility] Radiation Oncology  Cell: [phone]    [facility]  [address] [place] [address]     [place] [id number]  [place] [address]

## 2024-07-18 NOTE — PROCEDURES
SIMULATION NOTE:    DIAGNOSIS:  68 year old female with a diagnosis of stage IV breast cancer with clinically symptomatic lumbar spine metastasis, status post surgery.      INDICATION:  Palliative radiation therapy is recommended for patient for local control and symptom relief.      CONSENT:  The possible risks and side effects of radiation therapy have been discussed with the patient in detail and at great length.  Questions were answered to patient's satisfaction.  Written consent was obtained.      SIMULATION:  The patient is in the supine position with a headrest and under knee cushion to help keep same position during daily radiation therapy.  Tentative isocenter is set up in the center of the pelvic region.  We will acquire CT information to help us better locate the target and design the radiation therapy field.      BLOCKS:  Custom blocks will be drawn to minimize radiation to normal tissues and to protect normal organs, including, but not limited to, urinary bladder, kidneys, bowels, spinal cord, bone and soft tissue.      DOSAGE:  I plan to give her radiation therapy at 300 cGy each fraction to a total of 3000 cGy in 10 treatments targeted to the T12-L2 spine region only.      Laura Alexander MD, PhD  Department of Radiation Oncology   [facility] Radiation Oncology  Cell: [phone]    [facility]  [address] [place] [address]     [address] [place] [address]

## 2024-07-18 NOTE — PROGRESS NOTES
"Oncology Rooming Note    July 18, 2024 2:03 PM   Adriana St is a 68 year old female who presents for:    Chief Complaint   Patient presents with    Oncology Clinic Visit     Rad Onc follow up and consent     Initial Vitals: BP (!) 172/103   Pulse 104   Temp 98.5  F (36.9  C)   Resp 20   SpO2 98%  Estimated body mass index is 28.34 kg/m  as calculated from the following:    Height as of 7/3/24: 1.6 m (5' 3\").    Weight as of 7/3/24: 72.6 kg (160 lb). There is no height or weight on file to calculate BSA.  Mild Pain (2) Comment: Data Unavailable   No LMP recorded. Patient is postmenopausal.  Allergies reviewed: No  Medications reviewed: No    Medications: Medication refills not needed today.  Pharmacy name entered into [employer]: [id number] - [place], [place] - [address]    Frailty Screening:   Is the patient here for a new oncology consult visit in cancer care? 2. No      Clinical concerns: Pain much improved on Medrol DosePak, taking 5mg oxycodone PRN. Pain 2/10 at this time, ambulating well with walker.   Dr. Alexander was notified.      Brooklyn Xiong RN              "

## 2024-07-18 NOTE — LETTER
7/18/2024      Adriana St  [address] [facility] [address]      Dear Colleague,    Thank you for referring your patient, Adriana St, to the [facility] RADIATION ONCOLOGY [place]. Please see a copy of my visit note below.    [facility] Radiation Oncology Follow Up     Patient: Adriana St  MRN: [id number]  Date of Service: 07/18/2024         Ms. 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  The patient presented with a small lump in her right breast February 2024. It has grown fairly quickly.  She noted back pain about 6 weeks ago which limit her mobility significantly.  She stopped working because of the back pain (10/10) and unable to walk very well.  She however does not have any tingling, numbness in lower extremities or radiculopathy.  She does not have sensory changes in the perineum.  She does not have bowel or bladder incontinence.  She currently takes hydromorphone 4 mg every 5 hours for back pain.      She subsequently did workup further breast mass and biopsy was done on 5/30/2024 which showed invasive ductal carcinoma, grade 3, ER positive (95%, strong), CT positive (15%, strong), HER2 negative (1+ by IHC). She presented to the emergency room with worsening back pain on 6/6/2024.  MRI lumbar spine with and without contrast showed pathologic L1 burst fracture with > 50% vertebral body height loss.  Additional scattered osseous metastasis were seen as well.  The patient had a staging PET CT scan on 6/13/2024 which showed right breast cancer with right subpectoral kaylene, pulmonary, and osseous metastatic disease. Moderate compression deformity of the L1 vertebral body with associated uptake, probably pathologic, with about 7 mm retropulsion into the spinal canal, as noted on recent MRI.  MRI brain on 6/14/2024 showed no evidence of brain metastasis.   Patient was initially seen and evaluated on 6/12/2024 for palliative radiation therapy.  Patient then proceeded with surgery with decompression of spinal cord metastasis on 7/3/2024.  The pathology from L1 spine showed positive for malignancy compatible with breast primary.  Patient has been recovering well since then.  Her pain has been significantly improved.  She is here today for evaluation and consideration of possible postop radiation therapy.     CHEMOTHERAPY HISTORY: Concurrent Chemotherapy: No     RADIATION THERAPY HISTORY: Prior Radiation: No     IMPLANTED CARDIAC DEVICE: none      PREGNANCY: The patient is informed not to be pregnant during the radiation therapy.  She is post menopausal.    Medications were reviewed and are up to date on EPIC.    The following portions of the patient's history were reviewed and updated as appropriate: allergies, current medications, past family history, past medical history, past social history, past surgical history and problem list.    Review of Systems:      General  Constitutional  Constitutional (WDL): Exceptions to WDL  Fatigue: Fatigue relieved by rest  EENT  Eye Disorders  Eye Disorder (WDL): Assessment not pertinent to visit  Ear Disorders  Ear Disorder (WDL): Assessment not pertinent to visit  Respiratory  Respiratory  Respiratory (WDL): All respiratory elements are within defined limits  Cardiovascular  Cardiovascular  Cardiovascular (WDL): Exceptions to WDL (HTN)  Gastrointestinal  Gastrointestinal  Gastrointestinal (WDL): All gastrointestinal elements are within defined limits  Musculoskeletal  Musculoskeletal and Connective Tissue Disorders  Musculoskeletal & Connective (WDL): Exceptions to WDL  Arthralgia: Mild pain  Bone Pain: Mild pain  Generalized Muscle Weakness: Symptomatic OR perceived by patient but not evident on physical exam  Integumentary  Integumentary  Integumentary (WDL): All integumentary elements are within defined  limits  Neurological  Neurosensory  Neurosensory (WDL): Exceptions to WDL  Peripheral Motor Neuropathy: Asymptomatic OR clinical or diagnostic observations only  Ataxia: Asymptomatic OR clinical or diagnostic observations only OR intervention not indicated  Genitourinary/Reproductive  Genitourinary  Genitourinary (WDL): All genitourinary elements are within defined limits  Lymphatic  Lymph System Disorders  Lymph (WDL): All lymph elements are within defined limits  Pain  Pain Score: Mild Pain (2)  AUA Assessment                                                              Accompanied by  Accompanied By: family (son and sister)    Objective:      PHYSICAL EXAMINATION:    BP (!) 172/103   Pulse 104   Temp 98.5  F (36.9  C)   Resp 20   SpO2 98%     Gen: Alert, in NAD  Eyes: PERRL, EOMI, sclera anicteric  Pulm: No wheezing, stridor or respiratory distress  CV: Well-perfused, no cyanosis, no pedal edema  Back: No step-offs or pain to palpation along the thoracolumbar spine  Rectal: Deferred  : Deferred  Musculoskeletal: Normal muscle bulk and tone  Skin: Normal color and turgor  Neurologic: A/Ox3, CN II-XII intact, normal gait and station  Psychiatric: Appropriate mood and affect     Imaging: Reviewed     Impression     68 year old female with a diagnosis of stage IV breast cancer with clinically symptomatic lumbar spine metastasis, status post surgery.     Assessment & Plan:     I have personally reviewed her upcoming medical record today.  I have also reviewed her most recent radiology study including MRI scan.  Patient is a 68-year-old female with a new diagnosis of stage IV breast cancer with clinically symptomatic L-spine metastasis, status post surgery.  The possible treatment options including surgery, systemic therapy, and radiation therapy has been discussed with the patient in detail and at great lengths.  The possible risks and side effects of radiation therapy has also been explained to the patient.   "Questions are answered to patient's side affection.  I agree the patient is a good candidate and indicated for postop radiation therapy for her lumbar spine metastasis.  The radiation therapy is offered to the patient and she is willing to proceed being aware of potential risks and side effects involved.  Patient is scheduled return to radiation oncology [place] today for simulation.  I plan to give her radiation therapy to a total dose of 3000 cGy in 10 treatments targeted to the T12-L2 spine with margin.      Pain Management Plan: NA    I spent approximately 30 minutes today with the patient and 80% time was used for counseling.               Laura Alexander MD  Department of Radiation Oncology   [facility] Radiation Oncology  Tel: [phone]  Page: [phone]    [facility]  [address] [place] [address]     [address] [place] [address]    CC:  Patient Care Team:  Gilmer Waltesr as PCP - General  Annmarie Nash RN as Specialty Care Coordinator (Hematology & Oncology)  Laura Alexander MD as MD (Radiation Oncology)  Thomas Prieto MD as MD (Neurological Surgery)  Magaly Bender MD as MD (Hematology)  Magaly Bender MD as Assigned Cancer Care Provider  Jyoti Magana DO as Assigned Surgical Provider  Thomas Prieto MD as Assigned Neuroscience Provider      Oncology Rooming Note    July 18, 2024 2:03 PM   Adriana St is a 68 year old female who presents for:    Chief Complaint   Patient presents with     Oncology Clinic Visit     Rad Onc follow up and consent     Initial Vitals: BP (!) 172/103   Pulse 104   Temp 98.5  F (36.9  C)   Resp 20   SpO2 98%  Estimated body mass index is 28.34 kg/m  as calculated from the following:    Height as of 7/3/24: 1.6 m (5' 3\").    Weight as of 7/3/24: 72.6 kg (160 lb). There is no height or weight on file to calculate BSA.  Mild Pain (2) Comment: Data Unavailable   No LMP recorded. Patient is " postmenopausal.  Allergies reviewed: No  Medications reviewed: No    Medications: Medication refills not needed today.  Pharmacy name entered into [employer]: [id number] - [place], [place] - [address] AT [facility] [address] [facility]    Frailty Screening:   Is the patient here for a new oncology consult visit in cancer care? 2. No      Clinical concerns: Pain much improved on Medrol DosePak, taking 5mg oxycodone PRN. Pain 2/10 at this time, ambulating well with walker.   Dr. Alexander was notified.      Brooklyn Xiong RN                Again, thank you for allowing me to participate in the care of your patient.        Sincerely,        Laura Alexander MD

## 2024-07-22 ENCOUNTER — APPOINTMENT (OUTPATIENT)
Dept: RADIATION ONCOLOGY | Facility: CLINIC | Age: 69
End: 2024-07-22
Attending: RADIOLOGY
Payer: COMMERCIAL

## 2024-07-22 PROCEDURE — 77300 RADIATION THERAPY DOSE PLAN: CPT | Performed by: RADIOLOGY

## 2024-07-22 PROCEDURE — 77295 3-D RADIOTHERAPY PLAN: CPT | Mod: 26 | Performed by: RADIOLOGY

## 2024-07-22 PROCEDURE — 77334 RADIATION TREATMENT AID(S): CPT | Performed by: RADIOLOGY

## 2024-07-22 PROCEDURE — 77300 RADIATION THERAPY DOSE PLAN: CPT | Mod: 26 | Performed by: RADIOLOGY

## 2024-07-22 PROCEDURE — 77295 3-D RADIOTHERAPY PLAN: CPT | Performed by: RADIOLOGY

## 2024-07-22 PROCEDURE — 77334 RADIATION TREATMENT AID(S): CPT | Mod: 26 | Performed by: RADIOLOGY

## 2024-07-24 ENCOUNTER — TELEPHONE (OUTPATIENT)
Dept: RADIATION ONCOLOGY | Facility: CLINIC | Age: 69
End: 2024-07-24

## 2024-07-24 ENCOUNTER — OFFICE VISIT (OUTPATIENT)
Dept: NEUROSURGERY | Facility: CLINIC | Age: 69
End: 2024-07-24
Payer: COMMERCIAL

## 2024-07-24 DIAGNOSIS — T81.49XA INCISIONAL INFECTION: Primary | ICD-10-CM

## 2024-07-24 PROCEDURE — 99024 POSTOP FOLLOW-UP VISIT: CPT | Performed by: NEUROLOGICAL SURGERY

## 2024-07-24 RX ORDER — CEPHALEXIN 500 MG/1
500 CAPSULE ORAL 2 TIMES DAILY
Qty: 28 CAPSULE | Refills: 0 | Status: SHIPPED | OUTPATIENT
Start: 2024-07-24 | End: 2024-08-14

## 2024-07-24 NOTE — LETTER
7/24/2024      Adriana St  [address] [facility] [address]      Dear Colleague,    Thank you for referring your patient, Adriana St, to the [facility] NEUROLOGY CLINICS [facility]. Please see a copy of my visit note below.    NEUROSURGERY CLINIC PROGRESS NOTE    DATE OF VISIT: 7/24/2024    HPI:     Adriana St is a pleasant 68 year old female who presents to the clinic today for a post op wound check.     THORACIC 11 TO LUMBAR 3 POSTEROLATERAL FUSION USING ALLOGRAFT,LUMBAR 1 BILATERAL LAMINECTOMY AND MEDIAL FACETECTOMY AND SEPARATION SURGERY, USE OF OSTEOCOOL DEVICE.     DOS 7/3/2024  MD: Dr. Prieto    [facility]: ER positive [place] positive HER2/reji negative invasive ductal carcinoma     Patient notes drainage started mid-inferior aspect of incision about 2-3 days ago and has increased. Son had pictures of bedding with serosanguinous drainage on sheets. There is no ongoing drainage to palpation. Denies fevers, chills, night sweats. Radiation started yesterday and plan for total of 10 treatments next one scheduled tomorrow. Has been participating in light duties, tolerating well.     Current Outpatient Medications   Medication Sig Dispense Refill     cephALEXin (KEFLEX) 500 MG capsule Take 1 capsule (500 mg) by mouth 2 times daily 28 capsule 0     acetaminophen (TYLENOL) 325 MG tablet Take 2 tablets (650 mg) by mouth every 4 hours as needed for other (For optimal non-opioid multimodal pain management to improve pain control.) 100 tablet 0     bisacodyl (DULCOLAX) 10 MG suppository Place 1 suppository (10 mg) rectally daily as needed for constipation (Use if magnesium hydroxide (MILK of MAGNESIA) is not effective after 24 hours. May discontinue if patient having bowel movement.) 5 suppository 0     bisacodyl (DULCOLAX) 5 MG EC tablet Take 5 mg by mouth daily as needed (Gentle laxative)       HYDROmorphone (DILAUDID) 2 MG tablet Take 2 mg by mouth 2 times daily        letrozole (FEMARA) 2.5 MG tablet Take 1 tablet (2.5 mg) by mouth daily 90 tablet 1     Lidocaine (LIDOCARE) 4 % Patch Place 1 patch onto the skin every 24 hours To prevent lidocaine toxicity, patient should be patch free for 12 hrs daily. 30 patch 0     magnesium hydroxide (MILK OF MAGNESIA) 400 MG/5ML suspension Take 30 mLs by mouth daily as needed for constipation (Use if polyethylene glycol (Miralax) is not effective after 24 hours.) 354 mL 0     methocarbamol (ROBAXIN) 750 MG tablet Take 1 tablet (750 mg) by mouth 4 times daily as needed for muscle spasms 40 tablet 0     methocarbamol (ROBAXIN) 750 MG tablet Take 1 tablet (750 mg) by mouth every 6 hours as needed for muscle spasms 90 tablet 0     methylPREDNISolone (MEDROL DOSEPAK) 4 MG tablet therapy pack Follow Package Directions 21 tablet 0     oxyCODONE (ROXICODONE) 5 MG tablet Take 2 tablets (10 mg) by mouth every 4 hours as needed for moderate pain 90 tablet 0     polyethylene glycol (MIRALAX) 17 GM/Dose powder Take 17 g by mouth 2 times daily (Hold or stop if stools get too loose) 510 g 1     senna-docusate (SENOKOT-S/PERICOLACE) 8.6-50 MG tablet Take 1 tablet by mouth 2 times daily -- for bowels.    Hold if the stools start to get too loose 60 tablet 0     No current facility-administered medications for this visit.       Allergies   Allergen Reactions     Codeine Itching       Past Medical History:   Diagnosis Date     Malignant neoplasm metastatic to bone (H)      Malignant neoplasm of central portion of right breast in female, estrogen receptor positive (H)      Pathologic lumbar vertebral fracture      PONV (postoperative nausea and vomiting)        Review Of Systems     ROS: 10 point ROS neg other than the symptoms noted above in the HPI.      OBJECTIVE:    There were no vitals taken for this visit.    Exam:    Thoracolumbar incision inspected. Superior aspect is healing well with scabbing. Mid to lower healing well- palpation appreciate small  amount of serosang output, no ongoing drainage noted. Slight erythema surrounding skin edges. No appreciable fluid collection or ongoing dehiscence.     ASSESSMENT:    1. Incisional infection      Adriana St is a pleasant 68 year old female who presents to the clinic today for a post op wound check.     THORACIC 11 TO LUMBAR 3 POSTEROLATERAL FUSION USING ALLOGRAFT,LUMBAR 1 BILATERAL LAMINECTOMY AND MEDIAL FACETECTOMY AND SEPARATION SURGERY, USE OF OSTEOCOOL DEVICE.     DOS 7/3/2024  MD: Dr. Prieto    [facility]: ER positive [place] positive HER2/reji negative invasive ductal carcinoma     Patient notes drainage started mid-inferior aspect of incision about 2-3 days ago and has increased. Son had pictures of bedding with serosanguinous drainage on sheets. There is no ongoing drainage to palpation. Denies fevers, chills, night sweats. Radiation started yesterday and plan for total of 10 treatments next one scheduled tomorrow.     PLAN:  -clean wound with chloroprep daily  -change dressing daily and monitor output  -keflex x 14 days sent to pharmacy  -light activity: Please limit your lifting to no more that ten pounds and avoid excessive bending, twisting and turning at the lumbar spine. You should also avoid excessive jostling and jarring activities.   -recommend HOLD on radiation until next week incision check- updated patient of this. Dr. Prieto in agreement  -RN to check in via phone call Friday afternoon   -follow up in 1 week to reassess incision with DR. Prieto, this has been scheduled  -red flag s/s reviewed and advised to contact our office sooner should these occur    Patient and son in agreement with above stated plans  Dr. Prieto has reviewed history, imaging of incision site and in agreement with plans     Jaquelin Dale PA-C  [facility] Neurosurgery  [address] [place] [address]  Tel [phone]  Fax [phone]  Text page via [facility] Paging/Directory          Photos of  incision 7/24/24                Again, thank you for allowing me to participate in the care of your patient.        Sincerely,        Thomas Prieto MD

## 2024-07-24 NOTE — PROGRESS NOTES
NEUROSURGERY CLINIC PROGRESS NOTE    DATE OF VISIT: 7/24/2024    HPI:     Adriana St is a pleasant 68 year old female who presents to the clinic today for a post op wound check.     THORACIC 11 TO LUMBAR 3 POSTEROLATERAL FUSION USING ALLOGRAFT,LUMBAR 1 BILATERAL LAMINECTOMY AND MEDIAL FACETECTOMY AND SEPARATION SURGERY, USE OF OSTEOCOOL DEVICE.     DOS 7/3/2024  MD: Dr. Prieto    [facility]: ER positive [place] positive HER2/reji negative invasive ductal carcinoma     Patient notes drainage started mid-inferior aspect of incision about 2-3 days ago and has increased. Son had pictures of bedding with serosanguinous drainage on sheets. There is no ongoing drainage to palpation. Denies fevers, chills, night sweats. Radiation started yesterday and plan for total of 10 treatments next one scheduled tomorrow. Has been participating in light duties, tolerating well.     Current Outpatient Medications   Medication Sig Dispense Refill    cephALEXin (KEFLEX) 500 MG capsule Take 1 capsule (500 mg) by mouth 2 times daily 28 capsule 0    acetaminophen (TYLENOL) 325 MG tablet Take 2 tablets (650 mg) by mouth every 4 hours as needed for other (For optimal non-opioid multimodal pain management to improve pain control.) 100 tablet 0    bisacodyl (DULCOLAX) 10 MG suppository Place 1 suppository (10 mg) rectally daily as needed for constipation (Use if magnesium hydroxide (MILK of MAGNESIA) is not effective after 24 hours. May discontinue if patient having bowel movement.) 5 suppository 0    bisacodyl (DULCOLAX) 5 MG EC tablet Take 5 mg by mouth daily as needed (Gentle laxative)      HYDROmorphone (DILAUDID) 2 MG tablet Take 2 mg by mouth 2 times daily      letrozole (FEMARA) 2.5 MG tablet Take 1 tablet (2.5 mg) by mouth daily 90 tablet 1    Lidocaine (LIDOCARE) 4 % Patch Place 1 patch onto the skin every 24 hours To prevent lidocaine toxicity, patient should be patch free for 12 hrs daily. 30 patch 0    magnesium hydroxide (MILK OF  MAGNESIA) 400 MG/5ML suspension Take 30 mLs by mouth daily as needed for constipation (Use if polyethylene glycol (Miralax) is not effective after 24 hours.) 354 mL 0    methocarbamol (ROBAXIN) 750 MG tablet Take 1 tablet (750 mg) by mouth 4 times daily as needed for muscle spasms 40 tablet 0    methocarbamol (ROBAXIN) 750 MG tablet Take 1 tablet (750 mg) by mouth every 6 hours as needed for muscle spasms 90 tablet 0    methylPREDNISolone (MEDROL DOSEPAK) 4 MG tablet therapy pack Follow Package Directions 21 tablet 0    oxyCODONE (ROXICODONE) 5 MG tablet Take 2 tablets (10 mg) by mouth every 4 hours as needed for moderate pain 90 tablet 0    polyethylene glycol (MIRALAX) 17 GM/Dose powder Take 17 g by mouth 2 times daily (Hold or stop if stools get too loose) 510 g 1    senna-docusate (SENOKOT-S/PERICOLACE) 8.6-50 MG tablet Take 1 tablet by mouth 2 times daily -- for bowels.    Hold if the stools start to get too loose 60 tablet 0     No current facility-administered medications for this visit.       Allergies   Allergen Reactions    Codeine Itching       Past Medical History:   Diagnosis Date    Malignant neoplasm metastatic to bone (H)     Malignant neoplasm of central portion of right breast in female, estrogen receptor positive (H)     Pathologic lumbar vertebral fracture     PONV (postoperative nausea and vomiting)        Review Of Systems     ROS: 10 point ROS neg other than the symptoms noted above in the HPI.      OBJECTIVE:    There were no vitals taken for this visit.    Exam:    Thoracolumbar incision inspected. Superior aspect is healing well with scabbing. Mid to lower healing well- palpation appreciate small amount of serosang output, no ongoing drainage noted. Slight erythema surrounding skin edges. No appreciable fluid collection or ongoing dehiscence.     ASSESSMENT:    1. Incisional infection      Adriana St is a pleasant 68 year old female who presents to the clinic today for a post  op wound check.     THORACIC 11 TO LUMBAR 3 POSTEROLATERAL FUSION USING ALLOGRAFT,LUMBAR 1 BILATERAL LAMINECTOMY AND MEDIAL FACETECTOMY AND SEPARATION SURGERY, USE OF OSTEOCOOL DEVICE.     DOS 7/3/2024  MD: Dr. Prieto    [facility]: ER positive CT positive HER2/reji negative invasive ductal carcinoma     Patient notes drainage started mid-inferior aspect of incision about 2-3 days ago and has increased. Son had pictures of bedding with serosanguinous drainage on sheets. There is no ongoing drainage to palpation. Denies fevers, chills, night sweats. Radiation started yesterday and plan for total of 10 treatments next one scheduled tomorrow.     PLAN:  -clean wound with chloroprep daily  -change dressing daily and monitor output  -keflex x 14 days sent to pharmacy  -light activity: Please limit your lifting to no more that ten pounds and avoid excessive bending, twisting and turning at the lumbar spine. You should also avoid excessive jostling and jarring activities.   -recommend HOLD on radiation until next week incision check- updated patient of this. Dr. Prieto in agreement  -RN to check in via phone call Friday afternoon   -follow up in 1 week to reassess incision with DR. Prieto, this has been scheduled  -red flag s/s reviewed and advised to contact our office sooner should these occur    Patient and son in agreement with above stated plans  Dr. Prieto has reviewed history, imaging of incision site and in agreement with plans     Jaquelin Dale PA-C  [facility] Neurosurgery  [address] [place] [address]  Tel [phone]  Fax [phone]  Text page via [facility] Paging/Directory

## 2024-07-24 NOTE — TELEPHONE ENCOUNTER
Received voicemail message asking for a call back to the son about patient status.  Called son, Rush, and he states his mom's incision has a small opening on the lower side that looks to be draining possibly pus.  They have placed a call to the neurosurgeon and will be seeing the neurosurgeon later this afternoon.    Per Dr. Alexander we will hold radiation treatments until clearance from neurosurgery.  Notified therapist that today's appointment will be canceled.  Callback number to the radiation treatment room with confidential voicemail given to patient's son to call back and leave message about patient's status once they have seen the neurosurgeon later this afternoon.

## 2024-07-25 ENCOUNTER — MYC MEDICAL ADVICE (OUTPATIENT)
Dept: NEUROSURGERY | Facility: CLINIC | Age: 69
End: 2024-07-25
Payer: COMMERCIAL

## 2024-07-26 ENCOUNTER — TELEPHONE (OUTPATIENT)
Dept: NEUROSURGERY | Facility: CLINIC | Age: 69
End: 2024-07-26
Payer: COMMERCIAL

## 2024-07-26 NOTE — TELEPHONE ENCOUNTER
Jaquelin Nguyen PA-C stated:  Spoke with patient directly    Drainage has decreased  No recent fevers  Redness around where gauze and tape are  No new neurologic symptoms  Is taking abx    Recommendations  Continue daily cleaning chloroprep  Gauze and tape  OK to take off in day time to let incision breath  Monitor drainage and call if any significant increase or fevers    I will check in again Sunday

## 2024-07-26 NOTE — TELEPHONE ENCOUNTER
Patient sent updated incision photo. No fevers today, cleaning incision daily. Drainage continues. Antibiotics started yesterday at 9AM.    Routed update to provider.

## 2024-07-26 NOTE — TELEPHONE ENCOUNTER
Spoke with patient directly     Drainage has decreased  No recent fevers  Redness around where gauze and tape are   No new neurologic symptoms   Is taking abx    Recommendations   Continue daily cleaning chloroprep  Gauze and tape   OK to take off in day time to let incision breath   Monitor drainage and call if any significant increase or fevers    I will check in again Sunday    Updated Dr. Conner Dale, PAYOEL  [facility] Neurosurgery  [address] [place] [address]  Tel [phone]  Fax [phone]  Text page via [facility] Paging/Directory

## 2024-07-28 ENCOUNTER — TELEPHONE (OUTPATIENT)
Dept: NEUROSURGERY | Facility: CLINIC | Age: 69
End: 2024-07-28
Payer: COMMERCIAL

## 2024-07-28 NOTE — TELEPHONE ENCOUNTER
Checked in today     Redness improving  Burning sensation improving   Decreased drainage   Continuing to clean daily and putting gauze and tape on bottom half of incision   On abx     No fevers     Recommend continuing with current plans   Follow up set with Dr. Conner Matthews     Patient in agreement     Updated Dr. Conner Dale, PAYOEL  [facility] Neurosurgery  [address] [place] [id number]  Tel [phone]  Fax [phone]  Text page via [facility] Paging/Directory

## 2024-07-30 ENCOUNTER — OFFICE VISIT (OUTPATIENT)
Dept: NEUROSURGERY | Facility: CLINIC | Age: 69
End: 2024-07-30
Payer: COMMERCIAL

## 2024-07-30 VITALS — OXYGEN SATURATION: 98 % | DIASTOLIC BLOOD PRESSURE: 77 MMHG | SYSTOLIC BLOOD PRESSURE: 111 MMHG | HEART RATE: 102 BPM

## 2024-07-30 DIAGNOSIS — T81.49XA INCISIONAL INFECTION: Primary | ICD-10-CM

## 2024-07-30 PROCEDURE — 99024 POSTOP FOLLOW-UP VISIT: CPT | Performed by: NEUROLOGICAL SURGERY

## 2024-07-30 RX ORDER — CEPHALEXIN 500 MG/1
500 CAPSULE ORAL 2 TIMES DAILY
Qty: 8 CAPSULE | Refills: 0 | Status: SHIPPED | OUTPATIENT
Start: 2024-07-30 | End: 2024-08-14

## 2024-07-30 ASSESSMENT — PAIN SCALES - GENERAL: PAINLEVEL: MILD PAIN (2)

## 2024-07-30 NOTE — LETTER
7/30/2024      Adriana St  [address] [facility] [address]      Dear Colleague,    Thank you for referring your patient, Adriana St, to the [facility] NEUROLOGY CLINICS [facility]. Please see a copy of my visit note below.    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    Unfortunately patient developed, purulent discharge with redness and inflammation last week and was diagnosed to have superficial surgical site infection which was managed with Keflex and daily dressing.    She is in the clinic today for wound recheck and further management.  Since her last visit in clinic, patient is doing well with significant decrease in the volume of discharge and resolution of redness.  She has completed approximately 6 days of Keflex.    In terms of pain, she reports approximately 70% improvement in her preoperative mechanical back pain.  She still has intermittent paraspinal aching pain and spasms.  She denies any new onset weakness involving her bilateral lower extremities or bladder or bowel symptoms.    She was scheduled to start radiation last week, however given her incision issues with superficial surgical site infection, we are holding the radiation at this point.      PHYSICAL EXAM:   Constitutional: /77   Pulse 102   SpO2 98%      Mental Status: A & O in no acute distress.  Affect is appropriate.  Speech is fluent.  Recent and remote memory are intact.  Attention span and concentration are normal.     Motor: No pronator drift of upper extremity.   Normal bulk and tone all muscle groups of upper and lower extremities.       Delt Bi Tri Hand Flex/  Ext Iliopsoas Quadriceps Tibialis  Anterior EHL Gastroc     C5 C6 C7 C8/T1 L2 L3 L4 L5 S1   R 5 5 5 5 5 5 5 5 5   L 5 5 5 5 5 5 5 5 5        Sensory: Sensation intact bilaterally to light touch.      Coordination:   Heel/toe/ gait intact.       Reflexes; supinator, biceps, triceps, knee/ ankle jerk intact.  No peterson's    Local examination: No evidence of obvious purulent discharge from the incision.  Skin edges are well-approximated with minimal slough in between.  No evidence of swelling or significant redness at this point.  Incision was squeezed with no evidence of discharge.    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  Patient is in the clinic today with her sister and son.  She reports approximately 70% improvement in her preoperative pain.  Unfortunately she developed surgical site infection which seems superficial in location last week which was managed conservatively with daily dressings and Keflex.  She has completed day 6 with significant resolution of symptoms.    Local examination incision is healing well with no evidence of purulent discharge or swelling.  There is minimal redness and slough along the incision edges which is healing well.    We will continue conservative management at this point and continue Keflex for another 4 days (total of 2 weeks) and continue cleaning the incision with ChloraPrep and dressing.    I will follow her on 8/9/2024 in the [place] clinic to reassess her incision and plan to start radiation therapy after that.    I also discussed with the patient regarding tapering the dose of her opioids and muscle relaxants.  She can continue Tylenol in the interim.    Patient and her family  agreed with the plan.  All the questions were answered and patient sounded understanding.  They can contact us if there are any further questions or concerns or worsening neurological deficits.I spent more than 20 minutes in this apt, examining the pt, reviewing the scans, reviewing notes from chart, discussing treatment options with risks and benefits and coordinating care. This note was created in part by the use of Dragon voice recognition system. Inadvertent grammatical errors and typographical errors may have occurred due to inherent limitation of voice recognition software.  Reasonable attempts made to avoid errors, but this document may contain an error not identified before finalizing.  Please contact me for any clarification needed.        Thomas Prieto MD      CC:     Gilmer Walters  [address] [facility] [id number]      Again, thank you for allowing me to participate in the care of your patient.        Sincerely,        Thomas Prieto MD

## 2024-07-30 NOTE — NURSING NOTE
"Adriana St is a 68 year old female who presents for:  Chief Complaint   Patient presents with    Surgical Followup     Incision, draining puss, notes less redness and pain        Initial Vitals:  /77   Pulse 102   SpO2 98%  Estimated body mass index is 28.34 kg/m  as calculated from the following:    Height as of 7/3/24: 5' 3\" (1.6 m).    Weight as of 7/3/24: 160 lb (72.6 kg).. There is no height or weight on file to calculate BSA. BP completed using cuff size: regular  Mild Pain (2)        Amendo Phorn    "

## 2024-07-30 NOTE — PATIENT INSTRUCTIONS
Patient Next Steps:    Dr Prieto would like to see you back in the clinic for follow up on 8/9 in [place] sent to pharmacy to make it a total of 2 week course    Continue with wound care        Please call us if you have any further questions or concerns.    [facility]   Phone: [phone]  Fax: [phone]

## 2024-07-30 NOTE — PROGRESS NOTES
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    Unfortunately patient developed, purulent discharge with redness and inflammation last week and was diagnosed to have superficial surgical site infection which was managed with Keflex and daily dressing.    She is in the clinic today for wound recheck and further management.  Since her last visit in clinic, patient is doing well with significant decrease in the volume of discharge and resolution of redness.  She has completed approximately 6 days of Keflex.    In terms of pain, she reports approximately 70% improvement in her preoperative mechanical back pain.  She still has intermittent paraspinal aching pain and spasms.  She denies any new onset weakness involving her bilateral lower extremities or bladder or bowel symptoms.    She was scheduled to start radiation last week, however given her incision issues with superficial surgical site infection, we are holding the radiation at this point.      PHYSICAL EXAM:   Constitutional: /77   Pulse 102   SpO2 98%      Mental Status: A & O in no acute distress.  Affect is appropriate.  Speech is fluent.  Recent and remote memory are intact.  Attention span and concentration are normal.     Motor: No pronator drift of upper extremity.   Normal bulk and tone all muscle groups of upper and lower extremities.       Delt Bi Tri Hand Flex/  Ext Iliopsoas Quadriceps Tibialis Anterior EHL Gastroc     C5 C6 C7 C8/T1 L2 L3 L4 L5 S1   R 5 5 5 5 5 5 5 5 5   L 5 5 5 5 5 5 5 5 5        Sensory: Sensation intact bilaterally to light touch.      Coordination:   Heel/toe/ gait intact.       Reflexes; supinator, biceps, triceps, knee/ ankle jerk intact.   No peterson's    Local examination: No evidence of obvious purulent discharge from the incision.  Skin edges are well-approximated with minimal slough in between.  No evidence of swelling or significant redness at this point.  Incision was squeezed with no evidence of discharge.    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  Patient is in the clinic today with her sister and son.  She reports approximately 70% improvement in her preoperative pain.  Unfortunately she developed surgical site infection which seems superficial in location last week which was managed conservatively with daily dressings and Keflex.  She has completed day 6 with significant resolution of symptoms.    Local examination incision is healing well with no evidence of purulent discharge or swelling.  There is minimal redness and slough along the incision edges which is healing well.    We will continue conservative management at this point and continue Keflex for another 4 days (total of 2 weeks) and continue cleaning the incision with ChloraPrep and dressing.    I will follow her on 8/9/2024 in the [place] clinic to reassess her incision and plan to start radiation therapy after that.    I also discussed with the patient regarding tapering the dose of her opioids and muscle relaxants.  She can continue Tylenol in the interim.    Patient and her family agreed with the plan.  All the questions were answered and patient sounded understanding.  They can contact us if there are any further questions or concerns or worsening neurological deficits.I spent more than 20 minutes in this apt, examining the pt, reviewing the scans,  reviewing notes from chart, discussing treatment options with risks and benefits and coordinating care. This note was created in part by the use of Dragon voice recognition system. Inadvertent grammatical errors and typographical errors may have occurred due to inherent limitation of voice recognition software.  Reasonable attempts made to avoid errors, but this document may contain an error not identified before finalizing.  Please contact me for any clarification needed.        Thomas Prieto MD      CC:     Gilmer Walters  [address] [facility] [address]

## 2024-08-02 ENCOUNTER — TELEPHONE (OUTPATIENT)
Dept: ONCOLOGY | Facility: CLINIC | Age: 69
End: 2024-08-02
Payer: COMMERCIAL

## 2024-08-02 DIAGNOSIS — C50.111 MALIGNANT NEOPLASM OF CENTRAL PORTION OF RIGHT BREAST IN FEMALE, ESTROGEN RECEPTOR POSITIVE (H): Primary | ICD-10-CM

## 2024-08-02 DIAGNOSIS — Z80.0 FAMILY HISTORY OF MALIGNANT NEOPLASM OF GASTROINTESTINAL TRACT: ICD-10-CM

## 2024-08-02 DIAGNOSIS — Z80.3 FAMILY HISTORY OF MALIGNANT NEOPLASM OF BREAST: ICD-10-CM

## 2024-08-02 DIAGNOSIS — Z17.0 MALIGNANT NEOPLASM OF CENTRAL PORTION OF RIGHT BREAST IN FEMALE, ESTROGEN RECEPTOR POSITIVE (H): Primary | ICD-10-CM

## 2024-08-02 DIAGNOSIS — Z80.41 FAMILY HISTORY OF MALIGNANT NEOPLASM OF OVARY: ICD-10-CM

## 2024-08-02 NOTE — TELEPHONE ENCOUNTER
8/2/2024    Referring Provider: Magaly Bender MD     Presenting Information:   I spoke to Adriana for a follow-up visit today. I had previously seen Adriana on 6/27/24 for genetic counseling and we planned to touch base once she had additional information about her family history of cancer so that we could come up with an appropriate plan for testing.     Discussion:  We previously reviewed the details of Adriana's family and personal history. Please see the clinic note from our previous appointment for details.   Adriana shared the following family history updates:    Paternal uncle had a bowel cancer in his late 60's/early 70's  Paternal aunt had a breast cancer in her 70's.  Paternal aunt had a post-menopausal ovarian cancer.   Paternal aunt had a leukemia at age 28/29.   Paternal grandfather had a bowel adenocarcinoma in his 50's.  Paternal grandmother passed at age 72 from ovarian cancer.   She reports that there is lung cancer in 2 relatives, bowel cancer in 1 relative, breast cancer in 3 relatives as well as bladder, leukemia, and uterine cancer on her paternal grandmother's side.  Paternal grandfather's father had a bone cancer (unknown primary vs mets).   Based on her personal and family history, Adriana meets current National Comprehensive Cancer Network (NCCN) criteria for genetic testing of high-penetrance breast cancer susceptibility genes (including BRCA1, BRCA2, CDH1, PALB2, PTEN, STK11, and TP53).    We discussed that there are additional genes that could cause increased risk for breast and related cancers. As many of these genes present with overlapping features in a family and accurate cancer risk cannot always be established based upon the pedigree analysis alone, it would be reasonable for Adriana to consider panel genetic testing to analyze multiple genes at once.  We reviewed genetic testing options given Adriana's personal and family history including targeted and expanded options. After our  discussion, Adriana opted to proceed with genetic testing via BRCA1/2 analysis with automatic reflex to the Multi-Cancer panel through [facility].   The [facility] Multi-Cancer Panel analyzes 70 genes associated with an increased risk for cancer: AIP, ALK, APC, BAUDILIO, AXIN2, BAP1, BARD1, BLM, BMPR1A, BRCA1, BRCA2, BRIP1, CDC73, CDH1, CDK4, CDKN1B, CDKN2A, CHEK2, CTNNA1, DICER1, EGFR, EPCAM, FH, FLCN, GREM1, HOXB13, KIT, LZTR1, MAX, MBD4, MEN1, MET, MITF, MLH1, MSH2, MSH3, MSH6, MUTYH, NF1, NF2, NTHL1, PALB2, PDGFRA, PMS2, POLD1, POLE, POT1, WNREH2S, PTCH1, PTEN, RAD51C, RAD51D, RB1, RET, SDHA, SDHAF2, SDHB, SDHC, SDHD, SMAD4, SMARCA4, SMARCB1, SMARCE1, STK11, SUFU, EMXR384, TP53, TSC1, TSC2, VHL.  This panel includes genes associated with hereditary cancers across multiple major organ systems, including:  breast and gynecologic (breast, ovarian, uterine)  gastrointestinal (colorectal, gastric, pancreatic)  endocrine (thyroid, paraganglioma/pheochromocytoma, parathyroid, pituitary)  genitourinary (renal/urinary tract, prostate)  skin (melanoma, basal cell carcinoma)  brain/nervous system   sarcoma  hematologic (myelodysplastic syndrome/leukemia)  Individuals who are found to carry a pathogenic variant in one of these genes have an increased risk of developing certain cancers/tumors.  Identifying those at elevated risk may guide implementation of additional screening, surveillance and interventions.    Medical Management: For Adriana, we reviewed that the information from genetic testing may determine:  additional cancer screening for which Adriana may qualify (i.e. mammogram and breast MRI, more frequent colonoscopies, more frequent dermatologic exams, etc.),  options for risk reducing surgeries Adriana could consider (i.e. bilateral mastectomy, surgery to remove her ovaries and/or uterus, etc.),    and targeted chemotherapies for Adriana's active cancer, or if she were to develop certain cancers in the future (i.e. immunotherapy  for individuals with Cervantes syndrome, PARP inhibitors, etc.).   Verbal consent obtained over phone today with no witness required. A copy of the lab's consent form will be sent to Adriana via [employer] for review.   These recommendations will be discussed in detail once genetic testing is completed.   Adriana will be contacted by scheduling to schedule a lab only appointment at any [facility] clinic at her earliest convenience.    Plan:  1) Today Adriana elected to proceed with BRCA1/2 analysis with automatic reflex to Multi-Cancer panel genetic testing (70 genes) through [facility].  2) This information should be available in approximately 203 weeks from the time the lab receives her sample.  3) Adriana will be contacted by our scheduling department to set up a result disclosure appointment.     Norma Stone MS, [facility]  Licensed, Certified Genetic Counselor  [facility]  [email or web address]

## 2024-08-05 ENCOUNTER — TELEPHONE (OUTPATIENT)
Dept: ONCOLOGY | Facility: HOSPITAL | Age: 69
End: 2024-08-05
Payer: COMMERCIAL

## 2024-08-05 ENCOUNTER — LAB (OUTPATIENT)
Dept: LAB | Facility: CLINIC | Age: 69
End: 2024-08-05
Attending: GENETIC COUNSELOR, MS
Payer: COMMERCIAL

## 2024-08-05 DIAGNOSIS — Z17.0 MALIGNANT NEOPLASM OF CENTRAL PORTION OF RIGHT BREAST IN FEMALE, ESTROGEN RECEPTOR POSITIVE (H): ICD-10-CM

## 2024-08-05 DIAGNOSIS — Z80.0 FAMILY HISTORY OF MALIGNANT NEOPLASM OF GASTROINTESTINAL TRACT: ICD-10-CM

## 2024-08-05 DIAGNOSIS — C50.111 MALIGNANT NEOPLASM OF CENTRAL PORTION OF RIGHT BREAST IN FEMALE, ESTROGEN RECEPTOR POSITIVE (H): ICD-10-CM

## 2024-08-05 DIAGNOSIS — Z80.41 FAMILY HISTORY OF MALIGNANT NEOPLASM OF OVARY: ICD-10-CM

## 2024-08-05 DIAGNOSIS — Z80.3 FAMILY HISTORY OF MALIGNANT NEOPLASM OF BREAST: ICD-10-CM

## 2024-08-05 PROCEDURE — 36415 COLL VENOUS BLD VENIPUNCTURE: CPT

## 2024-08-05 NOTE — ORAL ONC MGMT
Oral Chemotherapy Monitoring Program     Placed call to Adriana Wei to introduce our oral chemotherapy pharmacy management team and to introduce the ribociclib. She let me know her radiation had been delayed due to an infection and is hoping to be able to start next week. She agreed that I can send a [employer] message with the information about ribociclib to her and plan to touch base with her towards the end of her radiation to do our new teach session. Adriana Wei knows she can call us in the meantime if she has any questions or concerns.    Ryan Melgar, PharmD, [facility]  Clinical Oncology Pharmacist  August 5, 2024

## 2024-08-09 ENCOUNTER — TELEPHONE (OUTPATIENT)
Dept: VASCULAR SURGERY | Facility: CLINIC | Age: 69
End: 2024-08-09

## 2024-08-09 ENCOUNTER — OFFICE VISIT (OUTPATIENT)
Dept: NEUROSURGERY | Facility: CLINIC | Age: 69
End: 2024-08-09
Payer: COMMERCIAL

## 2024-08-09 VITALS
OXYGEN SATURATION: 98 % | BODY MASS INDEX: 28.35 KG/M2 | HEART RATE: 96 BPM | HEIGHT: 63 IN | SYSTOLIC BLOOD PRESSURE: 128 MMHG | WEIGHT: 160 LBS | DIASTOLIC BLOOD PRESSURE: 83 MMHG

## 2024-08-09 DIAGNOSIS — C79.51 METASTASIS TO SPINAL COLUMN (H): Primary | ICD-10-CM

## 2024-08-09 DIAGNOSIS — T81.49XA INCISIONAL INFECTION: ICD-10-CM

## 2024-08-09 PROCEDURE — 99024 POSTOP FOLLOW-UP VISIT: CPT | Performed by: NEUROLOGICAL SURGERY

## 2024-08-09 ASSESSMENT — PAIN SCALES - GENERAL: PAINLEVEL: MILD PAIN (2)

## 2024-08-09 NOTE — TELEPHONE ENCOUNTER
Vascular Referral Intake    Referred by:     Thomas Prieto MD    for Metastasis to spinal column     Specialty: Wound Clinic    Specific Provider if Necessary:  ARTURO Merrill or MD Rah BARRIENTOS? Antonio    Visit Type: New    Time Frame: Within 1 Week    Testing/Imaging Needed Before Consult: NONE    Appt Note: (to be pasted into appt comments, also add where additional information is located, ie, outside images pushed to PACS, in Epic, [facility], etc)  Metastasis to spinal column .She is scheduled to start radiation on Monday coming week. However given her incision issues with no complete wound healing we are holding the radiation at this point.

## 2024-08-09 NOTE — LETTER
"8/9/2024       RE: Adriana St  [address] [facility] [address]     Dear Colleague,    Thank you for referring your patient, Adriana St, to the  [facility] at [facility]. Please see a copy of my visit note below.    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    Unfortunately patient developed, purulent discharge with redness and inflammation 2 weeks ago and was diagnosed to have superficial surgical site infection which was managed with Keflex and daily dressing.She has completed approximately 2 weeks of Keflex.     She is in the clinic today for wound recheck and further management at 2 weeks.  Since her last visit in clinic, patient is doing well with no evidence of purulent discharge.  She reports intermittent very minimal discharge.       Patient continued to report significant improvement in her preoperative mechanical back pain.  She denies any new onset weakness involving her bilateral lower extremities or bladder or bowel symptoms.    She is scheduled to start radiation on Monday coming week.  However given her incision issues with no complete wound healing we are holding the radiation at this point.        PHYSICAL EXAM:   Constitutional: /83   Pulse 96   Ht 5' 3\" (1.6 m)   Wt 160 lb (72.6 kg)   SpO2 98%   BMI 28.34 kg/m       Mental Status: A & O in no acute distress.  Affect is appropriate.  Speech is fluent.  Recent and remote memory are intact.  Attention span and concentration are normal.     Motor: No pronator drift of upper extremity.   Normal bulk and tone all muscle groups of upper and lower extremities.   "        Delt Bi Tri Hand Flex/  Ext Iliopsoas Quadriceps Tibialis Anterior EHL Gastroc     C5 C6 C7 C8/T1 L2 L3 L4 L5 S1   R 5 5 5 5 5 5 5 5 5   L 5 5 5 5 5 5 5 5 5         Sensory: Sensation intact bilaterally to light touch.      Coordination:   Heel/toe/ gait intact.       Reflexes; supinator, biceps, triceps, knee/ ankle jerk intact.  No peterson's     Local examination: No evidence of obvious purulent discharge from the incision. There is minimal gaping between the skin edges with possible yellowish slough.  There is no evidence of swelling or redness.Incision was squeezed with no evidence of discharge.       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  Patient is in the clinic today with her sister and son.  She reports approximately significant improvement in her preoperative pain.  Unfortunately she developed superficial surgical site infection which was managed conservatively with daily dressings and Keflex.  She has completed 2 weeks of antibiotics.      Local examination incision is healing well with no evidence of purulent discharge or swelling.  There is minimal  slough along the incision edges which is healing well.    I would recommend to continue conservative management with daily cleaning with ChloraPrep and dressing.  We will also refer her to the wound nursing clinic.    I discussed her case with Dr. Alexander (radiation oncologist) and he agreed to wait for another 2 weeks prior to starting radiation therapy until her wound heals completely.  I will follow her in another 2 weeks to reassess her incision and will also get X-ray thoracolumbar spine AP and lateral  views.    Patient agreed with the plan.  All the questions were answered and patient sounded understanding.  She can contact us if there are any further questions or concerns or worsening neurological deficits.I spent more than 20 minutes in this apt, examining the pt, reviewing the scans, reviewing notes from chart, discussing treatment options with risks and benefits and coordinating care. This note was created in part by the use of Dragon voice recognition system. Inadvertent grammatical errors and typographical errors may have occurred due to inherent limitation of voice recognition software.  Reasonable attempts made to avoid errors, but this document may contain an error not identified before finalizing.  Please contact me for any clarification needed.        Thomas Prieto MD      CC:     Gilmer Walters  [address] [facility] [address]      Again, thank you for allowing me to participate in the care of your patient.      Sincerely,    Thomas Prieto MD

## 2024-08-09 NOTE — TELEPHONE ENCOUNTER
Patient called via  services. Voicemail left with patient to call clinic when able to discuss scheduling an appointment.

## 2024-08-09 NOTE — PROGRESS NOTES
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    Unfortunately patient developed, purulent discharge with redness and inflammation 2 weeks ago and was diagnosed to have superficial surgical site infection which was managed with Keflex and daily dressing.She has completed approximately 2 weeks of Keflex.     She is in the clinic today for wound recheck and further management at 2 weeks.  Since her last visit in clinic, patient is doing well with no evidence of purulent discharge.  She reports intermittent very minimal discharge.       Patient continued to report significant improvement in her preoperative mechanical back pain.  She denies any new onset weakness involving her bilateral lower extremities or bladder or bowel symptoms.    She is scheduled to start radiation on Monday coming week.  However given her incision issues with no complete wound healing we are holding the radiation at this point.        PHYSICAL EXAM:   Constitutional: /83   Pulse 96   Ht 5' 3\" (1.6 m)   Wt 160 lb (72.6 kg)   SpO2 98%   BMI 28.34 kg/m       Mental Status: A & O in no acute distress.  Affect is appropriate.  Speech is fluent.  Recent and remote memory are intact.  Attention span and concentration are normal.     Motor: No pronator drift of upper extremity.   Normal bulk and tone all muscle groups of upper and lower extremities.          Delt Bi Tri Hand Flex/  Ext Iliopsoas Quadriceps Tibialis Anterior EHL Gastroc     C5 C6 C7 C8/T1 L2 L3 L4 L5 S1   R 5 5 5 5 5 5 5 5 5   L 5 5 5 5 5 5 5 5 5         Sensory: Sensation intact bilaterally to light touch.      Coordination:   Heel/toe/ gait intact.       Reflexes; supinator, biceps, triceps, " knee/ ankle jerk intact.  No peterson's     Local examination: No evidence of obvious purulent discharge from the incision. There is minimal gaping between the skin edges with possible yellowish slough.  There is no evidence of swelling or redness.Incision was squeezed with no evidence of discharge.       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  Patient is in the clinic today with her sister and son.  She reports approximately significant improvement in her preoperative pain.  Unfortunately she developed superficial surgical site infection which was managed conservatively with daily dressings and Keflex.  She has completed 2 weeks of antibiotics.      Local examination incision is healing well with no evidence of purulent discharge or swelling.  There is minimal  slough along the incision edges which is healing well.    I would recommend to continue conservative management with daily cleaning with ChloraPrep and dressing.  We will also refer her to the wound nursing clinic.    I discussed her case with Dr. Alexander (radiation oncologist) and he agreed to wait for another 2 weeks prior to starting radiation therapy until her wound heals completely.  I will follow her in another 2 weeks to reassess her incision and will also get X-ray thoracolumbar spine AP and lateral views.    Patient agreed with the plan.  All the questions were answered and patient sounded understanding.  She can contact us if there are any further questions or concerns or worsening neurological deficits.I spent more than 20 minutes in this apt, examining the pt, reviewing the scans, reviewing notes from chart,  discussing treatment options with risks and benefits and coordinating care. This note was created in part by the use of Dragon voice recognition system. Inadvertent grammatical errors and typographical errors may have occurred due to inherent limitation of voice recognition software.  Reasonable attempts made to avoid errors, but this document may contain an error not identified before finalizing.  Please contact me for any clarification needed.        Thomas Prieto MD      CC:     Gilmer Walters  [address] [facility] [address]

## 2024-08-09 NOTE — NURSING NOTE
"Adriana St is a 68 year old female who presents for:  Chief Complaint   Patient presents with    RECHECK     S/P spinal fusion        Initial Vitals:  /83   Pulse 96   Ht 1.6 m (5' 3\")   Wt 72.6 kg (160 lb)   SpO2 98%   BMI 28.34 kg/m   Estimated body mass index is 28.34 kg/m  as calculated from the following:    Height as of this encounter: 1.6 m (5' 3\").    Weight as of this encounter: 72.6 kg (160 lb). Body surface area is 1.8 meters squared. BP completed using cuff size: regular  Mild Pain (2)        Tomas Valles    "

## 2024-08-09 NOTE — TELEPHONE ENCOUNTER
Epic chat message sent to [facility].  They will call to schedule, they may have availability early next week if it works for the patient.

## 2024-08-12 LAB — SCANNED LAB RESULT: ABNORMAL

## 2024-08-12 NOTE — TELEPHONE ENCOUNTER
Call placed to Son as noted in the demographic information. Patient scheduled for an appointment 8/14/24.

## 2024-08-14 ENCOUNTER — HOSPITAL ENCOUNTER (OUTPATIENT)
Dept: WOUND CARE | Facility: CLINIC | Age: 69
Discharge: HOME | End: 2024-08-14
Attending: FAMILY MEDICINE | Admitting: FAMILY MEDICINE
Payer: COMMERCIAL

## 2024-08-14 VITALS
TEMPERATURE: 96.7 F | DIASTOLIC BLOOD PRESSURE: 95 MMHG | HEIGHT: 63 IN | SYSTOLIC BLOOD PRESSURE: 133 MMHG | RESPIRATION RATE: 20 BRPM | BODY MASS INDEX: 28.81 KG/M2 | HEART RATE: 98 BPM | WEIGHT: 162.6 LBS

## 2024-08-14 DIAGNOSIS — T81.89XA NON-HEALING SURGICAL WOUND, INITIAL ENCOUNTER: Primary | ICD-10-CM

## 2024-08-14 DIAGNOSIS — L98.422 NON-PRESSURE CHRONIC ULCER OF BACK WITH FAT LAYER EXPOSED (H): ICD-10-CM

## 2024-08-14 PROCEDURE — 11042 DBRDMT SUBQ TIS 1ST 20SQCM/<: CPT | Performed by: FAMILY MEDICINE

## 2024-08-14 PROCEDURE — 99215 OFFICE O/P EST HI 40 MIN: CPT | Mod: 25 | Performed by: FAMILY MEDICINE

## 2024-08-14 NOTE — ADDENDUM NOTE
Encounter addended by: Amelia Ortiz RN on: 8/14/2024 10:56 AM   Actions taken: Problem List modified, Problem List reviewed, Episode edited

## 2024-08-14 NOTE — PROGRESS NOTES
Wound Clinic Note          Visit date: 08/14/2024       Cheif Complaint:     Adriana St is a 68 year old  female had concerns including WOUND CARE.        HISTORY OF PRESENT ILLNESS:    Adriana St reports the ulcer has been present since late July 2024.  The wound began after a recent surgery and the incision did not heal normally.      She has metastatic breast cancer and had a metastases to her lumbar spine causing a pathologic fracture.  She had a surgery to stabilize this on July 3, 2024.  Postoperatively a portion of the incision dehisced.  She reports her chemotherapy and radiation therapy are on hold waiting for this wound to heal.    Her sons have been bandaging the area with dry gauze changed once or twice a day.  There has been light serous drainage from the wound.     The pateint denies fevers or chills.  They report the pain from the wound has been 0/10 and has remained about the same recently.      Today the patient reports maintaining a regular diet without special attention to protein.        The patient denies a history of diabetes, smoking or chronic steroid use.         The patient has not had any symptoms of infection relating to the wound recently and is not currently on antibiotics.       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       Surgical Hx:   Past Surgical History:   Procedure Laterality Date    APPENDECTOMY      BREAST AUGMENTATION      CHOLECYSTECTOMY      OPTICAL TRACKING SYSTEM FUSION SPINE POSTERIOR LUMBAR THREE+ LEVELS Bilateral 7/3/2024    Procedure: THORACIC 11 TO LUMBAR 3 POSTEROLATERAL FUSION USING ALLOGRAFT,LUMBAR 1 BILATERAL LAMINECTOMY AND  MEDIAL FACETECTOMY AND SEPARATION SURGERY, USE OF OSTEOCOOL DEVICE.;  Surgeon: Thomas Prieto MD;  Location: [facility]          Allergies:    Allergies   Allergen Reactions    Codeine Itching              Medication History:    Current Outpatient Medications   Medication Sig Dispense Refill    acetaminophen (TYLENOL) 325 MG tablet Take 2 tablets (650 mg) by mouth every 4 hours as needed for other (For optimal non-opioid multimodal pain management to improve pain control.) 100 tablet 0    bisacodyl (DULCOLAX) 10 MG suppository Place 1 suppository (10 mg) rectally daily as needed for constipation (Use if magnesium hydroxide (MILK of MAGNESIA) is not effective after 24 hours. May discontinue if patient having bowel movement.) 5 suppository 0    bisacodyl (DULCOLAX) 5 MG EC tablet Take 5 mg by mouth daily as needed (Gentle laxative)      HYDROmorphone (DILAUDID) 2 MG tablet Take 2 mg by mouth 2 times daily      letrozole (FEMARA) 2.5 MG tablet Take 1 tablet (2.5 mg) by mouth daily 90 tablet 1    Lidocaine (LIDOCARE) 4 % Patch Place 1 patch onto the skin every 24 hours To prevent lidocaine toxicity, patient should be patch free for 12 hrs daily. 30 patch 0    magnesium hydroxide (MILK OF MAGNESIA) 400 MG/5ML suspension Take 30 mLs by mouth daily as needed for constipation (Use if polyethylene glycol (Miralax) is not effective after 24 hours.) 354 mL 0    methocarbamol (ROBAXIN) 750 MG tablet Take 1 tablet (750 mg) by mouth 4 times daily as needed for muscle spasms 40 tablet 0    oxyCODONE (ROXICODONE) 5 MG tablet Take 2 tablets (10 mg) by mouth every 4 hours as needed for moderate pain 90 tablet 0    polyethylene glycol (MIRALAX) 17 GM/Dose powder Take 17 g by mouth 2 times daily (Hold or stop if stools get too loose) 510 g 1    senna-docusate (SENOKOT-S/PERICOLACE) 8.6-50 MG tablet Take 1 tablet by mouth 2 times daily -- for bowels.    Hold if the stools start to get too loose 60 tablet 0     No current  "facility-administered medications for this encounter.         Tobacco History:  reports that she quit smoking about 39 years ago. Her smoking use included cigarettes. She started smoking about 29 years ago. She has never used smokeless tobacco.       REVIEW OF SYMPTOMS:   The review of systems was negative except as noted in the HPI.           PHYSICAL EXAMINATION:     BP (!) 133/95 (BP Location: Left arm, Patient Position: Sitting, Cuff Size: Adult Regular)   Pulse 98   Temp (!) 96.7  F (35.9  C) (Temporal)   Resp 20   Ht 1.6 m (5' 3\")   Wt 73.8 kg (162 lb 9.6 oz)   BMI 28.80 kg/m             GENERAL: The patient overall appears well and is no acute distress.   HEAD: normocephalic   EYES: Sclera and conjunctiva clear   NECK: no obvious masses   LUNGS: breathing is unlabored.   EXTREMITIES: No clubbing, cyanosis or edema   SKIN: No rashes or other abnormalities except as noted under the Wound section below.   NEUROLOGICAL: normal motor and sensory function       WOUND/ulcer: The wound appears healthy with no sign of infection.   Wound bed: necrotic material  Periwound: healthy intact skin  There is a fairly superficial dehiscence of the inferior aspect of the lumbar surgical wound with some yellow necrotic material here.      Also see below for wound details:     Circumferential volume measures:             No data to display                Ulceration(s)/Wound(s):   Please see the media tab under the chart review for pictures of the wounds.  Nursing staff removed dressings and cleansed wound.    Wound (used by OP [facility] only) 08/14/24 0853 back lower;midline surgical (Active)   Thickness/Stage full thickness 08/14/24 0545   Base slough 08/14/24 0545   Periwound intact 08/14/24 0545   Periwound Temperature warm 08/14/24 0545   Periwound Skin Turgor soft 08/14/24 0545   Edges open 08/14/24 0545   Length (cm) 8.5 08/14/24 0545   Width (cm) 0.8 08/14/24 0545   Depth (cm) 0.2 08/14/24 0545   Wound (cm^2) 6.8 cm^2 " "08/14/24 0545   Wound Volume (cm^3) 1.36 cm^3 08/14/24 0545   Drainage Characteristics/Odor serosanguineous 08/14/24 0545   Drainage Amount moderate 08/14/24 0545   Care, Wound debrided 08/14/24 0545           Recent Labs   Lab Test 07/03/24  2144   A1C 6.6*          No results for input(s): \"ALBUMIN\" in the last 31583 hours.           Procedure note:  Informed Consent:  Patient acknowledges that I have explained the patient's general medical condition to him/her.  Patient has been informed and acknowledges that I have explained the risks or complications of wound debridement including, but not limited to, scarring, damage to blood vessels or surrounding areas such as nerves and organs, allergic reactions to topical and injected anaesthetic and/or skin prep solutions.  Other risks include excessive bleeding, removal of healthy tissue, infection, pain and inflammation, and prolonged or failure to heal.  Patient acknowledges that bleeding after debridement and pain may worsen after debridement and that dead/necrotic tissue may cause bacteria and toxins to be released into the bloodstream and cause sepsis or shock.  Patient acknowledges that I have explained that the wound may be larger after debridement.  Patient acknowledges that they may need serial debridements while under care in the wound department.  Patient acknowledges that they were given an opportunity to ask questions about treatment and I have answered patient's questions.    Anesthetized as needed with lidocaine.  Using a curette and/or a scalpel I performed an excisional debridement removing all necrotic material at the lumbar surgical wound in to the level of viable subcutaneous tissue.  I obtained hemostasis with direct pressure.  The patient tolerated the procedure well.  EBL: <5 ml  Total debridement surface area: Less than 20 cm                  ASSESSMENT:   This is a 68 year old  female with a lumbar surgical wound.          PLAN:   They will " bandage the area with endoform and a Zetuvit bandage changed once a day.  I have explained to the patient the importance of protein intake to wound healing.  I have explained that increasing protein intake will speed wound healing.  We discussed several types of food that are high in protein and the wound care nurse gave the patient a handout that summarizes this information.  In addition to further speed wound healing I have encouraged the patient to take a protein supplement.   The patient will return to the wound clinic in 3 to 4 weeks to see me again.        45 minutes spent on the date of the encounter doing chart review, history and exam, documentation and further activities per the note, this time excludes any procedure time      Rah Alvarez MD  08/14/2024   9:13 AM   [facility] Vascular/Wound  [phone]    This note was electronically signed by Rah Alvarez MD      Further instructions from your care team         08/14/2024   Adriana St   1955    A DME order for supplies has been placed to [employer]. If there are any issues with your order including not receiving the order please call our clinic at [phone]. Do not call [employer] We are better able to help you. We can contact the [employer] rep to better assist than if you call the general [employer] number. We can provide a tracking number also if needed.     [employer] is now sending an ancillary kit free of charge. This kit includes gauze, gloves, and saline. You will receive 1 kit per 15 days of the supply order. We typically order 30 days of supplies so you will receive 2 kits. Please let us know if you would not like to receive this kit and we can communicate this to [employer].    Dressing changes outside of clinic are being performed by Family    Plan 08/14/2024   Ok to shower. Remove bandages before or during showering. Clean with a mild, unscented soap. Pat dry after showering and apply new dressings.  Do not  soak wounds in water. No Bathtubs, pools, lakes, etc  Avoid hard pressures against that area of the back (like wooden chair backs)  If you are experiencing:  Fevers > 100.8 degrees Farenheit  Chills   Nausea/vomiting  New or worsening pain in or near wounds (Sudden increase in Pain)  Increased redness (fire engine red)  Increased swelling  Changes in drainage (pus)  or other symptoms of concern you should go to the Emergency Department and be evaluated as these symptoms could indicate serious infection.  OK to start radiation or chemotherapy.    Wound Dressing Change: Thoracic and Lumbar Spine   - Wash your hands with soap and water before you begin your dressing change and prepare a clean surface for dressings.  - Cleanse with mild unscented soap (such as Cetaphil, Cerave or Dove) and water  - Apply small amount of VASHE on gauze, lay into wound bed, let sit for 10 minutes, remove gauze (do not rinse)  - Primary dressing: Apply 1/4 of an Endoform Natural cut fit the to wound base  - Secondary dressing: Cover with Zetuvit Plus Silicone Border  Change once daily and as needed for soilage/saturation/dislodgment    A diet high in protein is important for wound healing, we recommend getting 90 grams of protein per day.   Taking protein shakes or bars are a good way to get extra protein in your diet.     Good sources of protein:  Pork 26g per 3 oz  Whey protein powder - 24g per scoop (on average)  Greek yogurt - 23g per 8oz   Chicken or Turkey - 23g per 3oz  Fish - 20-25g per 3oz  Beef - 18-23g per 3oz  Tofu - 10g per 1/2 cup  Navy beans - 20g per cup  Cottage cheese - 14g per 1/2 cup   Lentils - 13g per 1/4 cup  Beef jerky 13g per 1oz  2% milk - 8g per cup  Peanut butter - 8g per 2 tablespoons  Eggs - 6g per egg  Mixed nuts - 6g per 2oz         Main Provider: Rah Alvarez M.D. August 14, 2024    Call us at [phone] if you have any questions about your wounds, if you have redness or swelling around your wound,  have a fever of 101 degrees Fahrenheit or greater or if you have any other problems or concerns. We answer the phone Monday through Friday 8 am to 4 pm, please leave a message as we check the voicemail frequently throughout the day. If you have a concern over the weekend, please leave a message and we will return your call Monday. If the need is urgent, go to the ER or urgent care.    If you had a positive experience please indicate that on your patient satisfaction survey form that [facility] will be sending you.    It was a pleasure meeting with you today.  Thank you for allowing me and my team the privilege of caring for you today.  YOU are the reason we are here, and I truly hope we provided you with the excellent service you deserve.  Please let us know if there is anything else we can do for you so that we can be sure you are leaving completely satisfied with your care experience.      If you have any billing related questions please call the [facility] Business office at [phone]. The clinic staff does not handle billing related matters.    If you are scheduled to have a follow up appointment, you will receive a reminder call the day before your visit. On the appointment day please arrive 15 minutes prior to your appointment time. If you are unable to keep that appointment, please call the clinic to cancel or reschedule. If you are more than 10 minutes late or greater for your scheduled appointment time, the clinic policy is that you may be asked to reschedule.         ,

## 2024-08-14 NOTE — DISCHARGE INSTRUCTIONS
08/14/2024   Adriana [address]   1955    A DME order for supplies has been placed to [employer]. If there are any issues with your order including not receiving the order please call our clinic at [phone]. Do not call [employer]. We are better able to help you. We can contact the [employer] rep to better assist than if you call the general [employer] number. We can provide a tracking number also if needed.     [employer] is now sending an ancillary kit free of charge. This kit includes gauze, gloves, and saline. You will receive 1 kit per 15 days of the supply order. We typically order 30 days of supplies so you will receive 2 kits. Please let us know if you would not like to receive this kit and we can communicate this to [employer].    Dressing changes outside of clinic are being performed by Family    Plan 08/14/2024   Ok to shower. Remove bandages before or during showering. Clean with a mild, unscented soap. Pat dry after showering and apply new dressings.  Do not soak wounds in water. No Bathtubs, pools, lakes, etc  Consider Purchase wound cleanser caled VASHE ([facility] or [employer])  Avoid hard pressures against that area of the back (like wooden chair backs)  If you are experiencing:  Fevers > 100.8 degrees Farenheit  Chills   Nausea/vomiting  New or worsening pain in or near wounds (Sudden increase in Pain)  Increased redness (fire engine red)  Increased swelling  Changes in drainage (pus)  or other symptoms of concern you should go to the Emergency Department and be evaluated as these symptoms could indicate serious infection.  OK to start radiation or chemotherapy.    Wound Dressing Change: Lower Back  - Wash your hands with soap and water before you begin your dressing change and prepare a clean surface for dressings.  - Cleanse with mild unscented soap (such as Cetaphil, Cerave or Dove) and water  - Apply small amount of VASHE on gauze, lay into wound bed, let sit for 10 minutes, remove gauze (do not  "rinse)  - Primary dressing: Apply 1/4 of an 2x2\" Endoform Natural cut fit the to wound base  - Secondary dressing: Cover with one 6x6 Zetuvit Plus Silicone Border  Change once daily and as needed for soilage/saturation/dislodgment    A diet high in protein is important for wound healing, we recommend getting 90 grams of protein per day.   Taking protein shakes or bars are a good way to get extra protein in your diet.     Good sources of protein:  Pork 26g per 3 oz  Whey protein powder - 24g per scoop (on average)  Greek yogurt - 23g per 8oz   Chicken or Turkey - 23g per 3oz  Fish - 20-25g per 3oz  Beef - 18-23g per 3oz  Tofu - 10g per 1/2 cup  Navy beans - 20g per cup  Cottage cheese - 14g per 1/2 cup   Lentils - 13g per 1/4 cup  Beef jerky 13g per 1oz  2% milk - 8g per cup  Peanut butter - 8g per 2 tablespoons  Eggs - 6g per egg  Mixed nuts - 6g per 2oz         Main Provider: Rah Alvarez M.D. August 14, 2024    Call us at [phone] if you have any questions about your wounds, if you have redness or swelling around your wound, have a fever of 101 degrees Fahrenheit or greater or if you have any other problems or concerns. We answer the phone Monday through Friday 8 am to 4 pm, please leave a message as we check the voicemail frequently throughout the day. If you have a concern over the weekend, please leave a message and we will return your call Monday. If the need is urgent, go to the ER or urgent care.    If you had a positive experience please indicate that on your patient satisfaction survey form that [facility] will be sending you.    It was a pleasure meeting with you today.  Thank you for allowing me and my team the privilege of caring for you today.  YOU are the reason we are here, and I truly hope we provided you with the excellent service you deserve.  Please let us know if there is anything else we can do for you so that we can be sure you are leaving completely satisfied with your care " experience.      If you have any billing related questions please call the [facility] Business office at [phone]. The clinic staff does not handle billing related matters.    If you are scheduled to have a follow up appointment, you will receive a reminder call the day before your visit. On the appointment day please arrive 15 minutes prior to your appointment time. If you are unable to keep that appointment, please call the clinic to cancel or reschedule. If you are more than 10 minutes late or greater for your scheduled appointment time, the clinic policy is that you may be asked to reschedule.

## 2024-08-14 NOTE — PROGRESS NOTES
Patient arrived for wound care visit.  Wound Care Nurse time spent evaluating patient record, completed a full evaluation and documented wound(s) & lo-wound skin; provided recommendation based on treatment plan. Applied dressing, reviewed discharge instructions, patient education, and discussed plan of care with appropriate medical team staff members and patient and/or family members.

## 2024-08-21 ENCOUNTER — ONCOLOGY VISIT (OUTPATIENT)
Dept: ONCOLOGY | Facility: HOSPITAL | Age: 69
End: 2024-08-21
Attending: INTERNAL MEDICINE
Payer: COMMERCIAL

## 2024-08-21 ENCOUNTER — LAB (OUTPATIENT)
Dept: INFUSION THERAPY | Facility: HOSPITAL | Age: 69
End: 2024-08-21
Attending: INTERNAL MEDICINE
Payer: COMMERCIAL

## 2024-08-21 VITALS
DIASTOLIC BLOOD PRESSURE: 105 MMHG | WEIGHT: 161.7 LBS | HEART RATE: 94 BPM | HEIGHT: 63 IN | RESPIRATION RATE: 16 BRPM | OXYGEN SATURATION: 97 % | SYSTOLIC BLOOD PRESSURE: 152 MMHG | BODY MASS INDEX: 28.65 KG/M2

## 2024-08-21 DIAGNOSIS — C50.111 MALIGNANT NEOPLASM OF CENTRAL PORTION OF RIGHT BREAST IN FEMALE, ESTROGEN RECEPTOR POSITIVE (H): ICD-10-CM

## 2024-08-21 DIAGNOSIS — Z51.11 ENCOUNTER FOR ANTINEOPLASTIC CHEMOTHERAPY: ICD-10-CM

## 2024-08-21 DIAGNOSIS — Z17.0 MALIGNANT NEOPLASM OF CENTRAL PORTION OF RIGHT BREAST IN FEMALE, ESTROGEN RECEPTOR POSITIVE (H): ICD-10-CM

## 2024-08-21 DIAGNOSIS — C79.51 MALIGNANT NEOPLASM METASTATIC TO BONE (H): ICD-10-CM

## 2024-08-21 DIAGNOSIS — C50.111 MALIGNANT NEOPLASM OF CENTRAL PORTION OF RIGHT BREAST IN FEMALE, ESTROGEN RECEPTOR POSITIVE (H): Primary | ICD-10-CM

## 2024-08-21 DIAGNOSIS — Z17.0 MALIGNANT NEOPLASM OF CENTRAL PORTION OF RIGHT BREAST IN FEMALE, ESTROGEN RECEPTOR POSITIVE (H): Primary | ICD-10-CM

## 2024-08-21 LAB
ALBUMIN SERPL BCG-MCNC: 4 G/DL (ref 3.5–5.2)
ALP SERPL-CCNC: 76 U/L (ref 40–150)
ALT SERPL W P-5'-P-CCNC: 11 U/L (ref 0–50)
ANION GAP SERPL CALCULATED.3IONS-SCNC: 13 MMOL/L (ref 7–15)
AST SERPL W P-5'-P-CCNC: 19 U/L (ref 0–45)
BASOPHILS # BLD AUTO: 0.1 10E3/UL (ref 0–0.2)
BASOPHILS NFR BLD AUTO: 1 %
BILIRUB SERPL-MCNC: 1 MG/DL
BUN SERPL-MCNC: 13.5 MG/DL (ref 8–23)
CALCIUM SERPL-MCNC: 10.2 MG/DL (ref 8.8–10.4)
CHLORIDE SERPL-SCNC: 101 MMOL/L (ref 98–107)
CREAT SERPL-MCNC: 0.69 MG/DL (ref 0.51–0.95)
EGFRCR SERPLBLD CKD-EPI 2021: >90 ML/MIN/1.73M2
EOSINOPHIL # BLD AUTO: 0.2 10E3/UL (ref 0–0.7)
EOSINOPHIL NFR BLD AUTO: 2 %
ERYTHROCYTE [DISTWIDTH] IN BLOOD BY AUTOMATED COUNT: 14.7 % (ref 10–15)
GLUCOSE SERPL-MCNC: 133 MG/DL (ref 70–99)
HCO3 SERPL-SCNC: 27 MMOL/L (ref 22–29)
HCT VFR BLD AUTO: 39.1 % (ref 35–47)
HGB BLD-MCNC: 12.3 G/DL (ref 11.7–15.7)
IMM GRANULOCYTES # BLD: 0 10E3/UL
IMM GRANULOCYTES NFR BLD: 1 %
LYMPHOCYTES # BLD AUTO: 2.8 10E3/UL (ref 0.8–5.3)
LYMPHOCYTES NFR BLD AUTO: 35 %
MAGNESIUM SERPL-MCNC: 1.9 MG/DL (ref 1.7–2.3)
MCH RBC QN AUTO: 25.9 PG (ref 26.5–33)
MCHC RBC AUTO-ENTMCNC: 31.5 G/DL (ref 31.5–36.5)
MCV RBC AUTO: 82 FL (ref 78–100)
MONOCYTES # BLD AUTO: 0.6 10E3/UL (ref 0–1.3)
MONOCYTES NFR BLD AUTO: 7 %
NEUTROPHILS # BLD AUTO: 4.4 10E3/UL (ref 1.6–8.3)
NEUTROPHILS NFR BLD AUTO: 54 %
NRBC # BLD AUTO: 0 10E3/UL
NRBC BLD AUTO-RTO: 0 /100
PHOSPHATE SERPL-MCNC: 4.1 MG/DL (ref 2.5–4.5)
PLATELET # BLD AUTO: 283 10E3/UL (ref 150–450)
POTASSIUM SERPL-SCNC: 3.6 MMOL/L (ref 3.4–5.3)
PROT SERPL-MCNC: 6.6 G/DL (ref 6.4–8.3)
RBC # BLD AUTO: 4.75 10E6/UL (ref 3.8–5.2)
SODIUM SERPL-SCNC: 141 MMOL/L (ref 135–145)
WBC # BLD AUTO: 8 10E3/UL (ref 4–11)

## 2024-08-21 PROCEDURE — 36415 COLL VENOUS BLD VENIPUNCTURE: CPT

## 2024-08-21 PROCEDURE — 80053 COMPREHEN METABOLIC PANEL: CPT

## 2024-08-21 PROCEDURE — 99215 OFFICE O/P EST HI 40 MIN: CPT | Performed by: INTERNAL MEDICINE

## 2024-08-21 PROCEDURE — 85041 AUTOMATED RBC COUNT: CPT

## 2024-08-21 PROCEDURE — G0463 HOSPITAL OUTPT CLINIC VISIT: HCPCS | Performed by: INTERNAL MEDICINE

## 2024-08-21 PROCEDURE — G2211 COMPLEX E/M VISIT ADD ON: HCPCS | Performed by: INTERNAL MEDICINE

## 2024-08-21 PROCEDURE — 83735 ASSAY OF MAGNESIUM: CPT

## 2024-08-21 PROCEDURE — 84100 ASSAY OF PHOSPHORUS: CPT

## 2024-08-21 ASSESSMENT — PAIN SCALES - GENERAL: PAINLEVEL: NO PAIN (0)

## 2024-08-21 NOTE — PROGRESS NOTES
"Oncology Rooming Note    August 21, 2024 1:09 PM   Adriana St is a 68 year old female who presents for:    Chief Complaint   Patient presents with    Oncology Clinic Visit     Malignant neoplasm of central portion of right breast in female, estrogen receptor positive; Malignant neoplasm metastatic to bone      Initial Vitals: BP (!) 152/105   Pulse 94   Resp 16   Ht 1.6 m (5' 3\")   Wt 73.3 kg (161 lb 11.2 oz)   SpO2 97%   BMI 28.64 kg/m   Estimated body mass index is 28.64 kg/m  as calculated from the following:    Height as of this encounter: 1.6 m (5' 3\").    Weight as of this encounter: 73.3 kg (161 lb 11.2 oz). Body surface area is 1.81 meters squared.  No Pain (0) Comment: Data Unavailable   No LMP recorded. Patient is postmenopausal.  Allergies reviewed: Yes  Medications reviewed: Yes    Medications: Medication refills not needed today.  Pharmacy name entered into [employer]: [id number] [place] [id number] AT [facility] [address] [facility]    Frailty Screening:   Is the patient here for a new oncology consult visit in cancer care? 2. No      Clinical concerns:  post radiation follow up      Shanel Granda              "

## 2024-08-21 NOTE — Clinical Note
8/21/2024      Adriana St  [address] [facility] [address]      Dear Colleague,    Thank you for referring your patient, Adriana St, to the [facility] CANCER [facility]. Please see a copy of my visit note below.    [facility] Hematology and Oncology Progress Note    Patient: Adriana St  MRN: [id number]  Date of Service: Aug 21, 2024         Reason for Visit    Chief Complaint   Patient presents with    Oncology Clinic Visit     Malignant neoplasm of central portion of right breast in female, estrogen receptor positive; Malignant neoplasm metastatic to bone        Assessment and Plan     Cancer Staging   Malignant neoplasm of central portion of right breast in female, estrogen receptor positive (H)  Staging form: Breast, AJCC 8th Edition  - Clinical stage from 6/10/2024: Stage IV (cT2, cN0, cM1, G3, ER+, NC+, HER2-) - Signed by Magaly Bender MD on 6/10/2024      ECOG Performance    0 - Independent     Pain  Pain Score: No Pain (0)    #.  Metastatic right breast cancer ER positive, NC positive, HER2 1+ by IHC.  #.  Pathologic L1 fracture  #.  Large right breast mass  #.  Cancer related pain     Reviewed PET scan and MRI images and report in detail with the patient and her son.  It is confirmed metastatic ER positive, HER2 negative breast cancer.   I also reviewed notes by Dr. Alexander and Dr. Prieto.   I recommended her to continue letrozole daily.  She tolerates well without any side effects.  I will plan to start ribociclib after completion of surgery and radiation.  I reviewed the side effects and schedule.   I explained to her that she can continue or hold letrozole with surgery.  She will be indicated for thromboprophylaxis after surgery either pharmacologic or mechanical.    She has not started calcium/vitamin D daily.  I also advised her to start calcium/vitamin D 1 tablet twice daily.  I reviewed the recommended doses.  I will plan to add  bisphosphonate for metastatic bone disease a couple months after surgery.    I requested breast cancer NGS panel.      I also requested genetic counseling and testing for metastatic breast cancer.      Follow-up with me about a few weeks after completion of surgery/radiation, prior to starting ribociclib.  She will need labs and EKG prior to starting it.    The longitudinal plan of care for the diagnosis(es)/condition(s) as documented were addressed during this visit. Due to the added complexity in care, I will continue to support Adriana Wei in the subsequent management and with ongoing continuity of care.    Encounter Diagnoses:    Problem List Items Addressed This Visit          Oncology Diagnoses    Malignant neoplasm of central portion of right breast in female, estrogen receptor positive (H) - Primary        CC: Gilmer Walters   ______________________________________________________________________________  Oncologic history  5/2024- She presented with a large right breast mass, and progressive back pain.     5/30/2024-right breast mass biopsy showed invasive ductal carcinoma, grade 3,   ER positive (95%, strong), ID positive (15%, strong), HER2 negative (1+ by IHC).    6/6/2024- MRI lumbar spine with and without contrast showed pathologic L1 burst fracture with > 50% vertebral body height loss.  Additional scattered osseous metastasis were seen as well.    6/13/2024-PET scan showed right breast malignancy with right subpectoral kaylene, pulmonary and osseous metastasis.  Moderate compression deformity of the L1 vertebral body with associated uptake probably pathologic, with about 7 mm retropulsion into the spinal canal.   Hypermetabolic right thyroid nodule.   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  Presumed  "right frontal falcine meningioma.  No evidence for intracranial metastatic disease.      History of Present Illness    Ms. Adriana St presented today accompanied by her son.  She feels her back pain is slightly better since the start of dexamethasone.  She met with Dr. Prieto from neurosurgery and she is offered L1 bilateral laminectomy and medial facetectomy.  She also met with Dr. Alexander from radiation oncology for consideration of radiation after surgery.    She does not have any new neurological symptoms.    Review of systems  Apart from describing in HPI, the remainder of comprehensive ROS was negative.    Past History    Past Medical History:   Diagnosis Date    Malignant neoplasm metastatic to bone (H)     Malignant neoplasm of central portion of right breast in female, estrogen receptor positive (H)     Pathologic lumbar vertebral fracture     PONV (postoperative nausea and vomiting)        Past Surgical History:   Procedure Laterality Date    APPENDECTOMY      BREAST AUGMENTATION      CHOLECYSTECTOMY      OPTICAL TRACKING SYSTEM FUSION SPINE POSTERIOR LUMBAR THREE+ LEVELS Bilateral 7/3/2024    Procedure: THORACIC 11 TO LUMBAR 3 POSTEROLATERAL FUSION USING ALLOGRAFT,LUMBAR 1 BILATERAL LAMINECTOMY AND MEDIAL FACETECTOMY AND SEPARATION SURGERY, USE OF OSTEOCOOL DEVICE.;  Surgeon: Thomas Prieto MD;  Location:  OR       Physical Exam    Resp 16   Ht 1.6 m (5' 3\")   Wt 73.3 kg (161 lb 11.2 oz)   BMI 28.64 kg/m      General: alert, awake, not in acute distress  HEENT: Head: Normal, normocephalic, atraumatic.  Eye: Normal external eye, conjunctiva, lids cornea, ALEJANDRA.  Nose: Normal external nose, mucus membranes and septum.  Pharynx: Normal buccal mucosa. Normal pharynx.  Neck / Thyroid: Supple, no masses, nodes, nodules or enlargement.  Lymphatics: No abnormally enlarged lymph nodes.  Extremities: normal strength, tone, and muscle mass  Skin: normal. no rash or abnormalities  CNS: non " focal.    Lab Results    Recent Results (from the past 240 hour(s))   Comprehensive metabolic panel    Collection Time: 08/21/24 12:37 PM   Result Value Ref Range    Sodium 141 135 - 145 mmol/L    Potassium 3.6 3.4 - 5.3 mmol/L    Carbon Dioxide (CO2) 27 22 - 29 mmol/L    Anion Gap 13 7 - 15 mmol/L    Urea Nitrogen 13.5 8.0 - 23.0 mg/dL    Creatinine 0.69 0.51 - 0.95 mg/dL    GFR Estimate >90 >60 mL/min/1.73m2    Calcium 10.2 8.8 - 10.4 mg/dL    Chloride 101 98 - 107 mmol/L    Glucose 133 (H) 70 - 99 mg/dL    Alkaline Phosphatase 76 40 - 150 U/L    AST 19 0 - 45 U/L    ALT 11 0 - 50 U/L    Protein Total 6.6 6.4 - 8.3 g/dL    Albumin 4.0 3.5 - 5.2 g/dL    Bilirubin Total 1.0 <=1.2 mg/dL   Magnesium    Collection Time: 08/21/24 12:37 PM   Result Value Ref Range    Magnesium 1.9 1.7 - 2.3 mg/dL   Phosphorus    Collection Time: 08/21/24 12:37 PM   Result Value Ref Range    Phosphorus 4.1 2.5 - 4.5 mg/dL   CBC with platelets and differential    Collection Time: 08/21/24 12:37 PM   Result Value Ref Range    WBC Count 8.0 4.0 - 11.0 10e3/uL    RBC Count 4.75 3.80 - 5.20 10e6/uL    Hemoglobin 12.3 11.7 - 15.7 g/dL    Hematocrit 39.1 35.0 - 47.0 %    MCV 82 78 - 100 fL    MCH 25.9 (L) 26.5 - 33.0 pg    MCHC 31.5 31.5 - 36.5 g/dL    RDW 14.7 10.0 - 15.0 %    Platelet Count 283 150 - 450 10e3/uL    % Neutrophils 54 %    % Lymphocytes 35 %    % Monocytes 7 %    % Eosinophils 2 %    % Basophils 1 %    % Immature Granulocytes 1 %    NRBCs per 100 WBC 0 <1 /100    Absolute Neutrophils 4.4 1.6 - 8.3 10e3/uL    Absolute Lymphocytes 2.8 0.8 - 5.3 10e3/uL    Absolute Monocytes 0.6 0.0 - 1.3 10e3/uL    Absolute Eosinophils 0.2 0.0 - 0.7 10e3/uL    Absolute Basophils 0.1 0.0 - 0.2 10e3/uL    Absolute Immature Granulocytes 0.0 <=0.4 10e3/uL    Absolute NRBCs 0.0 10e3/uL         Imaging    No results found.    The longitudinal plan of care for the diagnosis(es)/condition(s) as documented were addressed during this visit. Due to the added  "complexity in care, I will continue to support Adriana Wei in the subsequent management and with ongoing continuity of care.     *** minutes spent by me on the date of the encounter doing chart review, history and exam, documentation and further activities as noted above.    Signed by: Magaly Bender MD    Oncology Rooming Note    August 21, 2024 1:09 PM   Adriana tS is a 68 year old female who presents for:    Chief Complaint   Patient presents with    Oncology Clinic Visit     Malignant neoplasm of central portion of right breast in female, estrogen receptor positive; Malignant neoplasm metastatic to bone      Initial Vitals: BP (!) 152/105   Pulse 94   Resp 16   Ht 1.6 m (5' 3\")   Wt 73.3 kg (161 lb 11.2 oz)   SpO2 97%   BMI 28.64 kg/m   Estimated body mass index is 28.64 kg/m  as calculated from the following:    Height as of this encounter: 1.6 m (5' 3\").    Weight as of this encounter: 73.3 kg (161 lb 11.2 oz). Body surface area is 1.81 meters squared.  No Pain (0) Comment: Data Unavailable   No LMP recorded. Patient is postmenopausal.  Allergies reviewed: Yes  Medications reviewed: Yes    Medications: Medication refills not needed today.  Pharmacy name entered into [employer]: [id number] - [place] [id number] AT [facility] [address] [facility]    Frailty Screening:   Is the patient here for a new oncology consult visit in cancer care? 2. No      Clinical concerns:  post radiation follow up      Shanel Granda                  Again, thank you for allowing me to participate in the care of your patient.        Sincerely,        Magaly Bender MD  "

## 2024-08-21 NOTE — PROGRESS NOTES
[facility] Hematology and Oncology Progress Note    Patient: Adriana St  MRN: [id number]  Date of Service: Aug 21, 2024         Reason for Visit    Chief Complaint   Patient presents with    Oncology Clinic Visit     Malignant neoplasm of central portion of right breast in female, estrogen receptor positive; Malignant neoplasm metastatic to bone        Assessment and Plan     Cancer Staging   Malignant neoplasm of central portion of right breast in female, estrogen receptor positive (H)  Staging form: Breast, AJCC 8th Edition  - Clinical stage from 6/10/2024: Stage IV (cT2, cN0, cM1, G3, ER+, CA+, HER2-) - Signed by Magaly Bender MD on 6/10/2024      ECOG Performance    0 - Independent     Pain  Pain Score: No Pain (0)    #.  Metastatic right breast cancer ER positive, CA positive, HER2 1+ by IHC.  #.  Pathologic L1 fracture status post lumbar decompression surgery.  #.  Cancer related pain  #.  Hypertension, uncontrolled     She had lumbar decompression surgery.  Awaiting to start postop radiation.     Continue letrozole daily.   Plan to start ribociclib after completion of radiation.  She will need to obtain labs and EKG prior to starting.  I advised her to follow-up with her primary care provider regarding elevated blood pressure.   I advised her to take calcium/vitamin D daily.  I will plan to add bisphosphonate for metastatic bone disease a couple months after surgery.    She had genetic counseling and testing for metastatic breast cancer and awaiting result.    Follow-up with me about a few weeks after completion of surgery/radiation, prior to starting ribociclib.  She will need labs and EKG prior to starting it.    Encounter Diagnoses:    Problem List Items Addressed This Visit          Oncology Diagnoses    Malignant neoplasm of central portion of right breast in female, estrogen receptor positive (H) - Primary    Relevant Orders    EKG 12-lead, complete (Completed)    EKG 12-lead  complete w/read - Clinics    Malignant neoplasm metastatic to bone (H)    Relevant Orders    EKG 12-lead, complete (Completed)       Other    Encounter for antineoplastic chemotherapy    Relevant Orders    EKG 12-lead complete w/read - Clinics        CC: Gilmer Walters   ______________________________________________________________________________  Oncologic history  5/2024- She presented with a large right breast mass, and progressive back pain.     5/30/2024-right breast mass biopsy showed invasive ductal carcinoma, grade 3,   ER positive (95%, strong), OH positive (15%, strong), HER2 negative (1+ by IHC).  NGS: no mutations. No fusion.    6/6/2024- MRI lumbar spine with and without contrast showed pathologic L1 burst fracture with > 50% vertebral body height loss.  Additional scattered osseous metastasis were seen as well.    6/11/2024-initiated anastrozole    6/13/2024-PET scan showed right breast malignancy with right subpectoral kaylene, pulmonary and osseous metastasis.  Moderate compression deformity of the L1 vertebral body with associated uptake probably pathologic, with about 7 mm retropulsion into the spinal canal.   Hypermetabolic right thyroid nodule.   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  Presumed right frontal falcine meningioma.  No evidence for intracranial metastatic disease.    7/3/2024- s/p T11-L3 posterior segmental instrumentation, T12-L1 and L1-L2 bilateral laminectomies and right L1 transpedicular placement of osteochondral device for radiofrequency ablation of L1 vertebral body tumor (Dr. Prieto)    Planning to start post op radiation 3000 cGy T12-L2 spine    History of Present Illness    Ms. Adriana St presented today accompanied by her son.  She reported hot flashes.  She  "reported her pain is well-controlled.  She ambulates well.  She reported hot flashes, other than that she does not have any intolerable side effects from letrozole.    Review of systems  Apart from describing in HPI, the remainder of comprehensive ROS was negative.    Past History    Past Medical History:   Diagnosis Date    Malignant neoplasm metastatic to bone (H)     Malignant neoplasm of central portion of right breast in female, estrogen receptor positive (H)     Pathologic lumbar vertebral fracture     PONV (postoperative nausea and vomiting)        Past Surgical History:   Procedure Laterality Date    APPENDECTOMY      BREAST AUGMENTATION      CHOLECYSTECTOMY      OPTICAL TRACKING SYSTEM FUSION SPINE POSTERIOR LUMBAR THREE+ LEVELS Bilateral 7/3/2024    Procedure: THORACIC 11 TO LUMBAR 3 POSTEROLATERAL FUSION USING ALLOGRAFT,LUMBAR 1 BILATERAL LAMINECTOMY AND MEDIAL FACETECTOMY AND SEPARATION SURGERY, USE OF OSTEOCOOL DEVICE.;  Surgeon: Thomas Prieto MD;  Location:  OR       Physical Exam    BP (!) 152/105   Pulse 94   Resp 16   Ht 1.6 m (5' 3\")   Wt 73.3 kg (161 lb 11.2 oz)   SpO2 97%   BMI 28.64 kg/m      General: alert, awake, not in acute distress  HEENT: Head: Normal, normocephalic, atraumatic.  Eye: Normal external eye, conjunctiva, lids cornea, ALEJANDRA.  Nose: Normal external nose, mucus membranes and septum.  Pharynx: Normal buccal mucosa. Normal pharynx.  Neck / Thyroid: Supple, no masses, nodes, nodules or enlargement.  Lymphatics: No abnormally enlarged lymph nodes.  Extremities: normal strength, tone, and muscle mass  Skin: normal. no rash or abnormalities  CNS: non focal.    Lab Results    No results found for this or any previous visit (from the past 240 hour(s)).        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 Incisional infection. COMPARISON: Two view thoracolumbar spine " 7/6/2024.     IMPRESSION: Posterior spine fusion hardware from T11 down to the lumbar spine again noted. Chronic compression fracture deformity of the L1 vertebral body again noted. Vertebral body heights and contours of the thoracic spine remain normal. Alignment of the thoracic vertebrae is normal. HARESH OSBORN MD   SYSTEM ID:  [id number]     The longitudinal plan of care for the diagnosis(es)/condition(s) as documented were addressed during this visit. Due to the added complexity in care, I will continue to support Adriana Wei in the subsequent management and with ongoing continuity of care.     40 minutes spent by me on the date of the encounter doing chart review, history and exam, documentation and further activities as noted above.    Signed by: Magaly Bender MD

## 2024-08-23 ENCOUNTER — ANCILLARY PROCEDURE (OUTPATIENT)
Dept: GENERAL RADIOLOGY | Facility: CLINIC | Age: 69
End: 2024-08-23
Attending: NEUROLOGICAL SURGERY
Payer: COMMERCIAL

## 2024-08-23 DIAGNOSIS — C79.51 METASTASIS TO SPINAL COLUMN (H): ICD-10-CM

## 2024-08-23 DIAGNOSIS — T81.49XA INCISIONAL INFECTION: ICD-10-CM

## 2024-08-23 PROCEDURE — 72070 X-RAY EXAM THORAC SPINE 2VWS: CPT | Mod: TC | Performed by: RADIOLOGY

## 2024-08-27 NOTE — PROGRESS NOTES
"Virtual Visit Details    Type of service:  Telephone Visit   Phone call duration: 17 minutes   Originating Location (pt. Location): Home  Distant Location (provider location):  Off-site    8/28/2024    Referring Provider: Magaly Bender MD     Presenting Information:   I spoke with Adriana over the phone today to discuss her genetic testing results. Her blood was drawn on 8/5/2024 and Multi-Cancer panel testing was ordered from [facility]. This testing was done because of her personal and family history of cancer.     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 We discussed that this mutation is associated with an increased risk for breast, ovarian, pancreatic, and possibly other cancers. We discussed the impact of this testing on Adriana in detail.     Of note, Adriana is negative for mutations in the AIP, ALK, APC, AXIN2, BAP1, BARD1, BLM, BMPR1A, BRCA1, BRCA2, BRIP1, CDC73, CDH1, CDK4, CDKN1B, CDKN2A, CHEK2, CTNNA1, DICER1, EGFR, EPCAM, FH, FLCN, GREM1, HOXB13, KIT, LZTR1, MAX, MBD4, MEN1, MET, MITF, MLH1, MSH2, MSH3, MSH6, MUTYH, NF1, NF2, NTHL1, PALB2, PDGFRA, PMS2, POLD1, POLE, POT1, ALBUY2A, PTCH1, PTEN, RAD51C, RAD51D, RB1, RET, SDHA, SDHAF2, SDHB, SDHC, SDHD, SMAD4, SMARCA4, SMARCB1, SMARCE1, STK11, SUFU, PIYW065, TP53, TSC1, TSC2, and VHL genes.      No mutations were found in any of the remaining 69 genes analyzed. This test involved sequencing and deletion/duplication analysis of all genes with the exceptions of EPCAM and GREM1 (deletions/duplications only), MITF (only the status of the c.952G>A (p.E318K) alteration is analyzed and reported), and SDHA (sequencing only).      A copy of the test report can be found in the Laboratory tab, dated 8/5/2024, and named \"LABORATORY MISCELLANEOUS ORDER\". The report is scanned in as a linked document.    Cancer Risks:    Individuals with an BAUDILIO mutation are at increased risk " of certain cancers. These risks may be influenced by family history:   The lifetime breast cancer risk for women who carry one BAUDILIO mutation is approximately 20-30%, compared to 12.8% in the general population. Approximately 1% of women diagnosed with breast cancer carry a variant in the BAUDILIO gene, with this frequency being higher in women diagnosed at early age.   Lifetime pancreatic cancer risk of 5-10%.   We also discussed the association of BAUDILIO mutations with increased risk for prostate cancer, however data is still limited in this area. Studies have also suggested that there may be a moderately increased risk for ovarian cancer in carriers of an BAUDILIO mutation (lifetime risk estimated to be 2-3%, compared to the general population risk of 1-2%), however insufficient evidence is available regarding recommendations for risk reducing surgery (RRSO). Though no exact lifetime risks are available at this time, there may be an increased risk for other cancers.     We reviewed that the BAUDILIO gene is currently considered a moderate-risk gene. This means that mutations in this gene increase the risk for certain cancers, but are unlikely to be the single cause for an individual's cancer. There are likely other genetic and/or environmental risk factors that, in combination with the BAUDILIO gene mutation, may be associated with the cancers in Adriana's family.    Recommendations for Screening and Management:   The following screening is recommended for individuals who carry one BAUDILIO mutation, per current National Comprehensive Cancer Network (NCCN) guidelines.  Women with BAUDILIO mutations qualify for high risk breast screening.  High-risk breast cancer screening recommendations: annual mammograms with consideration of tomosynthesis at age 40, and consider breast MRI with and without contrast starting at age 30-35 (mammograms and breast MRIs alternate every 6 months).  There is currently insufficient data regarding the benefit of  risk-reducing mastectomy for women who carry an BAUDILIO mutation. As such, the decision to pursue a risk-reducing mastectomy should be based on both the BAUDILIO mutation and family history and should be discussed in greater detail with an individual's medical providers.  Screening for pancreatic cancer, including annual contract-enhanced MRI/MRCP and/or EUS, is recommended based on family history. Per current NCCN guidelines, this screening may be considered for individuals who carry an BAUDILIO mutation and have 1 or more first- or second-degree relatives who are also presumed to carry the same gene alteration. Screening should start at age 50, or 10 years younger than the earliest exocrine pancreatic cancer diagnosis in the family. At this time, Adriana Wei does not have any first- or second-degree relatives with pancreatic cancer history.  Screening for prostate cancer can be considered starting at age 40.  There are currently no published screening recommendations for other cancers, such as ovarian, potentially associated with an BAUDILIO mutation. Decisions regarding possible risk reduction and screening may be made based on family history.     Other screening based on Adriana's personal and family history:  We discussed that Adriana Wei should continue to follow her oncology team's recommendations for the treatment of her metastatic breast cancer. These results will be routed to her oncology team.  Due to the close family history of melanoma in her son, Adriana her son's other close relatives remain at some increased risk for developing melanoma. According to the American Cancer Society, these individuals are encouraged to speak to their primary care providers about having regular skin exams and safe skin practices (i.e. sunscreen, self skin exams, limited sun exposure, etc.).  Other population cancer screening options, such as those recommended by the American Cancer Society and the National Comprehensive Cancer Network  (NCCN), are also appropriate for Adriana and her family. These screening recommendations may change if there are changes to Adriana's personal and/or family history. Final screening recommendations should be made by each individual's managing physician.    We reviewed that the focus for Adriana Wei at this time is her breast cancer treatment. We discussed that Adriana could participate in our Cancer Risk Management Program in which our nursing specialist provides an individual screening plan and assists with medical management should she need that in the future. She was encouraged to reach out to me if she ever would like a referral.     Of note, the above information is based on our current understanding of Adriana's genetic findings. Adriana is encouraged to reach out to me regularly regarding any pertinent updates to her personal and/or family history of cancer, as our understanding of the genetic findings in her family may change over time.     Implications for Family Members:  We reviewed that mutations in the BAUDILIO gene are inherited in an autosomal dominant pattern. This means that each of Adriana's children have a 50% chance of inheriting the same mutation. Likewise, each of her siblings has a 50% risk of having the same mutation. Extended relatives may also carry this mutation, and she is encouraged to share this information with her family members on both sides of the family. I am happy to help her relatives connect with a genetic counselor in their area if they would like to discuss testing.    We also discussed reproductive implications of having an BAUDILIO mutation. In rare situations in which both parents have a mutation in the BAUDILIO gene, their children each have a 25% risk for ataxia-telangiectasia. Ataxia-telangiectasia is a rare autosomal recessive disorder that typically presents in childhood and can present with widened blood vessels called telangiectasias, progressive neurologic symptoms, immune  "deficiency, and increased risk for childhood cancers. For individuals of childbearing age with BAUDILIO mutations, genetic counseling and genetic testing may be advised for their partners.    Plan:  1.  A copy of her results will be released to her email via the [employer] portal .  2.  I will provide a \"Dear Relative\" letter for Adriana to share with her family members.  3.  She plans to follow up with her oncology for ongoing breast cancer treatment.    Norma Stone MS, [facility]  Licensed, Certified Genetic Counselor  [facility]  [email or web address]      "

## 2024-08-28 ENCOUNTER — HOSPITAL ENCOUNTER (OUTPATIENT)
Dept: CARDIOLOGY | Facility: CLINIC | Age: 69
Discharge: HOME | End: 2024-08-28
Attending: INTERNAL MEDICINE | Admitting: INTERNAL MEDICINE
Payer: COMMERCIAL

## 2024-08-28 ENCOUNTER — VIRTUAL VISIT (OUTPATIENT)
Dept: ONCOLOGY | Facility: CLINIC | Age: 69
End: 2024-08-28
Attending: GENETIC COUNSELOR, MS
Payer: COMMERCIAL

## 2024-08-28 DIAGNOSIS — Z17.0 MALIGNANT NEOPLASM OF CENTRAL PORTION OF RIGHT BREAST IN FEMALE, ESTROGEN RECEPTOR POSITIVE (H): ICD-10-CM

## 2024-08-28 DIAGNOSIS — Z80.0 FAMILY HISTORY OF MALIGNANT NEOPLASM OF GASTROINTESTINAL TRACT: ICD-10-CM

## 2024-08-28 DIAGNOSIS — Z80.41 FAMILY HISTORY OF MALIGNANT NEOPLASM OF OVARY: ICD-10-CM

## 2024-08-28 DIAGNOSIS — Z15.01 MONOALLELIC MUTATION OF ATM GENE: Primary | ICD-10-CM

## 2024-08-28 DIAGNOSIS — Z80.8 FAMILY HISTORY OF MELANOMA: ICD-10-CM

## 2024-08-28 DIAGNOSIS — Z80.3 FAMILY HISTORY OF MALIGNANT NEOPLASM OF BREAST: ICD-10-CM

## 2024-08-28 DIAGNOSIS — Z15.09 MONOALLELIC MUTATION OF ATM GENE: Primary | ICD-10-CM

## 2024-08-28 DIAGNOSIS — Z15.89 MONOALLELIC MUTATION OF ATM GENE: Primary | ICD-10-CM

## 2024-08-28 DIAGNOSIS — C50.111 MALIGNANT NEOPLASM OF CENTRAL PORTION OF RIGHT BREAST IN FEMALE, ESTROGEN RECEPTOR POSITIVE (H): ICD-10-CM

## 2024-08-28 LAB
ATRIAL RATE - MUSE: 93 BPM
DIASTOLIC BLOOD PRESSURE - MUSE: NORMAL MMHG
INTERPRETATION ECG - MUSE: NORMAL
P AXIS - MUSE: 52 DEGREES
PR INTERVAL - MUSE: 154 MS
QRS DURATION - MUSE: 82 MS
QT - MUSE: 372 MS
QTC - MUSE: 462 MS
R AXIS - MUSE: -29 DEGREES
SYSTOLIC BLOOD PRESSURE - MUSE: NORMAL MMHG
T AXIS - MUSE: -12 DEGREES
VENTRICULAR RATE- MUSE: 93 BPM

## 2024-08-28 PROCEDURE — 93010 ELECTROCARDIOGRAM REPORT: CPT | Performed by: INTERNAL MEDICINE

## 2024-08-28 PROCEDURE — 93005 ELECTROCARDIOGRAM TRACING: CPT

## 2024-08-28 PROCEDURE — 96040 HC GENETIC COUNSELING, EACH 30 MINUTES: CPT | Mod: TEL,95 | Performed by: GENETIC COUNSELOR, MS

## 2024-08-28 NOTE — NURSING NOTE
Current patient location: [address] [facility] [address]    Is the patient currently in the state of [place]? YES    Visit mode:TELEPHONE    If the visit is dropped, the patient can be reconnected by: TELEPHONE VISIT: Phone number:   Telephone Information:   Mobile [phone]       Will anyone else be joining the visit? SonHo, is present and can interpret if needed  (If patient encounters technical issues they should call [phone]    How would you like to obtain your AVS? MyChart    Are changes needed to the allergy or medication list? N/A    Are refills needed on medications prescribed by this physician? NO    Reason for visit: RECHECK    Arline CHACKO

## 2024-08-28 NOTE — LETTER
8/28/2024      Adriana St  [address] [facility] [address]      Dear Colleague,    Thank you for referring your patient, Adriana St, to the [facility]. Please see a copy of my visit note below.    Virtual Visit Details    Type of service:  Telephone Visit   Phone call duration: 17 minutes   Originating Location (pt. Location): Home  Distant Location (provider location):  Off-site    8/28/2024    Referring Provider: Magaly Bender MD     Presenting Information:   I spoke with Adriana over the phone today to discuss her genetic testing results. Her blood was drawn on 8/5/2024 and Multi-Cancer panel testing was ordered from [facility]. This testing was done because of her personal and family history of cancer.     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 We discussed that this mutation is associated with an increased risk for breast, ovarian, pancreatic, and possibly other cancers. We discussed the impact of this testing on Adriana in detail.     Of note, Adriana is negative for mutations in the AIP, ALK, APC, AXIN2, BAP1, BARD1, BLM, BMPR1A, BRCA1, BRCA2, BRIP1, CDC73, CDH1, CDK4, CDKN1B, CDKN2A, CHEK2, CTNNA1, DICER1, EGFR, EPCAM, FH, FLCN, GREM1, HOXB13, KIT, LZTR1, MAX, MBD4, MEN1, MET, MITF, MLH1, MSH2, MSH3, MSH6, MUTYH, NF1, NF2, NTHL1, PALB2, PDGFRA, PMS2, POLD1, POLE, POT1, ZCNPF4V, PTCH1, PTEN, RAD51C, RAD51D, RB1, RET, SDHA, SDHAF2, SDHB, SDHC, SDHD, SMAD4, SMARCA4, SMARCB1, SMARCE1, STK11, SUFU, MFGO311, TP53, TSC1, TSC2, and VHL genes.      No mutations were found in any of the remaining 69 genes analyzed. This test involved sequencing and deletion/duplication analysis of all genes with the exceptions of EPCAM and GREM1 (deletions/duplications only), MITF (only the status of the c.952G>A (p.E318K) alteration is analyzed and reported), and SDHA  "(sequencing only).      A copy of the test report can be found in the Laboratory tab, dated 8/5/2024, and named \"LABORATORY MISCELLANEOUS ORDER\". The report is scanned in as a linked document.    Cancer Risks:    Individuals with an BAUDILIO mutation are at increased risk of certain cancers. These risks may be influenced by family history:   The lifetime breast cancer risk for women who carry one BAUDILIO mutation is approximately 20-30%, compared to 12.8% in the general population. Approximately 1% of women diagnosed with breast cancer carry a variant in the BAUDILIO gene, with this frequency being higher in women diagnosed at early age.   Lifetime pancreatic cancer risk of 5-10%.   We also discussed the association of BAUDILIO mutations with increased risk for prostate cancer, however data is still limited in this area. Studies have also suggested that there may be a moderately increased risk for ovarian cancer in carriers of an BAUDILIO mutation (lifetime risk estimated to be 2-3%, compared to the general population risk of 1-2%), however insufficient evidence is available regarding recommendations for risk reducing surgery (RRSO). Though no exact lifetime risks are available at this time, there may be an increased risk for other cancers.     We reviewed that the BAUDILIO gene is currently considered a moderate-risk gene. This means that mutations in this gene increase the risk for certain cancers, but are unlikely to be the single cause for an individual's cancer. There are likely other genetic and/or environmental risk factors that, in combination with the BAUDILIO gene mutation, may be associated with the cancers in Adriana's family.    Recommendations for Screening and Management:   The following screening is recommended for individuals who carry one BAUDILIO mutation, per current National Comprehensive Cancer Network (NCCN) guidelines.  Women with BAUDILIO mutations qualify for high risk breast screening.  High-risk breast cancer screening " recommendations: annual mammograms with consideration of tomosynthesis at age 40, and consider breast MRI with and without contrast starting at age 30-35 (mammograms and breast MRIs alternate every 6 months).  There is currently insufficient data regarding the benefit of risk-reducing mastectomy for women who carry an BAUDILIO mutation. As such, the decision to pursue a risk-reducing mastectomy should be based on both the BAUDILIO mutation and family history and should be discussed in greater detail with an individual's medical providers.  Screening for pancreatic cancer, including annual contract-enhanced MRI/MRCP and/or EUS, is recommended based on family history. Per current NCCN guidelines, this screening may be considered for individuals who carry an BAUDILIO mutation and have 1 or more first- or second-degree relatives who are also presumed to carry the same gene alteration. Screening should start at age 50, or 10 years younger than the earliest exocrine pancreatic cancer diagnosis in the family. At this time, Adriana Wei does not have any first- or second-degree relatives with pancreatic cancer history.  Screening for prostate cancer can be considered starting at age 40.  There are currently no published screening recommendations for other cancers, such as ovarian, potentially associated with an BAUDILIO mutation. Decisions regarding possible risk reduction and screening may be made based on family history.     Other screening based on Adriana's personal and family history:  We discussed that Adriana Wei should continue to follow her oncology team's recommendations for the treatment of her metastatic breast cancer. These results will be routed to her oncology team.  Due to the close family history of melanoma in her son, Adriana her son's other close relatives remain at some increased risk for developing melanoma. According to the American Cancer Society, these individuals are encouraged to speak to their primary care  providers about having regular skin exams and safe skin practices (i.e. sunscreen, self skin exams, limited sun exposure, etc.).  Other population cancer screening options, such as those recommended by the American Cancer Society and the National Comprehensive Cancer Network (NCCN), are also appropriate for Adriana and her family. These screening recommendations may change if there are changes to Adraina's personal and/or family history. Final screening recommendations should be made by each individual's managing physician.    We reviewed that the focus for Adriana Wei at this time is her breast cancer treatment. We discussed that Adriana could participate in our Cancer Risk Management Program in which our nursing specialist provides an individual screening plan and assists with medical management should she need that in the future. She was encouraged to reach out to me if she ever would like a referral.     Of note, the above information is based on our current understanding of Adriana's genetic findings. Adriana is encouraged to reach out to me regularly regarding any pertinent updates to her personal and/or family history of cancer, as our understanding of the genetic findings in her family may change over time.     Implications for Family Members:  We reviewed that mutations in the BAUDILIO gene are inherited in an autosomal dominant pattern. This means that each of Adriana's children have a 50% chance of inheriting the same mutation. Likewise, each of her siblings has a 50% risk of having the same mutation. Extended relatives may also carry this mutation, and she is encouraged to share this information with her family members on both sides of the family. I am happy to help her relatives connect with a genetic counselor in their area if they would like to discuss testing.    We also discussed reproductive implications of having an BAUDILIO mutation. In rare situations in which both parents have a mutation in the BAUDILIO gene,  "their children each have a 25% risk for ataxia-telangiectasia. Ataxia-telangiectasia is a rare autosomal recessive disorder that typically presents in childhood and can present with widened blood vessels called telangiectasias, progressive neurologic symptoms, immune deficiency, and increased risk for childhood cancers. For individuals of childbearing age with BAUDILIO mutations, genetic counseling and genetic testing may be advised for their partners.    Plan:  1.  A copy of her results will be released to her email via the [employer] portal .  2.  I will provide a \"Dear Relative\" letter for Adriana to share with her family members.  3.  She plans to follow up with her oncology for ongoing breast cancer treatment.    Norma Stone MS, [facility]  Licensed, Certified Genetic Counselor  [facility]  [email or web address]        Again, thank you for allowing me to participate in the care of your patient.        Sincerely,        Norma Jackson GC  "

## 2024-08-28 NOTE — LETTER
8/28/2024    Dear Relative:  The purpose of this letter is to inform you that your relative recently underwent genetic counseling and genetic testing due to personal/family history of cancer.  The testing done through [facility] identified a mutation in the BAUDILIO gene.  Specifically, the mutation is called c.640del (p.[id number]*16).  The accession number linked to their test result is [id number].   A mutation (or change in the genetic code) causes a specific gene to stop working properly.  Ultimately, individuals who have a mutation in this BAUDILIO gene have an increased risk for certain types of cancer.  BAUDILIO mutations increase the lifetime risk of breast cancer to approximately 20-30% over a woman s lifetime, compared to 12% in the general population.  Ovarian cancer risk is increased to 2-3% lifetime risk compared to 1.5% in the general population. For men, BAUDILIO mutations may be associated with an increased risk for prostate cancer.  For both men and women, BAUDILIO mutations have also been associated with an increased risk for pancreatic cancer.  Both men and women have a 50% chance of passing this mutation on to each of their children.  If individuals are found to have a mutation in the BAUDILIO gene, we would recommend increased breast cancer screening.  Additional cancer screening recommendations may be made based on family history   In rare situations in which both parents have a mutation in the BAUDILIO gene, their children each have a 25% risk for ataxia-telangiectasia. Ataxia-telangiectasia is a disorder that affects the nervous system, immune system, and other systems.  Individuals with this disorder have trouble with balance and coordinating movements (called ataxia) beginning in childhood.  Affected individuals also have widened blood vessels called telangiectasias. People with ataxia-telangiectasia often have a weakened immune system and have an increased risk of developing childhood cancers.  For individuals of  childbearing age with BAUDILIO mutations, genetic counseling and genetic testing may be advised for their partners.  I understand that this may be surprising, unexpected, and even scary news.  Because this mutation has been identified in your family, you are at risk for having the same mutation.  As mentioned earlier, your children may also be at risk.    Scheduling a genetic counseling appointment does not mean you have to undergo genetic testing.  The decision to pursue such testing is a very personal one that is discussed in more detail during the session.  Much of cancer genetic counseling is providing valuable information to individuals who are impacted by genetic information such as this.    If you are interested in scheduling a genetic counseling appointment at [facility], please call [phone] to schedule an appointment. You can also find a genetic counselor close to you or at another health system at [employer]  Sincerely,   Norma Stone MS, [facility]  Licensed, Certified Genetic Counselor  [facility]

## 2024-08-29 ENCOUNTER — APPOINTMENT (OUTPATIENT)
Dept: RADIATION ONCOLOGY | Facility: HOSPITAL | Age: 69
End: 2024-08-29
Attending: RADIOLOGY
Payer: COMMERCIAL

## 2024-08-29 PROCEDURE — 77280 THER RAD SIMULAJ FIELD SMPL: CPT | Mod: 26 | Performed by: RADIOLOGY

## 2024-08-29 PROCEDURE — 77407 RADIATION TX DELIVERY LVL 2: CPT | Performed by: RADIOLOGY

## 2024-08-29 PROCEDURE — 77280 THER RAD SIMULAJ FIELD SMPL: CPT | Performed by: RADIOLOGY

## 2024-08-30 ENCOUNTER — APPOINTMENT (OUTPATIENT)
Dept: RADIATION ONCOLOGY | Facility: HOSPITAL | Age: 69
End: 2024-08-30
Attending: RADIOLOGY
Payer: COMMERCIAL

## 2024-08-30 PROCEDURE — 77387 GUIDANCE FOR RADJ TX DLVR: CPT | Mod: 26 | Performed by: RADIOLOGY

## 2024-08-30 PROCEDURE — 77407 RADIATION TX DELIVERY LVL 2: CPT | Performed by: RADIOLOGY

## 2024-08-30 PROCEDURE — 77387 GUIDANCE FOR RADJ TX DLVR: CPT | Performed by: RADIOLOGY

## 2024-09-03 ENCOUNTER — APPOINTMENT (OUTPATIENT)
Dept: RADIATION ONCOLOGY | Facility: HOSPITAL | Age: 69
End: 2024-09-03
Attending: RADIOLOGY
Payer: COMMERCIAL

## 2024-09-03 ENCOUNTER — TELEPHONE (OUTPATIENT)
Dept: ONCOLOGY | Facility: HOSPITAL | Age: 69
End: 2024-09-03

## 2024-09-03 VITALS
HEART RATE: 88 BPM | OXYGEN SATURATION: 96 % | DIASTOLIC BLOOD PRESSURE: 103 MMHG | RESPIRATION RATE: 16 BRPM | SYSTOLIC BLOOD PRESSURE: 176 MMHG

## 2024-09-03 DIAGNOSIS — C50.911 CARCINOMA OF RIGHT BREAST METASTATIC TO BONE (H): Primary | ICD-10-CM

## 2024-09-03 DIAGNOSIS — C50.111 MALIGNANT NEOPLASM OF CENTRAL PORTION OF RIGHT BREAST IN FEMALE, ESTROGEN RECEPTOR POSITIVE (H): ICD-10-CM

## 2024-09-03 DIAGNOSIS — C79.51 MALIGNANT NEOPLASM METASTATIC TO BONE (H): ICD-10-CM

## 2024-09-03 DIAGNOSIS — C79.51 CARCINOMA OF RIGHT BREAST METASTATIC TO BONE (H): Primary | ICD-10-CM

## 2024-09-03 DIAGNOSIS — Z17.0 MALIGNANT NEOPLASM OF CENTRAL PORTION OF RIGHT BREAST IN FEMALE, ESTROGEN RECEPTOR POSITIVE (H): ICD-10-CM

## 2024-09-03 PROCEDURE — 77387 GUIDANCE FOR RADJ TX DLVR: CPT | Performed by: RADIOLOGY

## 2024-09-03 PROCEDURE — 77407 RADIATION TX DELIVERY LVL 2: CPT | Performed by: RADIOLOGY

## 2024-09-03 PROCEDURE — 77387 GUIDANCE FOR RADJ TX DLVR: CPT | Mod: 26 | Performed by: RADIOLOGY

## 2024-09-03 RX ORDER — NYSTATIN AND TRIAMCINOLONE ACETONIDE 100000; 1 [USP'U]/G; MG/G
CREAM TOPICAL 3 TIMES DAILY
COMMUNITY

## 2024-09-03 RX ORDER — METOPROLOL SUCCINATE 25 MG/1
25 TABLET, EXTENDED RELEASE ORAL DAILY
COMMUNITY
Start: 2024-08-20 | End: 2024-09-09

## 2024-09-03 ASSESSMENT — PAIN SCALES - GENERAL: PAINLEVEL: MILD PAIN (3)

## 2024-09-03 NOTE — PROGRESS NOTES
RADIATION ONCOLOGY WEEKLY TREATMENT VISIT NOTE      Assessment / Impression       Visit Dx:  (C50.911,  C79.51) Carcinoma of right breast metastatic to bone (H)  (primary encounter diagnosis)    Tolerating radiation therapy well.  All questions and concerns addressed.    Plan:     Continue radiation treatment as prescribed.    The surgical wound continue to heal well with no evidence of infection    Pain Management Plan: NA    Subjective:      HPI: Adriana St is a 68 year old female with  Carcinoma of right breast metastatic to bone (H) [C50.911, C79.51]    The following portions of the patient's history were reviewed and updated as appropriate: allergies, current medications, past family history, past medical history, past social history, past surgical history and problem list.    Assessment                  Body Site:  Bone Information  Site: T12-L1  Stereotactic Radiosurgery: No  Today's Dose: 900  Total Dose for Bone: 3000  Today's Fraction/Total Fraction Bone: 310  Comfort Alteration  KPS: 70% Cannot do active work, but can care for self  Fatigue (ONS scale): 0: No Fatigue  Pain Location: low back, R sciatic nerve pain  Pain Intensity. Rate degree of pain ranging from 0 (no pain) to 10 (severe pain): 3  Pain Description: Sharp - Sharp, stabbing, knife like pain  Pain Intervention: 3: Opiods  Effectiveness of pain intervention: 3: Pain relieved 75%  Elimination Alteration  Diarrhea W/O Colostomy: 0: None  Constipation: 2: Requiring laxatives  Urinary Incontinence: 0: None  Fall Risk  Have you fallen since last visit?: no  Are you unsteady?: yes  Skin Alteration  Skin Sensation: 0: No problem  Skin Reaction: 0: None  CNS Alteration  Neuropathy - motor: 2: Mild objective weakness interfering with function, but not interfering with activites of daily living  Nutrition Alteration  Anorexia: 0: None  Nausea: 0: None  Vomitin: None  Pharynx and Esphogaus: 0: No change over baseline  Elimination  Alteration  Constipation: 2: Requiring laxatives  Diarrhea W/O Colostomy: 0: None  Bowel Incontinence: 0: None  Urinary Incontinence: 0: None                                   Sexuality Alteration                    Emotional Alteration       Comfort Alteration   KPS: 70% Cannot do active work, but can care for self  Fatigue (ONS scale): 0: No Fatigue  Pain Location: low back, R sciatic nerve pain  Pain Intensity. Rate degree of pain ranging from 0 (no pain) to 10 (severe pain): 3  Pain Description: Sharp - Sharp, stabbing, knife like pain  Pain Intervention: 3: Opiods  Effectiveness of pain intervention: 3: Pain relieved 75%   Nutrition Alteration   Anorexia: 0: None  Nausea: 0: None  Vomitin: None  Pharynx and Esphogaus: 0: No change over baseline  Skin Alteration   Skin Sensation: 0: No problem  Skin Reaction: 0: None  AUA Assessment                                           Accompanied by       Objective:     Exam: no Erythema.    Vitals:    24 1109   BP: (!) 176/103   Pulse: 88   Resp: 16   SpO2: 96%   PainSc: Mild Pain (3)   PainLoc: Low Back       Wt Readings from Last 8 Encounters:   24 73.3 kg (161 lb 11.2 oz)   24 73.8 kg (162 lb 9.6 oz)   24 72.6 kg (160 lb)   24 72.6 kg (160 lb)   24 73.7 kg (162 lb 6.4 oz)   24 73.2 kg (161 lb 6.4 oz)   24 73.1 kg (161 lb 3.2 oz)   24 73.1 kg (161 lb 3.2 oz)       General: Alert and oriented, in no acute distress  Adriana has no Erythema.  Aria chart and setup information reviewed    Laura Alexander MD

## 2024-09-03 NOTE — ORAL ONC MGMT
"Oral Chemotherapy Monitoring Program    Subjective/Objective:  Adriana St is a 68 year old female contacted by phone for an initial visit for oral chemotherapy education. Reminded patient of dose and dosing schedule.        6/19/2024     8:00 AM 9/3/2024     2:00 PM   ORAL CHEMOTHERAPY   Assessment Type Initial Work up New Teach   Diagnosis Code Breast Cancer Breast Cancer   Providers Dr. Napoleon Bender   Clinic Name/Location [facility]   Drug Name Kisqali (ribociclib) Kisqali (ribociclib)   Dose 600 mg 600 mg   Current Schedule Daily Daily   Cycle Details 3 weeks on, 1 week off 3 weeks on, 1 week off   Planned next cycle start date  9/11/2024   Any new drug interactions?  No   Is the dose as ordered appropriate for the patient?  Yes       Last PHQ-2 Score on record:       7/15/2024     2:41 PM 7/15/2024     2:40 PM   PHQ-2 ( 1999 [employer])   Q1: Little interest or pleasure in doing things 1 0   Q2: Feeling down, depressed or hopeless 1 0   PHQ-2 Score 2 0       Vitals:  BP:   BP Readings from Last 1 Encounters:   09/03/24 (!) 176/103     Wt Readings from Last 1 Encounters:   08/21/24 73.3 kg (161 lb 11.2 oz)     Estimated body surface area is 1.81 meters squared as calculated from the following:    Height as of 8/21/24: 1.6 m (5' 3\").    Weight as of 8/21/24: 73.3 kg (161 lb 11.2 oz).    Labs:  _  Result Component Current Result Ref Range   Sodium 141 (8/21/2024) 135 - 145 mmol/L     _  Result Component Current Result Ref Range   Potassium 3.6 (8/21/2024) 3.4 - 5.3 mmol/L     _  Result Component Current Result Ref Range   Calcium 10.2 (8/21/2024) 8.8 - 10.4 mg/dL     _  Result Component Current Result Ref Range   Magnesium 1.9 (8/21/2024) 1.7 - 2.3 mg/dL     _  Result Component Current Result Ref Range   Phosphorus 4.1 (8/21/2024) 2.5 - 4.5 mg/dL     _  Result Component Current Result Ref Range   Albumin 4.0 (8/21/2024) 3.5 - 5.2 g/dL     _  Result Component Current Result Ref Range   Urea " Nitrogen 13.5 (8/21/2024) 8.0 - 23.0 mg/dL     _  Result Component Current Result Ref Range   Creatinine 0.69 (8/21/2024) 0.51 - 0.95 mg/dL     _  Result Component Current Result Ref Range   AST 19 (8/21/2024) 0 - 45 U/L     _  Result Component Current Result Ref Range   ALT 11 (8/21/2024) 0 - 50 U/L     _  Result Component Current Result Ref Range   Bilirubin Total 1.0 (8/21/2024) <=1.2 mg/dL     _  Result Component Current Result Ref Range   WBC Count 8.0 (8/21/2024) 4.0 - 11.0 10e3/uL     _  Result Component Current Result Ref Range   Hemoglobin 12.3 (8/21/2024) 11.7 - 15.7 g/dL     _  Result Component Current Result Ref Range   Platelet Count 283 (8/21/2024) 150 - 450 10e3/uL     No results found for ANC within last 30 days.     _  Result Component Current Result Ref Range   Absolute Neutrophils 4.4 (8/21/2024) 1.6 - 8.3 10e3/uL        Assessment:  Patient is appropriate to start therapy.    Plan:  Basic chemotherapy teaching was reviewed with the patient including indication, start date of therapy, dose, administration, adverse effects, missed doses, food and drug interactions, monitoring, side effect management, office contact information, and safe handling. Written materials were provided and all questions answered.    Patient is aware to call [facility] early next week if she has not heard from them. Anticipates starting oral chemotherapy on 9/11/24.    Follow-Up:  Plan for follow-up call one week after starting. Adriana St agrees to the plan and knows they can call us in the meantime if they have any questions or concerns.     Rosaura Whitaker, PharmD  Oral Chemotherapy Monitoring Program

## 2024-09-03 NOTE — LETTER
9/3/2024      Adriana St  [address] [facility] [address]      Dear Colleague,    Thank you for referring your patient, Adriana St, to the [facility] RADIATION ONCOLOGY [place]. Please see a copy of my visit note below.    RADIATION ONCOLOGY WEEKLY TREATMENT VISIT NOTE      Assessment / Impression       Visit Dx:  (C50.911,  C79.51) Carcinoma of right breast metastatic to bone (H)  (primary encounter diagnosis)    Tolerating radiation therapy well.  All questions and concerns addressed.    Plan:     Continue radiation treatment as prescribed.    The surgical wound continue to heal well with no evidence of infection    Pain Management Plan: NA    Subjective:      HPI: Adriana St is a 68 year old female with  Carcinoma of right breast metastatic to bone (H) [C50.911, C79.51]    The following portions of the patient's history were reviewed and updated as appropriate: allergies, current medications, past family history, past medical history, past social history, past surgical history and problem list.    Assessment                  Body Site:  Bone Information  Site: T12-L1  Stereotactic Radiosurgery: No  Today's Dose: 900  Total Dose for Bone: 3000  Today's Fraction/Total Fraction Bone: 3/10  Comfort Alteration  KPS: 70% Cannot do active work, but can care for self  Fatigue (ONS scale): 0: No Fatigue  Pain Location: low back, R sciatic nerve pain  Pain Intensity. Rate degree of pain ranging from 0 (no pain) to 10 (severe pain): 3  Pain Description: Sharp - Sharp, stabbing, knife like pain  Pain Intervention: 3: Opiods  Effectiveness of pain intervention: 3: Pain relieved 75%  Elimination Alteration  Diarrhea W/O Colostomy: 0: None  Constipation: 2: Requiring laxatives  Urinary Incontinence: 0: None  Fall Risk  Have you fallen since last visit?: no  Are you unsteady?: yes  Skin Alteration  Skin Sensation: 0: No problem  Skin Reaction: 0: None  CNS  Alteration  Neuropathy - motor: 2: Mild objective weakness interfering with function, but not interfering with activites of daily living  Nutrition Alteration  Anorexia: 0: None  Nausea: 0: None  Vomitin: None  Pharynx and Esphogaus: 0: No change over baseline  Elimination Alteration  Constipation: 2: Requiring laxatives  Diarrhea W/O Colostomy: 0: None  Bowel Incontinence: 0: None  Urinary Incontinence: 0: None                                   Sexuality Alteration                    Emotional Alteration       Comfort Alteration   KPS: 70% Cannot do active work, but can care for self  Fatigue (ONS scale): 0: No Fatigue  Pain Location: low back, R sciatic nerve pain  Pain Intensity. Rate degree of pain ranging from 0 (no pain) to 10 (severe pain): 3  Pain Description: Sharp - Sharp, stabbing, knife like pain  Pain Intervention: 3: Opiods  Effectiveness of pain intervention: 3: Pain relieved 75%   Nutrition Alteration   Anorexia: 0: None  Nausea: 0: None  Vomitin: None  Pharynx and Esphogaus: 0: No change over baseline  Skin Alteration   Skin Sensation: 0: No problem  Skin Reaction: 0: None  AUA Assessment                                           Accompanied by       Objective:     Exam: no Erythema.    Vitals:    24 1109   BP: (!) 176/103   Pulse: 88   Resp: 16   SpO2: 96%   PainSc: Mild Pain (3)   PainLoc: Low Back       Wt Readings from Last 8 Encounters:   24 73.3 kg (161 lb 11.2 oz)   24 73.8 kg (162 lb 9.6 oz)   24 72.6 kg (160 lb)   24 72.6 kg (160 lb)   24 73.7 kg (162 lb 6.4 oz)   24 73.2 kg (161 lb 6.4 oz)   24 73.1 kg (161 lb 3.2 oz)   24 73.1 kg (161 lb 3.2 oz)       General: Alert and oriented, in no acute distress  Adriana has no Erythema.  Aria chart and setup information reviewed    Laura Alexander MD      Again, thank you for allowing me to participate in the care of your patient.        Sincerely,        Laura Alexander MD

## 2024-09-04 ENCOUNTER — APPOINTMENT (OUTPATIENT)
Dept: RADIATION ONCOLOGY | Facility: HOSPITAL | Age: 69
End: 2024-09-04
Attending: RADIOLOGY
Payer: COMMERCIAL

## 2024-09-04 PROCEDURE — 77387 GUIDANCE FOR RADJ TX DLVR: CPT | Mod: 26 | Performed by: STUDENT IN AN ORGANIZED HEALTH CARE EDUCATION/TRAINING PROGRAM

## 2024-09-04 PROCEDURE — 77387 GUIDANCE FOR RADJ TX DLVR: CPT | Performed by: STUDENT IN AN ORGANIZED HEALTH CARE EDUCATION/TRAINING PROGRAM

## 2024-09-04 PROCEDURE — 77407 RADIATION TX DELIVERY LVL 2: CPT | Performed by: STUDENT IN AN ORGANIZED HEALTH CARE EDUCATION/TRAINING PROGRAM

## 2024-09-05 ENCOUNTER — APPOINTMENT (OUTPATIENT)
Dept: RADIATION ONCOLOGY | Facility: HOSPITAL | Age: 69
End: 2024-09-05
Attending: RADIOLOGY
Payer: COMMERCIAL

## 2024-09-05 PROCEDURE — 77387 GUIDANCE FOR RADJ TX DLVR: CPT | Mod: 26 | Performed by: RADIOLOGY

## 2024-09-05 PROCEDURE — 77387 GUIDANCE FOR RADJ TX DLVR: CPT | Performed by: RADIOLOGY

## 2024-09-05 PROCEDURE — 77336 RADIATION PHYSICS CONSULT: CPT | Performed by: RADIOLOGY

## 2024-09-05 PROCEDURE — 77407 RADIATION TX DELIVERY LVL 2: CPT | Performed by: RADIOLOGY

## 2024-09-05 PROCEDURE — 77427 RADIATION TX MANAGEMENT X5: CPT | Performed by: RADIOLOGY

## 2024-09-06 ENCOUNTER — APPOINTMENT (OUTPATIENT)
Dept: RADIATION ONCOLOGY | Facility: HOSPITAL | Age: 69
End: 2024-09-06
Attending: RADIOLOGY
Payer: COMMERCIAL

## 2024-09-06 PROCEDURE — 77387 GUIDANCE FOR RADJ TX DLVR: CPT | Mod: 26 | Performed by: RADIOLOGY

## 2024-09-06 PROCEDURE — 77407 RADIATION TX DELIVERY LVL 2: CPT | Performed by: RADIOLOGY

## 2024-09-06 PROCEDURE — 77387 GUIDANCE FOR RADJ TX DLVR: CPT | Performed by: RADIOLOGY

## 2024-09-09 ENCOUNTER — OFFICE VISIT (OUTPATIENT)
Dept: RADIATION ONCOLOGY | Facility: HOSPITAL | Age: 69
End: 2024-09-09
Attending: RADIOLOGY
Payer: COMMERCIAL

## 2024-09-09 VITALS
SYSTOLIC BLOOD PRESSURE: 148 MMHG | OXYGEN SATURATION: 97 % | DIASTOLIC BLOOD PRESSURE: 85 MMHG | BODY MASS INDEX: 28.93 KG/M2 | TEMPERATURE: 97.9 F | WEIGHT: 163.3 LBS | HEART RATE: 76 BPM | RESPIRATION RATE: 20 BRPM

## 2024-09-09 DIAGNOSIS — C79.51 CARCINOMA OF RIGHT BREAST METASTATIC TO BONE (H): Primary | ICD-10-CM

## 2024-09-09 DIAGNOSIS — C50.911 CARCINOMA OF RIGHT BREAST METASTATIC TO BONE (H): Primary | ICD-10-CM

## 2024-09-09 PROBLEM — E11.9 DIABETES MELLITUS, TYPE 2 (H): Status: ACTIVE | Noted: 2024-09-09

## 2024-09-09 PROCEDURE — 77387 GUIDANCE FOR RADJ TX DLVR: CPT | Mod: 26 | Performed by: STUDENT IN AN ORGANIZED HEALTH CARE EDUCATION/TRAINING PROGRAM

## 2024-09-09 PROCEDURE — 77412 RADIATION TX DELIVERY LVL 3: CPT | Performed by: STUDENT IN AN ORGANIZED HEALTH CARE EDUCATION/TRAINING PROGRAM

## 2024-09-09 PROCEDURE — 77387 GUIDANCE FOR RADJ TX DLVR: CPT | Performed by: STUDENT IN AN ORGANIZED HEALTH CARE EDUCATION/TRAINING PROGRAM

## 2024-09-09 RX ORDER — LOSARTAN POTASSIUM 25 MG/1
1 TABLET ORAL DAILY
COMMUNITY
Start: 2024-09-05

## 2024-09-09 RX ORDER — METOPROLOL SUCCINATE 50 MG/1
TABLET, EXTENDED RELEASE ORAL
COMMUNITY
Start: 2024-09-05

## 2024-09-09 RX ORDER — LETROZOLE 2.5 MG/1
2.5 TABLET, FILM COATED ORAL DAILY
COMMUNITY

## 2024-09-09 RX ORDER — PROCHLORPERAZINE MALEATE 10 MG
TABLET ORAL
COMMUNITY
Start: 2024-09-05

## 2024-09-09 ASSESSMENT — PAIN SCALES - GENERAL: PAINLEVEL: NO PAIN (0)

## 2024-09-09 NOTE — LETTER
9/9/2024      Adriana St  [address] [facility] [address]      Dear Colleague,    Thank you for referring your patient, Adriana tS, to the [facility] RADIATION ONCOLOGY [place]. Please see a copy of my visit note below.    RADIATION ONCOLOGY WEEKLY TREATMENT VISIT NOTE      Assessment / Impression       Visit Dx:  (C50.911,  C79.51) Carcinoma of right breast metastatic to bone (H)  (primary encounter diagnosis)       Cancer Staging   Malignant neoplasm of central portion of right breast in female, estrogen receptor positive (H)  Staging form: Breast, AJCC 8th Edition  - Clinical stage from 6/10/2024: Stage IV (cT2, cN0, cM1, G3, ER+, RI+, HER2-) - Signed by Magaly Bender MD on 6/10/2024       Tolerating radiation therapy well.  All questions and concerns addressed.  Incision healing well without signs of infection      Plan:     Continue radiation treatment as prescribed.  Radiation:   Site: T12-L1  Stereotactic Radiosurgery: No  Total Dose for Bone: 3000  Today's Fraction/Total Fraction Bone: 3/10    Continue wound cleaning and dressing changes as recommended.  May apply some Aquaphor to the area as needed  Pain Management Plan: No change    Subjective:      HPI: Adriana St is a 69 year old female with  Carcinoma of right breast metastatic to bone (H) [C50.911, C79.51]    Is tolerating radiation therapy well.  Her back continues to heal well with some brief discomfort and altered sensation near her incision.  No significant fatigue.  Did note headache last night and some nausea this morning both have since resolved.  No bowel changes.    The following portions of the patient's history were reviewed and updated as appropriate: allergies, current medications, past family history, past medical history, past social history, past surgical history and problem list.    Assessment                  Body Site:  Bone Information  Site: T12-L1  Stereotactic  Radiosurgery: No  Today's Dose: 2100  Total Dose for Bone: 3000  Today's Fraction/Total Fraction Bone: 7/10  Comfort Alteration  KPS: 80% Can perform normal activity with effort, some signs of disease  Fatigue (ONS scale): 0: No Fatigue  Pain Location: denies (headaches on/off)  Elimination Alteration  Diarrhea W/O Colostomy: 0: None  Constipation: 2: Requiring laxatives  Urinary Incontinence: 0: None  Dysuria: 0: None  Fall Risk  Have you fallen since last visit?: no  Are you unsteady?: yes  Skin Alteration  Skin Sensation: 0: No problem  Skin Reaction: 0: None  CNS Alteration  Neuropathy - motor: 2: Mild objective weakness interfering with function, but not interfering with activites of daily living  Nutrition Alteration  Anorexia: 0: None  Nausea: 0: None  Vomitin: None  Pharynx and Esphogaus: 0: No change over baseline  Elimination Alteration  Constipation: 2: Requiring laxatives  Diarrhea W/O Colostomy: 0: None  Bowel Incontinence: 0: None  Urinary Incontinence: 0: None                                   Sexuality Alteration                    Emotional Alteration       Comfort Alteration       Nutrition Alteration   Weight: 74.1 kg (163 lb 4.8 oz)  Skin Alteration      AUA Assessment                                           Accompanied by       Objective:     Exam:     Vitals:    24 1100   BP: (!) 148/85   Pulse: 76   Resp: 20   Temp: 97.9  F (36.6  C)   SpO2: 97%   Weight: 74.1 kg (163 lb 4.8 oz)   PainSc: No Pain (0)       Wt Readings from Last 8 Encounters:   24 74.1 kg (163 lb 4.8 oz)   24 73.3 kg (161 lb 11.2 oz)   24 73.8 kg (162 lb 9.6 oz)   24 72.6 kg (160 lb)   24 72.6 kg (160 lb)   24 73.7 kg (162 lb 6.4 oz)   24 73.2 kg (161 lb 6.4 oz)   24 73.1 kg (161 lb 3.2 oz)       General: Alert and oriented, in no acute distress  Adriana has mild erythema consistent with healing surgical incision.  Well-healing surgical incision with no  evidence of infection.    Treatment Summary to Date    Aria chart and setup information reviewed    Tiffany Oliva MD      Again, thank you for allowing me to participate in the care of your patient.        Sincerely,        Tiffany Oliva MD

## 2024-09-09 NOTE — PROGRESS NOTES
RADIATION ONCOLOGY WEEKLY TREATMENT VISIT NOTE      Assessment / Impression       Visit Dx:  (C50.911,  C79.51) Carcinoma of right breast metastatic to bone (H)  (primary encounter diagnosis)       Cancer Staging   Malignant neoplasm of central portion of right breast in female, estrogen receptor positive (H)  Staging form: Breast, AJCC 8th Edition  - Clinical stage from 6/10/2024: Stage IV (cT2, cN0, cM1, G3, ER+, IN+, HER2-) - Signed by Magaly Bender MD on 6/10/2024       Tolerating radiation therapy well.  All questions and concerns addressed.  Incision healing well without signs of infection      Plan:     Continue radiation treatment as prescribed.  Radiation:   Site: T12-L1  Stereotactic Radiosurgery: No  Total Dose for Bone: 3000  Today's Fraction/Total Fraction Bone: 3/10    Continue wound cleaning and dressing changes as recommended.  May apply some Aquaphor to the area as needed  Pain Management Plan: No change    Subjective:      HPI: Adriana St is a 69 year old female with  Carcinoma of right breast metastatic to bone (H) [C50.911, C79.51]    Is tolerating radiation therapy well.  Her back continues to heal well with some brief discomfort and altered sensation near her incision.  No significant fatigue.  Did note headache last night and some nausea this morning both have since resolved.  No bowel changes.    The following portions of the patient's history were reviewed and updated as appropriate: allergies, current medications, past family history, past medical history, past social history, past surgical history and problem list.    Assessment                  Body Site:  Bone Information  Site: T12-L1  Stereotactic Radiosurgery: No  Today's Dose: 2100  Total Dose for Bone: 3000  Today's Fraction/Total Fraction Bone: 7/10  Comfort Alteration  KPS: 80% Can perform normal activity with effort, some signs of disease  Fatigue (ONS scale): 0: No Fatigue  Pain Location: denies (headaches  on/off)  Elimination Alteration  Diarrhea W/O Colostomy: 0: None  Constipation: 2: Requiring laxatives  Urinary Incontinence: 0: None  Dysuria: 0: None  Fall Risk  Have you fallen since last visit?: no  Are you unsteady?: yes  Skin Alteration  Skin Sensation: 0: No problem  Skin Reaction: 0: None  CNS Alteration  Neuropathy - motor: 2: Mild objective weakness interfering with function, but not interfering with activites of daily living  Nutrition Alteration  Anorexia: 0: None  Nausea: 0: None  Vomitin: None  Pharynx and Esphogaus: 0: No change over baseline  Elimination Alteration  Constipation: 2: Requiring laxatives  Diarrhea W/O Colostomy: 0: None  Bowel Incontinence: 0: None  Urinary Incontinence: 0: None                                   Sexuality Alteration                    Emotional Alteration       Comfort Alteration       Nutrition Alteration   Weight: 74.1 kg (163 lb 4.8 oz)  Skin Alteration      AUA Assessment                                           Accompanied by       Objective:     Exam:     Vitals:    24 1100   BP: (!) 148/85   Pulse: 76   Resp: 20   Temp: 97.9  F (36.6  C)   SpO2: 97%   Weight: 74.1 kg (163 lb 4.8 oz)   PainSc: No Pain (0)       Wt Readings from Last 8 Encounters:   24 74.1 kg (163 lb 4.8 oz)   24 73.3 kg (161 lb 11.2 oz)   24 73.8 kg (162 lb 9.6 oz)   24 72.6 kg (160 lb)   24 72.6 kg (160 lb)   24 73.7 kg (162 lb 6.4 oz)   24 73.2 kg (161 lb 6.4 oz)   24 73.1 kg (161 lb 3.2 oz)       General: Alert and oriented, in no acute distress  Adriana has mild erythema consistent with healing surgical incision.  Well-healing surgical incision with no evidence of infection.    Treatment Summary to Date    Aria chart and setup information reviewed    Tiffany Oliva MD

## 2024-09-10 ENCOUNTER — APPOINTMENT (OUTPATIENT)
Dept: RADIATION ONCOLOGY | Facility: HOSPITAL | Age: 69
End: 2024-09-10
Attending: RADIOLOGY
Payer: COMMERCIAL

## 2024-09-10 PROCEDURE — 77412 RADIATION TX DELIVERY LVL 3: CPT | Performed by: RADIOLOGY

## 2024-09-10 PROCEDURE — 77387 GUIDANCE FOR RADJ TX DLVR: CPT | Mod: 26 | Performed by: RADIOLOGY

## 2024-09-10 PROCEDURE — 77387 GUIDANCE FOR RADJ TX DLVR: CPT | Performed by: RADIOLOGY

## 2024-09-11 ENCOUNTER — APPOINTMENT (OUTPATIENT)
Dept: RADIATION ONCOLOGY | Facility: HOSPITAL | Age: 69
End: 2024-09-11
Attending: RADIOLOGY
Payer: COMMERCIAL

## 2024-09-11 PROCEDURE — 77387 GUIDANCE FOR RADJ TX DLVR: CPT

## 2024-09-11 PROCEDURE — 77412 RADIATION TX DELIVERY LVL 3: CPT

## 2024-09-11 PROCEDURE — 77387 GUIDANCE FOR RADJ TX DLVR: CPT | Mod: 26 | Performed by: RADIOLOGY

## 2024-09-12 ENCOUNTER — ALLIED HEALTH/NURSE VISIT (OUTPATIENT)
Dept: RADIATION ONCOLOGY | Facility: HOSPITAL | Age: 69
End: 2024-09-12
Attending: RADIOLOGY
Payer: COMMERCIAL

## 2024-09-12 ENCOUNTER — RADIATION TREATMENT SUMMARY (OUTPATIENT)
Dept: RADIATION ONCOLOGY | Facility: HOSPITAL | Age: 69
End: 2024-09-12

## 2024-09-12 PROCEDURE — 77412 RADIATION TX DELIVERY LVL 3: CPT | Performed by: RADIOLOGY

## 2024-09-12 PROCEDURE — 77387 GUIDANCE FOR RADJ TX DLVR: CPT | Performed by: RADIOLOGY

## 2024-09-12 PROCEDURE — 77336 RADIATION PHYSICS CONSULT: CPT | Performed by: RADIOLOGY

## 2024-09-12 PROCEDURE — 77387 GUIDANCE FOR RADJ TX DLVR: CPT | Mod: 26 | Performed by: RADIOLOGY

## 2024-09-12 PROCEDURE — 77427 RADIATION TX MANAGEMENT X5: CPT | Performed by: STUDENT IN AN ORGANIZED HEALTH CARE EDUCATION/TRAINING PROGRAM

## 2024-09-12 RX ORDER — LETROZOLE 2.5 MG/1
2.5 TABLET, FILM COATED ORAL DAILY
Status: CANCELLED | OUTPATIENT
Start: 2024-09-12

## 2024-09-12 NOTE — PROGRESS NOTES
Pt here for their final radiation treatment. DC instructions given verbally and in writing, pt verbalized their understanding. Encouraged pt to make 4 week f/u apt on their way out today.

## 2024-09-12 NOTE — PROGRESS NOTES
Radiation Treatment Summary    Patient: Adriana St   MRN: [id number]  : 1955  Care Provider: Laura Alexander MD    Encounter Date: Sep 12, 2024      Magaly Bender MD  [address] [place]  [place] [address]              Dear Dr. Bender:     Your patient Mrs. Adriana St completed radiation therapy on 2024. As you know Ms. 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  Patient's status post surgery followed by postop radiation therapy with a total dose of 3000 cGy in 10 treatments given from 2024 - 2024.  She tolerated radiation therapy well with minimal side effect.  The patient is scheduled to return to radiation oncology in 4 weeks for a routine post therapy office follow-up.    Again, thank you very much for the referral and allowing me to participate in the care of this patient.  If you have any questions or concerns about this patient, please do not hesitate to call.      Pain Management Plan: NA          Sincerely,    Laura Alexander MD, PhD  Department of Radiation Oncology   [facility] Radiation Oncology  Cell: [phone]    [facility]  [address] [place] [address]     [address] [place] [address]    CC:  Patient Care Team:  Gilmer Walters as PCP - General  Annmarie Nash, RN as Specialty Care Coordinator (Hematology & Oncology)  Laura Alexander MD as MD (Radiation Oncology)  Thomas Prieto MD as MD (Neurological Surgery)  Magaly Bender MD as MD (Hematology)  Jyoti Magana DO as Assigned Surgical Provider  Thomas Prieto MD as Assigned Neuroscience Provider  Laura Alexander MD as Assigned Cancer Care Provider

## 2024-09-12 NOTE — LETTER
Radiation Treatment Summary    Patient: Adriana St   MRN: [id number]  : 1955  Care Provider: Laura Alexander MD    Encounter Date: Sep 12, 2024      Magaly Bender MD  [address] [place] [address]              Dear Dr. Bender:     Your patient Mrs. Adriana St completed radiation therapy on 2024. As you know Ms. 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  Patient's status post surgery followed by postop radiation therapy with a total dose of 3000 cGy in 10 treatments given from 2024 - 2024.  She tolerated radiation therapy well with minimal side effect.  The patient is scheduled to return to radiation oncology in 4 weeks for a routine post therapy office follow-up.    Again, thank you very much for the referral and allowing me to participate in the care of this patient.  If you have any questions or concerns about this patient, please do not hesitate to call.      Pain Management Plan: NA          Sincerely,    Laura Alexander MD, PhD  Department of Radiation Oncology   [facility] Radiation Oncology  Cell: [phone]    [facility]  [address] [place] [address]     [place] [id number] [place] [address]    CC:  Patient Care Team:  Gilmer Walters as PCP - General  Annmarie Nash RN as Specialty Care Coordinator (Hematology & Oncology)  Laura Alexander MD as MD (Radiation Oncology)  Thomas Prieto MD as MD (Neurological Surgery)  Magaly Bender MD as MD (Hematology)  Jyoti Magana DO as Assigned Surgical Provider  Thomas Prieto MD as Assigned Neuroscience Provider  Laura Alexander MD as Assigned Cancer Care Provider          cc:    Patient Care Team:  Gilmer Walters as PCP - General  Annmarie Nash RN as Specialty Care Coordinator (Hematology & Oncology)  Laura Alexander MD as MD (Radiation Oncology)  Thomas Prieto MD as MD (Neurological Surgery)  Napoleon  Magaly Mckenzie MD as MD (Hematology)  Jyoti Magana DO as Assigned Surgical Provider  Thomas Prieto MD as Assigned Neuroscience Provider  Laura Alexander MD as Assigned Cancer Care Provider

## 2024-09-12 NOTE — TELEPHONE ENCOUNTER
Therapy started 6/11/24    Last Written Prescription Date:  6/11/24  Last Fill Quantity: 90,  # refills: 1   Last office visit provider:  8/21/24 with Dr. Bender, follow up in clinic 9/27/24     Requested Prescriptions   Pending Prescriptions Disp Refills    letrozole (FEMARA) 2.5 MG tablet       Sig: Take 1 tablet (2.5 mg) by mouth daily.       There is no refill protocol information for this order          Marisa Sanchez RN 09/12/24 8:51 AM

## 2024-09-17 ENCOUNTER — HOSPITAL ENCOUNTER (OUTPATIENT)
Dept: WOUND CARE | Facility: CLINIC | Age: 69
Discharge: HOME | End: 2024-09-17
Attending: FAMILY MEDICINE | Admitting: FAMILY MEDICINE
Payer: COMMERCIAL

## 2024-09-17 VITALS — HEART RATE: 76 BPM | DIASTOLIC BLOOD PRESSURE: 78 MMHG | SYSTOLIC BLOOD PRESSURE: 133 MMHG | TEMPERATURE: 98.1 F

## 2024-09-17 DIAGNOSIS — T81.89XD NON-HEALING SURGICAL WOUND, SUBSEQUENT ENCOUNTER: Primary | ICD-10-CM

## 2024-09-17 DIAGNOSIS — L98.422 NON-PRESSURE CHRONIC ULCER OF BACK WITH FAT LAYER EXPOSED (H): ICD-10-CM

## 2024-09-17 PROCEDURE — G0463 HOSPITAL OUTPT CLINIC VISIT: HCPCS

## 2024-09-17 PROCEDURE — 99213 OFFICE O/P EST LOW 20 MIN: CPT | Performed by: FAMILY MEDICINE

## 2024-09-17 NOTE — PROGRESS NOTES
Patient arrived for wound care visit. Certified Wound Care Nurse time spent evaluating patient record, and completed a full evaluation; provided recommendation based on treatment plan. Reviewed discharge instructions, patient education, and discussed plan of care with appropriate medical team staff members and patient and/or family members.    Vivian Rahman RN

## 2024-09-17 NOTE — PROGRESS NOTES
Wound Clinic Note          Visit date: 09/17/2024       Cheif Complaint:     Adriana St is a 69 year old  female had concerns including WOUND CARE.        HISTORY OF PRESENT ILLNESS:    Adriana St reports the ulcer has been present since late July 2024.  The wound began after a recent surgery and the incision did not heal normally.      She has metastatic breast cancer and had a metastases to her lumbar spine causing a pathologic fracture.  She had a surgery to stabilize this on July 3, 2024.  Postoperatively a portion of the incision dehisced.    Since her last clinic visit with me her son has been changing the bandages every other day bandaging the wound with endoform and a silicone adhesive bandage.  She reports for the last few days there has been little to no drainage from the area.       The pateint denies fevers or chills.  They report the pain from the wound has been 0/10 and has remained about the same recently.      Today the patient reports maintaining a high protein diet, but has not been taking protein supplements lately.        The patient denies a history of diabetes, smoking or chronic steroid use.         The patient has not had any symptoms of infection relating to the wound recently and is not currently on antibiotics.       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       Surgical Hx:   Past Surgical History:   Procedure Laterality Date    APPENDECTOMY      BREAST AUGMENTATION      CHOLECYSTECTOMY      OPTICAL TRACKING SYSTEM FUSION SPINE POSTERIOR LUMBAR THREE+ LEVELS Bilateral 7/3/2024    Procedure: THORACIC 11 TO LUMBAR 3 POSTEROLATERAL FUSION USING  ALLOGRAFT,LUMBAR 1 BILATERAL LAMINECTOMY AND MEDIAL FACETECTOMY AND SEPARATION SURGERY, USE OF OSTEOCOOL DEVICE.;  Surgeon: Thomas Prieto MD;  Location:  OR          Allergies:    Allergies   Allergen Reactions    Codeine Itching              Medication History:    Current Outpatient Medications   Medication Sig Dispense Refill    acetaminophen (TYLENOL) 325 MG tablet Take 2 tablets (650 mg) by mouth every 4 hours as needed for other (For optimal non-opioid multimodal pain management to improve pain control.) 100 tablet 0    bisacodyl (DULCOLAX) 10 MG suppository Place 1 suppository (10 mg) rectally daily as needed for constipation (Use if magnesium hydroxide (MILK of MAGNESIA) is not effective after 24 hours. May discontinue if patient having bowel movement.) 5 suppository 0    bisacodyl (DULCOLAX) 5 MG EC tablet Take 5 mg by mouth daily as needed (Gentle laxative)      letrozole (FEMARA) 2.5 MG tablet Take 2.5 mg by mouth daily.      Lidocaine (LIDOCARE) 4 % Patch Place 1 patch onto the skin every 24 hours To prevent lidocaine toxicity, patient should be patch free for 12 hrs daily. 30 patch 0    losartan (COZAAR) 25 MG tablet Take 1 tablet by mouth daily.      magnesium hydroxide (MILK OF MAGNESIA) 400 MG/5ML suspension Take 30 mLs by mouth daily as needed for constipation (Use if polyethylene glycol (Miralax) is not effective after 24 hours.) 354 mL 0    methocarbamol (ROBAXIN) 750 MG tablet Take 1 tablet (750 mg) by mouth 4 times daily as needed for muscle spasms 40 tablet 0    metoprolol succinate ER (TOPROL XL) 50 MG 24 hr tablet TAKE 1 TABLET BY MOUTH EVERY DAY TO LOWER BLOOD PRESSURE AND PROTECT THE HEART      nystatin-triamcinolone (MYCOLOG II) 361979-4.1 UNIT/GM-% external cream Apply topically 3 times daily.      oxyCODONE (ROXICODONE) 5 MG tablet Take 2 tablets (10 mg) by mouth every 4 hours as needed for moderate pain 90 tablet 0    polyethylene glycol (MIRALAX) 17 GM/Dose powder Take 17 g by mouth  2 times daily (Hold or stop if stools get too loose) 510 g 1    prochlorperazine (COMPAZINE) 10 MG tablet       ribociclib (KISQALI) (600 MG DOSE) 200 MG tablet therapy pack Take 3 tablets (600 mg) by mouth every morning. , then off for 7 days. 63 tablet 0    senna-docusate (SENOKOT-S/PERICOLACE) 8.6-50 MG tablet Take 1 tablet by mouth 2 times daily -- for bowels.    Hold if the stools start to get too loose 60 tablet 0     No current facility-administered medications for this encounter.         Tobacco History:  reports that she quit smoking about 39 years ago. Her smoking use included cigarettes. She started smoking about 29 years ago. She has never used smokeless tobacco.       REVIEW OF SYMPTOMS:   The review of systems was negative except as noted in the HPI.           PHYSICAL EXAMINATION:     /78 (BP Location: Left arm, Patient Position: Sitting, Cuff Size: Adult Regular)   Pulse 76   Temp 98.1  F (36.7  C) (Temporal)            GENERAL: The patient overall appears well and is no acute distress.   HEAD: normocephalic   EYES: Sclera and conjunctiva clear   NECK: no obvious masses   LUNGS: breathing is unlabored.   EXTREMITIES: No clubbing, cyanosis or edema   SKIN: No rashes or other abnormalities except as noted under the Wound section below.   NEUROLOGICAL: normal motor and sensory function       WOUND/ulcer: Healed      Also see below for wound details:     Circumferential volume measures:             No data to display                Ulceration(s)/Wound(s):   Please see the media tab under the chart review for pictures of the wounds.  Nursing staff removed dressings and cleansed wound.    Wound (used by OP [facility] only) 08/14/24 0853 back lower;midline surgical (Active)   Thickness/Stage full thickness 09/17/24 0901   Base epithelialization 09/17/24 0901   Periwound intact 09/17/24 0901   Periwound Temperature warm 09/17/24 0901   Periwound Skin Turgor soft 09/17/24 0901   Edges open 09/17/24 0901  "  Length (cm) 0 09/17/24 0901   Width (cm) 0 09/17/24 0901   Depth (cm) 0 09/17/24 0901   Wound (cm^2) 0 cm^2 09/17/24 0901   Wound Volume (cm^3) 0 cm^3 09/17/24 0901   Wound healing % 100 09/17/24 0901   Drainage Amount none 09/17/24 0901   Care, Wound non-select wound debridement performed. 09/17/24 0901             Recent Labs   Lab Test 07/03/24  2144   A1C 6.6*          No results for input(s): \"ALBUMIN\" in the last 04223 hours.           No sharp debridement performed today.                  ASSESSMENT:   This is a 69 year old  female with a healed lumbar surgical wound.          PLAN:   I recommend she continue to keep the area covered with a simple bandage change with bathing for 1 more week to allow the new skin which is covered over the wound to thicken up and get stronger then no further bandages be required after that.  I have encouraged the patient to continue on their high protein diet to aid in wound healing.   The patient will return to the wound clinic on an as-needed basis if further wounds develop.        20 minutes spent on the date of the encounter doing chart review, history and exam, documentation and further activities per the note, this time excludes any procedure time      Rah Alvarez MD  09/17/2024   9:11 AM   [facility]  [phone]    This note was electronically signed by Rah Alvarez MD      Further instructions from your care team         09/17/2024   Adriana St   1955    Plan 09/17/2024   Dressing changes outside of clinic are being performed by V[facility]  A DME order was not completed because supplies were not needed    Wound Dressing Change: Lower Back  Cover with a simple bandage for about 1 more week to let the skin strengthen. Change only when needing to bathe. Then okay to leave open to air.  You are discharged from [facility] healing institute. If you notice the wound reopening or worsening, please call us at [phone]   "   Protein:  A diet high in protein is important for wound healing, we recommend getting 90 grams of protein per day.   Taking protein shakes or bars are a good way to get extra protein in your diet.        Main Provider: Rah Alvarez M.D. September 17, 2024    Call us at [phone] if you have any questions about your wounds, if you have redness or swelling around your wound, have a fever of 101 degrees Fahrenheit or greater or if you have any other problems or concerns. We answer the phone Monday through Friday 8 am to 4 pm, please leave a message as we check the voicemail frequently throughout the day. If you have a concern over the weekend, please leave a message and we will return your call Monday. If the need is urgent, go to the ER or urgent care.    If you had a positive experience please indicate that on your patient satisfaction survey form that [facility] will be sending you.    It was a pleasure meeting with you today.  Thank you for allowing me and my team the privilege of caring for you today.  YOU are the reason we are here, and I truly hope we provided you with the excellent service you deserve.  Please let us know if there is anything else we can do for you so that we can be sure you are leaving completely satisfied with your care experience.      If you have any billing related questions please call the [facility] Business office at [phone]. The clinic staff does not handle billing related matters.    If you are scheduled to have a follow up appointment, you will receive a reminder call the day before your visit. On the appointment day please arrive 15 minutes prior to your appointment time. If you are unable to keep that appointment, please call the clinic to cancel or reschedule. If you are more than 10 minutes late or greater for your scheduled appointment time, the clinic policy is that you may be asked to reschedule.        ,

## 2024-09-17 NOTE — DISCHARGE INSTRUCTIONS
09/17/2024   Adriana Wei St   1955    Plan 09/17/2024   Dressing changes outside of clinic are being performed by Son  A DME order was not completed because supplies were not needed    Wound Dressing Change: Lower Back  Cover with a simple bandage for about 1 more week to let the skin strengthen. Change only when needing to bathe. Then okay to leave open to air.  You are discharged from wound healing institute. If you notice the wound reopening or worsening, please call us at [phone]     Protein:  A diet high in protein is important for wound healing, we recommend getting 90 grams of protein per day.   Taking protein shakes or bars are a good way to get extra protein in your diet.        Main Provider: Rah Alvarez M.D. September 17, 2024    Call us at [phone] if you have any questions about your wounds, if you have redness or swelling around your wound, have a fever of 101 degrees Fahrenheit or greater or if you have any other problems or concerns. We answer the phone Monday through Friday 8 am to 4 pm, please leave a message as we check the voicemail frequently throughout the day. If you have a concern over the weekend, please leave a message and we will return your call Monday. If the need is urgent, go to the ER or urgent care.    If you had a positive experience please indicate that on your patient satisfaction survey form that [facility] will be sending you.    It was a pleasure meeting with you today.  Thank you for allowing me and my team the privilege of caring for you today.  YOU are the reason we are here, and I truly hope we provided you with the excellent service you deserve.  Please let us know if there is anything else we can do for you so that we can be sure you are leaving completely satisfied with your care experience.      If you have any billing related questions please call the [facility] Business office at [phone]. The clinic staff does not handle  billing related matters.    If you are scheduled to have a follow up appointment, you will receive a reminder call the day before your visit. On the appointment day please arrive 15 minutes prior to your appointment time. If you are unable to keep that appointment, please call the clinic to cancel or reschedule. If you are more than 10 minutes late or greater for your scheduled appointment time, the clinic policy is that you may be asked to reschedule.

## 2024-09-18 ENCOUNTER — TELEPHONE (OUTPATIENT)
Dept: ONCOLOGY | Facility: HOSPITAL | Age: 69
End: 2024-09-18
Payer: COMMERCIAL

## 2024-09-18 NOTE — ORAL ONC MGMT
"Oral Chemotherapy Monitoring Program    Subjective/Objective:  Adriana St is a 69 year old female contacted by phone for a follow-up visit for oral chemotherapy.  She confirms starting ribociclib 600 mg once daily. She takes an antinausea pill a half an hour prior to ribociclib and is doing better from that standpoint. She has had some softer stools during the beginning but monitoring. She has a sore arm which started prior to this medication and is discussing with PCP. Otherwise no concerns.        6/19/2024     8:00 AM 9/3/2024     2:00 PM 9/18/2024    11:00 AM   ORAL CHEMOTHERAPY   Assessment Type Initial Work up New Teach Initial Follow up   Diagnosis Code Breast Cancer Breast Cancer Breast Cancer   Providers Dr. Napoleon Bender   Clinic Name/Location [facility]   Drug Name Kisqali (ribociclib) Kisqali (ribociclib) Kisqali (ribociclib)   Dose 600 mg 600 mg 600 mg   Current Schedule Daily Daily Daily   Cycle Details 3 weeks on, 1 week off 3 weeks on, 1 week off 3 weeks on, 1 week off   Start Date of Last Cycle   9/11/2024   Planned next cycle start date  9/11/2024 10/9/2024   Doses missed in last 2 weeks   0   Adherence Assessment   Adherent   Adverse Effects   Nausea   Nausea   Grade 1   Pharmacist Intervention(nausea)   No   Any new drug interactions?  No No   Is the dose as ordered appropriate for the patient?  Yes Yes       Last PHQ-2 Score on record:       7/15/2024     2:41 PM 7/15/2024     2:40 PM   PHQ-2 ( 1999 [employer])   Q1: Little interest or pleasure in doing things 1 0   Q2: Feeling down, depressed or hopeless 1 0   PHQ-2 Score 2 0       Vitals:  BP:   BP Readings from Last 1 Encounters:   09/17/24 133/78     Wt Readings from Last 1 Encounters:   09/09/24 74.1 kg (163 lb 4.8 oz)     Estimated body surface area is 1.81 meters squared as calculated from the following:    Height as of 8/21/24: 1.6 m (5' 3\").    Weight as of 9/9/24: 74.1 kg (163 lb 4.8 " oz).    Labs:  _  Result Component Current Result Ref Range   Sodium 141 (8/21/2024) 135 - 145 mmol/L     _  Result Component Current Result Ref Range   Potassium 3.6 (8/21/2024) 3.4 - 5.3 mmol/L     _  Result Component Current Result Ref Range   Calcium 10.2 (8/21/2024) 8.8 - 10.4 mg/dL     _  Result Component Current Result Ref Range   Magnesium 1.9 (8/21/2024) 1.7 - 2.3 mg/dL     _  Result Component Current Result Ref Range   Phosphorus 4.1 (8/21/2024) 2.5 - 4.5 mg/dL     _  Result Component Current Result Ref Range   Albumin 4.0 (8/21/2024) 3.5 - 5.2 g/dL     _  Result Component Current Result Ref Range   Urea Nitrogen 13.5 (8/21/2024) 8.0 - 23.0 mg/dL     _  Result Component Current Result Ref Range   Creatinine 0.69 (8/21/2024) 0.51 - 0.95 mg/dL     _  Result Component Current Result Ref Range   AST 19 (8/21/2024) 0 - 45 U/L     _  Result Component Current Result Ref Range   ALT 11 (8/21/2024) 0 - 50 U/L     _  Result Component Current Result Ref Range   Bilirubin Total 1.0 (8/21/2024) <=1.2 mg/dL     _  Result Component Current Result Ref Range   WBC Count 8.0 (8/21/2024) 4.0 - 11.0 10e3/uL     _  Result Component Current Result Ref Range   Hemoglobin 12.3 (8/21/2024) 11.7 - 15.7 g/dL     _  Result Component Current Result Ref Range   Platelet Count 283 (8/21/2024) 150 - 450 10e3/uL     No results found for ANC within last 30 days.     _  Result Component Current Result Ref Range   Absolute Neutrophils 4.4 (8/21/2024) 1.6 - 8.3 10e3/uL      Assessment/Plan:  Adriana Wei is tolerating ribociclib and will continue as planned. Discussed can take antinausea medication even 60 minutes prior if she feels like it isn't kicking in in time. Encouraged her to reach out if needed.    Follow-Up:  9/27 lab, EKG, provider visit. Will need labs during off week, and then another assessment a week or two into next cycle.    Refill Due:  10/1    Monika Stark, PharmD, BCOP  Oral Chemotherapy  Pharmacist  [phone]

## 2024-09-26 NOTE — PROGRESS NOTES
[facility] Hematology and Oncology Outpatient Progress Note    Patient: Adriana St  MRN: [id number]  Date of Service: Sep 27, 2024          Reason for Visit    Chief Complaint   Patient presents with    Oncology Clinic Visit     Malignant neoplasm metastatic to bone   Malignant neoplasm of central portion of right breast in female, estrogen receptor positive           Cancer Staging   Malignant neoplasm of central portion of right breast in female, estrogen receptor positive (H)  Staging form: Breast, AJCC 8th Edition  - Clinical stage from 6/10/2024: Stage IV (cT2, cN0, cM1, G3, ER+, TN+, HER2-) - Signed by Magaly Bender MD on 6/10/2024      Primary Hematologist/Oncologist: Dr. Magaly Bender        Assessment/Plan  #.  Metastatic right breast cancer ER positive, TN positive, HER2 1+ by IHC.  #.  Pathologic L1 fracture status post lumbar decompression surgery.  #.  Cancer related pain  #.  Hypertension, uncontrolled  Started Ribociclib on 9/11 and has had good toleration thus far noting some increased fatigue, and less constipation. We reviewed labs today noting a stable and unremarkable CMP, and a CBC that shows a decrease in WBC to 2.5 with ANC of 1.2. No fevers. Platelet count is down to 109 K. Reviewed EKG today, which is stable. Recommend to continue with Ribociclib, without dose adjustment.   Continue Ribociclib 600 mg daily for 3 weeks, then off for 1 week starting Wednesday 10/2/24.  Continue letrozole daily.  Continue to follow with PCP regarding blood pressure management.  Continue taking calcium/vitamin D daily.    Will plan to add bisphosphonate for metastatic bone disease a couple months after surgery.  She had genetic counseling and testing for metastatic breast cancer and is awaiting results.  Follow-up in 2 weeks for labs, EKG and consideration of starting C2 Ribociclib.     ______________________________________________________________________________    History of Present  Illness/ Interval History    Ms. Adriana St  is a 69 year old female patient who comes in for labs and follow up after starting Kisqali on 9/11/24. Since starting Kisqali, she has felt ok. She notes some increased fatigue and some numbness to her ankles. At first the medication was giving her nausea, but she started using antiemetics 30 minutes prior and this has been helpful to eliminate the nausea. Prior to kisqali, she was more prone to constipation. No issues with diarrhea, though stools are coming more frequently and she is less constipated. She is getting hot flashes, specifically at night. Using a fan to help with temperature regulation. Has some right shoulder pain, xray's ordered per PCP showing no fracture but some mild arthritis.      ECOG performance status   0- Fully active, without restriction      Pain  Pain Score: Mild Pain (2)  Pain Loc: Shoulder (right)    ROS  A 14 point review of systems was obtained.  Positive findings noted in the history.  The remainder of the review of system is otherwise negative.      Oncology History/Treatment  5/2024- She presented with a large right breast mass, and progressive back pain.     5/30/2024-right breast mass biopsy showed invasive ductal carcinoma, grade 3,   ER positive (95%, strong), [place] positive (15%, strong), HER2 negative (1+ by IHC).  NGS: no mutations. No fusion.    6/6/2024- MRI lumbar spine with and without contrast showed pathologic L1 burst fracture with > 50% vertebral body height loss.  Additional scattered osseous metastasis were seen as well.    6/11/2024-initiated anastrozole    6/13/2024-PET scan showed right breast malignancy with right subpectoral kaylene, pulmonary and osseous metastasis.  Moderate compression deformity of the L1 vertebral body with associated uptake probably pathologic, with about 7 mm retropulsion into the spinal canal.   Hypermetabolic right thyroid nodule.   Possible focal wall thickening of the transverse  "colon with associated uptake    6/14/2024-MRI brain with and without contrast showed presumed skeletal metastatic disease involving the right side of the clivus and a right occipital condyle.  Presumed right frontal falcine meningioma.  No evidence for intracranial metastatic disease.    7/3/2024- s/p T11-L3 posterior segmental instrumentation, T12-L1 and L1-L2 bilateral laminectomies and right L1 transpedicular placement of osteochondral device for radiofrequency ablation of L1 vertebral body tumor (Dr. Prieto)    Planning to start post op radiation 3000 cGy T12-L2 spine        Physical Exam    BP (!) 162/92 (BP Location: Left arm, Patient Position: Sitting, Cuff Size: Adult Regular)   Pulse 65   Resp 20   Ht 1.6 m (5' 3\")   Wt 74.3 kg (163 lb 11.2 oz)   SpO2 97%   BMI 29.00 kg/m      General: Well-appearing female in no acute distress. Cooperative in conversation.  Eyes: EOMI, PERRL. No scleral icterus.  ENT: Oral mucosa is moist without lesions or thrush. No ulcerations or mucositis noted.  Lymphatic: Neck is supple without cervical, axillary, or supraclavicular lymphadenopathy.   Cardiovascular: RRR No murmurs, gallops, or rubs. No peripheral edema.  Respiratory: CTA bilaterally. No wheezes or crackles.  Gastrointestinal: BS +. Abdomen soft, non-tender. No palpable hepatosplenomegaly or masses.   Neurologic: Alert and oriented x3. Cranial nerves II through XII are grossly intact.  Skin: No rashes, petechiae, or bruising noted. Warm, dry, intact.      Lab Results    Recent Results (from the past 168 hour(s))   Comprehensive metabolic panel   Result Value Ref Range    Sodium 139 135 - 145 mmol/L    Potassium 3.8 3.4 - 5.3 mmol/L    Carbon Dioxide (CO2) 21 (L) 22 - 29 mmol/L    Anion Gap 14 7 - 15 mmol/L    Urea Nitrogen 13.5 8.0 - 23.0 mg/dL    Creatinine 0.82 0.51 - 0.95 mg/dL    GFR Estimate 77 >60 mL/min/1.73m2    Calcium 8.9 8.8 - 10.4 mg/dL    Chloride 104 98 - 107 mmol/L    Glucose 131 (H) 70 - 99 " mg/dL    Alkaline Phosphatase 70 40 - 150 U/L    AST 14 0 - 45 U/L    ALT 9 0 - 50 U/L    Protein Total 6.3 (L) 6.4 - 8.3 g/dL    Albumin 3.9 3.5 - 5.2 g/dL    Bilirubin Total 0.7 <=1.2 mg/dL   Magnesium   Result Value Ref Range    Magnesium 1.8 1.7 - 2.3 mg/dL   Phosphorus   Result Value Ref Range    Phosphorus 4.5 2.5 - 4.5 mg/dL   CBC with platelets and differential   Result Value Ref Range    WBC Count 2.5 (L) 4.0 - 11.0 10e3/uL    RBC Count 4.55 3.80 - 5.20 10e6/uL    Hemoglobin 12.0 11.7 - 15.7 g/dL    Hematocrit 36.6 35.0 - 47.0 %    MCV 80 78 - 100 fL    MCH 26.4 (L) 26.5 - 33.0 pg    MCHC 32.8 31.5 - 36.5 g/dL    RDW 14.7 10.0 - 15.0 %    Platelet Count 109 (L) 150 - 450 10e3/uL    % Neutrophils 47 %    % Lymphocytes 36 %    % Monocytes 4 %    % Eosinophils 12 %    % Basophils 0 %    % Immature Granulocytes 0 %    NRBCs per 100 WBC 0 <1 /100    Absolute Neutrophils 1.2 (L) 1.6 - 8.3 10e3/uL    Absolute Lymphocytes 0.9 0.8 - 5.3 10e3/uL    Absolute Monocytes 0.1 0.0 - 1.3 10e3/uL    Absolute Eosinophils 0.3 0.0 - 0.7 10e3/uL    Absolute Basophils 0.0 0.0 - 0.2 10e3/uL    Absolute Immature Granulocytes 0.0 <=0.4 10e3/uL    Absolute NRBCs 0.0 10e3/uL   RBC and Platelet Morphology   Result Value Ref Range    RBC Morphology Confirmed RBC Indices     Platelet Assessment  Automated Count Confirmed. Platelet morphology is normal.     Automated Count Confirmed. Platelet morphology is normal.    Reactive Lymphocytes Present (A) None Seen   EKG 12-lead, complete   Result Value Ref Range    Systolic Blood Pressure  mmHg    Diastolic Blood Pressure  mmHg    Ventricular Rate 64 BPM    Atrial Rate 64 BPM    NV Interval 166 ms    QRS Duration 94 ms     ms    QTc 472 ms    P Axis 47 degrees    R AXIS 1 degrees    T Axis -14 degrees    Interpretation ECG       Sinus rhythm  Normal ECG  When compared with ECG of 28-Aug-2024 14:00,  QRS axis Shifted right  Minimal criteria for Anterior infarct are no longer  Present  Criteria for Inferior infarct are no longer Present  Confirmed by SHEKHAR PERRY MD LOC:JN ([id number]) on 9/27/2024 12:04:55 PM       I reviewed the above labs today.  Imaging    XR Humerus Right G/E 2 Views    Result Date: 9/19/2024  For Patients: As a result of the 21st Century Cures Act, medical imaging exams and procedure reports are released immediately into your electronic medical record. You may view this report before your referring provider. If you have questions, please contact your health care provider. EXAM: XR HUMERUS MIN 2 VIEWS RIGHT LOCATION: [facility] DATE: 9/19/2024 INDICATION: Arm Pain, Right COMPARISON: None.    Within normal limits. No fracture.    XR Shoulder Right G/E 3 Views    Result Date: 9/19/2024  For Patients: As a result of the 21st Century Cures Act, medical imaging exams and procedure reports are released immediately into your electronic medical record. You may view this report before your referring provider. If you have questions, please contact your health care provider. EXAM: XR SHOULDER 3 VIEWS RIGHT LOCATION: [facility] DATE: 9/19/2024 INDICATION: Acute Pain Of Right Shoulder COMPARISON: None.    No fracture. Anatomic alignment. Mild glenohumeral degenerative joint disease. Mild acromioclavicular degenerative joint disease.       Billing  Total time 35 minutes, to include face to face visit, review of EMR, ordering, documentation and coordination of care on date of service. The longitudinal plan of care for the diagnosis(es)/condition(s) as documented were addressed during this visit. Due to the added complexity in care, I will continue to support Adriana in the subsequent management and with ongoing continuity of care.    Signed by: MARTINE Barron CNP        Chart documentation with Dragon Voice recognition Software. Although reviewed after completion, some words and grammatical errors may remain.

## 2024-09-27 ENCOUNTER — ONCOLOGY VISIT (OUTPATIENT)
Dept: ONCOLOGY | Facility: HOSPITAL | Age: 69
End: 2024-09-27
Attending: INTERNAL MEDICINE
Payer: COMMERCIAL

## 2024-09-27 ENCOUNTER — LAB (OUTPATIENT)
Dept: INFUSION THERAPY | Facility: HOSPITAL | Age: 69
End: 2024-09-27
Attending: INTERNAL MEDICINE
Payer: COMMERCIAL

## 2024-09-27 VITALS
OXYGEN SATURATION: 97 % | BODY MASS INDEX: 29 KG/M2 | SYSTOLIC BLOOD PRESSURE: 162 MMHG | DIASTOLIC BLOOD PRESSURE: 92 MMHG | HEART RATE: 65 BPM | HEIGHT: 63 IN | RESPIRATION RATE: 20 BRPM | WEIGHT: 163.7 LBS

## 2024-09-27 DIAGNOSIS — Z17.0 MALIGNANT NEOPLASM OF CENTRAL PORTION OF RIGHT BREAST IN FEMALE, ESTROGEN RECEPTOR POSITIVE (H): ICD-10-CM

## 2024-09-27 DIAGNOSIS — Z51.11 ENCOUNTER FOR ANTINEOPLASTIC CHEMOTHERAPY: ICD-10-CM

## 2024-09-27 DIAGNOSIS — C79.51 MALIGNANT NEOPLASM METASTATIC TO BONE (H): Primary | ICD-10-CM

## 2024-09-27 DIAGNOSIS — C79.51 MALIGNANT NEOPLASM METASTATIC TO BONE (H): ICD-10-CM

## 2024-09-27 DIAGNOSIS — C50.111 MALIGNANT NEOPLASM OF CENTRAL PORTION OF RIGHT BREAST IN FEMALE, ESTROGEN RECEPTOR POSITIVE (H): ICD-10-CM

## 2024-09-27 LAB
ALBUMIN SERPL BCG-MCNC: 3.9 G/DL (ref 3.5–5.2)
ALP SERPL-CCNC: 70 U/L (ref 40–150)
ALT SERPL W P-5'-P-CCNC: 9 U/L (ref 0–50)
ANION GAP SERPL CALCULATED.3IONS-SCNC: 14 MMOL/L (ref 7–15)
AST SERPL W P-5'-P-CCNC: 14 U/L (ref 0–45)
ATRIAL RATE - MUSE: 64 BPM
BASOPHILS # BLD AUTO: 0 10E3/UL (ref 0–0.2)
BASOPHILS NFR BLD AUTO: 0 %
BILIRUB SERPL-MCNC: 0.7 MG/DL
BUN SERPL-MCNC: 13.5 MG/DL (ref 8–23)
CALCIUM SERPL-MCNC: 8.9 MG/DL (ref 8.8–10.4)
CHLORIDE SERPL-SCNC: 104 MMOL/L (ref 98–107)
CREAT SERPL-MCNC: 0.82 MG/DL (ref 0.51–0.95)
DIASTOLIC BLOOD PRESSURE - MUSE: NORMAL MMHG
EGFRCR SERPLBLD CKD-EPI 2021: 77 ML/MIN/1.73M2
EOSINOPHIL # BLD AUTO: 0.3 10E3/UL (ref 0–0.7)
EOSINOPHIL NFR BLD AUTO: 12 %
ERYTHROCYTE [DISTWIDTH] IN BLOOD BY AUTOMATED COUNT: 14.7 % (ref 10–15)
GLUCOSE SERPL-MCNC: 131 MG/DL (ref 70–99)
HCO3 SERPL-SCNC: 21 MMOL/L (ref 22–29)
HCT VFR BLD AUTO: 36.6 % (ref 35–47)
HGB BLD-MCNC: 12 G/DL (ref 11.7–15.7)
IMM GRANULOCYTES # BLD: 0 10E3/UL
IMM GRANULOCYTES NFR BLD: 0 %
INTERPRETATION ECG - MUSE: NORMAL
LYMPHOCYTES # BLD AUTO: 0.9 10E3/UL (ref 0.8–5.3)
LYMPHOCYTES NFR BLD AUTO: 36 %
MAGNESIUM SERPL-MCNC: 1.8 MG/DL (ref 1.7–2.3)
MCH RBC QN AUTO: 26.4 PG (ref 26.5–33)
MCHC RBC AUTO-ENTMCNC: 32.8 G/DL (ref 31.5–36.5)
MCV RBC AUTO: 80 FL (ref 78–100)
MONOCYTES # BLD AUTO: 0.1 10E3/UL (ref 0–1.3)
MONOCYTES NFR BLD AUTO: 4 %
NEUTROPHILS # BLD AUTO: 1.2 10E3/UL (ref 1.6–8.3)
NEUTROPHILS NFR BLD AUTO: 47 %
NRBC # BLD AUTO: 0 10E3/UL
NRBC BLD AUTO-RTO: 0 /100
P AXIS - MUSE: 47 DEGREES
PHOSPHATE SERPL-MCNC: 4.5 MG/DL (ref 2.5–4.5)
PLAT MORPH BLD: ABNORMAL
PLATELET # BLD AUTO: 109 10E3/UL (ref 150–450)
POTASSIUM SERPL-SCNC: 3.8 MMOL/L (ref 3.4–5.3)
PR INTERVAL - MUSE: 166 MS
PROT SERPL-MCNC: 6.3 G/DL (ref 6.4–8.3)
QRS DURATION - MUSE: 94 MS
QT - MUSE: 458 MS
QTC - MUSE: 472 MS
R AXIS - MUSE: 1 DEGREES
RBC # BLD AUTO: 4.55 10E6/UL (ref 3.8–5.2)
RBC MORPH BLD: ABNORMAL
SODIUM SERPL-SCNC: 139 MMOL/L (ref 135–145)
SYSTOLIC BLOOD PRESSURE - MUSE: NORMAL MMHG
T AXIS - MUSE: -14 DEGREES
VARIANT LYMPHS BLD QL SMEAR: PRESENT
VENTRICULAR RATE- MUSE: 64 BPM
WBC # BLD AUTO: 2.5 10E3/UL (ref 4–11)

## 2024-09-27 PROCEDURE — 93010 ELECTROCARDIOGRAM REPORT: CPT | Performed by: INTERNAL MEDICINE

## 2024-09-27 PROCEDURE — 84100 ASSAY OF PHOSPHORUS: CPT

## 2024-09-27 PROCEDURE — 85025 COMPLETE CBC W/AUTO DIFF WBC: CPT

## 2024-09-27 PROCEDURE — 83735 ASSAY OF MAGNESIUM: CPT

## 2024-09-27 PROCEDURE — 93005 ELECTROCARDIOGRAM TRACING: CPT

## 2024-09-27 PROCEDURE — G0463 HOSPITAL OUTPT CLINIC VISIT: HCPCS

## 2024-09-27 PROCEDURE — 99214 OFFICE O/P EST MOD 30 MIN: CPT | Mod: 24

## 2024-09-27 PROCEDURE — G2211 COMPLEX E/M VISIT ADD ON: HCPCS

## 2024-09-27 PROCEDURE — 84155 ASSAY OF PROTEIN SERUM: CPT

## 2024-09-27 PROCEDURE — 36415 COLL VENOUS BLD VENIPUNCTURE: CPT

## 2024-09-27 RX ORDER — METOPROLOL SUCCINATE 100 MG/1
TABLET, EXTENDED RELEASE ORAL
COMMUNITY
Start: 2024-09-19

## 2024-09-27 ASSESSMENT — PAIN SCALES - GENERAL: PAINLEVEL: MILD PAIN (2)

## 2024-09-27 NOTE — LETTER
9/27/2024      Adriana St  [address] [facility] [address]      Dear Colleague,    Thank you for referring your patient, Adriana St, to the [facility] [place]. Please see a copy of my visit note below.    [facility] Hematology and Oncology Outpatient Progress Note    Patient: Adriana St  MRN: [id number]  Date of Service: Sep 27, 2024          Reason for Visit    Chief Complaint   Patient presents with     Oncology Clinic Visit     Malignant neoplasm metastatic to bone   Malignant neoplasm of central portion of right breast in female, estrogen receptor positive           Cancer Staging   Malignant neoplasm of central portion of right breast in female, estrogen receptor positive (H)  Staging form: Breast, AJCC 8th Edition  - Clinical stage from 6/10/2024: Stage IV (cT2, cN0, cM1, G3, ER+, HI+, HER2-) - Signed by Magaly Bender MD on 6/10/2024      Primary Hematologist/Oncologist: Dr. Magaly Bender        Assessment/Plan  #.  Metastatic right breast cancer ER positive, HI positive, HER2 1+ by IHC.  #.  Pathologic L1 fracture status post lumbar decompression surgery.  #.  Cancer related pain  #.  Hypertension, uncontrolled  Started Ribociclib on 9/11 and has had good toleration thus far noting some increased fatigue, and less constipation. We reviewed labs today noting a stable and unremarkable CMP, and a CBC that shows a decrease in WBC to 2.5 with ANC of 1.2. No fevers. Platelet count is down to 109 K. Reviewed EKG today, which is stable. Recommend to continue with Ribociclib, without dose adjustment.   Continue Ribociclib 600 mg daily for 3 weeks, then off for 1 week starting Wednesday 10/2/24.  Continue letrozole daily.  Continue to follow with PCP regarding blood pressure management.  Continue taking calcium/vitamin D daily.    Will plan to add bisphosphonate for metastatic bone disease a couple months after surgery.  She had  genetic counseling and testing for metastatic breast cancer and is awaiting results.  Follow-up in 2 weeks for labs, EKG and consideration of starting C2 Ribociclib.     ______________________________________________________________________________    History of Present Illness/ Interval History    Ms. Adriana St  is a 69 year old female patient who comes in for labs and follow up after starting Kisqali on 9/11/24. Since starting Kisqali, she has felt ok. She notes some increased fatigue and some numbness to her ankles. At first the medication was giving her nausea, but she started using antiemetics 30 minutes prior and this has been helpful to eliminate the nausea. Prior to kisqali, she was more prone to constipation. No issues with diarrhea, though stools are coming more frequently and she is less constipated. She is getting hot flashes, specifically at night. Using a fan to help with temperature regulation. Has some right shoulder pain, xray's ordered per PCP showing no fracture but some mild arthritis.      ECOG performance status   0- Fully active, without restriction      Pain  Pain Score: Mild Pain (2)  Pain Loc: Shoulder (right)    ROS  A 14 point review of systems was obtained.  Positive findings noted in the history.  The remainder of the review of system is otherwise negative.      Oncology History/Treatment  5/2024- She presented with a large right breast mass, and progressive back pain.     5/30/2024-right breast mass biopsy showed invasive ductal carcinoma, grade 3,   ER positive (95%, strong), [place] positive (15%, strong), HER2 negative (1+ by IHC).  NGS: no mutations. No fusion.    6/6/2024- MRI lumbar spine with and without contrast showed pathologic L1 burst fracture with > 50% vertebral body height loss.  Additional scattered osseous metastasis were seen as well.    6/11/2024-initiated anastrozole    6/13/2024-PET scan showed right breast malignancy with right subpectoral kaylene,  "pulmonary and osseous metastasis.  Moderate compression deformity of the L1 vertebral body with associated uptake probably pathologic, with about 7 mm retropulsion into the spinal canal.   Hypermetabolic right thyroid nodule.   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  Presumed right frontal falcine meningioma.  No evidence for intracranial metastatic disease.    7/3/2024- s/p T11-L3 posterior segmental instrumentation, T12-L1 and L1-L2 bilateral laminectomies and right L1 transpedicular placement of osteochondral device for radiofrequency ablation of L1 vertebral body tumor (Dr. Prieto)    Planning to start post op radiation 3000 cGy T12-L2 spine        Physical Exam    BP (!) 162/92 (BP Location: Left arm, Patient Position: Sitting, Cuff Size: Adult Regular)   Pulse 65   Resp 20   Ht 1.6 m (5' 3\")   Wt 74.3 kg (163 lb 11.2 oz)   SpO2 97%   BMI 29.00 kg/m      General: Well-appearing female in no acute distress. Cooperative in conversation.  Eyes: EOMI, PERRL. No scleral icterus.  ENT: Oral mucosa is moist without lesions or thrush. No ulcerations or mucositis noted.  Lymphatic: Neck is supple without cervical, axillary, or supraclavicular lymphadenopathy.   Cardiovascular: RRR No murmurs, gallops, or rubs. No peripheral edema.  Respiratory: CTA bilaterally. No wheezes or crackles.  Gastrointestinal: BS +. Abdomen soft, non-tender. No palpable hepatosplenomegaly or masses.   Neurologic: Alert and oriented x3. Cranial nerves II through XII are grossly intact.  Skin: No rashes, petechiae, or bruising noted. Warm, dry, intact.      Lab Results    Recent Results (from the past 168 hour(s))   Comprehensive metabolic panel   Result Value Ref Range    Sodium 139 135 - 145 mmol/L    Potassium 3.8 3.4 - 5.3 mmol/L    " Carbon Dioxide (CO2) 21 (L) 22 - 29 mmol/L    Anion Gap 14 7 - 15 mmol/L    Urea Nitrogen 13.5 8.0 - 23.0 mg/dL    Creatinine 0.82 0.51 - 0.95 mg/dL    GFR Estimate 77 >60 mL/min/1.73m2    Calcium 8.9 8.8 - 10.4 mg/dL    Chloride 104 98 - 107 mmol/L    Glucose 131 (H) 70 - 99 mg/dL    Alkaline Phosphatase 70 40 - 150 U/L    AST 14 0 - 45 U/L    ALT 9 0 - 50 U/L    Protein Total 6.3 (L) 6.4 - 8.3 g/dL    Albumin 3.9 3.5 - 5.2 g/dL    Bilirubin Total 0.7 <=1.2 mg/dL   Magnesium   Result Value Ref Range    Magnesium 1.8 1.7 - 2.3 mg/dL   Phosphorus   Result Value Ref Range    Phosphorus 4.5 2.5 - 4.5 mg/dL   CBC with platelets and differential   Result Value Ref Range    WBC Count 2.5 (L) 4.0 - 11.0 10e3/uL    RBC Count 4.55 3.80 - 5.20 10e6/uL    Hemoglobin 12.0 11.7 - 15.7 g/dL    Hematocrit 36.6 35.0 - 47.0 %    MCV 80 78 - 100 fL    MCH 26.4 (L) 26.5 - 33.0 pg    MCHC 32.8 31.5 - 36.5 g/dL    RDW 14.7 10.0 - 15.0 %    Platelet Count 109 (L) 150 - 450 10e3/uL    % Neutrophils 47 %    % Lymphocytes 36 %    % Monocytes 4 %    % Eosinophils 12 %    % Basophils 0 %    % Immature Granulocytes 0 %    NRBCs per 100 WBC 0 <1 /100    Absolute Neutrophils 1.2 (L) 1.6 - 8.3 10e3/uL    Absolute Lymphocytes 0.9 0.8 - 5.3 10e3/uL    Absolute Monocytes 0.1 0.0 - 1.3 10e3/uL    Absolute Eosinophils 0.3 0.0 - 0.7 10e3/uL    Absolute Basophils 0.0 0.0 - 0.2 10e3/uL    Absolute Immature Granulocytes 0.0 <=0.4 10e3/uL    Absolute NRBCs 0.0 10e3/uL   RBC and Platelet Morphology   Result Value Ref Range    RBC Morphology Confirmed RBC Indices     Platelet Assessment  Automated Count Confirmed. Platelet morphology is normal.     Automated Count Confirmed. Platelet morphology is normal.    Reactive Lymphocytes Present (A) None Seen   EKG 12-lead, complete   Result Value Ref Range    Systolic Blood Pressure  mmHg    Diastolic Blood Pressure  mmHg    Ventricular Rate 64 BPM    Atrial Rate 64 BPM    DC Interval 166 ms    QRS Duration 94 ms      ms    QTc 472 ms    P Axis 47 degrees    R AXIS 1 degrees    T Axis -14 degrees    Interpretation ECG       Sinus rhythm  Normal ECG  When compared with ECG of 28-Aug-2024 14:00,  QRS axis Shifted right  Minimal criteria for Anterior infarct are no longer Present  Criteria for Inferior infarct are no longer Present  Confirmed by SHEKHAR PERRY MD LOC:JN ([id number]) on 9/27/2024 12:04:55 PM       I reviewed the above labs today.  Imaging    XR Humerus Right G/E 2 Views    Result Date: 9/19/2024  For Patients: As a result of the 21st Century Cures Act, medical imaging exams and procedure reports are released immediately into your electronic medical record. You may view this report before your referring provider. If you have questions, please contact your health care provider. EXAM: XR HUMERUS MIN 2 VIEWS RIGHT LOCATION: [facility] DATE: 9/19/2024 INDICATION: Arm Pain, Right COMPARISON: None.    Within normal limits. No fracture.    XR Shoulder Right G/E 3 Views    Result Date: 9/19/2024  For Patients: As a result of the 21st Century Cures Act, medical imaging exams and procedure reports are released immediately into your electronic medical record. You may view this report before your referring provider. If you have questions, please contact your health care provider. EXAM: XR SHOULDER 3 VIEWS RIGHT LOCATION: [facility] DATE: 9/19/2024 INDICATION: Acute Pain Of Right Shoulder COMPARISON: None.    No fracture. Anatomic alignment. Mild glenohumeral degenerative joint disease. Mild acromioclavicular degenerative joint disease.       Billing  Total time 35 minutes, to include face to face visit, review of EMR, ordering, documentation and coordination of care on date of service. The longitudinal plan of care for the diagnosis(es)/condition(s) as documented were addressed during this visit. Due to the added complexity in care, I will continue to support Adriana in the subsequent  "management and with ongoing continuity of care.    Signed by: MARTINE Barron CNP        Chart documentation with Dragon Voice recognition Software. Although reviewed after completion, some words and grammatical errors may remain.    Oncology Rooming Note    September 27, 2024 10:22 AM   Adriana St is a 69 year old female who presents for:    Chief Complaint   Patient presents with     Oncology Clinic Visit     Malignant neoplasm metastatic to bone   Malignant neoplasm of central portion of right breast in female, estrogen receptor positive        Initial Vitals: BP (!) 162/92 (BP Location: Left arm, Patient Position: Sitting, Cuff Size: Adult Regular)   Pulse 65   Resp 20   Ht 1.6 m (5' 3\")   Wt 74.3 kg (163 lb 11.2 oz)   SpO2 97%   BMI 29.00 kg/m   Estimated body mass index is 29 kg/m  as calculated from the following:    Height as of this encounter: 1.6 m (5' 3\").    Weight as of this encounter: 74.3 kg (163 lb 11.2 oz). Body surface area is 1.82 meters squared.  Mild Pain (2) Comment: Data Unavailable   No LMP recorded. Patient is postmenopausal.  Allergies reviewed: Yes  Medications reviewed: Yes    Medications: Medication refills not needed today.  Pharmacy name entered into [employer]: [id number] [place], [place] - [facility] [address] AT [facility] OF [facility]    Frailty Screening:   Is the patient here for a new oncology consult visit in cancer care? 2. No      Clinical concerns:   Pt reported bilateral ankles slight numbness x3 weeks. Pt states numbness started right around the time she started taking oral chemo medication.       Alee Valles MA                Again, thank you for allowing me to participate in the care of your patient.        Sincerely,        MARTINE Barron CNP  "

## 2024-09-27 NOTE — PROGRESS NOTES
"Oncology Rooming Note    September 27, 2024 10:22 AM   Adriana St is a 69 year old female who presents for:    Chief Complaint   Patient presents with    Oncology Clinic Visit     Malignant neoplasm metastatic to bone   Malignant neoplasm of central portion of right breast in female, estrogen receptor positive        Initial Vitals: BP (!) 162/92 (BP Location: Left arm, Patient Position: Sitting, Cuff Size: Adult Regular)   Pulse 65   Resp 20   Ht 1.6 m (5' 3\")   Wt 74.3 kg (163 lb 11.2 oz)   SpO2 97%   BMI 29.00 kg/m   Estimated body mass index is 29 kg/m  as calculated from the following:    Height as of this encounter: 1.6 m (5' 3\").    Weight as of this encounter: 74.3 kg (163 lb 11.2 oz). Body surface area is 1.82 meters squared.  Mild Pain (2) Comment: Data Unavailable   No LMP recorded. Patient is postmenopausal.  Allergies reviewed: Yes  Medications reviewed: Yes    Medications: Medication refills not needed today.  Pharmacy name entered into "[employer]": [id number] - [place], [place] - [facility] [address] AT [facility] [place]    Frailty Screening:   Is the patient here for a new oncology consult visit in cancer care? 2. No      Clinical concerns:   Pt reported bilateral ankles slight numbness x3 weeks. Pt states numbness started right around the time she started taking oral chemo medication.       Alee Valles MA              "

## 2024-10-01 ENCOUNTER — TELEPHONE (OUTPATIENT)
Dept: ONCOLOGY | Facility: HOSPITAL | Age: 69
End: 2024-10-01
Payer: COMMERCIAL

## 2024-10-01 NOTE — TELEPHONE ENCOUNTER
"Patient calls and leaves message on triage voicemail with concerns regarding leg pain.    Return call placed to patient. She reports that she developed pain in her feet and right leg last night. The pain was making it difficult to sleep. She reports that the pain is a little better today but still present. She denies any ankle or leg swelling. Does say that her right leg \"feels heavy\" but denies any visible swelling. The pain in the right leg is mainly from the knee down in the calf. There is no redness or warm spot.     Symptoms are reviewed with Dr. Bender. She recommends that patient be evaluated in UC or ED to rule out a blood clot.     Return call placed to patient with recommendations. She verbalizes understanding and will plan on going into urgent care. She denies further questions at this time.    Marisa Sanchez RN      "

## 2024-10-08 ENCOUNTER — ONCOLOGY VISIT (OUTPATIENT)
Dept: ONCOLOGY | Facility: HOSPITAL | Age: 69
End: 2024-10-08
Payer: COMMERCIAL

## 2024-10-08 ENCOUNTER — LAB (OUTPATIENT)
Dept: INFUSION THERAPY | Facility: HOSPITAL | Age: 69
End: 2024-10-08
Attending: INTERNAL MEDICINE
Payer: COMMERCIAL

## 2024-10-08 ENCOUNTER — HOSPITAL ENCOUNTER (OUTPATIENT)
Dept: CARDIOLOGY | Facility: CLINIC | Age: 69
Discharge: HOME | End: 2024-10-08
Payer: COMMERCIAL

## 2024-10-08 VITALS
WEIGHT: 164 LBS | HEIGHT: 63 IN | RESPIRATION RATE: 16 BRPM | BODY MASS INDEX: 29.06 KG/M2 | OXYGEN SATURATION: 98 % | DIASTOLIC BLOOD PRESSURE: 85 MMHG | TEMPERATURE: 97.9 F | SYSTOLIC BLOOD PRESSURE: 136 MMHG | HEART RATE: 75 BPM

## 2024-10-08 DIAGNOSIS — Z17.0 MALIGNANT NEOPLASM OF CENTRAL PORTION OF RIGHT BREAST IN FEMALE, ESTROGEN RECEPTOR POSITIVE (H): ICD-10-CM

## 2024-10-08 DIAGNOSIS — C50.111 MALIGNANT NEOPLASM OF CENTRAL PORTION OF RIGHT BREAST IN FEMALE, ESTROGEN RECEPTOR POSITIVE (H): ICD-10-CM

## 2024-10-08 DIAGNOSIS — Z51.11 ENCOUNTER FOR ANTINEOPLASTIC CHEMOTHERAPY: ICD-10-CM

## 2024-10-08 DIAGNOSIS — C79.51 MALIGNANT NEOPLASM METASTATIC TO BONE (H): Primary | ICD-10-CM

## 2024-10-08 DIAGNOSIS — C79.51 MALIGNANT NEOPLASM METASTATIC TO BONE (H): ICD-10-CM

## 2024-10-08 DIAGNOSIS — R51.9 ACHING HEADACHE: ICD-10-CM

## 2024-10-08 DIAGNOSIS — M79.18 MUSCULOSKELETAL PAIN: ICD-10-CM

## 2024-10-08 LAB
ALBUMIN SERPL BCG-MCNC: 4.2 G/DL (ref 3.5–5.2)
ALP SERPL-CCNC: 72 U/L (ref 40–150)
ALT SERPL W P-5'-P-CCNC: 11 U/L (ref 0–50)
ANION GAP SERPL CALCULATED.3IONS-SCNC: 14 MMOL/L (ref 7–15)
AST SERPL W P-5'-P-CCNC: 18 U/L (ref 0–45)
ATRIAL RATE - MUSE: 75 BPM
BASOPHILS # BLD AUTO: 0.1 10E3/UL (ref 0–0.2)
BASOPHILS NFR BLD AUTO: 2 %
BILIRUB SERPL-MCNC: 0.6 MG/DL
BUN SERPL-MCNC: 20.1 MG/DL (ref 8–23)
CALCIUM SERPL-MCNC: 9.1 MG/DL (ref 8.8–10.4)
CHLORIDE SERPL-SCNC: 103 MMOL/L (ref 98–107)
CREAT SERPL-MCNC: 0.82 MG/DL (ref 0.51–0.95)
DIASTOLIC BLOOD PRESSURE - MUSE: NORMAL MMHG
EGFRCR SERPLBLD CKD-EPI 2021: 77 ML/MIN/1.73M2
EOSINOPHIL # BLD AUTO: 0.2 10E3/UL (ref 0–0.7)
EOSINOPHIL NFR BLD AUTO: 5 %
ERYTHROCYTE [DISTWIDTH] IN BLOOD BY AUTOMATED COUNT: 16.6 % (ref 10–15)
GLUCOSE SERPL-MCNC: 133 MG/DL (ref 70–99)
HCO3 SERPL-SCNC: 23 MMOL/L (ref 22–29)
HCT VFR BLD AUTO: 38.4 % (ref 35–47)
HGB BLD-MCNC: 12.9 G/DL (ref 11.7–15.7)
IMM GRANULOCYTES # BLD: 0 10E3/UL
IMM GRANULOCYTES NFR BLD: 0 %
INTERPRETATION ECG - MUSE: NORMAL
LYMPHOCYTES # BLD AUTO: 1.2 10E3/UL (ref 0.8–5.3)
LYMPHOCYTES NFR BLD AUTO: 37 %
MAGNESIUM SERPL-MCNC: 2 MG/DL (ref 1.7–2.3)
MCH RBC QN AUTO: 27 PG (ref 26.5–33)
MCHC RBC AUTO-ENTMCNC: 33.6 G/DL (ref 31.5–36.5)
MCV RBC AUTO: 81 FL (ref 78–100)
MONOCYTES # BLD AUTO: 0.4 10E3/UL (ref 0–1.3)
MONOCYTES NFR BLD AUTO: 11 %
NEUTROPHILS # BLD AUTO: 1.5 10E3/UL (ref 1.6–8.3)
NEUTROPHILS NFR BLD AUTO: 45 %
NRBC # BLD AUTO: 0 10E3/UL
NRBC BLD AUTO-RTO: 0 /100
P AXIS - MUSE: 24 DEGREES
PHOSPHATE SERPL-MCNC: 4.3 MG/DL (ref 2.5–4.5)
PLATELET # BLD AUTO: 159 10E3/UL (ref 150–450)
POTASSIUM SERPL-SCNC: 4.1 MMOL/L (ref 3.4–5.3)
PR INTERVAL - MUSE: 156 MS
PROT SERPL-MCNC: 6.6 G/DL (ref 6.4–8.3)
QRS DURATION - MUSE: 86 MS
QT - MUSE: 406 MS
QTC - MUSE: 453 MS
R AXIS - MUSE: -24 DEGREES
RBC # BLD AUTO: 4.77 10E6/UL (ref 3.8–5.2)
SODIUM SERPL-SCNC: 140 MMOL/L (ref 135–145)
SYSTOLIC BLOOD PRESSURE - MUSE: NORMAL MMHG
T AXIS - MUSE: -7 DEGREES
VENTRICULAR RATE- MUSE: 75 BPM
WBC # BLD AUTO: 3.3 10E3/UL (ref 4–11)

## 2024-10-08 PROCEDURE — G2211 COMPLEX E/M VISIT ADD ON: HCPCS | Performed by: INTERNAL MEDICINE

## 2024-10-08 PROCEDURE — G0463 HOSPITAL OUTPT CLINIC VISIT: HCPCS | Performed by: INTERNAL MEDICINE

## 2024-10-08 PROCEDURE — 80053 COMPREHEN METABOLIC PANEL: CPT

## 2024-10-08 PROCEDURE — 93010 ELECTROCARDIOGRAM REPORT: CPT | Performed by: STUDENT IN AN ORGANIZED HEALTH CARE EDUCATION/TRAINING PROGRAM

## 2024-10-08 PROCEDURE — 36415 COLL VENOUS BLD VENIPUNCTURE: CPT

## 2024-10-08 PROCEDURE — 85025 COMPLETE CBC W/AUTO DIFF WBC: CPT

## 2024-10-08 PROCEDURE — 83735 ASSAY OF MAGNESIUM: CPT

## 2024-10-08 PROCEDURE — 84100 ASSAY OF PHOSPHORUS: CPT

## 2024-10-08 PROCEDURE — 93005 ELECTROCARDIOGRAM TRACING: CPT

## 2024-10-08 PROCEDURE — 99215 OFFICE O/P EST HI 40 MIN: CPT | Performed by: INTERNAL MEDICINE

## 2024-10-08 RX ORDER — DULOXETIN HYDROCHLORIDE 30 MG/1
30 CAPSULE, DELAYED RELEASE ORAL 2 TIMES DAILY
Qty: 60 CAPSULE | Refills: 1 | Status: SHIPPED | OUTPATIENT
Start: 2024-10-08

## 2024-10-08 ASSESSMENT — PAIN SCALES - GENERAL: PAINLEVEL: MILD PAIN (3)

## 2024-10-08 NOTE — LETTER
"10/8/2024      Adriana St  [address] [facility] [address]      Dear Colleague,    Thank you for referring your patient, Adriana St, to the [facility]. Please see a copy of my visit note below.    Oncology Rooming Note    October 8, 2024 10:03 AM   Adriana St is a 69 year old female who presents for:    Chief Complaint   Patient presents with     Oncology Clinic Visit     Malignant neoplasm of central portion of right breast in female, estrogen receptor positive; Malignant neoplasm metastatic to bone        Initial Vitals: /85 (BP Location: Left arm, Patient Position: Sitting, Cuff Size: Adult Regular)   Pulse 75   Temp 97.9  F (36.6  C) (Axillary)   Resp 16   Ht 1.6 m (5' 3\")   Wt 74.4 kg (164 lb)   SpO2 98%   BMI 29.05 kg/m   Estimated body mass index is 29.05 kg/m  as calculated from the following:    Height as of this encounter: 1.6 m (5' 3\").    Weight as of this encounter: 74.4 kg (164 lb). Body surface area is 1.82 meters squared.  Mild Pain (3) Comment: Data Unavailable   No LMP recorded. Patient is postmenopausal.  Allergies reviewed: Yes  Medications reviewed: Yes    Medications: MEDICATION REFILLS NEEDED TODAY. Provider was notified.  Pharmacy name entered into [facility]: [id number] - [place], [place] - [address] AT [address] [facility]    Frailty Screening:   Is the patient here for a new oncology consult visit in cancer care? 2. No      Clinical concerns:  2 week labs and med follow up      Shanel Granda              [facility] Hematology and Oncology Progress Note    Patient: Adriana St  MRN: [id number]  Date of Service: Oct 8, 2024         Reason for Visit    Chief Complaint   Patient presents with     Oncology Clinic Visit     Malignant neoplasm of central portion of right breast in female, estrogen receptor positive; Malignant neoplasm metastatic to bone    "       Assessment and Plan     Cancer Staging   Malignant neoplasm of central portion of right breast in female, estrogen receptor positive (H)  Staging form: Breast, AJCC 8th Edition  - Clinical stage from 6/10/2024: Stage IV (cT2, cN0, cM1, G3, ER+, OR+, HER2-) - Signed by Magaly Bender MD on 6/10/2024      ECOG Performance    0 - Independent     Pain  Pain Score: Mild Pain (3)  Pain Loc: Head (and right arm)    #.  Metastatic right breast cancer ER positive, OR positive, HER2 1+ by IHC.  #.  Pathologic L1 fracture status post lumbar decompression surgery followed by postop radiation.  #.  New onset right-sided headache  #.  Cancer related pain  #.  Hypertension, uncontrolled     Clinical exam is unremarkable.  I reviewed the EKG it showed QTc less than 500.  I reviewed her labs and they are overall adequate.   I recommended her to continue ribociclib and anastrozole.   I advised her to start Cymbalta for musculoskeletal pain related to AI.   Requested MRI brain for further evaluation of right-sided headache.   Follow-up labs and provider visit in 4 weeks.     Encounter Diagnoses:    Problem List Items Addressed This Visit          Oncology Diagnoses    Malignant neoplasm of central portion of right breast in female, estrogen receptor positive (H)    Relevant Medications    DULoxetine (CYMBALTA) 30 MG capsule    ribociclib (KISQALI) (600 MG DOSE) 200 MG tablet therapy pack    Other Relevant Orders    CBC with platelets differential    Comprehensive metabolic panel    Magnesium    Phosphorus    Treatment Conditions 4    EKG 12-lead, complete    Malignant neoplasm metastatic to bone (H) - Primary    Relevant Medications    ribociclib (KISQALI) (600 MG DOSE) 200 MG tablet therapy pack    Other Relevant Orders    CBC with platelets differential    Comprehensive metabolic panel    Magnesium    Phosphorus    Treatment Conditions 4    EKG 12-lead, complete     Other Visit Diagnoses       Musculoskeletal pain         Relevant Medications    DULoxetine (CYMBALTA) 30 MG capsule    Aching headache        Relevant Medications    DULoxetine (CYMBALTA) 30 MG capsule    Other Relevant Orders    MR Brain w/o & w Contrast             CC: Gilmer Walters   ______________________________________________________________________________  Oncologic history  5/2024- She presented with a large right breast mass, and progressive back pain.     5/30/2024-right breast mass biopsy showed invasive ductal carcinoma, grade 3,   ER positive (95%, strong), MA positive (15%, strong), HER2 negative (1+ by IHC).  NGS: no mutations. No fusion.    6/6/2024- MRI lumbar spine with and without contrast showed pathologic L1 burst fracture with > 50% vertebral body height loss.  Additional scattered osseous metastasis were seen as well.    6/11/2024-initiated anastrozole    6/13/2024-PET scan showed right breast malignancy with right subpectoral kaylene, pulmonary and osseous metastasis.  Moderate compression deformity of the L1 vertebral body with associated uptake probably pathologic, with about 7 mm retropulsion into the spinal canal.   Hypermetabolic right thyroid nodule.   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  Presumed right frontal falcine meningioma.  No evidence for intracranial metastatic disease.    7/3/2024- s/p T11-L3 posterior segmental instrumentation, T12-L1 and L1-L2 bilateral laminectomies and right L1 transpedicular placement of osteochondral device for radiofrequency ablation of L1 vertebral body tumor (Dr. Prieto)    8/29/2024 - 9/12/2024 - post op radiation 3000 cGy T12-L2 spine    9/11/2024-initiated ribociclib 600 mg daily, 3 weeks on and 1 week off..    History of Present Illness    Ms. Adriana St presented today  "unaccompanied.  She reported grade 1 nausea from ribociclib.  She also has musculoskeletal side effects related to anastrozole.  She reported right-sided headache which has been more frequent and persistent in the last few weeks.  No vision changes.  No injury.  No focal weakness.    Review of systems  Apart from describing in HPI, the remainder of comprehensive ROS was negative.    Past History    Past Medical History:   Diagnosis Date     Malignant neoplasm metastatic to bone (H)      Malignant neoplasm of central portion of right breast in female, estrogen receptor positive (H)      Pathologic lumbar vertebral fracture      PONV (postoperative nausea and vomiting)        Past Surgical History:   Procedure Laterality Date     APPENDECTOMY       BREAST AUGMENTATION       CHOLECYSTECTOMY       OPTICAL TRACKING SYSTEM FUSION SPINE POSTERIOR LUMBAR THREE+ LEVELS Bilateral 7/3/2024    Procedure: THORACIC 11 TO LUMBAR 3 POSTEROLATERAL FUSION USING ALLOGRAFT,LUMBAR 1 BILATERAL LAMINECTOMY AND MEDIAL FACETECTOMY AND SEPARATION SURGERY, USE OF OSTEOCOOL DEVICE.;  Surgeon: Thomas Prieto MD;  Location:  OR       Physical Exam    /85 (BP Location: Left arm, Patient Position: Sitting, Cuff Size: Adult Regular)   Pulse 75   Temp 97.9  F (36.6  C) (Axillary)   Resp 16   Ht 1.6 m (5' 3\")   Wt 74.4 kg (164 lb)   SpO2 98%   BMI 29.05 kg/m      General: alert, awake, not in acute distress  HEENT: Head: Normal, normocephalic, atraumatic.  Eye: Normal external eye, conjunctiva, lids cornea, ALEJANDRA.  Nose: Normal external nose, mucus membranes and septum.  Pharynx: Normal buccal mucosa. Normal pharynx.  Neck / Thyroid: Supple, no masses, nodes, nodules or enlargement.  Lymphatics: No abnormally enlarged lymph nodes.  Extremities: normal strength, tone, and muscle mass  Skin: normal. no rash or abnormalities  CNS: non focal.    Lab Results    Recent Results (from the past 240 hour(s))   EKG 12-lead complete w/read - " Clinics    Collection Time: 10/08/24  9:14 AM   Result Value Ref Range    Systolic Blood Pressure  mmHg    Diastolic Blood Pressure  mmHg    Ventricular Rate 75 BPM    Atrial Rate 75 BPM    NV Interval 156 ms    QRS Duration 86 ms     ms    QTc 453 ms    P Axis 24 degrees    R AXIS -24 degrees    T Axis -7 degrees    Interpretation ECG       Sinus rhythm  Normal ECG  When compared with ECG of 27-Sep-2024 10:05,  T wave inversion now evident in Anterior leads  Confirmed by EDI ALEXIS MD LOC: ([id number]) on 10/8/2024 2:40:50 PM     Comprehensive metabolic panel    Collection Time: 10/08/24  9:27 AM   Result Value Ref Range    Sodium 140 135 - 145 mmol/L    Potassium 4.1 3.4 - 5.3 mmol/L    Carbon Dioxide (CO2) 23 22 - 29 mmol/L    Anion Gap 14 7 - 15 mmol/L    Urea Nitrogen 20.1 8.0 - 23.0 mg/dL    Creatinine 0.82 0.51 - 0.95 mg/dL    GFR Estimate 77 >60 mL/min/1.73m2    Calcium 9.1 8.8 - 10.4 mg/dL    Chloride 103 98 - 107 mmol/L    Glucose 133 (H) 70 - 99 mg/dL    Alkaline Phosphatase 72 40 - 150 U/L    AST 18 0 - 45 U/L    ALT 11 0 - 50 U/L    Protein Total 6.6 6.4 - 8.3 g/dL    Albumin 4.2 3.5 - 5.2 g/dL    Bilirubin Total 0.6 <=1.2 mg/dL   Magnesium    Collection Time: 10/08/24  9:27 AM   Result Value Ref Range    Magnesium 2.0 1.7 - 2.3 mg/dL   Phosphorus    Collection Time: 10/08/24  9:27 AM   Result Value Ref Range    Phosphorus 4.3 2.5 - 4.5 mg/dL   CBC with platelets and differential    Collection Time: 10/08/24  9:27 AM   Result Value Ref Range    WBC Count 3.3 (L) 4.0 - 11.0 10e3/uL    RBC Count 4.77 3.80 - 5.20 10e6/uL    Hemoglobin 12.9 11.7 - 15.7 g/dL    Hematocrit 38.4 35.0 - 47.0 %    MCV 81 78 - 100 fL    MCH 27.0 26.5 - 33.0 pg    MCHC 33.6 31.5 - 36.5 g/dL    RDW 16.6 (H) 10.0 - 15.0 %    Platelet Count 159 150 - 450 10e3/uL    % Neutrophils 45 %    % Lymphocytes 37 %    % Monocytes 11 %    % Eosinophils 5 %    % Basophils 2 %    % Immature Granulocytes 0 %    NRBCs per 100 WBC 0 <1 /100     Absolute Neutrophils 1.5 (L) 1.6 - 8.3 10e3/uL    Absolute Lymphocytes 1.2 0.8 - 5.3 10e3/uL    Absolute Monocytes 0.4 0.0 - 1.3 10e3/uL    Absolute Eosinophils 0.2 0.0 - 0.7 10e3/uL    Absolute Basophils 0.1 0.0 - 0.2 10e3/uL    Absolute Immature Granulocytes 0.0 <=0.4 10e3/uL    Absolute NRBCs 0.0 10e3/uL           Imaging    US Lower Extremity Venous Duplex Bilateral    Result Date: 10/4/2024  For Patients: As a result of the 21st Century Cures Act, medical imaging exams and procedure reports are released immediately into your electronic medical record. You may view this report before your referring provider. If you have questions, please contact your health care provider. EXAM: US VENOUS LOWER EXTREMITY BILATERAL LOCATION: [facility] DATE: 10/4/2024 INDICATION: Bilateral leg pain. COMPARISON: None. TECHNIQUE: Venous Duplex ultrasound of bilateral lower extremities with and without compression, augmentation and duplex. Color flow and spectral Doppler with waveform analysis performed. FINDINGS: Exam includes the common femoral, femoral, popliteal veins as well as segmentally visualized deep calf veins and greater saphenous vein. RIGHT: No deep vein thrombosis. No superficial thrombophlebitis. No popliteal cyst. LEFT: No deep vein thrombosis. No superficial thrombophlebitis. No popliteal cyst.    1.  No deep venous thrombosis in the bilateral lower extremities.    XR Humerus Right G/E 2 Views    Result Date: 9/19/2024  For Patients: As a result of the 21st Century Cures Act, medical imaging exams and procedure reports are released immediately into your electronic medical record. You may view this report before your referring provider. If you have questions, please contact your health care provider. EXAM: XR HUMERUS MIN 2 VIEWS RIGHT LOCATION: [facility] DATE: 9/19/2024 INDICATION: Arm Pain, Right COMPARISON: None.    Within normal limits. No fracture.    XR Shoulder  Right G/E 3 Views    Result Date: 9/19/2024  For Patients: As a result of the 21st Century Cures Act, medical imaging exams and procedure reports are released immediately into your electronic medical record. You may view this report before your referring provider. If you have questions, please contact your health care provider. EXAM: XR SHOULDER 3 VIEWS RIGHT LOCATION: [facility] DATE: 9/19/2024 INDICATION: Acute Pain Of Right Shoulder COMPARISON: None.    No fracture. Anatomic alignment. Mild glenohumeral degenerative joint disease. Mild acromioclavicular degenerative joint disease.     The longitudinal plan of care for the diagnosis(es)/condition(s) as documented were addressed during this visit. Due to the added complexity in care, I will continue to support Adriana Oakleyina in the subsequent management and with ongoing continuity of care.     40 minutes spent by me on the date of the encounter doing chart review, history and exam, documentation and further activities as noted above.    Signed by: Magaly Bender MD      Again, thank you for allowing me to participate in the care of your patient.        Sincerely,        Magaly Bender MD

## 2024-10-08 NOTE — PROGRESS NOTES
"Oncology Rooming Note    October 8, 2024 10:03 AM   Adriana St is a 69 year old female who presents for:    Chief Complaint   Patient presents with    Oncology Clinic Visit     Malignant neoplasm of central portion of right breast in female, estrogen receptor positive; Malignant neoplasm metastatic to bone        Initial Vitals: /85 (BP Location: Left arm, Patient Position: Sitting, Cuff Size: Adult Regular)   Pulse 75   Temp 97.9  F (36.6  C) (Axillary)   Resp 16   Ht 1.6 m (5' 3\")   Wt 74.4 kg (164 lb)   SpO2 98%   BMI 29.05 kg/m   Estimated body mass index is 29.05 kg/m  as calculated from the following:    Height as of this encounter: 1.6 m (5' 3\").    Weight as of this encounter: 74.4 kg (164 lb). Body surface area is 1.82 meters squared.  Mild Pain (3) Comment: Data Unavailable   No LMP recorded. Patient is postmenopausal.  Allergies reviewed: Yes  Medications reviewed: Yes    Medications: MEDICATION REFILLS NEEDED TODAY. Provider was notified.  Pharmacy name entered into [employer]: [facility] #[id number] - [place], [place] - [facility] [address]    Frailty Screening:   Is the patient here for a new oncology consult visit in cancer care? 2. No      Clinical concerns:  2 week labs and med follow up      Shanel Granda            "

## 2024-10-08 NOTE — PROGRESS NOTES
[facility] Hematology and Oncology Progress Note    Patient: Adriana St  MRN: [id number]  Date of Service: Oct 8, 2024         Reason for Visit    Chief Complaint   Patient presents with    Oncology Clinic Visit     Malignant neoplasm of central portion of right breast in female, estrogen receptor positive; Malignant neoplasm metastatic to bone          Assessment and Plan     Cancer Staging   Malignant neoplasm of central portion of right breast in female, estrogen receptor positive (H)  Staging form: Breast, AJCC 8th Edition  - Clinical stage from 6/10/2024: Stage IV (cT2, cN0, cM1, G3, ER+, OH+, HER2-) - Signed by Magaly Bender MD on 6/10/2024      ECOG Performance    0 - Independent     Pain  Pain Score: Mild Pain (3)  Pain Loc: Head (and right arm)    #.  Metastatic right breast cancer ER positive, OH positive, HER2 1+ by IHC.  #.  Pathologic L1 fracture status post lumbar decompression surgery followed by postop radiation.  #.  New onset right-sided headache  #.  Cancer related pain  #.  Hypertension, uncontrolled     Clinical exam is unremarkable.  I reviewed the EKG it showed QTc less than 500.  I reviewed her labs and they are overall adequate.   I recommended her to continue ribociclib and anastrozole.   I advised her to start Cymbalta for musculoskeletal pain related to AI.   Requested MRI brain for further evaluation of right-sided headache.   Follow-up labs and provider visit in 4 weeks.     Encounter Diagnoses:    Problem List Items Addressed This Visit          Oncology Diagnoses    Malignant neoplasm of central portion of right breast in female, estrogen receptor positive (H)    Relevant Medications    DULoxetine (CYMBALTA) 30 MG capsule    ribociclib (KISQALI) (600 MG DOSE) 200 MG tablet therapy pack    Other Relevant Orders    CBC with platelets differential    Comprehensive metabolic panel    Magnesium    Phosphorus    Treatment Conditions 4    EKG 12-lead, complete     Malignant neoplasm metastatic to bone (H) - Primary    Relevant Medications    ribociclib (KISQALI) (600 MG DOSE) 200 MG tablet therapy pack    Other Relevant Orders    CBC with platelets differential    Comprehensive metabolic panel    Magnesium    Phosphorus    Treatment Conditions 4    EKG 12-lead, complete     Other Visit Diagnoses       Musculoskeletal pain        Relevant Medications    DULoxetine (CYMBALTA) 30 MG capsule    Aching headache        Relevant Medications    DULoxetine (CYMBALTA) 30 MG capsule    Other Relevant Orders    MR Brain w/o & w Contrast             CC: Gilmer Walters   ______________________________________________________________________________  Oncologic history  5/2024- She presented with a large right breast mass, and progressive back pain.     5/30/2024-right breast mass biopsy showed invasive ductal carcinoma, grade 3,   ER positive (95%, strong), NE positive (15%, strong), HER2 negative (1+ by IHC).  NGS: no mutations. No fusion.    6/6/2024- MRI lumbar spine with and without contrast showed pathologic L1 burst fracture with > 50% vertebral body height loss.  Additional scattered osseous metastasis were seen as well.    6/11/2024-initiated anastrozole    6/13/2024-PET scan showed right breast malignancy with right subpectoral kaylene, pulmonary and osseous metastasis.  Moderate compression deformity of the L1 vertebral body with associated uptake probably pathologic, with about 7 mm retropulsion into the spinal canal.   Hypermetabolic right thyroid nodule.   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  Presumed right frontal falcine meningioma.  No evidence for intracranial metastatic disease.    7/3/2024- s/p T11-L3 posterior segmental instrumentation, T12-L1 and L1-L2 bilateral  "laminectomies and right L1 transpedicular placement of osteochondral device for radiofrequency ablation of L1 vertebral body tumor (Dr. Prieto)    8/29/2024 - 9/12/2024 - post op radiation 3000 cGy T12-L2 spine    9/11/2024-initiated ribociclib 600 mg daily, 3 weeks on and 1 week off..    History of Present Illness    Ms. Adriana St presented today unaccompanied.  She reported grade 1 nausea from ribociclib.  She also has musculoskeletal side effects related to anastrozole.  She reported right-sided headache which has been more frequent and persistent in the last few weeks.  No vision changes.  No injury.  No focal weakness.    Review of systems  Apart from describing in HPI, the remainder of comprehensive ROS was negative.    Past History    Past Medical History:   Diagnosis Date    Malignant neoplasm metastatic to bone (H)     Malignant neoplasm of central portion of right breast in female, estrogen receptor positive (H)     Pathologic lumbar vertebral fracture     PONV (postoperative nausea and vomiting)        Past Surgical History:   Procedure Laterality Date    APPENDECTOMY      BREAST AUGMENTATION      CHOLECYSTECTOMY      OPTICAL TRACKING SYSTEM FUSION SPINE POSTERIOR LUMBAR THREE+ LEVELS Bilateral 7/3/2024    Procedure: THORACIC 11 TO LUMBAR 3 POSTEROLATERAL FUSION USING ALLOGRAFT,LUMBAR 1 BILATERAL LAMINECTOMY AND MEDIAL FACETECTOMY AND SEPARATION SURGERY, USE OF OSTEOCOOL DEVICE.;  Surgeon: Thomas Prieto MD;  Location:  OR       Physical Exam    /85 (BP Location: Left arm, Patient Position: Sitting, Cuff Size: Adult Regular)   Pulse 75   Temp 97.9  F (36.6  C) (Axillary)   Resp 16   Ht 1.6 m (5' 3\")   Wt 74.4 kg (164 lb)   SpO2 98%   BMI 29.05 kg/m      General: alert, awake, not in acute distress  HEENT: Head: Normal, normocephalic, atraumatic.  Eye: Normal external eye, conjunctiva, lids cornea, ALEJANDRA.  Nose: Normal external nose, mucus membranes and septum.  Pharynx: " Normal buccal mucosa. Normal pharynx.  Neck / Thyroid: Supple, no masses, nodes, nodules or enlargement.  Lymphatics: No abnormally enlarged lymph nodes.  Extremities: normal strength, tone, and muscle mass  Skin: normal. no rash or abnormalities  CNS: non focal.    Lab Results    Recent Results (from the past 240 hour(s))   EKG 12-lead complete w/read - Clinics    Collection Time: 10/08/24  9:14 AM   Result Value Ref Range    Systolic Blood Pressure  mmHg    Diastolic Blood Pressure  mmHg    Ventricular Rate 75 BPM    Atrial Rate 75 BPM    PA Interval 156 ms    QRS Duration 86 ms     ms    QTc 453 ms    P Axis 24 degrees    R AXIS -24 degrees    T Axis -7 degrees    Interpretation ECG       Sinus rhythm  Normal ECG  When compared with ECG of 27-Sep-2024 10:05,  T wave inversion now evident in Anterior leads  Confirmed by EDI ALEXIS MD LOC: ([id number]) on 10/8/2024 2:40:50 PM     Comprehensive metabolic panel    Collection Time: 10/08/24  9:27 AM   Result Value Ref Range    Sodium 140 135 - 145 mmol/L    Potassium 4.1 3.4 - 5.3 mmol/L    Carbon Dioxide (CO2) 23 22 - 29 mmol/L    Anion Gap 14 7 - 15 mmol/L    Urea Nitrogen 20.1 8.0 - 23.0 mg/dL    Creatinine 0.82 0.51 - 0.95 mg/dL    GFR Estimate 77 >60 mL/min/1.73m2    Calcium 9.1 8.8 - 10.4 mg/dL    Chloride 103 98 - 107 mmol/L    Glucose 133 (H) 70 - 99 mg/dL    Alkaline Phosphatase 72 40 - 150 U/L    AST 18 0 - 45 U/L    ALT 11 0 - 50 U/L    Protein Total 6.6 6.4 - 8.3 g/dL    Albumin 4.2 3.5 - 5.2 g/dL    Bilirubin Total 0.6 <=1.2 mg/dL   Magnesium    Collection Time: 10/08/24  9:27 AM   Result Value Ref Range    Magnesium 2.0 1.7 - 2.3 mg/dL   Phosphorus    Collection Time: 10/08/24  9:27 AM   Result Value Ref Range    Phosphorus 4.3 2.5 - 4.5 mg/dL   CBC with platelets and differential    Collection Time: 10/08/24  9:27 AM   Result Value Ref Range    WBC Count 3.3 (L) 4.0 - 11.0 10e3/uL    RBC Count 4.77 3.80 - 5.20 10e6/uL    Hemoglobin 12.9 11.7 -  15.7 g/dL    Hematocrit 38.4 35.0 - 47.0 %    MCV 81 78 - 100 fL    MCH 27.0 26.5 - 33.0 pg    MCHC 33.6 31.5 - 36.5 g/dL    RDW 16.6 (H) 10.0 - 15.0 %    Platelet Count 159 150 - 450 10e3/uL    % Neutrophils 45 %    % Lymphocytes 37 %    % Monocytes 11 %    % Eosinophils 5 %    % Basophils 2 %    % Immature Granulocytes 0 %    NRBCs per 100 WBC 0 <1 /100    Absolute Neutrophils 1.5 (L) 1.6 - 8.3 10e3/uL    Absolute Lymphocytes 1.2 0.8 - 5.3 10e3/uL    Absolute Monocytes 0.4 0.0 - 1.3 10e3/uL    Absolute Eosinophils 0.2 0.0 - 0.7 10e3/uL    Absolute Basophils 0.1 0.0 - 0.2 10e3/uL    Absolute Immature Granulocytes 0.0 <=0.4 10e3/uL    Absolute NRBCs 0.0 10e3/uL           Imaging    US Lower Extremity Venous Duplex Bilateral    Result Date: 10/4/2024  For Patients: As a result of the 21st Century Cures Act, medical imaging exams and procedure reports are released immediately into your electronic medical record. You may view this report before your referring provider. If you have questions, please contact your health care provider. EXAM: US VENOUS LOWER EXTREMITY BILATERAL LOCATION: [facility] DATE: 10/4/2024 INDICATION: Bilateral leg pain. COMPARISON: None. TECHNIQUE: Venous Duplex ultrasound of bilateral lower extremities with and without compression, augmentation and duplex. Color flow and spectral Doppler with waveform analysis performed. FINDINGS: Exam includes the common femoral, femoral, popliteal veins as well as segmentally visualized deep calf veins and greater saphenous vein. RIGHT: No deep vein thrombosis. No superficial thrombophlebitis. No popliteal cyst. LEFT: No deep vein thrombosis. No superficial thrombophlebitis. No popliteal cyst.    1.  No deep venous thrombosis in the bilateral lower extremities.    XR Humerus Right G/E 2 Views    Result Date: 9/19/2024  For Patients: As a result of the 21st Century Cures Act, medical imaging exams and procedure reports are released  immediately into your electronic medical record. You may view this report before your referring provider. If you have questions, please contact your health care provider. EXAM: XR HUMERUS MIN 2 VIEWS RIGHT LOCATION: [facility] DATE: 9/19/2024 INDICATION: Arm Pain, Right COMPARISON: None.    Within normal limits. No fracture.    XR Shoulder Right G/E 3 Views    Result Date: 9/19/2024  For Patients: As a result of the 21st Century Cures Act, medical imaging exams and procedure reports are released immediately into your electronic medical record. You may view this report before your referring provider. If you have questions, please contact your health care provider. EXAM: XR SHOULDER 3 VIEWS RIGHT LOCATION: [facility] DATE: 9/19/2024 INDICATION: Acute Pain Of Right Shoulder COMPARISON: None.    No fracture. Anatomic alignment. Mild glenohumeral degenerative joint disease. Mild acromioclavicular degenerative joint disease.     The longitudinal plan of care for the diagnosis(es)/condition(s) as documented were addressed during this visit. Due to the added complexity in care, I will continue to support Adriana Wei in the subsequent management and with ongoing continuity of care.     40 minutes spent by me on the date of the encounter doing chart review, history and exam, documentation and further activities as noted above.    Signed by: Magaly Bender MD

## 2024-10-10 ENCOUNTER — PATIENT OUTREACH (OUTPATIENT)
Dept: ONCOLOGY | Facility: HOSPITAL | Age: 69
End: 2024-10-10
Payer: COMMERCIAL

## 2024-10-10 NOTE — PROGRESS NOTES
[facility]: Cancer Care                                                                                            Situation: Patient chart reviewed by care coordinator.    Background: Patient has history of metastatic right breast cancer ER positive, OR positive, HER2 1+ by IHC.     Assessment: Patient saw Dr. Bender in follow-up on 10/8/24.  Patient taking ribociclib and letrozole.  Dr. Bender prescribed Cymbalta for musculoskeletal pain related to AI.  Patient has new onset HA.  MRI ordered.     Plan/Recommendations: Patient to follow-up with labs and provider visit in 4 week.   Patient scheduled for labs and Anette Antonio CNP on 11/5/24.  MRI scheduled for 10/11/24.    Signature:  Annmarie Nash RN

## 2024-10-11 ENCOUNTER — HOSPITAL ENCOUNTER (OUTPATIENT)
Dept: MRI IMAGING | Facility: HOSPITAL | Age: 69
Discharge: HOME | End: 2024-10-11
Attending: INTERNAL MEDICINE | Admitting: INTERNAL MEDICINE
Payer: COMMERCIAL

## 2024-10-11 ENCOUNTER — OFFICE VISIT (OUTPATIENT)
Dept: RADIATION ONCOLOGY | Facility: HOSPITAL | Age: 69
End: 2024-10-11
Attending: RADIOLOGY
Payer: COMMERCIAL

## 2024-10-11 VITALS
OXYGEN SATURATION: 98 % | HEART RATE: 70 BPM | WEIGHT: 164 LBS | TEMPERATURE: 98.1 F | SYSTOLIC BLOOD PRESSURE: 155 MMHG | RESPIRATION RATE: 20 BRPM | BODY MASS INDEX: 29.05 KG/M2 | DIASTOLIC BLOOD PRESSURE: 86 MMHG

## 2024-10-11 DIAGNOSIS — C79.51 CARCINOMA OF RIGHT BREAST METASTATIC TO BONE (H): Primary | ICD-10-CM

## 2024-10-11 DIAGNOSIS — R51.9 ACHING HEADACHE: ICD-10-CM

## 2024-10-11 DIAGNOSIS — C50.911 CARCINOMA OF RIGHT BREAST METASTATIC TO BONE (H): Primary | ICD-10-CM

## 2024-10-11 PROCEDURE — 70553 MRI BRAIN STEM W/O & W/DYE: CPT

## 2024-10-11 PROCEDURE — 255N000002 HC RX 255 OP 636: Performed by: INTERNAL MEDICINE

## 2024-10-11 PROCEDURE — G0463 HOSPITAL OUTPT CLINIC VISIT: HCPCS | Performed by: RADIOLOGY

## 2024-10-11 PROCEDURE — A9585 GADOBUTROL INJECTION: HCPCS | Performed by: INTERNAL MEDICINE

## 2024-10-11 RX ORDER — GADOBUTROL 604.72 MG/ML
7.5 INJECTION INTRAVENOUS ONCE
Status: COMPLETED | OUTPATIENT
Start: 2024-10-11 | End: 2024-10-11

## 2024-10-11 RX ADMIN — GADOBUTROL 7.5 ML: 604.72 INJECTION INTRAVENOUS at 12:54

## 2024-10-11 ASSESSMENT — PAIN SCALES - GENERAL: PAINLEVEL: NO PAIN (0)

## 2024-10-11 NOTE — PROGRESS NOTES
"Oncology Rooming Note    October 11, 2024 1:58 PM   Adriana St is a 69 year old female who presents for:    Chief Complaint   Patient presents with    Oncology Clinic Visit    Radiation Therapy     Follow up     Initial Vitals: BP (!) 155/86 (BP Location: Left arm, Patient Position: Sitting)   Pulse 70   Temp 98.1  F (36.7  C)   Resp 20   Wt 74.4 kg (164 lb)   SpO2 98%   BMI 29.05 kg/m   Estimated body mass index is 29.05 kg/m  as calculated from the following:    Height as of 10/8/24: 1.6 m (5' 3\").    Weight as of this encounter: 74.4 kg (164 lb). Body surface area is 1.82 meters squared.  No Pain (0) Comment: right arm pain w/ certain movements   No LMP recorded. Patient is postmenopausal.  Allergies reviewed: Yes  Medications reviewed: Yes    Medications: Medication refills not needed today.  Pharmacy name entered into [employer]: [facility] #[id number] [place] [address] AT [facility] [employer] [address] [facility]    Frailty Screening:   Is the patient here for a new oncology consult visit in cancer care? 2. No      Clinical concerns: Follow up, last tx done 9/12/24, c/o right arm pain only with movement.  Dr. Alexander was notified.      Laurne Goodwin RN              "

## 2024-10-11 NOTE — LETTER
10/11/2024      Adriana St  [address] [facility] [place] [address]      Dear Colleague,    Thank you for referring your patient, Adriana St, to the [facility] RADIATION ONCOLOGY [place]. Please see a copy of my visit note below.    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       TYPE OF RADIATION THERAPY ADMINISTERED:  Postop radiation therapy with a total dose of 3000 cGy in 10 treatments given from 8/29/2024 - 9/12/2024.      INTERVAL SINCE COMPLETION OF RADIATION THERAPY: 1 month.      SUBJECTIVE:  Ms. St is a 69 year old female who was found to have stage IV breast cancer with clinically symptomatic lumbar spine metastasis.  The patient presented with a small lump in her right breast February 2024. It has grown fairly quickly.  She noted back pain about 6 weeks ago which limit her mobility significantly.  She stopped working because of the back pain (10/10) and unable to walk very well.  She however does not have any tingling, numbness in lower extremities or radiculopathy.  She does not have sensory changes in the perineum.  She does not have bowel or bladder incontinence.  She currently takes hydromorphone 4 mg every 5 hours for back pain.      She subsequently did workup further breast mass and biopsy was done on 5/30/2024 which showed invasive ductal carcinoma, grade 3, ER positive (95%, strong), ND positive (15%, strong), HER2 negative (1+ by IHC). She presented to the emergency room with worsening back pain on 6/6/2024.  MRI lumbar spine with and without contrast showed pathologic L1 burst fracture with > 50% vertebral body height loss.  Additional scattered osseous metastasis were seen as well.  The patient had a staging PET CT scan on 6/13/2024 which showed right breast  cancer with right subpectoral kaylene, pulmonary, and osseous metastatic disease. Moderate compression deformity of the L1 vertebral body with associated uptake, probably pathologic, with about 7 mm retropulsion into the spinal canal, as noted on recent MRI.  MRI brain on 6/14/2024 showed no evidence of brain metastasis.  Patient was initially seen and evaluated on 6/12/2024 for palliative radiation therapy.  Patient then proceeded with surgery with decompression of spinal cord metastasis on 7/3/2024.  The pathology from L1 spine showed positive for malignancy compatible with breast primary.  Patient has been recovering well since then.  Her pain has been significantly improved.  She received postoperative radiation therapy with a total dose of 3000 cGy in 10 treatments given from 8/29/2024 - 9/12/2024. She tolerated radiation therapy well with minimal side effect.     Patient has been stable since radiation therapy with no ongoing issues.  She is here for routine post therapy office follow-up.    Medications were reviewed and are up to date on EPIC.    The following portions of the patient's history were reviewed and updated as appropriate: allergies, current medications, past family history, past medical history, past social history, past surgical history and problem list.    Review of Systems:      General  Constitutional  Constitutional (WDL): Exceptions to WDL  Fatigue: Fatigue relieved by rest  EENT  Eye Disorders  Eye Disorder (WDL): Assessment not pertinent to visit  Ear Disorders  Ear Disorder (WDL): Assessment not pertinent to visit  Respiratory  Respiratory  Respiratory (WDL): All respiratory elements are within defined limits  Cardiovascular  Cardiovascular  Cardiovascular (WDL): All cardiovascular elements are within defined limits  Gastrointestinal  Gastrointestinal  Gastrointestinal (WDL): All gastrointestinal elements are within defined limits  Musculoskeletal  Musculoskeletal and Connective Tissue  Disorders  Musculoskeletal & Connective (WDL): Exceptions to WDL  Arthralgia: Mild pain  Bone Pain: Mild pain  Generalized Muscle Weakness: Symptomatic OR perceived by patient but not evident on physical exam  Integumentary  Integumentary  Integumentary (WDL): All integumentary elements are within defined limits  Neurological  Neurosensory  Neurosensory (WDL): All neurosensory elements are within defined limits  Peripheral Motor Neuropathy: Asymptomatic OR clinical or diagnostic observations only (r shoulder)  Genitourinary/Reproductive  Genitourinary  Genitourinary (WDL): All genitourinary elements are within defined limits  Lymphatic  Lymph System Disorders  Lymph (WDL): All lymph elements are within defined limits  Pain  Pain Score: No Pain (0) (right arm pain w/ certain movements)  AUA Assessment                                                              Accompanied by  Accompanied By: family (son)    Objective:      PHYSICAL EXAMINATION:    BP (!) 155/86 (BP Location: Left arm, Patient Position: Sitting)   Pulse 70   Temp 98.1  F (36.7  C)   Resp 20   Wt 74.4 kg (164 lb)   SpO2 98%   BMI 29.05 kg/m      Gen: Alert, in NAD  Eyes: PERRL, EOMI, sclera anicteric  Neck: Supple, full ROM, no LAD  Pulm: No wheezing, stridor or respiratory distress  CV: Well-perfused, no cyanosis, no pedal edema  Back: No step-offs or pain to palpation along the thoracolumbar spine  Rectal: Deferred  : Deferred  Musculoskeletal: Normal muscle bulk and tone  Skin: Normal color and turgor  Neurologic: A/Ox3, CN II-XII intact, normal gait and station  Psychiatric: Appropriate mood and affect      Impression     68 year old female with a diagnosis of stage IV breast cancer with clinically symptomatic lumbar spine metastasis.  Patient's status post surgery followed by postop radiation therapy with a total dose of 3000 cGy in 10 treatments given from 8/29/2024 - 9/12/2024.     Assessment & Plan:   1.  Patient has been recovering  "well since radiation therapy.  She has recent worsening headache for which she had MRI brain earlier today.  Result is pending at time of evaluation.  If there is any MRI evidence of metastatic disease which could be contributed to her recent headache, we will consider a course of palliative radiation therapy.     2.  Continue follow-up with Dr. Bender, medical oncology for her breast cancer ongoing treatment as planned.    3.  Follow-up with radiation oncology as needed.    Pain Management Plan: NA    Face to face time  20 minutes with > 80% spent on consultation, education and coordination of care.    Laura Alexander MD  Department of Radiation Oncology   [facility] Radiation Oncology  Tel: [phone]  Page: [phone]    [facility]  [address] [place] [address]     [address]  [place] [address]    CC:  Patient Care Team:  Gilmer Walters as PCP - General  Annmarie Nash RN as Specialty Care Coordinator (Hematology & Oncology)  Laura Alexander MD as MD (Radiation Oncology)  Thomas Prieto MD as MD (Neurological Surgery)  Magaly Bender MD as MD (Hematology)  Jyoti Magana DO as Assigned Surgical Provider  Thomas Prieto MD as Assigned Neuroscience Provider  Laura Alexander MD as Assigned Cancer Care Provider      Oncology Rooming Note    October 11, 2024 1:58 PM   Adriana St is a 69 year old female who presents for:    Chief Complaint   Patient presents with     Oncology Clinic Visit     Radiation Therapy     Follow up     Initial Vitals: BP (!) 155/86 (BP Location: Left arm, Patient Position: Sitting)   Pulse 70   Temp 98.1  F (36.7  C)   Resp 20   Wt 74.4 kg (164 lb)   SpO2 98%   BMI 29.05 kg/m   Estimated body mass index is 29.05 kg/m  as calculated from the following:    Height as of 10/8/24: 1.6 m (5' 3\").    Weight as of this encounter: 74.4 kg (164 lb). Body surface area is 1.82 meters squared.  No Pain (0) Comment: right arm pain w/ certain movements "   No LMP recorded. Patient is postmenopausal.  Allergies reviewed: Yes  Medications reviewed: Yes    Medications: Medication refills not needed today.  Pharmacy [facility] into [employer]: [id number] - [place] [address] AT [facility]    Frailty Screening:   Is the patient here for a new oncology consult visit in cancer care? 2. No      Clinical concerns: Follow up, last tx done 9/12/24, c/o right arm pain only with movement.  Dr. Alexander was notified.      Lauren Goodwin RN                Again, thank you for allowing me to participate in the care of your patient.        Sincerely,        Laura Alexander MD

## 2024-10-11 NOTE — PROGRESS NOTES
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       TYPE OF RADIATION THERAPY ADMINISTERED:  Postop radiation therapy with a total dose of 3000 cGy in 10 treatments given from 8/29/2024 - 9/12/2024.      INTERVAL SINCE COMPLETION OF RADIATION THERAPY: 1 month.      SUBJECTIVE:  Ms. St is a 69 year old female who was found to have stage IV breast cancer with clinically symptomatic lumbar spine metastasis.  The patient presented with a small lump in her right breast February 2024. It has grown fairly quickly.  She noted back pain about 6 weeks ago which limit her mobility significantly.  She stopped working because of the back pain (10/10) and unable to walk very well.  She however does not have any tingling, numbness in lower extremities or radiculopathy.  She does not have sensory changes in the perineum.  She does not have bowel or bladder incontinence.  She currently takes hydromorphone 4 mg every 5 hours for back pain.      She subsequently did workup further breast mass and biopsy was done on 5/30/2024 which showed invasive ductal carcinoma, grade 3, ER positive (95%, strong), IN positive (15%, strong), HER2 negative (1+ by IHC). She presented to the emergency room with worsening back pain on 6/6/2024.  MRI lumbar spine with and without contrast showed pathologic L1 burst fracture with > 50% vertebral body height loss.  Additional scattered osseous metastasis were seen as well.  The patient had a staging PET CT scan on 6/13/2024 which showed right breast cancer with right subpectoral kaylene, pulmonary, and osseous metastatic disease. Moderate compression deformity of the L1 vertebral body with associated uptake, probably pathologic, with about 7 mm retropulsion into the spinal canal, as noted on recent MRI.   MRI brain on 6/14/2024 showed no evidence of brain metastasis.  Patient was initially seen and evaluated on 6/12/2024 for palliative radiation therapy.  Patient then proceeded with surgery with decompression of spinal cord metastasis on 7/3/2024.  The pathology from L1 spine showed positive for malignancy compatible with breast primary.  Patient has been recovering well since then.  Her pain has been significantly improved.  She received postoperative radiation therapy with a total dose of 3000 cGy in 10 treatments given from 8/29/2024 - 9/12/2024. She tolerated radiation therapy well with minimal side effect.     Patient has been stable since radiation therapy with no ongoing issues.  She is here for routine post therapy office follow-up.    Medications were reviewed and are up to date on EPIC.    The following portions of the patient's history were reviewed and updated as appropriate: allergies, current medications, past family history, past medical history, past social history, past surgical history and problem list.    Review of Systems:      General  Constitutional  Constitutional (WDL): Exceptions to WDL  Fatigue: Fatigue relieved by rest  EENT  Eye Disorders  Eye Disorder (WDL): Assessment not pertinent to visit  Ear Disorders  Ear Disorder (WDL): Assessment not pertinent to visit  Respiratory  Respiratory  Respiratory (WDL): All respiratory elements are within defined limits  Cardiovascular  Cardiovascular  Cardiovascular (WDL): All cardiovascular elements are within defined limits  Gastrointestinal  Gastrointestinal  Gastrointestinal (WDL): All gastrointestinal elements are within defined limits  Musculoskeletal  Musculoskeletal and Connective Tissue Disorders  Musculoskeletal & Connective (WDL): Exceptions to WDL  Arthralgia: Mild pain  Bone Pain: Mild pain  Generalized Muscle Weakness: Symptomatic OR perceived by patient but not evident on physical exam  Integumentary  Integumentary  Integumentary (WDL):  All integumentary elements are within defined limits  Neurological  Neurosensory  Neurosensory (WDL): All neurosensory elements are within defined limits  Peripheral Motor Neuropathy: Asymptomatic OR clinical or diagnostic observations only (r shoulder)  Genitourinary/Reproductive  Genitourinary  Genitourinary (WDL): All genitourinary elements are within defined limits  Lymphatic  Lymph System Disorders  Lymph (WDL): All lymph elements are within defined limits  Pain  Pain Score: No Pain (0) (right arm pain w/ certain movements)  AUA Assessment                                                              Accompanied by  Accompanied By: family (son)    Objective:      PHYSICAL EXAMINATION:    BP (!) 155/86 (BP Location: Left arm, Patient Position: Sitting)   Pulse 70   Temp 98.1  F (36.7  C)   Resp 20   Wt 74.4 kg (164 lb)   SpO2 98%   BMI 29.05 kg/m      Gen: Alert, in NAD  Eyes: PERRL, EOMI, sclera anicteric  Neck: Supple, full ROM, no LAD  Pulm: No wheezing, stridor or respiratory distress  CV: Well-perfused, no cyanosis, no pedal edema  Back: No step-offs or pain to palpation along the thoracolumbar spine  Rectal: Deferred  : Deferred  Musculoskeletal: Normal muscle bulk and tone  Skin: Normal color and turgor  Neurologic: A/Ox3, CN II-XII intact, normal gait and station  Psychiatric: Appropriate mood and affect      Impression     68 year old female with a diagnosis of stage IV breast cancer with clinically symptomatic lumbar spine metastasis.  Patient's status post surgery followed by postop radiation therapy with a total dose of 3000 cGy in 10 treatments given from 8/29/2024 - 9/12/2024.     Assessment & Plan:   1.  Patient has been recovering well since radiation therapy.  She has recent worsening headache for which she had MRI brain earlier today.  Result is pending at time of evaluation.  If there is any MRI evidence of metastatic disease which could be contributed to her recent headache, we will  consider a course of palliative radiation therapy.     2.  Continue follow-up with Dr. Bender, medical oncology for her breast cancer ongoing treatment as planned.    3.  Follow-up with radiation oncology as needed.    Pain Management Plan: NA    Face to face time  20 minutes with > 80% spent on consultation, education and coordination of care.    Laura Alexander MD  Department of Radiation Oncology   [facility] Radiation Oncology  Tel: [phone]  Page: [phone]    [facility]  [address] [place] [address]     [address]   [place] [address]    CC:  Patient Care Team:  Gilmer Walters as PCP - General  Annmarie Nash, RN as Specialty Care Coordinator (Hematology & Oncology)  Laura Alexander MD as MD (Radiation Oncology)  Thomas Prieto MD as MD (Neurological Surgery)  Magaly Bender MD as MD (Hematology)  Jyoti Magana DO as Assigned Surgical Provider  Thomas Prieto MD as Assigned Neuroscience Provider  Laura Alexander MD as Assigned Cancer Care Provider

## 2024-10-14 ENCOUNTER — MYC MEDICAL ADVICE (OUTPATIENT)
Dept: ONCOLOGY | Facility: HOSPITAL | Age: 69
End: 2024-10-14
Payer: COMMERCIAL

## 2024-10-14 ENCOUNTER — PATIENT OUTREACH (OUTPATIENT)
Dept: ONCOLOGY | Facility: HOSPITAL | Age: 69
End: 2024-10-14
Payer: COMMERCIAL

## 2024-10-14 NOTE — PROGRESS NOTES
"[facility]: Cancer Care Follow-Up Note                                    Discussion with Patient:                                                      Dr. Bender reviewed MRI results from 10/11/24.  She said no signs of a brain tumor. She said areas of bone mets are stable/improved. She recently started Cymbalta and is wondering if Cymbalta improved her HA?    Called patient using Language Line:  [phone] Azeri , Wei #[id number].  Patient didn't answer. Message left to return call to [phone].  Dates of Treatment:                                                      Infusion given in last 28 days       None            Assessment:                                                      Refer to MRI results from 10/11/24    Intervention/Education provided during outreach:                                                       [employer] message sent to patient.    10/16/24:  Patient returned message, \"I am sorry that you couldn t talk to me. I am happy to hear that the tumor   is behaving itself . I didn t take the new drug because I haven t had headaches lately. Should I take it anyway?\"  [employer] message back to patient, \"I'm happy to hear your headaches resolved!   If you aren't having symptoms (such as headaches, muscle/joint pain), then you can hold off on taking Cymbalta.  Cymbalta has shown to improve muscle/joint pain caused by Aromatase Inhibitors (letrozole)\".     Patient to follow up as scheduled at next appt    Signature:  Annmarie Nash RN    "

## 2024-10-18 ENCOUNTER — PATIENT OUTREACH (OUTPATIENT)
Dept: ONCOLOGY | Facility: HOSPITAL | Age: 69
End: 2024-10-18
Payer: COMMERCIAL

## 2024-10-18 NOTE — TELEPHONE ENCOUNTER
[facility]: Cancer Care                                                                                          Called and talked with patient.  See note from 10/18/24.    Signature:  Annmarie Nash RN

## 2024-10-18 NOTE — PROGRESS NOTES
"[facility]: Cancer Care Follow-Up Note                                    Discussion with Patient:                                                      Shen message from patient, \"I just left voicemail because my mother is experiencing sharp abdominal pain, stomach pain. This is the first time that this happens and I don t know what to do. She took the new medication last night and she had a very agitated night. Woke up many times and peed a lot according to her and she woke up this morning and took the regular medication s losartan, metoprolol and a calcium vitamins and it was right after this, that the abdominal pain started. Just wanted to get a hold of someone. Please call me at [phone]. Thank you\".    Called patient. Son, Rush, answered the phone.  No consent to communicate on file but patient placed on speaker phone.  Explained we will need to get consent to communicate signed at next visit. They agreed.    See assessment below.    Dates of Treatment:                                                      Infusion given in last 28 days       None            Assessment:                                                      Patient and Rush report patient was \"agitated\" and up to bathroom 8-10 times throughout the night. She denies pain or burning with urination. Denies blood in urine.  She said normally doesn't urinate as much during the day and is up about 4 times at night.  She said last night she was up more than normal.  She said she is tired this morning because of this. She said she did start duloxetine 30 mg for the first time last night to see if it improves her right arm pain.      Patient and Rush report that this morning patient took her normal morning medications (losartan, metoprolol, calcium and vitamins) and approximately 10-15 minutes later, she developed epigastric pain, nausea, became sweaty, was shaking, and felt weak. She said the pain was \"strong\".  She denies constipation " "and diarrhea. She said she had a BM before this but didn't have to strain and was a normal BM for her.  She said the pain has almost resolved. She did not take any duloxetine this morning. Rush said she looks better now than when she was having pain.  He said he made her breakfast of eggs and toast and she's going to try eating now.     Rush is wondering how urgent it is for patient to start the \"bone strengthener medication\"? Patient said she needs a lot of dental work done.  Rush said patient was in the process of getting dental work done in [place] when she was diagnosed with cancer so it was put on hold.  He is wondering how soon the dental work needs to be done? Can she travel to [facility]? He said they have family who are Dentists that can do the work. If can, when is the best time for her to travel? If travel to [facility] isn't recommended, he will need help as patient doesn't have dental insurance.  Told them these are very good questions and I will follow-up with Dr. Bender when she returns to clinic and call them back with an update.  Told them if Dr. Bender doesn't recommend travel, there are a list of Dental Clinics that see patients on a sliding scale, based on income.  Rush verbalized understanding.  Told them Dr. Bender is out of the office until next week. Told them will discuss their questions with her when she returns to clinic next week and call them Tues with an update.  Rush expressed appreciation for this.    Rush said patient continues to have right shoulder and arm pain.  He said pain is more in the right shoulder than it is in the right arm.  He said certain movements make her pain worse.  He said patient saw PCP and had xrays done and was told the pain could be due to arthritis.   He is wondering what they should do now for the pain?  He said PT did come out to the home but wasn't very helpful. Told Rush will also discuss this with Dr. Bender next week and call with update on Tues. He verbalized " understanding.    Rush reports she started taking ribociclib on Sept. 11.  He said she started her last cycle of treatment on Oct. 9.     Intervention/Education provided during outreach:                                                       Told patient and Rush that I want to run her symptoms by Anette Antonio CNP, since Dr. Bender is out of clinic today and will call back today with update.  Recommended that she not take duloxetine and to monitor her abdominal pain. Told her if pain returns, she should go to the ER for further evaluation. They verbalized understanding.   Reminded them they can call call On Call Oncologist over the weekend, if have new symptoms, concerns.  Rush requested this number again. On Call number give:  [phone]. Rush wrote this number down and verbalized back to me correctly.     Discussed with Anette. She said could be from taking duloxetine.  She recommends patient hold duloxetine.  She agreed and said patient should monitor her symptoms and if abdominal pain returns, she should go to ER for further work-up. Called patient back. Rush answered the phone. This message given to him. He verbalized understanding. Reminded him will call him back on Tues to follow-up dental work and arm pain.  He said thank you.    Follow up call in 1-2 weeks  Patient to follow up as scheduled at next appt  Confirmed patient has clinic and triage numbers    Signature:  Annmarie Nash RN

## 2024-10-22 ENCOUNTER — PATIENT OUTREACH (OUTPATIENT)
Dept: ONCOLOGY | Facility: HOSPITAL | Age: 69
End: 2024-10-22
Payer: COMMERCIAL

## 2024-10-22 DIAGNOSIS — C79.51 MALIGNANT NEOPLASM METASTATIC TO BONE (H): Primary | ICD-10-CM

## 2024-10-22 DIAGNOSIS — M25.519 SHOULDER PAIN: ICD-10-CM

## 2024-10-22 NOTE — PROGRESS NOTES
[facility]: Cancer Care                                                                                          Talked with patient and son, Rush last week. Refer to my note from 10/18/24.  Discussed with Dr. Bender when dental work needs to be completed and if ok to travel to [facility]?   said dental work needs to be completed before Zometa/other bone strengthener given.  She said patient is ok to travel to complete dental work in [place] and should schedule around Cancer Care appointments.    She agreed with Ortho referral for right shoulder and right arm pain. Referral ordered.      Called patient's son, Rush, and left message calling with updates on our conversation last week. Call back number left:  [phone].    10/23/24: Rush and patient returned call.   Rush said patient has a dental appt scheduled at [facility] in [place] on 10/28/24 at 0930. He said they are requesting a letter explaining patient's health condition and need for the appointment.  He said   He provided me with the information for [facility] in [place]:  [address] [facility] [address] [place] [id number]  Phone:  [phone]    Told him will call them and let them know why the appointment is needed today.  Rush verbalized understanding.    He said they are talking about a trip in Early Spring: March/April or August.  Told him to keep Dr. Bender updated so plan of care can be discussed while she is gone. He verbalized he would.    Called Called [facility]: [phone] at 1130 and talked with Dr. Sahni.  Told her patient needs to dental exam and any dental work completed before she can receive Zometa, as it can cause osteomyelitis of the jaw. She verbalized understanding and documented this in patient's chart. She didn't need anything further.  Call back number given should they have further questions:  [phone].    Signature:  Annmarie Nash RN

## 2024-10-23 NOTE — PROGRESS NOTES
ASSESSMENT & PLAN    Today we discussed the underlying etiology/pathology of patient's   1. Shoulder impingement syndrome, right    2. Malignant neoplasm metastatic to bone (H)    3. Pathological compression fracture of vertebra, sequela    4. Fusion of spine, thoracolumbar region 7/3/2024: T11-L3 posterior segmental fusion, [id number] bilat. laminectomy. Right L1 transpedicular placement of osteochondral device/RF ablation of L1 body tumor.    5. Malignant neoplasm of central portion of right breast in female, estrogen receptor positive (H)      Discussed diagnosis and treatment options with the patient today. A shared decision making model was used. The patient's values and choices were respected. The following represents what was discussed and decided upon by the provider and the patient.   - We discussed the patient is dealing with some right shoulder pain for the last 2-3 months without known injury or trauma.  - We reviewed her previous imaging of x-rays as well as PET scan showing no evidence of metastatic bone involvement of the right shoulder.  - We discussed her physical exam shows mild weakness of the external rotators of her rotator cuff as well as positive impingement testing indicating shoulder impingement syndrome.  - We discussed the mainstays of treatment for this condition which includes initial pain control with either oral anti-inflammatories or consideration of subacromial cortisone injection and secondary phase of physical therapy for rotator cuff strengthening.  - Patient states that overall she is improving.  She does not believe that there is any need for oral medication or injection at this time.  - Patient will be referred to formal physical therapy for right rotator cuff impingement syndrome.  We did discuss with her and her son the patient in the past has not been diligent with doing home exercise programs for other conditions.  I discussed again the importance of her spending at least 15  minutes a day doing her home exercises to make overall improvement for her shoulder.  - Patient otherwise may follow-up as needed.    -Call direct clinic number [[phone]] at any time with questions or concerns in regards to your recent office visit with me.     Harshad Celis PA-C  [facility]    This note was completed in part using a voice recognition software, any grammatical or context distortion are unintentional and inherent to the software.       SUBJECTIVE  Adriana St is a/an 69 year old female who is seen in consultation at the request of  Magaly Bender M.D. for evaluation of right shoulder pain. The patient is seen with their son, who helps interpret.    Onset: 2 - 3 month(s) ago. Reports insidious onset without acute precipitating event.  Patient is right-hand dominant.  Patient currently undergoing oral chemotherapy for bony mets from primary breast cancer.  Patient has had a history of thoracic lumbar spine surgery with postoperative superficial cellulitis/skin infection which is improving.   Location of Pain: right posterior lateral shoulder without any radiation below the elbow.  Rating of Pain at worst: 8/10  Rating of Pain Currently: 3/10  Worsened by: shoulder flexion, ER, lifting, lying on right shoulder  Better with: activity modification, tylenol  Treatments tried: rest/activity avoidance, ice, heat, Tylenol, and previous imaging (xray 9/19/24 @ [facility])  Quality: aching, throbbing  Associated symptoms: no distal numbness or tingling; denies swelling or warmth  Orthopedic history: NO  Relevant surgical history: Yes, s/p 7/3/2024: T11-L3 posterior segmental instrumentation, T12-L1 and L1-L2 bilateral laminectomies and right L1 transpedicular placement of osteochondral device for radiofrequency ablation of L1 vertebral body tumor.   Social history: social history: retired - currently being followed by Oncology for Breast cancer    Past Medical History:    Diagnosis Date    Malignant neoplasm metastatic to bone (H)     Malignant neoplasm of central portion of right breast in female, estrogen receptor positive (H)     Pathologic lumbar vertebral fracture     PONV (postoperative nausea and vomiting)      Social History     Socioeconomic History    Marital status:    Tobacco Use    Smoking status: Former     Types: Cigarettes     Start date: 1995     Quit date: 1985     Years since quittin.3    Smokeless tobacco: Never   Vaping Use    Vaping status: Never Used   Substance and Sexual Activity    Alcohol use: Yes     Comment: social    Drug use: Never     Social Drivers of Health     Financial Resource Strain: Low Risk  (2024)    Received from [facility]    Financial Resource Strain     Difficulty of Paying Living Expenses: 3   Food Insecurity: No Food Insecurity (2024)    Received from [facility]    Food Insecurity     Worried About Running Out of Food in the Last Year: 1   Transportation Needs: No Transportation Needs (2024)    Received from [facility]    Transportation Needs     Lack of Transportation (Medical): 1   Social Connections: Socially Integrated (2024)    Received from [facility]    Social Connections     Frequency of Communication with Friends and Family: 0   Interpersonal Safety: Low Risk  (2024)    Interpersonal Safety     Do you feel physically and emotionally safe where you currently live?: Yes     Within the past 12 months, have you been hit, slapped, kicked or otherwise physically hurt by someone?: No     Within the past 12 months, have you been humiliated or emotionally abused in other ways by your partner or ex-partner?: No   Housing Stability: Low Risk  (2024)    Received from [email or web address]    Housing Stability     Unable to Pay for  Housing in the Last Year: 1         Patient's past medical, surgical, social, and family histories were personally reviewed today and no changes are noted.    REVIEW OF SYSTEMS:  10 point ROS is negative other than symptoms noted above in HPI, Past Medical History or as stated below  Constitutional: NEGATIVE for fever, chills, change in weight  Skin: NEGATIVE for worrisome rashes, moles or lesions  GI/: NEGATIVE for bowel or bladder changes  Neuro: NEGATIVE for weakness, dizziness or paresthesias    OBJECTIVE:  Vital signs as noted in EPIC for 10/23/2024  General: healthy, alert and in no distress  HEENT: no scleral icterus or conjunctival erythema  Skin: no suspicious lesions or rash. No jaundice.  CV: no pedal edema  Resp: normal respiratory effort without conversational dyspnea   Psych: normal mood and affect  Neuro: Normal light sensory exam of lower extremity      MSK:  Exam shows a very pleasant 69-year-old female who ambulates full weightbearing without assistive device.  She is alert and orientated x 3.  She is accompanied by her son today who helps translate.  Examination of the right shoulder shows no obvious bruising, swelling or ecchymosis.  No atrophy.  No lumps, bumps or masses noted.  Normal symmetry of both shoulder heights.  She is nontender at the sternum, SC joint, clavicle and AC joint.  No pain in the anterior subacromial space or over the deltoid.  No pain to palpation through the posterior shoulder girdle or scapula.  Patient has positive impingement testing of the right shoulder as well as some slight weakness of the external rotator of the rotator cuff at waist level but all other rotator cuff muscles are normal.  Mild discomfort with empty can testing but no shelley weakness of the supraspinatus tendon.  Sulcus sign is negative.  Negative apprehension test.  Full range of motion of the elbow, wrist and fingers.  Normal motor tone of the biceps, triceps, forearm musculature and intrinsics of  the hand.  Radial pulses +2 and symmetric.  Normal range of motion of the C-spine without pain.            Personal Independent visualization of the below images done today:  I reviewed patient's right shoulder x-rays and reviewed them with the patient and patient's son today.  There appears to be likely a type II/III acromion.  Age-appropriate changes noted of the glenohumeral and AC joint.  No evidence of fracture, dislocation.  No evidence of lytic lesion or disease.    Patient's PET scan also reviewed showing no evidence of metastatic uptake in the right shoulder girdle.    Imaging Results - XR SHOULDER 3 VIEWS RIGHT (09/19/2024 11:15 AM CDT)  Procedure Note   Kyle Mijares MD - 09/19/2024   Formatting of this note might be different from the original.  For Patients: As a result of the 21st Century Cures Act, medical imaging exams and procedure reports are released immediately into your electronic medical record. You may view this report before your referring provider. If you have questions, please contact your health care provider.    EXAM: XR SHOULDER 3 VIEWS RIGHT  LOCATION: [facility]  DATE: 9/19/2024    INDICATION: Acute Pain Of Right Shoulder  COMPARISON: None.    IMPRESSION:  No fracture. Anatomic alignment. Mild glenohumeral degenerative joint disease. Mild acromioclavicular degenerative joint disease.        Study Result    Narrative & Impression   EXAM: PET ONCOLOGY (EYES TO THIGHS), CT CHEST/ABDOMEN/PELVIS W CONTRAST  LOCATION: [facility]  DATE: 6/13/2024     INDICATION: Initial treatment evaluation for malignant neoplasm of central portion of right female breast.  COMPARISON: 6/6/2024.  CONTRAST: Isovue-370 99 mL IV  TECHNIQUE: Serum glucose level 100 mg/dL. One hour post intravenous administration of 11.95 mCi F-18 FDG, PET imaging was performed from the skull vertex to mid thighs, utilizing attenuation correction with concurrent axial CT and  PET/CT image fusion.   Separate diagnostic CT of the chest, abdomen, and pelvis was performed. Dose reduction techniques were used.     PET/CT FINDINGS: Large, enhancing mass in the central portion of the right breast measuring 5.2 x 4.8 x 5.7 cm (SUV max 7.4). There is a 10 mm FDG avid right subpectoral lymph node (SUV max 3.6). FDG avid anterior left upper lobe subpleural nodular   opacity (SUV max 2.9) measuring 13 x 6 mm as well as a 7 mm subpleural nodule in the right lung base (SUV max 2.8). Several hypermetabolic bone lesions including T4 vertebral body extending into the posterior elements (SUV max 6.2), left lateral ninth   rib (SUV max 5.2), sternum (SUV max 5.5),, right sacral ala (SUV max 4.7), right ischium (SUV max 5.9), and right proximal femur (SUV max 4.5). Possible focal wall thickening of the transverse colon with associated uptake (SUV max 6.9).     FDG avid right thyroid nodule (max SUV 6.6), a third of which represent primary thyroid neoplasms. This is amenable to ultrasound-guided histologic sampling if desired. Esophagitis. Moderate compression deformity of the L1 vertebral body with associated   uptake and retropulsion into the spinal canal measuring 7 mm.     CT FINDINGS: Subcentimeter focus of enhancement within the paramedian right frontal lobe along the falx without definite increased uptake above background, possibly meningioma. No significant coronary artery calcium. Tiny sliding hiatal hernia.   Cholecystectomy with some biliary reservoir effect. Uterine fibroids. Multilevel degenerative disease in the spine.                                                                      IMPRESSION:  1.  Findings of primary right breast malignancy with right subpectoral kaylene, pulmonary, and osseous metastatic disease  2.  Moderate compression deformity of the L1 vertebral body with associated uptake, probably pathologic, with about 7 mm retropulsion into the spinal canal, as noted on recent  MRI.  3.  Hypermetabolic right thyroid nodule. Recommend thyroid ultrasound.  4.  Possible focal wall thickening of the transverse colon with associated uptake. Although this could be related to peristalsis, consider follow-up colonoscopy to exclude primary colonic neoplasm, or close attention on follow-up.       Patient's conditions were thoroughly discussed during today's visit with total time reviewing patient's previous medical records/history/radiology, face-to-face examination and discussion and plan of care with the patient and documentation being 30 minutes for today's clinical visit  Harshad Celis PA-C  [facility]    This note was completed in part using a voice recognition software, any grammatical or context distortion are unintentional and inherent to the software.

## 2024-10-24 ENCOUNTER — TELEPHONE (OUTPATIENT)
Dept: ONCOLOGY | Facility: HOSPITAL | Age: 69
End: 2024-10-24
Payer: COMMERCIAL

## 2024-10-24 NOTE — ORAL ONC MGMT
Oral Chemotherapy Monitoring Program    Subjective/Objective:  Adriana St is a 69 year old female contacted by phone for a follow-up visit for oral chemotherapy.  Adriana Wei says she is doing well and denies side effects from the ribociclib. She does take a prochlorperazine about 30 min prior to each dose of ribociclib, but does feel like her body has gotten more used to the ribociclib and wonders if she needs the prochlorperazine still. Does admit to having a small rash on her back- about the size of half of a dollar bill and has not grown overtime. Has used Aquaphor on it and some hydrocortisone (but not both at the same time) and has been present for about 2 weeks. Is around a scar she has that she had tape on and wonders if the rash is due to the medical tape or the ribociclib.         6/19/2024     8:00 AM 9/3/2024     2:00 PM 9/18/2024    11:00 AM 10/24/2024    10:00 AM   ORAL CHEMOTHERAPY   Assessment Type Initial Work up New Teach Initial Follow up Monthly Follow up   Diagnosis Code Breast Cancer Breast Cancer Breast Cancer Breast Cancer   Providers Dr. Napoleon Bender   Clinic Name/Location [facility]   Drug Name Kisqali (ribociclib) Kisqali (ribociclib) Kisqali (ribociclib) Kisqali (ribociclib)   Dose 600 mg 600 mg 600 mg 600 mg   Current Schedule Daily Daily Daily Daily   Cycle Details 3 weeks on, 1 week off 3 weeks on, 1 week off 3 weeks on, 1 week off 3 weeks on, 1 week off   Start Date of Last Cycle   9/11/2024 10/9/2024   Planned next cycle start date  9/11/2024 10/9/2024 11/6/2024   Doses missed in last 2 weeks   0 1   Adherence Assessment   Adherent Adherent   Adverse Effects   Nausea No AE identified during assessment;Rash   Nausea   Grade 1    Pharmacist Intervention(nausea)   No    Rash    Grade 1   Pharmacist Intervention(Rash)    Yes   Intervention(s)    Patient education   Any new drug interactions?  No No No   Is the dose as  "ordered appropriate for the patient?  Yes Yes Yes       Last PHQ-2 Score on record:       7/15/2024     2:41 PM 7/15/2024     2:40 PM   PHQ-2 ( 1999 [employer])   Q1: Little interest or pleasure in doing things 1 0   Q2: Feeling down, depressed or hopeless 1 0   PHQ-2 Score 2 0       Vitals:  BP:   BP Readings from Last 1 Encounters:   10/11/24 (!) 155/86     Wt Readings from Last 1 Encounters:   10/11/24 74.4 kg (164 lb)     Estimated body surface area is 1.82 meters squared as calculated from the following:    Height as of 10/8/24: 1.6 m (5' 3\").    Weight as of 10/11/24: 74.4 kg (164 lb).    Labs:  _  Result Component Current Result Ref Range   Sodium 140 (10/8/2024) 135 - 145 mmol/L     _  Result Component Current Result Ref Range   Potassium 4.1 (10/8/2024) 3.4 - 5.3 mmol/L     _  Result Component Current Result Ref Range   Calcium 9.1 (10/8/2024) 8.8 - 10.4 mg/dL     _  Result Component Current Result Ref Range   Magnesium 2.0 (10/8/2024) 1.7 - 2.3 mg/dL     _  Result Component Current Result Ref Range   Phosphorus 4.3 (10/8/2024) 2.5 - 4.5 mg/dL     _  Result Component Current Result Ref Range   Albumin 4.2 (10/8/2024) 3.5 - 5.2 g/dL     _  Result Component Current Result Ref Range   Urea Nitrogen 20.1 (10/8/2024) 8.0 - 23.0 mg/dL     _  Result Component Current Result Ref Range   Creatinine 0.82 (10/8/2024) 0.51 - 0.95 mg/dL     _  Result Component Current Result Ref Range   AST 18 (10/8/2024) 0 - 45 U/L     _  Result Component Current Result Ref Range   ALT 11 (10/8/2024) 0 - 50 U/L     _  Result Component Current Result Ref Range   Bilirubin Total 0.6 (10/8/2024) <=1.2 mg/dL     _  Result Component Current Result Ref Range   WBC Count 3.3 (L) (10/8/2024) 4.0 - 11.0 10e3/uL     _  Result Component Current Result Ref Range   Hemoglobin 12.9 (10/8/2024) 11.7 - 15.7 g/dL     _  Result Component Current Result Ref Range   Platelet Count 159 (10/8/2024) 150 - 450 10e3/uL     No results found for ANC within last 30 " "days.     _  Result Component Current Result Ref Range   Absolute Neutrophils 1.5 (L) (10/8/2024) 1.6 - 8.3 10e3/uL          Assessment/Plan:  Adriana Wei is tolerating the ribociclib therapy well. Discussed how she could stop taking the prochlorperazine prior to each dose and try with out. If she does get nauseous she could still use the prochlorperazine, but maybe her body doesn't need it anymore. She agrees to give this a try and if she does feel crumby, she can go back to using the prochlorperazine prior to each dose. As for the rash, since it is so small, localized, and hasn't grown over the past 2 weeks, I think it is much more likely due to the medical tape than the ribociclib. Encouraged her to use the Aquaphor throughout the day as well as the hydrocortisone cream 2x/day. Can also take loratadine once daily to help with the itching until the rash goes away. Adriana Wei agrees to the plan.  Continue as prescribed.    Follow-Up:  Will plan to review next appt with Anette on 11/5 and then next assessment call a couple of weeks later. Adriana DOLL St agrees to the plan and knows they can call us in the meantime if they have any questions or concerns.    Refill Due:  ~11/1 for her 11/6 cycle start    Ryan Melgar, PharmD, BCOP  Oral Chemotherapy Pharmacist  [facility] Phone: [phone]  In Basket Pools: \"[facility] ORAL CHEMOTHERAPY PHARMACIST\" or \"[facility] ORAL CHEMOTHERAPY PHARMACIST\"  October 24, 2024      "

## 2024-10-29 ENCOUNTER — OFFICE VISIT (OUTPATIENT)
Dept: ORTHOPEDICS | Facility: CLINIC | Age: 69
End: 2024-10-29
Attending: INTERNAL MEDICINE
Payer: COMMERCIAL

## 2024-10-29 VITALS — BODY MASS INDEX: 28.7 KG/M2 | HEIGHT: 63 IN | WEIGHT: 162 LBS

## 2024-10-29 DIAGNOSIS — M48.50XS PATHOLOGICAL COMPRESSION FRACTURE OF VERTEBRA, SEQUELA: ICD-10-CM

## 2024-10-29 DIAGNOSIS — M75.41 SHOULDER IMPINGEMENT SYNDROME, RIGHT: Primary | ICD-10-CM

## 2024-10-29 DIAGNOSIS — M43.25 FUSION OF SPINE, THORACOLUMBAR REGION: ICD-10-CM

## 2024-10-29 DIAGNOSIS — C50.111 MALIGNANT NEOPLASM OF CENTRAL PORTION OF RIGHT BREAST IN FEMALE, ESTROGEN RECEPTOR POSITIVE (H): ICD-10-CM

## 2024-10-29 DIAGNOSIS — C79.51 MALIGNANT NEOPLASM METASTATIC TO BONE (H): ICD-10-CM

## 2024-10-29 DIAGNOSIS — Z17.0 MALIGNANT NEOPLASM OF CENTRAL PORTION OF RIGHT BREAST IN FEMALE, ESTROGEN RECEPTOR POSITIVE (H): ICD-10-CM

## 2024-10-29 PROCEDURE — 99203 OFFICE O/P NEW LOW 30 MIN: CPT | Performed by: PHYSICIAN ASSISTANT

## 2024-10-29 NOTE — LETTER
10/29/2024      Adriana St  [address] [facility] [address]      Dear Colleague,    Thank you for referring your patient, Adriana St, to the [facility]. Please see a copy of my visit note below.    ASSESSMENT & PLAN    Today we discussed the underlying etiology/pathology of patient's   1. Shoulder impingement syndrome, right    2. Malignant neoplasm metastatic to bone (H)    3. Pathological compression fracture of vertebra, sequela    4. Fusion of spine, thoracolumbar region 7/3/2024: T11-L3 posterior segmental fusion, F88-[id number] bilat. laminectomy. Right L1 transpedicular placement of osteochondral device/RF ablation of L1 body tumor.    5. Malignant neoplasm of central portion of right breast in female, estrogen receptor positive (H)      Discussed diagnosis and treatment options with the patient today. A shared decision making model was used. The patient's values and choices were respected. The following represents what was discussed and decided upon by the provider and the patient.   - We discussed the patient is dealing with some right shoulder pain for the last 2-3 months without known injury or trauma.  - We reviewed her previous imaging of x-rays as well as PET scan showing no evidence of metastatic bone involvement of the right shoulder.  - We discussed her physical exam shows mild weakness of the external rotators of her rotator cuff as well as positive impingement testing indicating shoulder impingement syndrome.  - We discussed the mainstays of treatment for this condition which includes initial pain control with either oral anti-inflammatories or consideration of subacromial cortisone injection and secondary phase of physical therapy for rotator cuff strengthening.  - Patient states that overall she is improving.  She does not believe that there is any need for oral medication or injection at this time.  - Patient will be referred to  formal physical therapy for right rotator cuff impingement syndrome.  We did discuss with her and her son the patient in the past has not been diligent with doing home exercise programs for other conditions.  I discussed again the importance of her spending at least 15 minutes a day doing her home exercises to make overall improvement for her shoulder.  - Patient otherwise may follow-up as needed.    -Call direct clinic number [[phone]] at any time with questions or concerns in regards to your recent office visit with me.     Harshad Celis PA-C  [facility]    This note was completed in part using a voice recognition software, any grammatical or context distortion are unintentional and inherent to the software.       SUBJECTIVE  Adriana St is a/an 69 year old female who is seen in consultation at the request of  Magaly Bender M.D. for evaluation of right shoulder pain. The patient is seen with their son, who helps interpret.    Onset: 2 - 3 month(s) ago. Reports insidious onset without acute precipitating event.  Patient is right-hand dominant.  Patient currently undergoing oral chemotherapy for bony mets from primary breast cancer.  Patient has had a history of thoracic lumbar spine surgery with postoperative superficial cellulitis/skin infection which is improving.   Location of Pain: right posterior lateral shoulder without any radiation below the elbow.  Rating of Pain at worst: 8/10  Rating of Pain Currently: 3/10  Worsened by: shoulder flexion, ER, lifting, lying on right shoulder  Better with: activity modification, tylenol  Treatments tried: rest/activity avoidance, ice, heat, Tylenol, and previous imaging (xray 9/19/24 @ [facility])  Quality: aching, throbbing  Associated symptoms: no distal numbness or tingling; denies swelling or warmth  Orthopedic history: NO  Relevant surgical history: Yes, s/p 7/3/2024: T11-L3 posterior segmental instrumentation, T12-L1 and L1-L2  bilateral laminectomies and right L1 transpedicular placement of osteochondral device for radiofrequency ablation of L1 vertebral body tumor.   Social history: social history: retired - currently being followed by Oncology for Breast cancer    Past Medical History:   Diagnosis Date     Malignant neoplasm metastatic to bone (H)      Malignant neoplasm of central portion of right breast in female, estrogen receptor positive (H)      Pathologic lumbar vertebral fracture      PONV (postoperative nausea and vomiting)      Social History     Socioeconomic History     Marital status:    Tobacco Use     Smoking status: Former     Types: Cigarettes     Start date: 1995     Quit date: 1985     Years since quittin.3     Smokeless tobacco: Never   Vaping Use     Vaping status: Never Used   Substance and Sexual Activity     Alcohol use: Yes     Comment: social     Drug use: Never     Social Drivers of Health     Financial Resource Strain: Low Risk  (2024)    Received from [employer]    Financial Resource Strain      Difficulty of Paying Living Expenses: 3   Food Insecurity: No Food Insecurity (2024)    Received from [employer]    Food Insecurity      Worried About Running Out of Food in the Last Year: 1   Transportation Needs: No Transportation Needs (2024)    Received from [employer]    Transportation Needs      Lack of Transportation (Medical): 1   Social Connections: Socially Integrated (2024)    Received from [employer]    Social Connections      Frequency of Communication with Friends and Family: 0   Interpersonal Safety: Low Risk  (2024)    Interpersonal Safety      Do you feel physically and emotionally safe where you currently live?: Yes      Within the past 12 months, have you been hit, slapped, kicked or otherwise physically hurt by someone?:  No      Within the past 12 months, have you been humiliated or emotionally abused in other ways by your partner or ex-partner?: No   Housing Stability: Low Risk  (6/6/2024)    Received from [employer] [facility]    Housing Stability      Unable to Pay for Housing in the Last Year: 1         Patient's past medical, surgical, social, and family histories were personally reviewed today and no changes are noted.    REVIEW OF SYSTEMS:  10 point ROS is negative other than symptoms noted above in HPI, Past Medical History or as stated below  Constitutional: NEGATIVE for fever, chills, change in weight  Skin: NEGATIVE for worrisome rashes, moles or lesions  GI/: NEGATIVE for bowel or bladder changes  Neuro: NEGATIVE for weakness, dizziness or paresthesias    OBJECTIVE:  Vital signs as noted in EPIC for 10/23/2024  General: healthy, alert and in no distress  HEENT: no scleral icterus or conjunctival erythema  Skin: no suspicious lesions or rash. No jaundice.  CV: no pedal edema  Resp: normal respiratory effort without conversational dyspnea   Psych: normal mood and affect  Neuro: Normal light sensory exam of lower extremity      MSK:  Exam shows a very pleasant 69-year-old female who ambulates full weightbearing without assistive device.  She is alert and orientated x 3.  She is accompanied by her son today who helps translate.  Examination of the right shoulder shows no obvious bruising, swelling or ecchymosis.  No atrophy.  No lumps, bumps or masses noted.  Normal symmetry of both shoulder heights.  She is nontender at the sternum, SC joint, clavicle and AC joint.  No pain in the anterior subacromial space or over the deltoid.  No pain to palpation through the posterior shoulder girdle or scapula.  Patient has positive impingement testing of the right shoulder as well as some slight weakness of the external rotator of the rotator cuff at waist level but all other rotator cuff muscles are normal.   Mild discomfort with empty can testing but no shelley weakness of the supraspinatus tendon.  Sulcus sign is negative.  Negative apprehension test.  Full range of motion of the elbow, wrist and fingers.  Normal motor tone of the biceps, triceps, forearm musculature and intrinsics of the hand.  Radial pulses +2 and symmetric.  Normal range of motion of the C-spine without pain.            Personal Independent visualization of the below images done today:  I reviewed patient's right shoulder x-rays and reviewed them with the patient and patient's son today.  There appears to be likely a type II/III acromion.  Age-appropriate changes noted of the glenohumeral and AC joint.  No evidence of fracture, dislocation.  No evidence of lytic lesion or disease.    Patient's PET scan also reviewed showing no evidence of metastatic uptake in the right shoulder girdle.    Imaging Results - XR SHOULDER 3 VIEWS RIGHT (09/19/2024 11:15 AM CDT)  Procedure Note   Kyle Mijares MD - 09/19/2024   Formatting of this note might be different from the original.  For Patients: As a result of the 21st Century Cures Act, medical imaging exams and procedure reports are released immediately into your electronic medical record. You may view this report before your referring provider. If you have questions, please contact your health care provider.    EXAM: XR SHOULDER 3 VIEWS RIGHT  LOCATION: [facility]  DATE: 9/19/2024    INDICATION: Acute Pain Of Right Shoulder  COMPARISON: None.    IMPRESSION:  No fracture. Anatomic alignment. Mild glenohumeral degenerative joint disease. Mild acromioclavicular degenerative joint disease.        Study Result    Narrative & Impression   EXAM: PET ONCOLOGY (EYES TO THIGHS), CT CHEST/ABDOMEN/PELVIS W CONTRAST  LOCATION: [facility]  DATE: 6/13/2024     INDICATION: Initial treatment evaluation for malignant neoplasm of central portion of right female  breast.  COMPARISON: 6/6/2024.  CONTRAST: Isovue-370 99 mL IV  TECHNIQUE: Serum glucose level 100 mg/dL. One hour post intravenous administration of 11.95 mCi F-18 FDG, PET imaging was performed from the skull vertex to mid thighs, utilizing attenuation correction with concurrent axial CT and PET/CT image fusion.   Separate diagnostic CT of the chest, abdomen, and pelvis was performed. Dose reduction techniques were used.     PET/CT FINDINGS: Large, enhancing mass in the central portion of the right breast measuring 5.2 x 4.8 x 5.7 cm (SUV max 7.4). There is a 10 mm FDG avid right subpectoral lymph node (SUV max 3.6). FDG avid anterior left upper lobe subpleural nodular   opacity (SUV max 2.9) measuring 13 x 6 mm as well as a 7 mm subpleural nodule in the right lung base (SUV max 2.8). Several hypermetabolic bone lesions including T4 vertebral body extending into the posterior elements (SUV max 6.2), left lateral ninth   rib (SUV max 5.2), sternum (SUV max 5.5),, right sacral ala (SUV max 4.7), right ischium (SUV max 5.9), and right proximal femur (SUV max 4.5). Possible focal wall thickening of the transverse colon with associated uptake (SUV max 6.9).     FDG avid right thyroid nodule (max SUV 6.6), a third of which represent primary thyroid neoplasms. This is amenable to ultrasound-guided histologic sampling if desired. Esophagitis. Moderate compression deformity of the L1 vertebral body with associated   uptake and retropulsion into the spinal canal measuring 7 mm.     CT FINDINGS: Subcentimeter focus of enhancement within the paramedian right frontal lobe along the falx without definite increased uptake above background, possibly meningioma. No significant coronary artery calcium. Tiny sliding hiatal hernia.   Cholecystectomy with some biliary reservoir effect. Uterine fibroids. Multilevel degenerative disease in the spine.                                                                      IMPRESSION:  1.   Findings of primary right breast malignancy with right subpectoral kaylene, pulmonary, and osseous metastatic disease  2.  Moderate compression deformity of the L1 vertebral body with associated uptake, probably pathologic, with about 7 mm retropulsion into the spinal canal, as noted on recent MRI.  3.  Hypermetabolic right thyroid nodule. Recommend thyroid ultrasound.  4.  Possible focal wall thickening of the transverse colon with associated uptake. Although this could be related to peristalsis, consider follow-up colonoscopy to exclude primary colonic neoplasm, or close attention on follow-up.       Patient's conditions were thoroughly discussed during today's visit with total time reviewing patient's previous medical records/history/radiology, face-to-face examination and discussion and plan of care with the patient and documentation being 30 minutes for today's clinical visit  Harshad Celis PA-C  [facility]    This note was completed in part using a voice recognition software, any grammatical or context distortion are unintentional and inherent to the software.       Again, thank you for allowing me to participate in the care of your patient.        Sincerely,        Harshad Celis PA-C

## 2024-10-29 NOTE — PATIENT INSTRUCTIONS
Today we discussed the underlying etiology/pathology of patient's   1. Shoulder impingement syndrome, right    2. Malignant neoplasm metastatic to bone (H)    3. Pathological compression fracture of vertebra, sequela    4. Fusion of spine, thoracolumbar region 7/3/2024: T11-L3 posterior segmental fusion, S31-K6-Y9 bilat. laminectomy. Right L1 transpedicular placement of osteochondral device/RF ablation of L1 body tumor.    5. Malignant neoplasm of central portion of right breast in female, estrogen receptor positive (H)      Discussed diagnosis and treatment options with the patient today. A shared decision making model was used. The patient's values and choices were respected. The following represents what was discussed and decided upon by the provider and the patient.   - We discussed the patient is dealing with some right shoulder pain for the last 2-3 months without known injury or trauma.  - We reviewed her previous imaging of x-rays as well as PET scan showing no evidence of metastatic bone involvement of the right shoulder.  - We discussed her physical exam shows mild weakness of the external rotators of her rotator cuff as well as positive impingement testing indicating shoulder impingement syndrome.  - We discussed the mainstays of treatment for this condition which includes initial pain control with either oral anti-inflammatories or consideration of subacromial cortisone injection and secondary phase of physical therapy for rotator cuff strengthening.  - Patient states that overall she is improving.  She does not believe that there is any need for oral medication or injection at this time.  - Patient will be referred to formal physical therapy for right rotator cuff impingement syndrome.  We did discuss with her and her son the patient in the past has not been diligent with doing home exercise programs for other conditions.  I discussed again the importance of her spending at least 15 minutes a day  doing her home exercises to make overall improvement for her shoulder.  - Patient otherwise may follow-up as needed.    -Call direct clinic number [[phone]] at any time with questions or concerns in regards to your recent office visit with me.     Harshad Celis PA-C  [facility]    This note was completed in part using a voice recognition software, any grammatical or context distortion are unintentional and inherent to the software.

## 2024-10-31 ENCOUNTER — THERAPY VISIT (OUTPATIENT)
Dept: PHYSICAL THERAPY | Facility: CLINIC | Age: 69
End: 2024-10-31
Attending: PHYSICIAN ASSISTANT
Payer: COMMERCIAL

## 2024-10-31 DIAGNOSIS — M75.41 SHOULDER IMPINGEMENT SYNDROME, RIGHT: ICD-10-CM

## 2024-10-31 PROCEDURE — 97161 PT EVAL LOW COMPLEX 20 MIN: CPT | Mod: GP | Performed by: PHYSICAL THERAPIST

## 2024-10-31 PROCEDURE — 97110 THERAPEUTIC EXERCISES: CPT | Mod: GP | Performed by: PHYSICAL THERAPIST

## 2024-10-31 ASSESSMENT — ACTIVITIES OF DAILY LIVING (ADL)
PUSHING_WITH_THE_INVOLVED_ARM: 4
REACHING_FOR_SOMETHING_ON_A_HIGH_SHELF: 3
WASHING_YOUR_HAIR?: 3
PLACING_AN_OBJECT_ON_A_HIGH_SHELF: 4
TOUCHING_THE_BACK_OF_YOUR_NECK: 4
PLEASE_INDICATE_YOR_PRIMARY_REASON_FOR_REFERRAL_TO_THERAPY:: SHOULDER
PUTTING_ON_YOUR_PANTS: 3
PUTTING_ON_AN_UNDERSHIRT_OR_A_PULLOVER_SWEATER: 5
REMOVING_SOMETHING_FROM_YOUR_BACK_POCKET: 3
WHEN_LYING_ON_THE_INVOLVED_SIDE: 3
CARRYING_A_HEAVY_OBJECT_OF_10_POUNDS: 3
AT_ITS_WORST?: 3
WASHING_YOUR_BACK: 4
PUTTING_ON_A_SHIRT_THAT_BUTTONS_DOWN_THE_FRONT: 3

## 2024-10-31 NOTE — PROGRESS NOTES
PHYSICAL THERAPY EVALUATION  Type of Visit: Evaluation       Fall Risk Screen:  Fall screen completed by: PT  Have you fallen 2 or more times in the past year?: No  Have you fallen and had an injury in the past year?: No  Is patient a fall risk?: No    Subjective         Presenting condition or subjective complaint: Pt has a pain in R shoulder, for the past 2 months, insidious onset, had a surgery on her low back, multiple level fusion after a tumor was found in her spine which caused a fracture.  During the recovery pt was using her arms a lot for movement and started to notice R shoulder pain.  Date of onset: 10/29/24    Relevant medical history: (Patient-Rptd) Cancer   Dates & types of surgery: (Patient-Rptd) my last surgery was on July 3rd and it was in my back    Prior diagnostic imaging/testing results: (Patient-Rptd) X-ray     Prior therapy history for the same diagnosis, illness or injury: (Patient-Rptd) No      Prior Level of Function  Transfers:   Ambulation:   ADL:   IADL:     Living Environment  Social support: (Patient-Rptd) With family members   Type of home: (Patient-Rptd) House   Stairs to enter the home: (Patient-Rptd) Yes       Ramp: (Patient-Rptd) No   Stairs inside the home: (Patient-Rptd) Yes (Patient-Rptd) 2 Is there a railing: (Patient-Rptd) Yes     Help at home: (Patient-Rptd) None  Equipment owned: (Patient-Rptd) Walker with wheels; Bath bench     Employment: (Patient-Rptd) No    Hobbies/Interests:      Patient goals for therapy: (Patient-Rptd) be back to normal       Objective   SHOULDER EVALUATION  PAIN: Pain Level at Rest: 1/10  Pain Level with Use: 7/10  Pain Location: shoulder  Pain Quality: Aching, Sharp, and Stabbing  Pain Frequency: intermittent  Pain is Worst: daytime  Pain is Exacerbated By: reaching, lifting  Pain is Relieved By: rest  Pain Progression: Unchanged  INTEGUMENTARY (edema, incisions):   POSTURE: Standing Posture: Rounded shoulders  GAIT:   Weightbearing Status:    Assistive Device(s):   Gait Deviations:   BALANCE/PROPRIOCEPTION:   WEIGHTBEARING ALIGNMENT:   ROM: AROM WNL pdm    STRENGTH:  R shoulder MMT: limited by pain, weak ER, flexion, IR on R  FLEXIBILITY:   SPECIAL TESTS:   PALPATION:   JOINT MOBILITY:   CERVICAL SCREEN:     Assessment & Plan   CLINICAL IMPRESSIONS  Medical Diagnosis: Shoulder impingement syndrome, right    Treatment Diagnosis: R shoulder pain   Impression/Assessment: Patient is a 69 year old female with R shoulder complaints.  The following significant findings have been identified: Pain, Decreased strength, Inflammation, Impaired muscle performance, Decreased activity tolerance, and Impaired posture. These impairments interfere with their ability to perform self care tasks, recreational activities, household chores, driving , household mobility, and community mobility as compared to previous level of function.     Clinical Decision Making (Complexity):  Clinical Presentation: Evolving/Changing  Clinical Presentation Rationale: based on medical and personal factors listed in PT evaluation  Clinical Decision Making (Complexity): Low complexity    PLAN OF CARE  Treatment Interventions:  Interventions: Manual Therapy, Neuromuscular Re-education, Therapeutic Activity, Therapeutic Exercise, Self-Care/Home Management    Long Term Goals     PT Goal 1  Goal Identifier: reaching  Goal Description: Pt will be able to reach overhead pain free  Rationale: to maximize safety and independence with performance of ADLs and functional tasks  Target Date: 12/26/24      Frequency of Treatment: 1x/week  Duration of Treatment: 8 weeks    Recommended Referrals to Other Professionals:   Education Assessment:   Learner/Method: Patient;Listening;Demonstration;Pictures/Video  Education Comments: Educated pt on findings of the evaluation and reasoning for plan of care.  Pt was able to understand how treatment plan aligns with goals.    Risks and benefits of evaluation/treatment  have been explained.   Patient/Family/caregiver agrees with Plan of Care.     Evaluation Time:     PT Eval, Low Complexity Minutes (14298): 15     Signing Clinician: BUDDY Romo [facility]                                                                                   OUTPATIENT PHYSICAL THERAPY      PLAN OF TREATMENT FOR OUTPATIENT REHABILITATION   Patient's Last Name, First Name, Adriana Huntley YOB: 1955   Provider's Name   [facility]   Medical Record No.  [id number]     Onset Date: 10/29/24  Start of Care Date: 10/31/24     Medical Diagnosis:  Shoulder impingement syndrome, right      PT Treatment Diagnosis:  R shoulder pain Plan of Treatment  Frequency/Duration: 1x/week/ 8 weeks    Certification date from 10/31/24 to 12/26/24         See note for plan of treatment details and functional goals     [facility]                         I CERTIFY THE NEED FOR THESE SERVICES FURNISHED UNDER        THIS PLAN OF TREATMENT AND WHILE UNDER MY CARE     (Physician attestation of this document indicates review and certification of the therapy plan).              Referring Provider:  Harshad Celis    Initial Assessment  See Epic Evaluation- Start of Care Date: 10/31/24

## 2024-11-01 ENCOUNTER — MYC MEDICAL ADVICE (OUTPATIENT)
Dept: ONCOLOGY | Facility: HOSPITAL | Age: 69
End: 2024-11-01
Payer: COMMERCIAL

## 2024-11-01 DIAGNOSIS — Z17.0 MALIGNANT NEOPLASM OF CENTRAL PORTION OF RIGHT BREAST IN FEMALE, ESTROGEN RECEPTOR POSITIVE (H): ICD-10-CM

## 2024-11-01 DIAGNOSIS — C79.51 MALIGNANT NEOPLASM METASTATIC TO BONE (H): Primary | ICD-10-CM

## 2024-11-01 DIAGNOSIS — C50.111 MALIGNANT NEOPLASM OF CENTRAL PORTION OF RIGHT BREAST IN FEMALE, ESTROGEN RECEPTOR POSITIVE (H): ICD-10-CM

## 2024-11-04 DIAGNOSIS — C50.111 MALIGNANT NEOPLASM OF CENTRAL PORTION OF RIGHT BREAST IN FEMALE, ESTROGEN RECEPTOR POSITIVE (H): ICD-10-CM

## 2024-11-04 DIAGNOSIS — Z17.0 MALIGNANT NEOPLASM OF CENTRAL PORTION OF RIGHT BREAST IN FEMALE, ESTROGEN RECEPTOR POSITIVE (H): ICD-10-CM

## 2024-11-04 DIAGNOSIS — C79.51 MALIGNANT NEOPLASM METASTATIC TO BONE (H): Primary | ICD-10-CM

## 2024-11-04 NOTE — PROGRESS NOTES
[facility] Hematology and Oncology Outpatient Progress Note    Patient: Adriana St  MRN: [id number]  Date of Service: Nov 5, 2024          Reason for Visit    Chief Complaint   Patient presents with    Oncology Clinic Visit     Malignant neoplasm of central portion of right breast in female, estrogen receptor positive        Cancer Staging   Malignant neoplasm of central portion of right breast in female, estrogen receptor positive (H)  Staging form: Breast, AJCC 8th Edition  - Clinical stage from 6/10/2024: Stage IV (cT2, cN0, cM1, G3, ER+, SD+, HER2-) - Signed by Magaly Bender MD on 6/10/2024      Primary Hematologist/Oncologist: Dr. Magaly Bender      Assessment/Plan  #.  Metastatic right breast cancer ER positive, SD positive, HER2 1+ by IHC.  #.  Pathologic L1 fracture status post lumbar decompression surgery followed by postop radiation.  #.  New onset right-sided headache  #.  Cancer related pain  #.  Hypertension, uncontrolled  #.  Rash to mid back, near old scar; improving  Clinically stable. No evidence for breast cancer recurrence on exam.  She has no new concerns. She endorses daily use of Letrozole, and tolerates this well. She is tolerating Kisqali as well. She has a rash to her mid back near a previous scar, this is improving. She can continue OTC hydrocortisone and moisturizing lotion.   Continue ribociclib and anastrozole.   Continue OTC hydrocortisone and moisturizing lotion  Follow-up labs and provider visit in 4 weeks.  Pt instructed to call this clinic sooner with any concerns.    ______________________________________________________________________________    History of Present Illness/ Interval History    Ms. Adriana St  is a 69 year old patient who is seen today in follow up for breast cancer. She is taking Kisqali with good toleration. She has a rash to her back, near an old incision that has been very itchy. Benadryl has been helpful, and she notes that the rash  is getting better. She is also using thick lotion, as well as hydrocortisone. She denies further headaches, brain mri negative.     ECOG performance status   0- Fully active, without restriction        Pain  Pain Score: Mild Pain (2)  Pain Loc: Arm (RIGHT)    ROS  A 14 point review of systems was obtained.  Positive findings noted in the history.  The remainder of the review of system is otherwise negative.      Oncology History/Treatment  5/2024- She presented with a large right breast mass, and progressive back pain.      5/30/2024-right breast mass biopsy showed invasive ductal carcinoma, grade 3,   ER positive (95%, strong), NH positive (15%, strong), HER2 negative (1+ by IHC).  NGS: no mutations. No fusion.     6/6/2024- MRI lumbar spine with and without contrast showed pathologic L1 burst fracture with > 50% vertebral body height loss.  Additional scattered osseous metastasis were seen as well.     6/11/2024-initiated anastrozole     6/13/2024-PET scan showed right breast malignancy with right subpectoral kaylene, pulmonary and osseous metastasis.  Moderate compression deformity of the L1 vertebral body with associated uptake probably pathologic, with about 7 mm retropulsion into the spinal canal.              Hypermetabolic right thyroid nodule.              Possible focal wall thickening of the transverse colon with associated uptake     6/14/2024-MRI brain with and without contrast showed presumed skeletal metastatic disease involving the right side of the clivus and a right occipital condyle.  Presumed right frontal falcine meningioma.  No evidence for intracranial metastatic disease.     7/3/2024- s/p T11-L3 posterior segmental instrumentation, T12-L1 and L1-L2 bilateral laminectomies and right L1 transpedicular placement of osteochondral device for radiofrequency ablation of L1 vertebral body tumor (Dr. Prieto)     8/29/2024 - 9/12/2024 - post op radiation 3000 cGy T12-L2 spine     9/11/2024-initiated  "ribociclib 600 mg daily, 3 weeks on and 1 week off.      Physical Exam    BP (!) 131/99 (BP Location: Left arm, Patient Position: Sitting, Cuff Size: Adult Regular)   Pulse 72   Temp 98.4  F (36.9  C) (Tympanic)   Resp 19   Ht 1.6 m (5' 3\")   Wt 74.5 kg (164 lb 4.8 oz)   SpO2 96%   BMI 29.10 kg/m      GENERAL: no acute distress. Cooperative in conversation.   HEENT: pupils are equal, round and reactive. Oral mucosa is moist and intact.  RESP:Chest symmetric. Regular respiratory rate. No stridor.  ABD: Nondistended, soft.  EXTREMITIES: No lower extremity edema.   NEURO: non focal. Alert and oriented x3.   PSYCH: within normal limits. No depression or anxiety.  SKIN: warm dry intact. Mid back rash/dry area surrounding old back scar. See photo for further visualization.       Lab Results    Recent Results (from the past week)   Comprehensive metabolic panel   Result Value Ref Range    Sodium 140 135 - 145 mmol/L    Potassium 3.7 3.4 - 5.3 mmol/L    Carbon Dioxide (CO2) 26 22 - 29 mmol/L    Anion Gap 13 7 - 15 mmol/L    Urea Nitrogen 14.0 8.0 - 23.0 mg/dL    Creatinine 0.93 0.51 - 0.95 mg/dL    GFR Estimate 67 >60 mL/min/1.73m2    Calcium 9.2 8.8 - 10.4 mg/dL    Chloride 101 98 - 107 mmol/L    Glucose 118 (H) 70 - 99 mg/dL    Alkaline Phosphatase 88 40 - 150 U/L    AST 18 0 - 45 U/L    ALT 15 0 - 50 U/L    Protein Total 6.7 6.4 - 8.3 g/dL    Albumin 4.1 3.5 - 5.2 g/dL    Bilirubin Total 0.9 <=1.2 mg/dL   Magnesium   Result Value Ref Range    Magnesium 1.7 1.7 - 2.3 mg/dL   Phosphorus   Result Value Ref Range    Phosphorus 3.7 2.5 - 4.5 mg/dL   CBC with platelets and differential   Result Value Ref Range    WBC Count 4.1 4.0 - 11.0 10e3/uL    RBC Count 4.59 3.80 - 5.20 10e6/uL    Hemoglobin 12.5 11.7 - 15.7 g/dL    Hematocrit 36.5 35.0 - 47.0 %    MCV 80 78 - 100 fL    MCH 27.2 26.5 - 33.0 pg    MCHC 34.2 31.5 - 36.5 g/dL    RDW 18.3 (H) 10.0 - 15.0 %    Platelet Count 170 150 - 450 10e3/uL    % Neutrophils 44 % "    % Lymphocytes 41 %    % Monocytes 10 %    % Eosinophils 3 %    % Basophils 2 %    % Immature Granulocytes 1 %    NRBCs per 100 WBC 0 <1 /100    Absolute Neutrophils 1.8 1.6 - 8.3 10e3/uL    Absolute Lymphocytes 1.7 0.8 - 5.3 10e3/uL    Absolute Monocytes 0.4 0.0 - 1.3 10e3/uL    Absolute Eosinophils 0.1 0.0 - 0.7 10e3/uL    Absolute Basophils 0.1 0.0 - 0.2 10e3/uL    Absolute Immature Granulocytes 0.0 <=0.4 10e3/uL    Absolute NRBCs 0.0 10e3/uL     I reviewed the above labs today.  Imaging    MR Brain w/o & w Contrast    Result Date: 10/11/2024  EXAM: MR BRAIN W/O and W CONTRAST LOCATION: [facility] DATE: 10/11/2024 INDICATION: persistent right sided headache, h o breast cancer COMPARISON: June 14, 2024. CONTRAST: 7.5 ml gadavist TECHNIQUE: Routine multiplanar multisequence head MRI without and with intravenous contrast. FINDINGS: INTRACRANIAL CONTENTS: No acute or subacute infarct. No mass, acute hemorrhage, or extra-axial fluid collections. A homogeneously enhancing 11 x 9 x 10 mm dural based mass is present along the anterior right falx. Finding is typical of meningioma. Appearance would be very atypical for a metastatic lesion. Scattered nonspecific T2/FLAIR hyperintensities within the cerebral white matter most consistent with mild chronic microvascular ischemic change. Mild generalized cerebral atrophy. No hydrocephalus. Normal position of the cerebellar tonsils. No pathologic contrast enhancement to suggest intracranial metastatic disease.. SELLA: No abnormality accounting for technique. OSSEOUS STRUCTURES/SOFT TISSUES: Pathologic marrow signal abnormality is noted in the anterior skull base/clivus on the right which slightly extends to the occipital condyle. Finding is compatible with osseous metastatic disease and is improved from prior (series 6 image 12, series 11 image 7). Signal abnormality is less extensive and less masslike. Although there is no definite T1 hypointense  marrow signal abnormality in the C2 vertebral body there appear to be foci of abnormal enhancement on coronal postcontrast imaging. The major intracranial vascular flow voids are maintained. ORBITS: No abnormality accounting for technique. SINUSES/MASTOIDS: No paranasal sinus mucosal disease. No middle ear or mastoid effusion.     IMPRESSION: 1.  Pathologic marrow signal abnormality in the anterior skull spaces/clivus on the right is compatible with osseous metastatic disease and is improved from prior. 2.  Abnormal enhancement in the C2 vertebral body, poorly characterized on this exam, indeterminate for additional osseous metastatic disease. A dedicated MRI of the cervical spine could further evaluate if clinically indicated. 3.  Unchanged anterior right falcine meningioma. 4.  Mild age-related changes.       Billing  Total time 30 minutes, to include face to face visit, review of EMR, ordering, documentation and coordination of care on date of service. The longitudinal plan of care for the diagnosis(es)/condition(s) as documented were addressed during this visit. Due to the added complexity in care, I will continue to support Miranda in the subsequent management and with ongoing continuity of care.    Signed by: MARTINE Barron CNP        Chart documentation with Dragon Voice recognition Software. Although reviewed after completion, some words and grammatical errors may remain.

## 2024-11-05 ENCOUNTER — ONCOLOGY VISIT (OUTPATIENT)
Dept: ONCOLOGY | Facility: HOSPITAL | Age: 69
End: 2024-11-05
Attending: INTERNAL MEDICINE
Payer: COMMERCIAL

## 2024-11-05 ENCOUNTER — LAB (OUTPATIENT)
Dept: INFUSION THERAPY | Facility: HOSPITAL | Age: 69
End: 2024-11-05
Attending: INTERNAL MEDICINE
Payer: COMMERCIAL

## 2024-11-05 VITALS
RESPIRATION RATE: 19 BRPM | BODY MASS INDEX: 29.11 KG/M2 | TEMPERATURE: 98.4 F | HEART RATE: 72 BPM | OXYGEN SATURATION: 96 % | WEIGHT: 164.3 LBS | DIASTOLIC BLOOD PRESSURE: 99 MMHG | HEIGHT: 63 IN | SYSTOLIC BLOOD PRESSURE: 131 MMHG

## 2024-11-05 DIAGNOSIS — Z17.0 MALIGNANT NEOPLASM OF CENTRAL PORTION OF RIGHT BREAST IN FEMALE, ESTROGEN RECEPTOR POSITIVE (H): ICD-10-CM

## 2024-11-05 DIAGNOSIS — C79.51 MALIGNANT NEOPLASM METASTATIC TO BONE (H): ICD-10-CM

## 2024-11-05 DIAGNOSIS — C50.111 MALIGNANT NEOPLASM OF CENTRAL PORTION OF RIGHT BREAST IN FEMALE, ESTROGEN RECEPTOR POSITIVE (H): ICD-10-CM

## 2024-11-05 DIAGNOSIS — C79.51 MALIGNANT NEOPLASM METASTATIC TO BONE (H): Primary | ICD-10-CM

## 2024-11-05 LAB
ALBUMIN SERPL BCG-MCNC: 4.1 G/DL (ref 3.5–5.2)
ALP SERPL-CCNC: 88 U/L (ref 40–150)
ALT SERPL W P-5'-P-CCNC: 15 U/L (ref 0–50)
ANION GAP SERPL CALCULATED.3IONS-SCNC: 13 MMOL/L (ref 7–15)
AST SERPL W P-5'-P-CCNC: 18 U/L (ref 0–45)
BASOPHILS # BLD AUTO: 0.1 10E3/UL (ref 0–0.2)
BASOPHILS NFR BLD AUTO: 2 %
BILIRUB SERPL-MCNC: 0.9 MG/DL
BUN SERPL-MCNC: 14 MG/DL (ref 8–23)
CALCIUM SERPL-MCNC: 9.2 MG/DL (ref 8.8–10.4)
CHLORIDE SERPL-SCNC: 101 MMOL/L (ref 98–107)
CREAT SERPL-MCNC: 0.93 MG/DL (ref 0.51–0.95)
EGFRCR SERPLBLD CKD-EPI 2021: 67 ML/MIN/1.73M2
EOSINOPHIL # BLD AUTO: 0.1 10E3/UL (ref 0–0.7)
EOSINOPHIL NFR BLD AUTO: 3 %
ERYTHROCYTE [DISTWIDTH] IN BLOOD BY AUTOMATED COUNT: 18.3 % (ref 10–15)
GLUCOSE SERPL-MCNC: 118 MG/DL (ref 70–99)
HCO3 SERPL-SCNC: 26 MMOL/L (ref 22–29)
HCT VFR BLD AUTO: 36.5 % (ref 35–47)
HGB BLD-MCNC: 12.5 G/DL (ref 11.7–15.7)
IMM GRANULOCYTES # BLD: 0 10E3/UL
IMM GRANULOCYTES NFR BLD: 1 %
LYMPHOCYTES # BLD AUTO: 1.7 10E3/UL (ref 0.8–5.3)
LYMPHOCYTES NFR BLD AUTO: 41 %
MAGNESIUM SERPL-MCNC: 1.7 MG/DL (ref 1.7–2.3)
MCH RBC QN AUTO: 27.2 PG (ref 26.5–33)
MCHC RBC AUTO-ENTMCNC: 34.2 G/DL (ref 31.5–36.5)
MCV RBC AUTO: 80 FL (ref 78–100)
MONOCYTES # BLD AUTO: 0.4 10E3/UL (ref 0–1.3)
MONOCYTES NFR BLD AUTO: 10 %
NEUTROPHILS # BLD AUTO: 1.8 10E3/UL (ref 1.6–8.3)
NEUTROPHILS NFR BLD AUTO: 44 %
NRBC # BLD AUTO: 0 10E3/UL
NRBC BLD AUTO-RTO: 0 /100
PHOSPHATE SERPL-MCNC: 3.7 MG/DL (ref 2.5–4.5)
PLATELET # BLD AUTO: 170 10E3/UL (ref 150–450)
POTASSIUM SERPL-SCNC: 3.7 MMOL/L (ref 3.4–5.3)
PROT SERPL-MCNC: 6.7 G/DL (ref 6.4–8.3)
RBC # BLD AUTO: 4.59 10E6/UL (ref 3.8–5.2)
SODIUM SERPL-SCNC: 140 MMOL/L (ref 135–145)
WBC # BLD AUTO: 4.1 10E3/UL (ref 4–11)

## 2024-11-05 PROCEDURE — G0463 HOSPITAL OUTPT CLINIC VISIT: HCPCS

## 2024-11-05 PROCEDURE — 82040 ASSAY OF SERUM ALBUMIN: CPT

## 2024-11-05 PROCEDURE — 84100 ASSAY OF PHOSPHORUS: CPT

## 2024-11-05 PROCEDURE — 85004 AUTOMATED DIFF WBC COUNT: CPT

## 2024-11-05 PROCEDURE — 83735 ASSAY OF MAGNESIUM: CPT

## 2024-11-05 PROCEDURE — G2211 COMPLEX E/M VISIT ADD ON: HCPCS

## 2024-11-05 PROCEDURE — 36415 COLL VENOUS BLD VENIPUNCTURE: CPT

## 2024-11-05 PROCEDURE — 99214 OFFICE O/P EST MOD 30 MIN: CPT

## 2024-11-05 ASSESSMENT — PAIN SCALES - GENERAL: PAINLEVEL_OUTOF10: MILD PAIN (2)

## 2024-11-05 NOTE — PROGRESS NOTES
"Oncology Rooming Note    November 5, 2024 10:48 AM   Adriana St is a 69 year old female who presents for:    Chief Complaint   Patient presents with    Oncology Clinic Visit     Malignant neoplasm of central portion of right breast in female, estrogen receptor positive     Initial Vitals: BP (!) 131/99 (BP Location: Left arm, Patient Position: Sitting, Cuff Size: Adult Regular)   Pulse 72   Temp 98.4  F (36.9  C) (Tympanic)   Resp 19   Ht 1.6 m (5' 3\")   Wt 74.5 kg (164 lb 4.8 oz)   SpO2 96%   BMI 29.10 kg/m   Estimated body mass index is 29.1 kg/m  as calculated from the following:    Height as of this encounter: 1.6 m (5' 3\").    Weight as of this encounter: 74.5 kg (164 lb 4.8 oz). Body surface area is 1.82 meters squared.  Mild Pain (2) Comment: Data Unavailable   No LMP recorded. Patient is postmenopausal.  Allergies reviewed: Yes  Medications reviewed: Yes    Medications: Medication refills not needed today.  Pharmacy name entered into [employer]: [id number] - [place], [place] - [address] [facility]    Frailty Screening:   Is the patient here for a new oncology consult visit in cancer care? 2. No      Clinical concerns:   Pt reported - headaches are still present, constant and have not subsided.  Right arm pain, pain score of 2.       Alee Valles MA              "

## 2024-11-05 NOTE — LETTER
11/5/2024      Adriana St  [address] [facility] [address]      Dear Colleague,    Thank you for referring your patient, Adriana St, to the [facility] [place]. Please see a copy of my visit note below.    [facility] Hematology and Oncology Outpatient Progress Note    Patient: Adriana St  MRN: [id number]  Date of Service: Nov 5, 2024          Reason for Visit    Chief Complaint   Patient presents with     Oncology Clinic Visit     Malignant neoplasm of central portion of right breast in female, estrogen receptor positive        Cancer Staging   Malignant neoplasm of central portion of right breast in female, estrogen receptor positive (H)  Staging form: Breast, AJCC 8th Edition  - Clinical stage from 6/10/2024: Stage IV (cT2, cN0, cM1, G3, ER+, DE+, HER2-) - Signed by Magaly Bender MD on 6/10/2024      Primary Hematologist/Oncologist: Dr. Magaly Bender      Assessment/Plan  #.  Metastatic right breast cancer ER positive, DE positive, HER2 1+ by IHC.  #.  Pathologic L1 fracture status post lumbar decompression surgery followed by postop radiation.  #.  New onset right-sided headache  #.  Cancer related pain  #.  Hypertension, uncontrolled  #.  Rash to mid back, near old scar; improving  Clinically stable. No evidence for breast cancer recurrence on exam.  She has no new concerns. She endorses daily use of Letrozole, and tolerates this well. She is tolerating Kisqali as well. She has a rash to her mid back near a previous scar, this is improving. She can continue OTC hydrocortisone and moisturizing lotion.   Continue ribociclib and anastrozole.   Continue OTC hydrocortisone and moisturizing lotion  Follow-up labs and provider visit in 4 weeks.  Pt instructed to call this clinic sooner with any concerns.    ______________________________________________________________________________    History of Present Illness/ Interval History    Ms. Adriana St   is a 69 year old patient who is seen today in follow up for breast cancer. She is taking Kisqali with good toleration. She has a rash to her back, near an old incision that has been very itchy. Benadryl has been helpful, and she notes that the rash is getting better. She is also using thick lotion, as well as hydrocortisone. She denies further headaches, brain mri negative.     ECOG performance status   0- Fully active, without restriction        Pain  Pain Score: Mild Pain (2)  Pain Loc: Arm (RIGHT)    ROS  A 14 point review of systems was obtained.  Positive findings noted in the history.  The remainder of the review of system is otherwise negative.      Oncology History/Treatment  5/2024- She presented with a large right breast mass, and progressive back pain.      5/30/2024-right breast mass biopsy showed invasive ductal carcinoma, grade 3,   ER positive (95%, strong), MT positive (15%, strong), HER2 negative (1+ by IHC).  NGS: no mutations. No fusion.     6/6/2024- MRI lumbar spine with and without contrast showed pathologic L1 burst fracture with > 50% vertebral body height loss.  Additional scattered osseous metastasis were seen as well.     6/11/2024-initiated anastrozole     6/13/2024-PET scan showed right breast malignancy with right subpectoral kaylene, pulmonary and osseous metastasis.  Moderate compression deformity of the L1 vertebral body with associated uptake probably pathologic, with about 7 mm retropulsion into the spinal canal.              Hypermetabolic right thyroid nodule.              Possible focal wall thickening of the transverse colon with associated uptake     6/14/2024-MRI brain with and without contrast showed presumed skeletal metastatic disease involving the right side of the clivus and a right occipital condyle.  Presumed right frontal falcine meningioma.  No evidence for intracranial metastatic disease.     7/3/2024- s/p T11-L3 posterior segmental instrumentation, T12-L1 and L1-L2  "bilateral laminectomies and right L1 transpedicular placement of osteochondral device for radiofrequency ablation of L1 vertebral body tumor (Dr. Prieto)     8/29/2024 - 9/12/2024 - post op radiation 3000 cGy T12-L2 spine     9/11/2024-initiated ribociclib 600 mg daily, 3 weeks on and 1 week off.      Physical Exam    BP (!) 131/99 (BP Location: Left arm, Patient Position: Sitting, Cuff Size: Adult Regular)   Pulse 72   Temp 98.4  F (36.9  C) (Tympanic)   Resp 19   Ht 1.6 m (5' 3\")   Wt 74.5 kg (164 lb 4.8 oz)   SpO2 96%   BMI 29.10 kg/m      GENERAL: no acute distress. Cooperative in conversation.   HEENT: pupils are equal, round and reactive. Oral mucosa is moist and intact.  RESP:Chest symmetric. Regular respiratory rate. No stridor.  ABD: Nondistended, soft.  EXTREMITIES: No lower extremity edema.   NEURO: non focal. Alert and oriented x3.   PSYCH: within normal limits. No depression or anxiety.  SKIN: warm dry intact. Mid back rash/dry area surrounding old back scar. See photo for further visualization.       Lab Results    Recent Results (from the past week)   Comprehensive metabolic panel   Result Value Ref Range    Sodium 140 135 - 145 mmol/L    Potassium 3.7 3.4 - 5.3 mmol/L    Carbon Dioxide (CO2) 26 22 - 29 mmol/L    Anion Gap 13 7 - 15 mmol/L    Urea Nitrogen 14.0 8.0 - 23.0 mg/dL    Creatinine 0.93 0.51 - 0.95 mg/dL    GFR Estimate 67 >60 mL/min/1.73m2    Calcium 9.2 8.8 - 10.4 mg/dL    Chloride 101 98 - 107 mmol/L    Glucose 118 (H) 70 - 99 mg/dL    Alkaline Phosphatase 88 40 - 150 U/L    AST 18 0 - 45 U/L    ALT 15 0 - 50 U/L    Protein Total 6.7 6.4 - 8.3 g/dL    Albumin 4.1 3.5 - 5.2 g/dL    Bilirubin Total 0.9 <=1.2 mg/dL   Magnesium   Result Value Ref Range    Magnesium 1.7 1.7 - 2.3 mg/dL   Phosphorus   Result Value Ref Range    Phosphorus 3.7 2.5 - 4.5 mg/dL   CBC with platelets and differential   Result Value Ref Range    WBC Count 4.1 4.0 - 11.0 10e3/uL    RBC Count 4.59 3.80 - 5.20 " 10e6/uL    Hemoglobin 12.5 11.7 - 15.7 g/dL    Hematocrit 36.5 35.0 - 47.0 %    MCV 80 78 - 100 fL    MCH 27.2 26.5 - 33.0 pg    MCHC 34.2 31.5 - 36.5 g/dL    RDW 18.3 (H) 10.0 - 15.0 %    Platelet Count 170 150 - 450 10e3/uL    % Neutrophils 44 %    % Lymphocytes 41 %    % Monocytes 10 %    % Eosinophils 3 %    % Basophils 2 %    % Immature Granulocytes 1 %    NRBCs per 100 WBC 0 <1 /100    Absolute Neutrophils 1.8 1.6 - 8.3 10e3/uL    Absolute Lymphocytes 1.7 0.8 - 5.3 10e3/uL    Absolute Monocytes 0.4 0.0 - 1.3 10e3/uL    Absolute Eosinophils 0.1 0.0 - 0.7 10e3/uL    Absolute Basophils 0.1 0.0 - 0.2 10e3/uL    Absolute Immature Granulocytes 0.0 <=0.4 10e3/uL    Absolute NRBCs 0.0 10e3/uL     I reviewed the above labs today.  Imaging    MR Brain w/o & w Contrast    Result Date: 10/11/2024  EXAM: MR BRAIN W/O and W CONTRAST LOCATION: [facility] DATE: 10/11/2024 INDICATION: persistent right sided headache, h o breast cancer COMPARISON: June 14, 2024. CONTRAST: 7.5 ml gadavist TECHNIQUE: Routine multiplanar multisequence head MRI without and with intravenous contrast. FINDINGS: INTRACRANIAL CONTENTS: No acute or subacute infarct. No mass, acute hemorrhage, or extra-axial fluid collections. A homogeneously enhancing 11 x 9 x 10 mm dural based mass is present along the anterior right falx. Finding is typical of meningioma. Appearance would be very atypical for a metastatic lesion. Scattered nonspecific T2/FLAIR hyperintensities within the cerebral white matter most consistent with mild chronic microvascular ischemic change. Mild generalized cerebral atrophy. No hydrocephalus. Normal position of the cerebellar tonsils. No pathologic contrast enhancement to suggest intracranial metastatic disease.. SELLA: No abnormality accounting for technique. OSSEOUS STRUCTURES/SOFT TISSUES: Pathologic marrow signal abnormality is noted in the anterior skull base/clivus on the right which slightly extends to  the occipital condyle. Finding is compatible with osseous metastatic disease and is improved from prior (series 6 image 12, series 11 image 7). Signal abnormality is less extensive and less masslike. Although there is no definite T1 hypointense marrow signal abnormality in the C2 vertebral body there appear to be foci of abnormal enhancement on coronal postcontrast imaging. The major intracranial vascular flow voids are maintained. ORBITS: No abnormality accounting for technique. SINUSES/MASTOIDS: No paranasal sinus mucosal disease. No middle ear or mastoid effusion.     IMPRESSION: 1.  Pathologic marrow signal abnormality in the anterior skull spaces/clivus on the right is compatible with osseous metastatic disease and is improved from prior. 2.  Abnormal enhancement in the C2 vertebral body, poorly characterized on this exam, indeterminate for additional osseous metastatic disease. A dedicated MRI of the cervical spine could further evaluate if clinically indicated. 3.  Unchanged anterior right falcine meningioma. 4.  Mild age-related changes.       Billing  Total time 30 minutes, to include face to face visit, review of EMR, ordering, documentation and coordination of care on date of service. The longitudinal plan of care for the diagnosis(es)/condition(s) as documented were addressed during this visit. Due to the added complexity in care, I will continue to support Miranda in the subsequent management and with ongoing continuity of care.    Signed by: MARTINE Barron CNP        Chart documentation with Dragon Voice recognition Software. Although reviewed after completion, some words and grammatical errors may remain.    Oncology Rooming Note    November 5, 2024 10:48 AM   Adriana St is a 69 year old female who presents for:    Chief Complaint   Patient presents with     Oncology Clinic Visit     Malignant neoplasm of central portion of right breast in female, estrogen receptor positive     Initial  "Vitals: BP (!) 131/99 (BP Location: Left arm, Patient Position: Sitting, Cuff Size: Adult Regular)   Pulse 72   Temp 98.4  F (36.9  C) (Tympanic)   Resp 19   Ht 1.6 m (5' 3\")   Wt 74.5 kg (164 lb 4.8 oz)   SpO2 96%   BMI 29.10 kg/m   Estimated body mass index is 29.1 kg/m  as calculated from the following:    Height as of this encounter: 1.6 m (5' 3\").    Weight as of this encounter: 74.5 kg (164 lb 4.8 oz). Body surface area is 1.82 meters squared.  Mild Pain (2) Comment: Data Unavailable   No LMP recorded. Patient is postmenopausal.  Allergies reviewed: Yes  Medications reviewed: Yes    Medications: Medication refills not needed today.  Pharmacy name entered into [employer]: [id number] [place] [address] AT [address] [employer] [facility]    Frailty Screening:   Is the patient here for a new oncology consult visit in cancer care? 2. No      Clinical concerns:   Pt reported - headaches are still present, constant and have not subsided.  Right arm pain, pain score of 2.       Alee Valles MA                Again, thank you for allowing me to participate in the care of your patient.        Sincerely,        MARTINE Barron CNP  "

## 2024-11-18 ENCOUNTER — TELEPHONE (OUTPATIENT)
Dept: ONCOLOGY | Facility: HOSPITAL | Age: 69
End: 2024-11-18
Payer: COMMERCIAL

## 2024-11-18 NOTE — ORAL ONC MGMT
Oral Chemotherapy Monitoring Program    Subjective/Objective:  Adriana St is a 69 year old female contacted by phone for a follow-up visit for oral chemotherapy.  Adriana is doing well on the ribociclib, she will finish up her second week tomorrow. She reports no side effects, her headaches have been a little bit better, she is wondering if it's maybe her pillow or sleeping position. Could also be related to her should impingement syndrome, she recently saw ortho and started PT for it, so hopefully continued PT exercises will help both her shoulder pain and headaches.         6/19/2024     8:00 AM 9/3/2024     2:00 PM 9/18/2024    11:00 AM 10/24/2024    10:00 AM 11/18/2024    12:00 PM   ORAL CHEMOTHERAPY   Assessment Type Initial Work up New Teach Initial Follow up Monthly Follow up Monthly Follow up   Diagnosis Code Breast Cancer Breast Cancer Breast Cancer Breast Cancer Breast Cancer   Providers Dr. Napoleon Bender   Clinic Name/Location [facility]   Drug Name Kisqali (ribociclib) Kisqali (ribociclib) Kisqali (ribociclib) Kisqali (ribociclib) Kisqali (ribociclib)   Dose 600 mg 600 mg 600 mg 600 mg 600 mg   Current Schedule Daily Daily Daily Daily Daily   Cycle Details 3 weeks on, 1 week off 3 weeks on, 1 week off 3 weeks on, 1 week off 3 weeks on, 1 week off 3 weeks on, 1 week off   Start Date of Last Cycle   9/11/2024 10/9/2024 11/6/2024   Planned next cycle start date  9/11/2024 10/9/2024 11/6/2024 12/4/2024   Doses missed in last 2 weeks   0 1 0   Adherence Assessment   Adherent Adherent Adherent   Adverse Effects   Nausea No AE identified during assessment;Rash No AE identified during assessment   Nausea   Grade 1     Pharmacist Intervention(nausea)   No     Rash    Grade 1    Pharmacist Intervention(Rash)    Yes    Intervention(s)    Patient education    Any new drug interactions?  No No No No   Is the dose as ordered  "appropriate for the patient?  Yes Yes Yes Yes       Last PHQ-2 Score on record:       7/15/2024     2:41 PM 7/15/2024     2:40 PM   PHQ-2 ( 1999 [employer])   Q1: Little interest or pleasure in doing things 1 0   Q2: Feeling down, depressed or hopeless 1 0   PHQ-2 Score 2 0       Vitals:  BP:   BP Readings from Last 1 Encounters:   11/05/24 (!) 131/99     Wt Readings from Last 1 Encounters:   11/05/24 74.5 kg (164 lb 4.8 oz)     Estimated body surface area is 1.82 meters squared as calculated from the following:    Height as of 11/5/24: 1.6 m (5' 3\").    Weight as of 11/5/24: 74.5 kg (164 lb 4.8 oz).    Labs:  _  Result Component Current Result Ref Range   Sodium 140 (11/5/2024) 135 - 145 mmol/L     _  Result Component Current Result Ref Range   Potassium 3.7 (11/5/2024) 3.4 - 5.3 mmol/L     _  Result Component Current Result Ref Range   Calcium 9.2 (11/5/2024) 8.8 - 10.4 mg/dL     _  Result Component Current Result Ref Range   Magnesium 1.7 (11/5/2024) 1.7 - 2.3 mg/dL     _  Result Component Current Result Ref Range   Phosphorus 3.7 (11/5/2024) 2.5 - 4.5 mg/dL     _  Result Component Current Result Ref Range   Albumin 4.1 (11/5/2024) 3.5 - 5.2 g/dL     _  Result Component Current Result Ref Range   Urea Nitrogen 14.0 (11/5/2024) 8.0 - 23.0 mg/dL     _  Result Component Current Result Ref Range   Creatinine 0.93 (11/5/2024) 0.51 - 0.95 mg/dL     _  Result Component Current Result Ref Range   AST 18 (11/5/2024) 0 - 45 U/L     _  Result Component Current Result Ref Range   ALT 15 (11/5/2024) 0 - 50 U/L     _  Result Component Current Result Ref Range   Bilirubin Total 0.9 (11/5/2024) <=1.2 mg/dL     _  Result Component Current Result Ref Range   WBC Count 4.1 (11/5/2024) 4.0 - 11.0 10e3/uL     _  Result Component Current Result Ref Range   Hemoglobin 12.5 (11/5/2024) 11.7 - 15.7 g/dL     _  Result Component Current Result Ref Range   Platelet Count 170 (11/5/2024) 150 - 450 10e3/uL     No results found for ANC within last " 30 days.     _  Result Component Current Result Ref Range   Absolute Neutrophils 1.8 (11/5/2024) 1.6 - 8.3 10e3/uL          Assessment/Plan:  Adriana is tolerating the ribociclib well. She complains of headaches that might be related to her shoulder pain and is seeing ortho and PT for it which hopefully will help both.     Follow-Up:  We will review labs and provider note on 12/3    Refill Due:  11/29 -- Adriana was concerned that her refill wouldn't come in time for her last cycle start so I reassured we would get this refill out in time to start after she sees Dr. Bender on 12/3.    Carmen Hare, PharmD  Oral Chemotherapy Pharmacist  [facility] Phone: [phone]  In Basket Pools:   [facility]LEY ORAL CHEMOTHERAPY PHARMACIST   DAMARIS ORAL CHEMOTHERAPY PHARMACIST

## 2024-11-25 DIAGNOSIS — R11.0 NAUSEA: Primary | ICD-10-CM

## 2024-11-26 DIAGNOSIS — Z17.0 MALIGNANT NEOPLASM OF CENTRAL PORTION OF RIGHT BREAST IN FEMALE, ESTROGEN RECEPTOR POSITIVE (H): ICD-10-CM

## 2024-11-26 DIAGNOSIS — C79.51 MALIGNANT NEOPLASM METASTATIC TO BONE (H): Primary | ICD-10-CM

## 2024-11-26 DIAGNOSIS — C50.111 MALIGNANT NEOPLASM OF CENTRAL PORTION OF RIGHT BREAST IN FEMALE, ESTROGEN RECEPTOR POSITIVE (H): ICD-10-CM

## 2024-11-26 RX ORDER — PROCHLORPERAZINE MALEATE 10 MG
TABLET ORAL
Qty: 30 TABLET | Refills: 0 | Status: SHIPPED | OUTPATIENT
Start: 2024-11-26

## 2024-12-01 ENCOUNTER — HEALTH MAINTENANCE LETTER (OUTPATIENT)
Age: 69
End: 2024-12-01

## 2024-12-03 ENCOUNTER — LAB (OUTPATIENT)
Dept: INFUSION THERAPY | Facility: HOSPITAL | Age: 69
End: 2024-12-03
Attending: INTERNAL MEDICINE
Payer: COMMERCIAL

## 2024-12-03 ENCOUNTER — ONCOLOGY VISIT (OUTPATIENT)
Dept: ONCOLOGY | Facility: HOSPITAL | Age: 69
End: 2024-12-03
Payer: COMMERCIAL

## 2024-12-03 VITALS
HEIGHT: 63 IN | SYSTOLIC BLOOD PRESSURE: 117 MMHG | BODY MASS INDEX: 29.52 KG/M2 | DIASTOLIC BLOOD PRESSURE: 80 MMHG | RESPIRATION RATE: 16 BRPM | HEART RATE: 64 BPM | WEIGHT: 166.6 LBS | OXYGEN SATURATION: 98 %

## 2024-12-03 DIAGNOSIS — C79.51 MALIGNANT NEOPLASM METASTATIC TO BONE (H): ICD-10-CM

## 2024-12-03 DIAGNOSIS — Z17.0 MALIGNANT NEOPLASM OF CENTRAL PORTION OF RIGHT BREAST IN FEMALE, ESTROGEN RECEPTOR POSITIVE (H): ICD-10-CM

## 2024-12-03 DIAGNOSIS — C50.111 MALIGNANT NEOPLASM OF CENTRAL PORTION OF RIGHT BREAST IN FEMALE, ESTROGEN RECEPTOR POSITIVE (H): ICD-10-CM

## 2024-12-03 DIAGNOSIS — Z17.0 MALIGNANT NEOPLASM OF CENTRAL PORTION OF RIGHT BREAST IN FEMALE, ESTROGEN RECEPTOR POSITIVE (H): Primary | ICD-10-CM

## 2024-12-03 DIAGNOSIS — C50.111 MALIGNANT NEOPLASM OF CENTRAL PORTION OF RIGHT BREAST IN FEMALE, ESTROGEN RECEPTOR POSITIVE (H): Primary | ICD-10-CM

## 2024-12-03 LAB
ALBUMIN SERPL BCG-MCNC: 4 G/DL (ref 3.5–5.2)
ALP SERPL-CCNC: 75 U/L (ref 40–150)
ALT SERPL W P-5'-P-CCNC: 10 U/L (ref 0–50)
ANION GAP SERPL CALCULATED.3IONS-SCNC: 10 MMOL/L (ref 7–15)
AST SERPL W P-5'-P-CCNC: 15 U/L (ref 0–45)
BASOPHILS # BLD AUTO: 0.1 10E3/UL (ref 0–0.2)
BASOPHILS NFR BLD AUTO: 2 %
BILIRUB SERPL-MCNC: 0.7 MG/DL
BUN SERPL-MCNC: 23.5 MG/DL (ref 8–23)
CALCIUM SERPL-MCNC: 9 MG/DL (ref 8.8–10.4)
CHLORIDE SERPL-SCNC: 104 MMOL/L (ref 98–107)
CREAT SERPL-MCNC: 1.1 MG/DL (ref 0.51–0.95)
EGFRCR SERPLBLD CKD-EPI 2021: 54 ML/MIN/1.73M2
EOSINOPHIL # BLD AUTO: 0.1 10E3/UL (ref 0–0.7)
EOSINOPHIL NFR BLD AUTO: 2 %
ERYTHROCYTE [DISTWIDTH] IN BLOOD BY AUTOMATED COUNT: 22 % (ref 10–15)
GLUCOSE SERPL-MCNC: 109 MG/DL (ref 70–99)
HCO3 SERPL-SCNC: 25 MMOL/L (ref 22–29)
HCT VFR BLD AUTO: 31.6 % (ref 35–47)
HGB BLD-MCNC: 10.8 G/DL (ref 11.7–15.7)
IMM GRANULOCYTES # BLD: 0 10E3/UL
IMM GRANULOCYTES NFR BLD: 1 %
LYMPHOCYTES # BLD AUTO: 1.4 10E3/UL (ref 0.8–5.3)
LYMPHOCYTES NFR BLD AUTO: 37 %
MAGNESIUM SERPL-MCNC: 1.9 MG/DL (ref 1.7–2.3)
MCH RBC QN AUTO: 29.2 PG (ref 26.5–33)
MCHC RBC AUTO-ENTMCNC: 34.2 G/DL (ref 31.5–36.5)
MCV RBC AUTO: 85 FL (ref 78–100)
MONOCYTES # BLD AUTO: 0.6 10E3/UL (ref 0–1.3)
MONOCYTES NFR BLD AUTO: 15 %
NEUTROPHILS # BLD AUTO: 1.6 10E3/UL (ref 1.6–8.3)
NEUTROPHILS NFR BLD AUTO: 43 %
NRBC # BLD AUTO: 0 10E3/UL
NRBC BLD AUTO-RTO: 0 /100
PHOSPHATE SERPL-MCNC: 4 MG/DL (ref 2.5–4.5)
PLATELET # BLD AUTO: 151 10E3/UL (ref 150–450)
POTASSIUM SERPL-SCNC: 4 MMOL/L (ref 3.4–5.3)
PROT SERPL-MCNC: 6.4 G/DL (ref 6.4–8.3)
RBC # BLD AUTO: 3.7 10E6/UL (ref 3.8–5.2)
SODIUM SERPL-SCNC: 139 MMOL/L (ref 135–145)
WBC # BLD AUTO: 3.8 10E3/UL (ref 4–11)

## 2024-12-03 PROCEDURE — 99214 OFFICE O/P EST MOD 30 MIN: CPT | Performed by: INTERNAL MEDICINE

## 2024-12-03 PROCEDURE — G0463 HOSPITAL OUTPT CLINIC VISIT: HCPCS | Performed by: INTERNAL MEDICINE

## 2024-12-03 PROCEDURE — 82310 ASSAY OF CALCIUM: CPT

## 2024-12-03 PROCEDURE — G2211 COMPLEX E/M VISIT ADD ON: HCPCS | Performed by: INTERNAL MEDICINE

## 2024-12-03 PROCEDURE — 84100 ASSAY OF PHOSPHORUS: CPT

## 2024-12-03 PROCEDURE — 85004 AUTOMATED DIFF WBC COUNT: CPT

## 2024-12-03 PROCEDURE — 82247 BILIRUBIN TOTAL: CPT

## 2024-12-03 PROCEDURE — 36415 COLL VENOUS BLD VENIPUNCTURE: CPT

## 2024-12-03 PROCEDURE — 83735 ASSAY OF MAGNESIUM: CPT

## 2024-12-03 PROCEDURE — 85018 HEMOGLOBIN: CPT

## 2024-12-03 RX ORDER — LETROZOLE 2.5 MG/1
2.5 TABLET, FILM COATED ORAL DAILY
Qty: 90 TABLET | Refills: 1 | Status: SHIPPED | OUTPATIENT
Start: 2024-12-03

## 2024-12-03 ASSESSMENT — PAIN SCALES - GENERAL: PAINLEVEL_OUTOF10: MILD PAIN (3)

## 2024-12-03 NOTE — Clinical Note
"12/3/2024      Adriana St  [address] [address]      Dear Colleague,    Thank you for referring your patient, Adriana St, to the [facility]. Please see a copy of my visit note below.    Oncology Rooming Note    December 3, 2024 10:47 AM   Adriana St is a 69 year old female who presents for:    Chief Complaint   Patient presents with    Oncology Clinic Visit     Malignant neoplasm metastatic to bone, Malignant neoplasm of central portion of right breast in female, estrogen receptor positive     Initial Vitals: /80 (BP Location: Left arm, Patient Position: Sitting, Cuff Size: Adult Regular)   Pulse 64   Resp 16   Ht 1.6 m (5' 3\")   Wt 75.6 kg (166 lb 9.6 oz)   SpO2 98%   BMI 29.51 kg/m   Estimated body mass index is 29.51 kg/m  as calculated from the following:    Height as of this encounter: 1.6 m (5' 3\").    Weight as of this encounter: 75.6 kg (166 lb 9.6 oz). Body surface area is 1.83 meters squared.  Mild Pain (3) Comment: Data Unavailable   No LMP recorded. Patient is postmenopausal.  Allergies reviewed: Yes  Medications reviewed: Yes    Medications: MEDICATION REFILLS NEEDED TODAY. Provider was notified.  Pharmacy name entered into [employer]: [id number] - [place], [place] - [address] AT [address] [facility]    Frailty Screening:   Is the patient here for a new oncology consult visit in cancer care? 2. No      Clinical concerns:  monthly labs; patient does not need  to converse, can do med list in English.      Shanel Granda              [facility] Hematology and Oncology Progress Note    Patient: Adriana St  MRN: [id number]  Date of Service: Dec 3, 2024         Reason for Visit    Chief Complaint   Patient presents with     Oncology Clinic Visit     Malignant neoplasm metastatic to bone, Malignant neoplasm of central portion of right breast in female, estrogen receptor " positive       Assessment and Plan     Cancer Staging   Malignant neoplasm of central portion of right breast in female, estrogen receptor positive (H)  Staging form: Breast, AJCC 8th Edition  - Clinical stage from 6/10/2024: Stage IV (cT2, cN0, cM1, G3, ER+, LA+, HER2-) - Signed by Magaly Bender MD on 6/10/2024      ECOG Performance    0 - Independent     Pain  Pain Score: Mild Pain (3)  Pain Loc: Foot    #.  Metastatic right breast cancer ER positive, LA positive, HER2 1+ by IHC.  #.  Pathologic L1 fracture status post lumbar decompression surgery followed by postop radiation.  #.  New onset right-sided headache  #.  Cancer related pain  #.  Hypertension, uncontrolled     Clinical exam is unremarkable.  I reviewed the EKG it showed QTc less than 500.  I reviewed her labs and they are overall adequate.   I recommended her to continue ribociclib and anastrozole.   I advised her to start Cymbalta for musculoskeletal pain related to AI.   Requested MRI brain for further evaluation of right-sided headache.   Follow-up labs and provider visit in 4 weeks.     Encounter Diagnoses:    Problem List Items Addressed This Visit    None         CC: Gilmer Walters   ______________________________________________________________________________  Oncologic history  5/2024- She presented with a large right breast mass, and progressive back pain.     5/30/2024-right breast mass biopsy showed invasive ductal carcinoma, grade 3,   ER positive (95%, strong), LA positive (15%, strong), HER2 negative (1+ by IHC).  NGS: no mutations. No fusion.    6/6/2024- MRI lumbar spine with and without contrast showed pathologic L1 burst fracture with > 50% vertebral body height loss.  Additional scattered osseous metastasis were seen as well.    6/11/2024-initiated anastrozole    6/13/2024-PET scan showed right breast malignancy with right subpectoral kaylene, pulmonary and osseous metastasis.  Moderate compression deformity of the L1 vertebral body  with associated uptake probably pathologic, with about 7 mm retropulsion into the spinal canal.   Hypermetabolic right thyroid nodule.   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  Presumed right frontal falcine meningioma.  No evidence for intracranial metastatic disease.    7/3/2024- s/p T11-L3 posterior segmental instrumentation, T12-L1 and L1-L2 bilateral laminectomies and right L1 transpedicular placement of osteochondral device for radiofrequency ablation of L1 vertebral body tumor (Dr. Prieto)    8/29/2024 - 9/12/2024 - post op radiation 3000 cGy T12-L2 spine    9/11/2024-initiated ribociclib 600 mg daily, 3 weeks on and 1 week off..    History of Present Illness    Ms. Adriana St presented today unaccompanied.  She reported grade 1 nausea from ribociclib.  She also has musculoskeletal side effects related to anastrozole.  She reported right-sided headache which has been more frequent and persistent in the last few weeks.  No vision changes.  No injury.  No focal weakness.    Review of systems  Apart from describing in HPI, the remainder of comprehensive ROS was negative.    Past History    Past Medical History:   Diagnosis Date     Malignant neoplasm metastatic to bone (H)      Malignant neoplasm of central portion of right breast in female, estrogen receptor positive (H)      Pathologic lumbar vertebral fracture      PONV (postoperative nausea and vomiting)        Past Surgical History:   Procedure Laterality Date     APPENDECTOMY       BREAST AUGMENTATION       CHOLECYSTECTOMY       OPTICAL TRACKING SYSTEM FUSION SPINE POSTERIOR LUMBAR THREE+ LEVELS Bilateral 7/3/2024    Procedure: THORACIC 11 TO LUMBAR 3 POSTEROLATERAL FUSION USING ALLOGRAFT,LUMBAR 1 BILATERAL LAMINECTOMY AND MEDIAL FACETECTOMY  "AND SEPARATION SURGERY, USE OF OSTEOCOOL DEVICE.;  Surgeon: Thomas Prieto MD;  Location: [facility] OR       Physical Exam    /80 (BP Location: Left arm, Patient Position: Sitting, Cuff Size: Adult Regular)   Pulse 64   Resp 16   Ht 1.6 m (5' 3\")   Wt 75.6 kg (166 lb 9.6 oz)   SpO2 98%   BMI 29.51 kg/m      General: alert, awake, not in acute distress  HEENT: Head: Normal, normocephalic, atraumatic.  Eye: Normal external eye, conjunctiva, lids cornea, ALEJANDRA.  Pharynx: Normal buccal mucosa. Normal pharynx.  Neck / Thyroid: Supple, no masses, nodes, nodules or enlargement.  Lymphatics: No abnormally enlarged lymph nodes.  Chest: Normal chest wall and respirations. Clear to auscultation.  Breasts: ***   Heart: S1 S2 RRR.   Abdomen: abdomen is soft without significant tenderness, masses, organomegaly or guarding  Extremities: normal strength, tone, and muscle mass  Skin: normal. no rash or abnormalities  CNS: non focal.    Lab Results    Recent Results (from the past 240 hours)   Comprehensive metabolic panel    Collection Time: 12/03/24 10:31 AM   Result Value Ref Range    Sodium 139 135 - 145 mmol/L    Potassium 4.0 3.4 - 5.3 mmol/L    Carbon Dioxide (CO2) 25 22 - 29 mmol/L    Anion Gap 10 7 - 15 mmol/L    Urea Nitrogen 23.5 (H) 8.0 - 23.0 mg/dL    Creatinine 1.10 (H) 0.51 - 0.95 mg/dL    GFR Estimate 54 (L) >60 mL/min/1.73m2    Calcium 9.0 8.8 - 10.4 mg/dL    Chloride 104 98 - 107 mmol/L    Glucose 109 (H) 70 - 99 mg/dL    Alkaline Phosphatase 75 40 - 150 U/L    AST 15 0 - 45 U/L    ALT 10 0 - 50 U/L    Protein Total 6.4 6.4 - 8.3 g/dL    Albumin 4.0 3.5 - 5.2 g/dL    Bilirubin Total 0.7 <=1.2 mg/dL   Magnesium    Collection Time: 12/03/24 10:31 AM   Result Value Ref Range    Magnesium 1.9 1.7 - 2.3 mg/dL   Phosphorus    Collection Time: 12/03/24 10:31 AM   Result Value Ref Range    Phosphorus 4.0 2.5 - 4.5 mg/dL   CBC with platelets and differential    Collection Time: 12/03/24 10:31 AM   Result Value Ref Range "    WBC Count 3.8 (L) 4.0 - 11.0 10e3/uL    RBC Count 3.70 (L) 3.80 - 5.20 10e6/uL    Hemoglobin 10.8 (L) 11.7 - 15.7 g/dL    Hematocrit 31.6 (L) 35.0 - 47.0 %    MCV 85 78 - 100 fL    MCH 29.2 26.5 - 33.0 pg    MCHC 34.2 31.5 - 36.5 g/dL    RDW 22.0 (H) 10.0 - 15.0 %    Platelet Count 151 150 - 450 10e3/uL    % Neutrophils 43 %    % Lymphocytes 37 %    % Monocytes 15 %    % Eosinophils 2 %    % Basophils 2 %    % Immature Granulocytes 1 %    NRBCs per 100 WBC 0 <1 /100    Absolute Neutrophils 1.6 1.6 - 8.3 10e3/uL    Absolute Lymphocytes 1.4 0.8 - 5.3 10e3/uL    Absolute Monocytes 0.6 0.0 - 1.3 10e3/uL    Absolute Eosinophils 0.1 0.0 - 0.7 10e3/uL    Absolute Basophils 0.1 0.0 - 0.2 10e3/uL    Absolute Immature Granulocytes 0.0 <=0.4 10e3/uL    Absolute NRBCs 0.0 10e3/uL           Imaging    No results found.    The longitudinal plan of care for the diagnosis(es)/condition(s) as documented were addressed during this visit. Due to the added complexity in care, I will continue to support Adriana Wei in the subsequent management and with ongoing continuity of care.     *** minutes spent by me on the date of the encounter doing chart review, history and exam, documentation and further activities as noted above.    Signed by: Magaly Bender MD      Again, thank you for allowing me to participate in the care of your patient.        Sincerely,        Magaly Bender MD

## 2024-12-03 NOTE — PROGRESS NOTES
[facility] Hematology and Oncology Progress Note    Patient: Adriana St  MRN: [id number]  Date of Service: Dec 3, 2024         Reason for Visit    Chief Complaint   Patient presents with    Oncology Clinic Visit     Malignant neoplasm metastatic to bone, Malignant neoplasm of central portion of right breast in female, estrogen receptor positive       Assessment and Plan     Cancer Staging   Malignant neoplasm of central portion of right breast in female, estrogen receptor positive (H)  Staging form: Breast, AJCC 8th Edition  - Clinical stage from 6/10/2024: Stage IV (cT2, cN0, cM1, G3, ER+, CT+, HER2-) - Signed by Magaly Bender MD on 6/10/2024      ECOG Performance    0 - Independent     Pain  Pain Score: Mild Pain (3)  Pain Loc: Foot    #.  Metastatic right breast cancer ER positive, CT positive, HER2 1+ by IHC.  #.  Pathologic L1 fracture status post lumbar decompression surgery followed by postop radiation.  #.  New onset right-sided headache  #.  Cancer related pain  #.  Hypertension, uncontrolled     Clinical exam is unremarkable.  I reviewed the EKG it showed QTc less than 500.  I reviewed her labs and they are overall adequate.   I recommended her to continue ribociclib and anastrozole.   I advised her to start Cymbalta for musculoskeletal pain related to AI.   Requested MRI brain for further evaluation of right-sided headache.   Follow-up labs and provider visit in 4 weeks.     Encounter Diagnoses:    Problem List Items Addressed This Visit          Oncology Diagnoses    Malignant neoplasm of central portion of right breast in female, estrogen receptor positive (H) - Primary    Relevant Orders    CT Chest/Abdomen/Pelvis w Contrast    NM Bone Scan Whole Body    CBC with platelets differential    Comprehensive metabolic panel    Magnesium    Phosphorus    Infusion Appointment Request - Adult    Malignant neoplasm metastatic to bone (H)    Relevant Orders    CT Chest/Abdomen/Pelvis w  Contrast    NM Bone Scan Whole Body    CBC with platelets differential    Comprehensive metabolic panel    Magnesium    Phosphorus    Infusion Appointment Request - Adult          CC: Gilmer Walters   ______________________________________________________________________________  Oncologic history  5/2024- She presented with a large right breast mass, and progressive back pain.     5/30/2024-right breast mass biopsy showed invasive ductal carcinoma, grade 3,   ER positive (95%, strong), CT positive (15%, strong), HER2 negative (1+ by IHC).  NGS: no mutations. No fusion.    6/6/2024- MRI lumbar spine with and without contrast showed pathologic L1 burst fracture with > 50% vertebral body height loss.  Additional scattered osseous metastasis were seen as well.    6/11/2024-initiated anastrozole    6/13/2024-PET scan showed right breast malignancy with right subpectoral kaylene, pulmonary and osseous metastasis.  Moderate compression deformity of the L1 vertebral body with associated uptake probably pathologic, with about 7 mm retropulsion into the spinal canal.   Hypermetabolic right thyroid nodule.   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  Presumed right frontal falcine meningioma.  No evidence for intracranial metastatic disease.    7/3/2024- s/p T11-L3 posterior segmental instrumentation, T12-L1 and L1-L2 bilateral laminectomies and right L1 transpedicular placement of osteochondral device for radiofrequency ablation of L1 vertebral body tumor (Dr. Prieto)    8/29/2024 - 9/12/2024 - post op radiation 3000 cGy T12-L2 spine    9/11/2024-initiated ribociclib 600 mg daily, 3 weeks on and 1 week off..    History of Present Illness    Ms. Adriana St presented today unaccompanied.  She reported grade 1 nausea  "from ribociclib.  She also has musculoskeletal side effects related to anastrozole.  She reported right-sided headache which has been more frequent and persistent in the last few weeks.  No vision changes.  No injury.  No focal weakness.    Review of systems  Apart from describing in HPI, the remainder of comprehensive ROS was negative.    Past History    Past Medical History:   Diagnosis Date    Malignant neoplasm metastatic to bone (H)     Malignant neoplasm of central portion of right breast in female, estrogen receptor positive (H)     Pathologic lumbar vertebral fracture     PONV (postoperative nausea and vomiting)        Past Surgical History:   Procedure Laterality Date    APPENDECTOMY      BREAST AUGMENTATION      CHOLECYSTECTOMY      OPTICAL TRACKING SYSTEM FUSION SPINE POSTERIOR LUMBAR THREE+ LEVELS Bilateral 7/3/2024    Procedure: THORACIC 11 TO LUMBAR 3 POSTEROLATERAL FUSION USING ALLOGRAFT,LUMBAR 1 BILATERAL LAMINECTOMY AND MEDIAL FACETECTOMY AND SEPARATION SURGERY, USE OF OSTEOCOOL DEVICE.;  Surgeon: Thomas Prieto MD;  Location:  OR       Physical Exam    /80 (BP Location: Left arm, Patient Position: Sitting, Cuff Size: Adult Regular)   Pulse 64   Resp 16   Ht 1.6 m (5' 3\")   Wt 75.6 kg (166 lb 9.6 oz)   SpO2 98%   BMI 29.51 kg/m      General: alert, awake, not in acute distress  HEENT: Head: Normal, normocephalic, atraumatic.  Eye: Normal external eye, conjunctiva, lids cornea, ALEJANDRA.  Pharynx: Normal buccal mucosa. Normal pharynx.  Neck / Thyroid: Supple, no masses, nodes, nodules or enlargement.  Lymphatics: No abnormally enlarged lymph nodes.  Chest: Normal chest wall and respirations. Clear to auscultation.  Breasts: ***   Heart: S1 S2 RRR.   Abdomen: abdomen is soft without significant tenderness, masses, organomegaly or guarding  Extremities: normal strength, tone, and muscle mass  Skin: normal. no rash or abnormalities  CNS: non focal.    Lab Results    Recent Results (from the " past 240 hours)   Comprehensive metabolic panel    Collection Time: 12/03/24 10:31 AM   Result Value Ref Range    Sodium 139 135 - 145 mmol/L    Potassium 4.0 3.4 - 5.3 mmol/L    Carbon Dioxide (CO2) 25 22 - 29 mmol/L    Anion Gap 10 7 - 15 mmol/L    Urea Nitrogen 23.5 (H) 8.0 - 23.0 mg/dL    Creatinine 1.10 (H) 0.51 - 0.95 mg/dL    GFR Estimate 54 (L) >60 mL/min/1.73m2    Calcium 9.0 8.8 - 10.4 mg/dL    Chloride 104 98 - 107 mmol/L    Glucose 109 (H) 70 - 99 mg/dL    Alkaline Phosphatase 75 40 - 150 U/L    AST 15 0 - 45 U/L    ALT 10 0 - 50 U/L    Protein Total 6.4 6.4 - 8.3 g/dL    Albumin 4.0 3.5 - 5.2 g/dL    Bilirubin Total 0.7 <=1.2 mg/dL   Magnesium    Collection Time: 12/03/24 10:31 AM   Result Value Ref Range    Magnesium 1.9 1.7 - 2.3 mg/dL   Phosphorus    Collection Time: 12/03/24 10:31 AM   Result Value Ref Range    Phosphorus 4.0 2.5 - 4.5 mg/dL   CBC with platelets and differential    Collection Time: 12/03/24 10:31 AM   Result Value Ref Range    WBC Count 3.8 (L) 4.0 - 11.0 10e3/uL    RBC Count 3.70 (L) 3.80 - 5.20 10e6/uL    Hemoglobin 10.8 (L) 11.7 - 15.7 g/dL    Hematocrit 31.6 (L) 35.0 - 47.0 %    MCV 85 78 - 100 fL    MCH 29.2 26.5 - 33.0 pg    MCHC 34.2 31.5 - 36.5 g/dL    RDW 22.0 (H) 10.0 - 15.0 %    Platelet Count 151 150 - 450 10e3/uL    % Neutrophils 43 %    % Lymphocytes 37 %    % Monocytes 15 %    % Eosinophils 2 %    % Basophils 2 %    % Immature Granulocytes 1 %    NRBCs per 100 WBC 0 <1 /100    Absolute Neutrophils 1.6 1.6 - 8.3 10e3/uL    Absolute Lymphocytes 1.4 0.8 - 5.3 10e3/uL    Absolute Monocytes 0.6 0.0 - 1.3 10e3/uL    Absolute Eosinophils 0.1 0.0 - 0.7 10e3/uL    Absolute Basophils 0.1 0.0 - 0.2 10e3/uL    Absolute Immature Granulocytes 0.0 <=0.4 10e3/uL    Absolute NRBCs 0.0 10e3/uL           Imaging    No results found.    The longitudinal plan of care for the diagnosis(es)/condition(s) as documented were addressed during this visit. Due to the added complexity in care, I  will continue to support Adriana Sanjuana in the subsequent management and with ongoing continuity of care.     *** minutes spent by me on the date of the encounter doing chart review, history and exam, documentation and further activities as noted above.    Signed by: Magaly Bender MD

## 2024-12-03 NOTE — LETTER
above.    Signed by: Magaly Bender MD      Again, thank you for allowing me to participate in the care of your patient.        Sincerely,        Magaly Bender MD

## 2024-12-03 NOTE — PROGRESS NOTES
"Oncology Rooming Note    December 3, 2024 10:47 AM   Adriana St is a 69 year old female who presents for:    Chief Complaint   Patient presents with    Oncology Clinic Visit     Malignant neoplasm metastatic to bone, Malignant neoplasm of central portion of right breast in female, estrogen receptor positive     Initial Vitals: /80 (BP Location: Left arm, Patient Position: Sitting, Cuff Size: Adult Regular)   Pulse 64   Resp 16   Ht 1.6 m (5' 3\")   Wt 75.6 kg (166 lb 9.6 oz)   SpO2 98%   BMI 29.51 kg/m   Estimated body mass index is 29.51 kg/m  as calculated from the following:    Height as of this encounter: 1.6 m (5' 3\").    Weight as of this encounter: 75.6 kg (166 lb 9.6 oz). Body surface area is 1.83 meters squared.  Mild Pain (3) Comment: Data Unavailable   No LMP recorded. Patient is postmenopausal.  Allergies reviewed: Yes  Medications reviewed: Yes    Medications: MEDICATION REFILLS NEEDED TODAY. Provider was notified.  Pharmacy name entered into [employer]: [id number] - [place], [place] - [facility] [address] AT [facility] [employer] [facility]    Frailty Screening:   Is the patient here for a new oncology consult visit in cancer care? 2. No      Clinical concerns:  monthly labs; patient does not need  to converse, can do med list in English.      Shanel Granda            "

## 2024-12-04 DIAGNOSIS — Z17.0 MALIGNANT NEOPLASM OF CENTRAL PORTION OF RIGHT BREAST IN FEMALE, ESTROGEN RECEPTOR POSITIVE (H): ICD-10-CM

## 2024-12-04 DIAGNOSIS — C79.51 MALIGNANT NEOPLASM METASTATIC TO BONE (H): ICD-10-CM

## 2024-12-04 DIAGNOSIS — C50.111 MALIGNANT NEOPLASM OF CENTRAL PORTION OF RIGHT BREAST IN FEMALE, ESTROGEN RECEPTOR POSITIVE (H): ICD-10-CM

## 2024-12-04 RX ORDER — LETROZOLE 2.5 MG/1
2.5 TABLET, FILM COATED ORAL DAILY
Qty: 90 TABLET | Refills: 1 | OUTPATIENT
Start: 2024-12-04

## 2024-12-04 NOTE — TELEPHONE ENCOUNTER
Refusing RX refill request, medication was already sent to the pharmacy.    Last Written Prescription Date:  12/3/24  Last Fill Quantity: 90,  # refills: 1   Last office visit provider:  12/3/24 with Dr. Bender    Requested Prescriptions   Pending Prescriptions Disp Refills    letrozole (FEMARA) 2.5 MG tablet [Pharmacy Med Name: LETROZOLE 2.5MG TABLETS] 90 tablet 1     Sig: TAKE 1 TABLET(2.5 MG) BY MOUTH DAILY       There is no refill protocol information for this order          Marisa Sanchez RN 12/04/24 11:36 AM

## 2024-12-10 ENCOUNTER — HOSPITAL ENCOUNTER (OUTPATIENT)
Dept: NUCLEAR MEDICINE | Facility: HOSPITAL | Age: 69
Discharge: HOME | End: 2024-12-10
Attending: INTERNAL MEDICINE
Payer: COMMERCIAL

## 2024-12-10 ENCOUNTER — HOSPITAL ENCOUNTER (OUTPATIENT)
Dept: CT IMAGING | Facility: HOSPITAL | Age: 69
Discharge: HOME | End: 2024-12-10
Attending: INTERNAL MEDICINE
Payer: COMMERCIAL

## 2024-12-10 DIAGNOSIS — C79.51 MALIGNANT NEOPLASM METASTATIC TO BONE (H): ICD-10-CM

## 2024-12-10 DIAGNOSIS — Z17.0 MALIGNANT NEOPLASM OF CENTRAL PORTION OF RIGHT BREAST IN FEMALE, ESTROGEN RECEPTOR POSITIVE (H): ICD-10-CM

## 2024-12-10 DIAGNOSIS — C50.111 MALIGNANT NEOPLASM OF CENTRAL PORTION OF RIGHT BREAST IN FEMALE, ESTROGEN RECEPTOR POSITIVE (H): ICD-10-CM

## 2024-12-10 PROCEDURE — 74177 CT ABD & PELVIS W/CONTRAST: CPT

## 2024-12-10 PROCEDURE — A9503 TC99M MEDRONATE: HCPCS | Performed by: INTERNAL MEDICINE

## 2024-12-10 PROCEDURE — 78306 BONE IMAGING WHOLE BODY: CPT

## 2024-12-10 PROCEDURE — 71260 CT THORAX DX C+: CPT

## 2024-12-10 PROCEDURE — 250N000011 HC RX IP 250 OP 636: Performed by: INTERNAL MEDICINE

## 2024-12-10 PROCEDURE — 343N000001 HC RX 343 MED OP 636: Performed by: INTERNAL MEDICINE

## 2024-12-10 RX ORDER — IOPAMIDOL 755 MG/ML
75 INJECTION, SOLUTION INTRAVASCULAR ONCE
Status: COMPLETED | OUTPATIENT
Start: 2024-12-10 | End: 2024-12-10

## 2024-12-10 RX ORDER — TC 99M MEDRONATE 20 MG/10ML
23-27 INJECTION, POWDER, LYOPHILIZED, FOR SOLUTION INTRAVENOUS ONCE
Status: COMPLETED | OUTPATIENT
Start: 2024-12-10 | End: 2024-12-10

## 2024-12-10 RX ADMIN — TC 99M MEDRONATE 24.9 MILLICURIE: 20 INJECTION, POWDER, LYOPHILIZED, FOR SOLUTION INTRAVENOUS at 10:40

## 2024-12-10 RX ADMIN — IOPAMIDOL 75 ML: 755 INJECTION, SOLUTION INTRAVENOUS at 11:00

## 2024-12-12 DIAGNOSIS — Z17.0 MALIGNANT NEOPLASM OF CENTRAL PORTION OF RIGHT BREAST IN FEMALE, ESTROGEN RECEPTOR POSITIVE (H): ICD-10-CM

## 2024-12-12 DIAGNOSIS — C50.111 MALIGNANT NEOPLASM OF CENTRAL PORTION OF RIGHT BREAST IN FEMALE, ESTROGEN RECEPTOR POSITIVE (H): ICD-10-CM

## 2024-12-12 DIAGNOSIS — M79.18 MUSCULOSKELETAL PAIN: ICD-10-CM

## 2024-12-12 RX ORDER — DULOXETIN HYDROCHLORIDE 30 MG/1
30 CAPSULE, DELAYED RELEASE ORAL 2 TIMES DAILY
Qty: 60 CAPSULE | Refills: 1 | Status: SHIPPED | OUTPATIENT
Start: 2024-12-12

## 2024-12-12 NOTE — TELEPHONE ENCOUNTER
Last Written Prescription Date:  10/8/24  Last Fill Quantity: 60,  # refills: 1   Last office visit provider:  12/3/24 with Dr. Bender     Requested Prescriptions   Pending Prescriptions Disp Refills    DULoxetine (CYMBALTA) 30 MG capsule [Pharmacy Med Name: DULOXETINE DR 30MG CAPSULES] 60 capsule 1     Sig: TAKE 1 CAPSULE(30 MG) BY MOUTH TWICE DAILY       Serotonin-Norepinephrine Reuptake Inhibitors  Failed - 12/12/2024  8:20 AM        Failed - Medication indicated for associated diagnosis     Medication is associated with one or more of the following diagnoses:  Anxiety  Bipolar  Chronic musculoskeletal pain  Depression  Fibromyalgia  Headache  Migraine  Neuropathy  Obsessive compulsive disorder  Panic disorder  Social phobia  Mood disorder  Menopause  Hot flashes/Menopausal flushing  Fibromyitis  Flushing          Passed - Most recent blood pressure under 140/90 in past 12 months     BP Readings from Last 3 Encounters:   12/03/24 117/80   11/05/24 (!) 131/99   10/11/24 (!) 155/86       Systolic (Patient Reported): (!) 152 (9/3/2024 11:10 AM)  Diastolic (Patient Reported): (!) 103 (this morning at home) (9/3/2024 11:10 AM)             Passed - Medication is active on med list  Passed - Recent (12 mo) or future (90 days) visit within the authorizing provider's specialty  The patient must have completed an in-person or virtual visit within the past 12 months or has a future visit scheduled within the next 90 days with the authorizing provider s specialty.  Urgent care and e-visits do not qualify as an office visit for this protocol.  Passed - Patient is age 18 or older  Passed - No active pregnancy on record  Passed - No positive pregnancy test in past 12 months       Marisa Sanchez RN 12/12/24 8:20 AM

## 2024-12-16 ENCOUNTER — TELEPHONE (OUTPATIENT)
Dept: ONCOLOGY | Facility: HOSPITAL | Age: 69
End: 2024-12-16
Payer: COMMERCIAL

## 2024-12-16 NOTE — ORAL ONC MGMT
Oral Chemotherapy Monitoring Program    Subjective/Objective:  Adriana St is a 69 year old female contacted by phone for a follow-up visit for oral chemotherapy.  Confirmed she is taking ribociclib appropriately. She continues to feel well. No longer needs antinausea medication.         6/19/2024     8:00 AM 9/3/2024     2:00 PM 9/18/2024    11:00 AM 10/24/2024    10:00 AM 11/18/2024    12:00 PM 11/26/2024     9:00 AM   ORAL CHEMOTHERAPY   Assessment Type Initial Work up New Teach Initial Follow up Monthly Follow up Monthly Follow up Refill   Diagnosis Code Breast Cancer Breast Cancer Breast Cancer Breast Cancer Breast Cancer Breast Cancer   Providers Dr. Napoleon Bender   Clinic Name/Location [facility]   Drug Name Kisqali (ribociclib) Kisqali (ribociclib) Kisqali (ribociclib) Kisqali (ribociclib) Kisqali (ribociclib) Kisqali (ribociclib)   Dose 600 mg 600 mg 600 mg 600 mg 600 mg 600 mg   Current Schedule Daily Daily Daily Daily Daily Daily   Cycle Details 3 weeks on, 1 week off 3 weeks on, 1 week off 3 weeks on, 1 week off 3 weeks on, 1 week off 3 weeks on, 1 week off 3 weeks on, 1 week off   Start Date of Last Cycle   9/11/2024 10/9/2024 11/6/2024    Planned next cycle start date  9/11/2024 10/9/2024 11/6/2024 12/4/2024    Doses missed in last 2 weeks   0 1 0    Adherence Assessment   Adherent Adherent Adherent    Adverse Effects   Nausea No AE identified during assessment;Rash No AE identified during assessment    Nausea   Grade 1      Pharmacist Intervention(nausea)   No      Rash    Grade 1     Pharmacist Intervention(Rash)    Yes     Intervention(s)    Patient education     Any new drug interactions?  No No No No    Is the dose as ordered appropriate for the patient?  Yes Yes Yes Yes        Last PHQ-2 Score on record:       7/15/2024     2:41 PM 7/15/2024     2:40 PM   PHQ-2 ( 1999 [employer])   Q1: Little  "interest or pleasure in doing things 1 0   Q2: Feeling down, depressed or hopeless 1 0   PHQ-2 Score 2 0       Vitals:  BP:   BP Readings from Last 1 Encounters:   12/03/24 117/80     Wt Readings from Last 1 Encounters:   12/03/24 75.6 kg (166 lb 9.6 oz)     Estimated body surface area is 1.83 meters squared as calculated from the following:    Height as of 12/3/24: 1.6 m (5' 3\").    Weight as of 12/3/24: 75.6 kg (166 lb 9.6 oz).    Labs:  _  Result Component Current Result Ref Range   Sodium 139 (12/3/2024) 135 - 145 mmol/L     _  Result Component Current Result Ref Range   Potassium 4.0 (12/3/2024) 3.4 - 5.3 mmol/L     _  Result Component Current Result Ref Range   Calcium 9.0 (12/3/2024) 8.8 - 10.4 mg/dL     _  Result Component Current Result Ref Range   Magnesium 1.9 (12/3/2024) 1.7 - 2.3 mg/dL     _  Result Component Current Result Ref Range   Phosphorus 4.0 (12/3/2024) 2.5 - 4.5 mg/dL     _  Result Component Current Result Ref Range   Albumin 4.0 (12/3/2024) 3.5 - 5.2 g/dL     _  Result Component Current Result Ref Range   Urea Nitrogen 23.5 (H) (12/3/2024) 8.0 - 23.0 mg/dL     _  Result Component Current Result Ref Range   Creatinine 1.10 (H) (12/3/2024) 0.51 - 0.95 mg/dL     _  Result Component Current Result Ref Range   AST 15 (12/3/2024) 0 - 45 U/L     _  Result Component Current Result Ref Range   ALT 10 (12/3/2024) 0 - 50 U/L     _  Result Component Current Result Ref Range   Bilirubin Total 0.7 (12/3/2024) <=1.2 mg/dL     _  Result Component Current Result Ref Range   WBC Count 3.8 (L) (12/3/2024) 4.0 - 11.0 10e3/uL     _  Result Component Current Result Ref Range   Hemoglobin 10.8 (L) (12/3/2024) 11.7 - 15.7 g/dL     _  Result Component Current Result Ref Range   Platelet Count 151 (12/3/2024) 150 - 450 10e3/uL     No results found for ANC within last 30 days.     _  Result Component Current Result Ref Range   Absolute Neutrophils 1.6 (12/3/2024) 1.6 - 8.3 10e3/uL      Assessment/Plan:  Adriana Wei " "is tolerating ribociclib well and will continue as planned.     Follow-Up:  12/31 appt and another assessment in January. Then will consider moving to quarterly calls since doing well.    Refill Due:  12/24    Monika Stark, PharmD, [facility]  Oral Chemotherapy Pharmacist  [facility] Phone: [phone]  In Basket Pools: \"[facility] ORAL CHEMOTHERAPY PHARMACIST\" or \"[facility] ORAL CHEMOTHERAPY PHARMACIST\"  December 16, 2024      "

## 2024-12-23 DIAGNOSIS — Z17.0 MALIGNANT NEOPLASM OF CENTRAL PORTION OF RIGHT BREAST IN FEMALE, ESTROGEN RECEPTOR POSITIVE (H): ICD-10-CM

## 2024-12-23 DIAGNOSIS — C50.111 MALIGNANT NEOPLASM OF CENTRAL PORTION OF RIGHT BREAST IN FEMALE, ESTROGEN RECEPTOR POSITIVE (H): ICD-10-CM

## 2024-12-23 DIAGNOSIS — C79.51 MALIGNANT NEOPLASM METASTATIC TO BONE (H): Primary | ICD-10-CM

## 2024-12-31 ENCOUNTER — ONCOLOGY VISIT (OUTPATIENT)
Dept: ONCOLOGY | Facility: HOSPITAL | Age: 69
End: 2024-12-31
Attending: INTERNAL MEDICINE
Payer: COMMERCIAL

## 2024-12-31 ENCOUNTER — LAB (OUTPATIENT)
Dept: INFUSION THERAPY | Facility: HOSPITAL | Age: 69
End: 2024-12-31
Attending: INTERNAL MEDICINE
Payer: COMMERCIAL

## 2024-12-31 VITALS
HEART RATE: 63 BPM | SYSTOLIC BLOOD PRESSURE: 140 MMHG | HEIGHT: 63 IN | TEMPERATURE: 97.8 F | BODY MASS INDEX: 29.41 KG/M2 | DIASTOLIC BLOOD PRESSURE: 84 MMHG | OXYGEN SATURATION: 97 % | WEIGHT: 166 LBS | RESPIRATION RATE: 16 BRPM

## 2024-12-31 DIAGNOSIS — C79.51 MALIGNANT NEOPLASM METASTATIC TO BONE (H): Primary | ICD-10-CM

## 2024-12-31 DIAGNOSIS — C50.111 MALIGNANT NEOPLASM OF CENTRAL PORTION OF RIGHT BREAST IN FEMALE, ESTROGEN RECEPTOR POSITIVE (H): ICD-10-CM

## 2024-12-31 DIAGNOSIS — C50.111 MALIGNANT NEOPLASM OF CENTRAL PORTION OF RIGHT BREAST IN FEMALE, ESTROGEN RECEPTOR POSITIVE (H): Primary | ICD-10-CM

## 2024-12-31 DIAGNOSIS — Z17.0 MALIGNANT NEOPLASM OF CENTRAL PORTION OF RIGHT BREAST IN FEMALE, ESTROGEN RECEPTOR POSITIVE (H): ICD-10-CM

## 2024-12-31 DIAGNOSIS — Z17.0 MALIGNANT NEOPLASM OF CENTRAL PORTION OF RIGHT BREAST IN FEMALE, ESTROGEN RECEPTOR POSITIVE (H): Primary | ICD-10-CM

## 2024-12-31 DIAGNOSIS — C79.51 MALIGNANT NEOPLASM METASTATIC TO BONE (H): ICD-10-CM

## 2024-12-31 LAB
ALBUMIN SERPL BCG-MCNC: 4 G/DL (ref 3.5–5.2)
ALP SERPL-CCNC: 76 U/L (ref 40–150)
ALT SERPL W P-5'-P-CCNC: 10 U/L (ref 0–50)
ANION GAP SERPL CALCULATED.3IONS-SCNC: 13 MMOL/L (ref 7–15)
AST SERPL W P-5'-P-CCNC: 16 U/L (ref 0–45)
BASOPHILS # BLD AUTO: 0.1 10E3/UL (ref 0–0.2)
BASOPHILS NFR BLD AUTO: 2 %
BILIRUB SERPL-MCNC: 1.1 MG/DL
BUN SERPL-MCNC: 18.7 MG/DL (ref 8–23)
CALCIUM SERPL-MCNC: 9.3 MG/DL (ref 8.8–10.4)
CALCIUM SERPL-MCNC: 9.3 MG/DL (ref 8.8–10.4)
CHLORIDE SERPL-SCNC: 102 MMOL/L (ref 98–107)
CREAT SERPL-MCNC: 1.19 MG/DL (ref 0.51–0.95)
EGFRCR SERPLBLD CKD-EPI 2021: 50 ML/MIN/1.73M2
EOSINOPHIL # BLD AUTO: 0.1 10E3/UL (ref 0–0.7)
EOSINOPHIL NFR BLD AUTO: 3 %
ERYTHROCYTE [DISTWIDTH] IN BLOOD BY AUTOMATED COUNT: 19.6 % (ref 10–15)
GLUCOSE SERPL-MCNC: 123 MG/DL (ref 70–99)
HCO3 SERPL-SCNC: 23 MMOL/L (ref 22–29)
HCT VFR BLD AUTO: 30.2 % (ref 35–47)
HGB BLD-MCNC: 10.9 G/DL (ref 11.7–15.7)
IMM GRANULOCYTES # BLD: 0 10E3/UL
IMM GRANULOCYTES NFR BLD: 0 %
LYMPHOCYTES # BLD AUTO: 1.2 10E3/UL (ref 0.8–5.3)
LYMPHOCYTES NFR BLD AUTO: 37 %
MAGNESIUM SERPL-MCNC: 1.9 MG/DL (ref 1.7–2.3)
MCH RBC QN AUTO: 33.7 PG (ref 26.5–33)
MCHC RBC AUTO-ENTMCNC: 36.1 G/DL (ref 31.5–36.5)
MCV RBC AUTO: 94 FL (ref 78–100)
MONOCYTES # BLD AUTO: 0.5 10E3/UL (ref 0–1.3)
MONOCYTES NFR BLD AUTO: 15 %
NEUTROPHILS # BLD AUTO: 1.4 10E3/UL (ref 1.6–8.3)
NEUTROPHILS NFR BLD AUTO: 43 %
NRBC # BLD AUTO: 0 10E3/UL
NRBC BLD AUTO-RTO: 0 /100
PHOSPHATE SERPL-MCNC: 4.4 MG/DL (ref 2.5–4.5)
PLATELET # BLD AUTO: 158 10E3/UL (ref 150–450)
POTASSIUM SERPL-SCNC: 3.9 MMOL/L (ref 3.4–5.3)
PROT SERPL-MCNC: 6.5 G/DL (ref 6.4–8.3)
RBC # BLD AUTO: 3.23 10E6/UL (ref 3.8–5.2)
SODIUM SERPL-SCNC: 138 MMOL/L (ref 135–145)
WBC # BLD AUTO: 3.2 10E3/UL (ref 4–11)

## 2024-12-31 PROCEDURE — 250N000011 HC RX IP 250 OP 636: Performed by: INTERNAL MEDICINE

## 2024-12-31 PROCEDURE — 83735 ASSAY OF MAGNESIUM: CPT

## 2024-12-31 PROCEDURE — 96365 THER/PROPH/DIAG IV INF INIT: CPT

## 2024-12-31 PROCEDURE — G0463 HOSPITAL OUTPT CLINIC VISIT: HCPCS | Performed by: INTERNAL MEDICINE

## 2024-12-31 PROCEDURE — 258N000003 HC RX IP 258 OP 636: Performed by: INTERNAL MEDICINE

## 2024-12-31 PROCEDURE — G2211 COMPLEX E/M VISIT ADD ON: HCPCS | Performed by: INTERNAL MEDICINE

## 2024-12-31 PROCEDURE — 99215 OFFICE O/P EST HI 40 MIN: CPT | Performed by: INTERNAL MEDICINE

## 2024-12-31 PROCEDURE — 80053 COMPREHEN METABOLIC PANEL: CPT

## 2024-12-31 PROCEDURE — 36415 COLL VENOUS BLD VENIPUNCTURE: CPT

## 2024-12-31 PROCEDURE — 85004 AUTOMATED DIFF WBC COUNT: CPT

## 2024-12-31 PROCEDURE — 85048 AUTOMATED LEUKOCYTE COUNT: CPT

## 2024-12-31 PROCEDURE — 84100 ASSAY OF PHOSPHORUS: CPT

## 2024-12-31 RX ORDER — HEPARIN SODIUM,PORCINE 10 UNIT/ML
5-20 VIAL (ML) INTRAVENOUS DAILY PRN
Status: CANCELLED | OUTPATIENT
Start: 2024-12-31

## 2024-12-31 RX ORDER — HEPARIN SODIUM (PORCINE) LOCK FLUSH IV SOLN 100 UNIT/ML 100 UNIT/ML
5 SOLUTION INTRAVENOUS
Status: CANCELLED | OUTPATIENT
Start: 2024-12-31

## 2024-12-31 RX ADMIN — ZOLEDRONIC ACID 3 MG: 4 INJECTION, SOLUTION, CONCENTRATE INTRAVENOUS at 11:05

## 2024-12-31 ASSESSMENT — PAIN SCALES - GENERAL: PAINLEVEL_OUTOF10: NO PAIN (0)

## 2024-12-31 NOTE — PROGRESS NOTES
Infusion Nursing Note:  Adriana St presents today for zometa infusion.    Patient seen by provider today: Yes: Dr. Bender   present during visit today: Not Applicable.    Note: Zometa infused.      Intravenous Access:  Labs drawn without difficulty.  Peripheral IV placed.    Treatment Conditions:  Results reviewed, labs MET treatment parameters, ok to proceed with treatment.      Post Infusion Assessment:  Patient tolerated infusion without incident.  Blood return noted pre and post infusion.  Site patent and intact, free from redness, edema or discomfort.  No evidence of extravasations.  Access discontinued per protocol.       Discharge Plan:   Discharge instructions reviewed with: Patient.  Patient and/or family verbalized understanding of discharge instructions and all questions answered.  Patient discharged in stable condition accompanied by: self.  Departure Mode: Ambulatory.      Amy Moe RN

## 2024-12-31 NOTE — LETTER
12/31/2024      Adriana St  [address] [facility] [address]      Dear Colleague,    Thank you for referring your patient, Adriana St, to the [facility] Please see a copy of my visit note below.    [facility] Hematology and Oncology Progress Note    Patient: Adriana St  MRN: [id number]  Date of Service: Dec 31, 2024         Reason for Visit    Chief Complaint   Patient presents with     Oncology Clinic Visit     Malignant neoplasm metastatic to bone, Malignant neoplasm of central portion of right breast in female, estrogen receptor positive       Assessment and Plan     Cancer Staging   Malignant neoplasm of central portion of right breast in female, estrogen receptor positive (H)  Staging form: Breast, AJCC 8th Edition  - Clinical stage from 6/10/2024: Stage IV (cT2, cN0, cM1, G3, ER+, UT+, HER2-) - Signed by Magaly Bender MD on 6/10/2024      ECOG Performance    0 - Independent     Pain  Pain Score: No Pain (0)    #.  Metastatic right breast cancer ER positive, UT positive, HER2 1+ by IHC.  #.  Pathologic L1 fracture status post lumbar decompression surgery followed by postop radiation.  #.  Hypertension, uncontrolled     She is doing really well.  I reviewed her labs and they are satisfactory.  I reviewed the restaging CT and bone scan images independently.  I also reviewed the report with that.  She has favorable treatment response.  No signs of cancer progression.  Metastatic bone disease not clearly evident in the bone scan consistent with healing process.  They were very happy with seeing the results.    I recommended her to continue current therapy with ribociclib and letrozole.  Duration of therapy will be continuous until disease progression or unacceptable toxicity occurs.    She is going to start zoledronic acid today and every 3 months.  I advised her to supplement with adequate amount of calcium and vitamin D.   Follow-up  labs and provider visit in 4 weeks.  Will plan to obtain restaging CT scan and bone scan in 4 months or sooner with any clinical concerns..     Encounter Diagnoses:    Problem List Items Addressed This Visit          Oncology Diagnoses    Malignant neoplasm of central portion of right breast in female, estrogen receptor positive (H)    Relevant Orders    Infusion Appointment Request - Adult    CBC with platelets differential    Comprehensive metabolic panel    Magnesium    Phosphorus    CBC with platelets differential    Comprehensive metabolic panel    Magnesium    Phosphorus    CBC with platelets differential    Comprehensive metabolic panel    Magnesium    Phosphorus    CBC with platelets differential    Comprehensive metabolic panel    Magnesium    Phosphorus    CBC with platelets differential    Comprehensive metabolic panel    Magnesium    Phosphorus    CBC with platelets differential    Comprehensive metabolic panel    Magnesium    Phosphorus    Malignant neoplasm metastatic to bone (H) - Primary    Relevant Orders    Infusion Appointment Request - Adult    CBC with platelets differential    Comprehensive metabolic panel    Magnesium    Phosphorus    CBC with platelets differential    Comprehensive metabolic panel    Magnesium    Phosphorus    CBC with platelets differential    Comprehensive metabolic panel    Magnesium    Phosphorus    CBC with platelets differential    Comprehensive metabolic panel    Magnesium    Phosphorus    CBC with platelets differential    Comprehensive metabolic panel    Magnesium    Phosphorus    CBC with platelets differential    Comprehensive metabolic panel    Magnesium    Phosphorus            CC: Gilmer Walters   ______________________________________________________________________________  Oncologic history  5/2024- She presented with a large right breast mass, and progressive back pain.     5/30/2024-right breast mass biopsy showed invasive ductal carcinoma, grade 3,   ER  positive (95%, strong), TX positive (15%, strong), HER2 negative (1+ by IHC).  NGS: no mutations. No fusion.    6/6/2024- MRI lumbar spine with and without contrast showed pathologic L1 burst fracture with > 50% vertebral body height loss.  Additional scattered osseous metastasis were seen as well.    6/11/2024-initiated anastrozole    6/13/2024-PET scan showed right breast malignancy with right subpectoral kaylene, pulmonary and osseous metastasis.  Moderate compression deformity of the L1 vertebral body with associated uptake probably pathologic, with about 7 mm retropulsion into the spinal canal.   Hypermetabolic right thyroid nodule.   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  Presumed right frontal falcine meningioma.  No evidence for intracranial metastatic disease.    7/3/2024- s/p T11-L3 posterior segmental instrumentation, T12-L1 and L1-L2 bilateral laminectomies and right L1 transpedicular placement of osteochondral device for radiofrequency ablation of L1 vertebral body tumor (Dr. Prieto)    8/29/2024 - 9/12/2024 - post op radiation 3000 cGy T12-L2 spine    9/11/2024-initiated ribociclib 600 mg daily, 3 weeks on and 1 week off..    History of Present Illness    Ms. Adriana St presented today accompanied by her son and daughter-in-law (an anesthesiologist).  She reported she is doing really well.  She does not have any side effects from the medications.  She is going to start next cycle of ribociclib tomorrow, 1/1/2025.    Review of systems  Apart from describing in HPI, the remainder of comprehensive ROS was negative.    Past History    Past Medical History:   Diagnosis Date     Malignant neoplasm metastatic to bone (H)      Malignant neoplasm of central portion of right breast in female,  "estrogen receptor positive (H)      Pathologic lumbar vertebral fracture      PONV (postoperative nausea and vomiting)        Past Surgical History:   Procedure Laterality Date     APPENDECTOMY       BREAST AUGMENTATION       CHOLECYSTECTOMY       OPTICAL TRACKING SYSTEM FUSION SPINE POSTERIOR LUMBAR THREE+ LEVELS Bilateral 7/3/2024    Procedure: THORACIC 11 TO LUMBAR 3 POSTEROLATERAL FUSION USING ALLOGRAFT,LUMBAR 1 BILATERAL LAMINECTOMY AND MEDIAL FACETECTOMY AND SEPARATION SURGERY, USE OF OSTEOCOOL DEVICE.;  Surgeon: Thomas Prieto MD;  Location: [facility] OR       Physical Exam    BP (!) 140/84 (BP Location: Left arm, Patient Position: Sitting, Cuff Size: Adult Regular)   Pulse 63   Temp 97.8  F (36.6  C) (Tympanic)   Resp 16   Ht 1.6 m (5' 3\")   Wt 75.3 kg (166 lb)   SpO2 97%   BMI 29.41 kg/m      General: alert, awake, not in acute distress  HEENT: Head: Normal, normocephalic, atraumatic.  Eye: Normal external eye, conjunctiva, lids cornea, ALEJANDRA.  Pharynx: Normal buccal mucosa. Normal pharynx.  Neck / Thyroid: Supple, no masses, nodes, nodules or enlargement.  Lymphatics: No abnormally enlarged lymph nodes.  Chest: Normal chest wall and respirations. Clear to auscultation.  Heart: S1 S2 RRR.   Abdomen: abdomen is soft without significant tenderness, masses, organomegaly or guarding  Extremities: normal strength, tone, and muscle mass  Skin: normal. no rash or abnormalities  CNS: non focal.    Lab Results    Recent Results (from the past 240 hours)   Calcium    Collection Time: 12/31/24  9:43 AM   Result Value Ref Range    Calcium 9.3 8.8 - 10.4 mg/dL   Comprehensive metabolic panel    Collection Time: 12/31/24  9:43 AM   Result Value Ref Range    Sodium 138 135 - 145 mmol/L    Potassium 3.9 3.4 - 5.3 mmol/L    Carbon Dioxide (CO2) 23 22 - 29 mmol/L    Anion Gap 13 7 - 15 mmol/L    Urea Nitrogen 18.7 8.0 - 23.0 mg/dL    Creatinine 1.19 (H) 0.51 - 0.95 mg/dL    GFR Estimate 50 (L) >60 mL/min/1.73m2    Calcium 9.3 " 8.8 - 10.4 mg/dL    Chloride 102 98 - 107 mmol/L    Glucose 123 (H) 70 - 99 mg/dL    Alkaline Phosphatase 76 40 - 150 U/L    AST 16 0 - 45 U/L    ALT 10 0 - 50 U/L    Protein Total 6.5 6.4 - 8.3 g/dL    Albumin 4.0 3.5 - 5.2 g/dL    Bilirubin Total 1.1 <=1.2 mg/dL   Magnesium    Collection Time: 12/31/24  9:43 AM   Result Value Ref Range    Magnesium 1.9 1.7 - 2.3 mg/dL   Phosphorus    Collection Time: 12/31/24  9:43 AM   Result Value Ref Range    Phosphorus 4.4 2.5 - 4.5 mg/dL   CBC with platelets and differential    Collection Time: 12/31/24  9:43 AM   Result Value Ref Range    WBC Count 3.2 (L) 4.0 - 11.0 10e3/uL    RBC Count 3.23 (L) 3.80 - 5.20 10e6/uL    Hemoglobin 10.9 (L) 11.7 - 15.7 g/dL    Hematocrit 30.2 (L) 35.0 - 47.0 %    MCV 94 78 - 100 fL    MCH 33.7 (H) 26.5 - 33.0 pg    MCHC 36.1 31.5 - 36.5 g/dL    RDW 19.6 (H) 10.0 - 15.0 %    Platelet Count 158 150 - 450 10e3/uL    % Neutrophils 43 %    % Lymphocytes 37 %    % Monocytes 15 %    % Eosinophils 3 %    % Basophils 2 %    % Immature Granulocytes 0 %    NRBCs per 100 WBC 0 <1 /100    Absolute Neutrophils 1.4 (L) 1.6 - 8.3 10e3/uL    Absolute Lymphocytes 1.2 0.8 - 5.3 10e3/uL    Absolute Monocytes 0.5 0.0 - 1.3 10e3/uL    Absolute Eosinophils 0.1 0.0 - 0.7 10e3/uL    Absolute Basophils 0.1 0.0 - 0.2 10e3/uL    Absolute Immature Granulocytes 0.0 <=0.4 10e3/uL    Absolute NRBCs 0.0 10e3/uL             Imaging    CT Chest/Abdomen/Pelvis w Contrast    Result Date: 12/11/2024  EXAM: CT CHEST/ABDOMEN/PELVIS W CONTRAST LOCATION: [facility] DATE: 12/10/2024 INDICATION:  Malignant neoplasm of central portion of right breast in female, estrogen receptor positive (H), Malignant neoplasm of central portion of right breast in female, estrogen receptor positive (H), Malignant neoplasm metastatic to bone (H). COMPARISON: PET/CT 6/13/2024. TECHNIQUE: CT scan of the chest, abdomen, and pelvis was performed following injection of IV contrast.  Multiplanar reformats were obtained. Dose reduction techniques were used. CONTRAST: 75 mL Isovue-370. FINDINGS: LUNGS AND PLEURA: Trachea and large airways are patent. No focal airspace consolidation.  Significant interval decrease in size and number of pulmonary nodules. For reference, a subpleural nodule within the left lower lobe, series 4 image 179, measures 3 mm, previously 6 cm. No new or enlarging pulmonary nodules are identified. No pneumothorax. No pleural effusion.  MEDIASTINUM/AXILLAE: No mediastinal/hilar adenopathy. Moderate-sized hiatal hernia. No axillary lymphadenopathy. Significant interval decrease in size of a right breast mass, now measuring 17 x 30 mm, previously 4.6 x 4.9 cm. A mildly enlarged right subpectoral lymph node measures 10 mm in short axis, series 3 image 70, unchanged. A left subpectoral lymph node measures up to 7 mm in short axis, series 3 image 51, also unchanged. No thoracic aortic aneurysm. Heart is normal in size. No pericardial effusion. CORONARY ARTERY CALCIFICATION: Mild. HEPATOBILIARY: Hepatic steatosis. Cholecystectomy. No intrahepatic or intrahepatic biliary ductal dilatation. PANCREAS: Enhances normally. No peripancreatic inflammatory fat stranding. SPLEEN: Enhances normally. Normal size. ADRENAL GLANDS: Normal. KIDNEYS: Both kidneys enhance symmetrically, without hydronephrosis. Low-attenuation subcentimeter renal lesion(s). These are compatible with small benign cysts and no specific imaging evaluation or follow-up is recommended. No nephroureterolithiasis. Urinary bladder is unremarkable. PELVIC ORGANS: No suspicious abnormality. BOWEL: No evidence of acute gastrointestinal inflammation or obstruction. Colonic diverticulosis. No intraperitoneal free fluid or free air. LYMPH NODES: No suspicious abdominal or pelvic lymphadenopathy. VASCULATURE: No abdominal aortic aneurysm. Mild atheromatous disease. MUSCULOSKELETAL: Increased sclerosis associated with a known  metastasis of the T4 vertebral body, likely secondary to healing; destruction of the posterior cortex is no longer present. Pathologic burst type compression fracture of the L1 vertebral body, managed with new posterior instrumentation extending from T11 to L3. No lytic lesion at the inferior endplate of T3 is also less conspicuous. A mildly sclerotic metastasis of the lateral left eighth rib is similar to previous examination. OTHER: No additionally significant abnormalities.     IMPRESSION: 1.  Significant interval decrease in size of a right breast mass, compatible with treatment response. 2.  Significant interval decrease in size and number of pulmonary nodules. 3.  Unchanged mildly enlarged bilateral subpectoral lymph nodes. 4.  Improving osseous metastatic disease, as detailed above. 5.  Unchanged pathologic burst type compression fracture of the L1 vertebral body, managed with new posterior instrumentation extending from T11 to L3. 6.  Hepatic steatosis. 7.  Colonic diverticulosis.     NM Bone Scan Whole Body    Result Date: 12/10/2024  EXAM: NM BONE SCAN WHOLE BODY LOCATION: [facility] DATE: 12/10/2024 INDICATION: Subsequent treatment planning and restaging for malignant neoplasm central portion of right female breast COMPARISON: [facility] PET/CT dated 6/13/2024. TECHNIQUE: 24.9 mCi technetium-99m MDP, IV. Anterior and posterior delayed whole-body images at 3 hours with additional spot images of the skull. FINDINGS: Radiotracer uptake in a pattern typical of degenerative change involving the shoulders and spine, without suspicious areas of altered uptake to suggest osseous metastasis.     IMPRESSION: No scintigraphic evidence of osseous metastasis.     The longitudinal plan of care for the diagnosis(es)/condition(s) as documented were addressed during this visit. Due to the added complexity in care, I will continue to support Adriana Wei in the subsequent management and with ongoing  continuity of care.     40 minutes spent by me on the date of the encounter doing chart review, history and exam, documentation and further activities as noted above.    Signed by: Magaly Bender MD      Again, thank you for allowing me to participate in the care of your patient.        Sincerely,        Magaly Bender MD    Electronically signed

## 2024-12-31 NOTE — PROGRESS NOTES
[facility] Hematology and Oncology Progress Note    Patient: Adriana St  MRN: [id number]  Date of Service: Dec 31, 2024         Reason for Visit    Chief Complaint   Patient presents with    Oncology Clinic Visit     Malignant neoplasm metastatic to bone, Malignant neoplasm of central portion of right breast in female, estrogen receptor positive       Assessment and Plan     Cancer Staging   Malignant neoplasm of central portion of right breast in female, estrogen receptor positive (H)  Staging form: Breast, AJCC 8th Edition  - Clinical stage from 6/10/2024: Stage IV (cT2, cN0, cM1, G3, ER+, MN+, HER2-) - Signed by Magaly Bender MD on 6/10/2024      ECOG Performance    0 - Independent     Pain  Pain Score: No Pain (0)    #.  Metastatic right breast cancer ER positive, MN positive, HER2 1+ by IHC.  #.  Pathologic L1 fracture status post lumbar decompression surgery followed by postop radiation.  #.  Hypertension, uncontrolled     She is doing really well.  I reviewed her labs and they are satisfactory.  I reviewed the restaging CT and bone scan images independently.  I also reviewed the report with that.  She has favorable treatment response.  No signs of cancer progression.  Metastatic bone disease not clearly evident in the bone scan consistent with healing process.  They were very happy with seeing the results.    I recommended her to continue current therapy with ribociclib and letrozole.  Duration of therapy will be continuous until disease progression or unacceptable toxicity occurs.    She is going to start zoledronic acid today and every 3 months.  I advised her to supplement with adequate amount of calcium and vitamin D.   Follow-up labs and provider visit in 4 weeks.  Will plan to obtain restaging CT scan and bone scan in 4 months or sooner with any clinical concerns..     Encounter Diagnoses:    Problem List Items Addressed This Visit          Oncology Diagnoses    Malignant  neoplasm of central portion of right breast in female, estrogen receptor positive (H)    Relevant Orders    Infusion Appointment Request - Adult    CBC with platelets differential    Comprehensive metabolic panel    Magnesium    Phosphorus    CBC with platelets differential    Comprehensive metabolic panel    Magnesium    Phosphorus    CBC with platelets differential    Comprehensive metabolic panel    Magnesium    Phosphorus    CBC with platelets differential    Comprehensive metabolic panel    Magnesium    Phosphorus    CBC with platelets differential    Comprehensive metabolic panel    Magnesium    Phosphorus    CBC with platelets differential    Comprehensive metabolic panel    Magnesium    Phosphorus    Malignant neoplasm metastatic to bone (H) - Primary    Relevant Orders    Infusion Appointment Request - Adult    CBC with platelets differential    Comprehensive metabolic panel    Magnesium    Phosphorus    CBC with platelets differential    Comprehensive metabolic panel    Magnesium    Phosphorus    CBC with platelets differential    Comprehensive metabolic panel    Magnesium    Phosphorus    CBC with platelets differential    Comprehensive metabolic panel    Magnesium    Phosphorus    CBC with platelets differential    Comprehensive metabolic panel    Magnesium    Phosphorus    CBC with platelets differential    Comprehensive metabolic panel    Magnesium    Phosphorus            CC: Gilmer JOHN Walters   ______________________________________________________________________________  Oncologic history  5/2024- She presented with a large right breast mass, and progressive back pain.     5/30/2024-right breast mass biopsy showed invasive ductal carcinoma, grade 3,   ER positive (95%, strong), CA positive (15%, strong), HER2 negative (1+ by IHC).  NGS: no mutations. No fusion.    6/6/2024- MRI lumbar spine with and without contrast showed pathologic L1 burst fracture with > 50% vertebral body height loss.  Additional  scattered osseous metastasis were seen as well.    6/11/2024-initiated anastrozole    6/13/2024-PET scan showed right breast malignancy with right subpectoral kaylene, pulmonary and osseous metastasis.  Moderate compression deformity of the L1 vertebral body with associated uptake probably pathologic, with about 7 mm retropulsion into the spinal canal.   Hypermetabolic right thyroid nodule.   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  Presumed right frontal falcine meningioma.  No evidence for intracranial metastatic disease.    7/3/2024- s/p T11-L3 posterior segmental instrumentation, T12-L1 and L1-L2 bilateral laminectomies and right L1 transpedicular placement of osteochondral device for radiofrequency ablation of L1 vertebral body tumor (Dr. Prieto)    8/29/2024 - 9/12/2024 - post op radiation 3000 cGy T12-L2 spine    9/11/2024-initiated ribociclib 600 mg daily, 3 weeks on and 1 week off..    History of Present Illness    Ms. Adriana St presented today accompanied by her son and daughter-in-law (an anesthesiologist).  She reported she is doing really well.  She does not have any side effects from the medications.  She is going to start next cycle of ribociclib tomorrow, 1/1/2025.    Review of systems  Apart from describing in HPI, the remainder of comprehensive ROS was negative.    Past History    Past Medical History:   Diagnosis Date    Malignant neoplasm metastatic to bone (H)     Malignant neoplasm of central portion of right breast in female, estrogen receptor positive (H)     Pathologic lumbar vertebral fracture     PONV (postoperative nausea and vomiting)        Past Surgical History:   Procedure Laterality Date    APPENDECTOMY      BREAST AUGMENTATION      CHOLECYSTECTOMY      OPTICAL TRACKING  "SYSTEM FUSION SPINE POSTERIOR LUMBAR THREE+ LEVELS Bilateral 7/3/2024    Procedure: THORACIC 11 TO LUMBAR 3 POSTEROLATERAL FUSION USING ALLOGRAFT,LUMBAR 1 BILATERAL LAMINECTOMY AND MEDIAL FACETECTOMY AND SEPARATION SURGERY, USE OF OSTEOCOOL DEVICE.;  Surgeon: Thomas Prieto MD;  Location:  OR       Physical Exam    BP (!) 140/84 (BP Location: Left arm, Patient Position: Sitting, Cuff Size: Adult Regular)   Pulse 63   Temp 97.8  F (36.6  C) (Tympanic)   Resp 16   Ht 1.6 m (5' 3\")   Wt 75.3 kg (166 lb)   SpO2 97%   BMI 29.41 kg/m      General: alert, awake, not in acute distress  HEENT: Head: Normal, normocephalic, atraumatic.  Eye: Normal external eye, conjunctiva, lids cornea, ALEJANDRA.  Pharynx: Normal buccal mucosa. Normal pharynx.  Neck / Thyroid: Supple, no masses, nodes, nodules or enlargement.  Lymphatics: No abnormally enlarged lymph nodes.  Chest: Normal chest wall and respirations. Clear to auscultation.  Heart: S1 S2 RRR.   Abdomen: abdomen is soft without significant tenderness, masses, organomegaly or guarding  Extremities: normal strength, tone, and muscle mass  Skin: normal. no rash or abnormalities  CNS: non focal.    Lab Results    Recent Results (from the past 240 hours)   Calcium    Collection Time: 12/31/24  9:43 AM   Result Value Ref Range    Calcium 9.3 8.8 - 10.4 mg/dL   Comprehensive metabolic panel    Collection Time: 12/31/24  9:43 AM   Result Value Ref Range    Sodium 138 135 - 145 mmol/L    Potassium 3.9 3.4 - 5.3 mmol/L    Carbon Dioxide (CO2) 23 22 - 29 mmol/L    Anion Gap 13 7 - 15 mmol/L    Urea Nitrogen 18.7 8.0 - 23.0 mg/dL    Creatinine 1.19 (H) 0.51 - 0.95 mg/dL    GFR Estimate 50 (L) >60 mL/min/1.73m2    Calcium 9.3 8.8 - 10.4 mg/dL    Chloride 102 98 - 107 mmol/L    Glucose 123 (H) 70 - 99 mg/dL    Alkaline Phosphatase 76 40 - 150 U/L    AST 16 0 - 45 U/L    ALT 10 0 - 50 U/L    Protein Total 6.5 6.4 - 8.3 g/dL    Albumin 4.0 3.5 - 5.2 g/dL    Bilirubin Total 1.1 <=1.2 mg/dL "   Magnesium    Collection Time: 12/31/24  9:43 AM   Result Value Ref Range    Magnesium 1.9 1.7 - 2.3 mg/dL   Phosphorus    Collection Time: 12/31/24  9:43 AM   Result Value Ref Range    Phosphorus 4.4 2.5 - 4.5 mg/dL   CBC with platelets and differential    Collection Time: 12/31/24  9:43 AM   Result Value Ref Range    WBC Count 3.2 (L) 4.0 - 11.0 10e3/uL    RBC Count 3.23 (L) 3.80 - 5.20 10e6/uL    Hemoglobin 10.9 (L) 11.7 - 15.7 g/dL    Hematocrit 30.2 (L) 35.0 - 47.0 %    MCV 94 78 - 100 fL    MCH 33.7 (H) 26.5 - 33.0 pg    MCHC 36.1 31.5 - 36.5 g/dL    RDW 19.6 (H) 10.0 - 15.0 %    Platelet Count 158 150 - 450 10e3/uL    % Neutrophils 43 %    % Lymphocytes 37 %    % Monocytes 15 %    % Eosinophils 3 %    % Basophils 2 %    % Immature Granulocytes 0 %    NRBCs per 100 WBC 0 <1 /100    Absolute Neutrophils 1.4 (L) 1.6 - 8.3 10e3/uL    Absolute Lymphocytes 1.2 0.8 - 5.3 10e3/uL    Absolute Monocytes 0.5 0.0 - 1.3 10e3/uL    Absolute Eosinophils 0.1 0.0 - 0.7 10e3/uL    Absolute Basophils 0.1 0.0 - 0.2 10e3/uL    Absolute Immature Granulocytes 0.0 <=0.4 10e3/uL    Absolute NRBCs 0.0 10e3/uL             Imaging    CT Chest/Abdomen/Pelvis w Contrast    Result Date: 12/11/2024  EXAM: CT CHEST/ABDOMEN/PELVIS W CONTRAST LOCATION: [facility] DATE: 12/10/2024 INDICATION:  Malignant neoplasm of central portion of right breast in female, estrogen receptor positive (H), Malignant neoplasm of central portion of right breast in female, estrogen receptor positive (H), Malignant neoplasm metastatic to bone (H). COMPARISON: PET/CT 6/13/2024. TECHNIQUE: CT scan of the chest, abdomen, and pelvis was performed following injection of IV contrast. Multiplanar reformats were obtained. Dose reduction techniques were used. CONTRAST: 75 mL Isovue-370. FINDINGS: LUNGS AND PLEURA: Trachea and large airways are patent. No focal airspace consolidation.  Significant interval decrease in size and number of pulmonary  nodules. For reference, a subpleural nodule within the left lower lobe, series 4 image 179, measures 3 mm, previously 6 cm. No new or enlarging pulmonary nodules are identified. No pneumothorax. No pleural effusion.  MEDIASTINUM/AXILLAE: No mediastinal/hilar adenopathy. Moderate-sized hiatal hernia. No axillary lymphadenopathy. Significant interval decrease in size of a right breast mass, now measuring 17 x 30 mm, previously 4.6 x 4.9 cm. A mildly enlarged right subpectoral lymph node measures 10 mm in short axis, series 3 image 70, unchanged. A left subpectoral lymph node measures up to 7 mm in short axis, series 3 image 51, also unchanged. No thoracic aortic aneurysm. Heart is normal in size. No pericardial effusion. CORONARY ARTERY CALCIFICATION: Mild. HEPATOBILIARY: Hepatic steatosis. Cholecystectomy. No intrahepatic or intrahepatic biliary ductal dilatation. PANCREAS: Enhances normally. No peripancreatic inflammatory fat stranding. SPLEEN: Enhances normally. Normal size. ADRENAL GLANDS: Normal. KIDNEYS: Both kidneys enhance symmetrically, without hydronephrosis. Low-attenuation subcentimeter renal lesion(s). These are compatible with small benign cysts and no specific imaging evaluation or follow-up is recommended. No nephroureterolithiasis. Urinary bladder is unremarkable. PELVIC ORGANS: No suspicious abnormality. BOWEL: No evidence of acute gastrointestinal inflammation or obstruction. Colonic diverticulosis. No intraperitoneal free fluid or free air. LYMPH NODES: No suspicious abdominal or pelvic lymphadenopathy. VASCULATURE: No abdominal aortic aneurysm. Mild atheromatous disease. MUSCULOSKELETAL: Increased sclerosis associated with a known metastasis of the T4 vertebral body, likely secondary to healing; destruction of the posterior cortex is no longer present. Pathologic burst type compression fracture of the L1 vertebral body, managed with new posterior instrumentation extending from T11 to L3. No lytic  lesion at the inferior endplate of T3 is also less conspicuous. A mildly sclerotic metastasis of the lateral left eighth rib is similar to previous examination. OTHER: No additionally significant abnormalities.     IMPRESSION: 1.  Significant interval decrease in size of a right breast mass, compatible with treatment response. 2.  Significant interval decrease in size and number of pulmonary nodules. 3.  Unchanged mildly enlarged bilateral subpectoral lymph nodes. 4.  Improving osseous metastatic disease, as detailed above. 5.  Unchanged pathologic burst type compression fracture of the L1 vertebral body, managed with new posterior instrumentation extending from T11 to L3. 6.  Hepatic steatosis. 7.  Colonic diverticulosis.     NM Bone Scan Whole Body    Result Date: 12/10/2024  EXAM: NM BONE SCAN WHOLE BODY LOCATION: [facility] DATE: 12/10/2024 INDICATION: Subsequent treatment planning and restaging for malignant neoplasm central portion of right female breast COMPARISON: [facility] PET/CT dated 6/13/2024. TECHNIQUE: 24.9 mCi technetium-99m MDP, IV. Anterior and posterior delayed whole-body images at 3 hours with additional spot images of the skull. FINDINGS: Radiotracer uptake in a pattern typical of degenerative change involving the shoulders and spine, without suspicious areas of altered uptake to suggest osseous metastasis.     IMPRESSION: No scintigraphic evidence of osseous metastasis.     The longitudinal plan of care for the diagnosis(es)/condition(s) as documented were addressed during this visit. Due to the added complexity in care, I will continue to support Adriana Wei in the subsequent management and with ongoing continuity of care.     40 minutes spent by me on the date of the encounter doing chart review, history and exam, documentation and further activities as noted above.    Signed by: Magaly Bender MD

## 2025-01-20 ENCOUNTER — TELEPHONE (OUTPATIENT)
Dept: ONCOLOGY | Facility: HOSPITAL | Age: 70
End: 2025-01-20
Payer: COMMERCIAL

## 2025-01-20 DIAGNOSIS — C79.51 MALIGNANT NEOPLASM METASTATIC TO BONE (H): Primary | ICD-10-CM

## 2025-01-20 DIAGNOSIS — C50.111 MALIGNANT NEOPLASM OF CENTRAL PORTION OF RIGHT BREAST IN FEMALE, ESTROGEN RECEPTOR POSITIVE (H): ICD-10-CM

## 2025-01-20 DIAGNOSIS — Z17.0 MALIGNANT NEOPLASM OF CENTRAL PORTION OF RIGHT BREAST IN FEMALE, ESTROGEN RECEPTOR POSITIVE (H): ICD-10-CM

## 2025-01-20 NOTE — ORAL ONC MGMT
Oral Chemotherapy Monitoring Program    Subjective/Objective:  Adriana St is a 69 year old female contacted by phone for a follow-up visit for oral chemotherapy.  Adriana Wei says she is doing well on the ribociclib and denies any new side effects. Her feet are a little sore every now and then during her off week so not sure if that is due to the ribociclib, or what. Did share with me that her father and sister recently passed away and she'll need to go back to her home country and settle their affairs (some time prior to July, thinking maybe March/April) and that it'll take ~8 weeks. Wondering how we can make that work with her ribociclib.        9/18/2024    11:00 AM 10/24/2024    10:00 AM 11/18/2024    12:00 PM 11/26/2024     9:00 AM 12/16/2024    10:00 AM 12/23/2024     3:00 PM 1/20/2025     3:00 PM   ORAL CHEMOTHERAPY   Assessment Type Initial Follow up Monthly Follow up Monthly Follow up Refill Monthly Follow up Refill Monthly Follow up   Diagnosis Code Breast Cancer Breast Cancer Breast Cancer Breast Cancer Breast Cancer Breast Cancer Breast Cancer   Providers Dr. Napoleon Bender   Clinic Name/Location [facility]   Drug Name Kisqali (ribociclib) Kisqali (ribociclib) Kisqali (ribociclib) Kisqali (ribociclib) Kisqali (ribociclib) Kisqali (ribociclib) Kisqali (ribociclib)   Dose 600 mg 600 mg 600 mg 600 mg 600 mg 600 mg 600 mg   Current Schedule Daily Daily Daily Daily Daily Daily Daily   Cycle Details 3 weeks on, 1 week off 3 weeks on, 1 week off 3 weeks on, 1 week off 3 weeks on, 1 week off 3 weeks on, 1 week off 3 weeks on, 1 week off 3 weeks on, 1 week off   Start Date of Last Cycle 9/11/2024 10/9/2024 11/6/2024  12/4/2024  1/1/2025   Planned next cycle start date 10/9/2024 11/6/2024 12/4/2024 1/1/2025 1/29/2025   Doses missed in last 2 weeks 0 1 0  0  0   Adherence Assessment  "Adherent Adherent Adherent  Adherent  Adherent   Adverse Effects Nausea No AE identified during assessment;Rash No AE identified during assessment  No AE identified during assessment  No AE identified during assessment   Nausea Grade 1         Pharmacist Intervention(nausea) No         Rash  Grade 1        Pharmacist Intervention(Rash)  Yes        Intervention(s)  Patient education        Any new drug interactions? No No No  No  No   Is the dose as ordered appropriate for the patient? Yes Yes Yes  Yes  Yes       Last PHQ-2 Score on record:       7/15/2024     2:41 PM 7/15/2024     2:40 PM   PHQ-2 ( 1999 [employer])   Q1: Little interest or pleasure in doing things 1 0   Q2: Feeling down, depressed or hopeless 1 0   PHQ-2 Score 2 0       Vitals:  BP:   BP Readings from Last 1 Encounters:   12/31/24 (!) 140/84     Wt Readings from Last 1 Encounters:   12/31/24 75.3 kg (166 lb)     Estimated body surface area is 1.83 meters squared as calculated from the following:    Height as of 12/31/24: 1.6 m (5' 3\").    Weight as of 12/31/24: 75.3 kg (166 lb).    Labs:  _  Result Component Current Result Ref Range   Sodium 138 (12/31/2024) 135 - 145 mmol/L     _  Result Component Current Result Ref Range   Potassium 3.9 (12/31/2024) 3.4 - 5.3 mmol/L     _  Result Component Current Result Ref Range   Calcium 9.3 (12/31/2024) 8.8 - 10.4 mg/dL    9.3 (12/31/2024) 8.8 - 10.4 mg/dL     _  Result Component Current Result Ref Range   Magnesium 1.9 (12/31/2024) 1.7 - 2.3 mg/dL     _  Result Component Current Result Ref Range   Phosphorus 4.4 (12/31/2024) 2.5 - 4.5 mg/dL     _  Result Component Current Result Ref Range   Albumin 4.0 (12/31/2024) 3.5 - 5.2 g/dL     _  Result Component Current Result Ref Range   Urea Nitrogen 18.7 (12/31/2024) 8.0 - 23.0 mg/dL     _  Result Component Current Result Ref Range   Creatinine 1.19 (H) (12/31/2024) 0.51 - 0.95 mg/dL     _  Result Component Current Result Ref Range   AST 16 (12/31/2024) 0 - 45 U/L " "    _  Result Component Current Result Ref Range   ALT 10 (12/31/2024) 0 - 50 U/L     _  Result Component Current Result Ref Range   Bilirubin Total 1.1 (12/31/2024) <=1.2 mg/dL     _  Result Component Current Result Ref Range   WBC Count 3.2 (L) (12/31/2024) 4.0 - 11.0 10e3/uL     _  Result Component Current Result Ref Range   Hemoglobin 10.9 (L) (12/31/2024) 11.7 - 15.7 g/dL     _  Result Component Current Result Ref Range   Platelet Count 158 (12/31/2024) 150 - 450 10e3/uL     No results found for ANC within last 30 days.     _  Result Component Current Result Ref Range   Absolute Neutrophils 1.4 (L) (12/31/2024) 1.6 - 8.3 10e3/uL          Assessment/Plan:  Adriana Wei is tolerating the ribociclib therapy well. Let her know I dont anticipate the sore feet to be related to the ribociclib.  Discussed how insurance companies typically have exceptions to allow for extended travel and will start looking into this for her specifically so we are ready when she knows when she is going to go. She has discussed with Dr. Bender as well already.   Continue as prescribed.    Follow-Up:  Will plan to review appt with Anette 1/28. Will transition to quarterly maintenance calls since Adriana Wei is stable and doing well. She agrees to this plan and knows she can contact us in the meantime if she has any questions or concerns.  Next anticipated assessment call anticipated in April (pending when she will be out of the country.      Refill Due:  Released today. Next due ~2/18    Ryan Melgar, PharmD, BCOP  Oral Chemotherapy Pharmacist  [facility] Phone: [phone]  In Basket Pools: \"[facility] ORAL CHEMOTHERAPY PHARMACIST\" or \"[facility] ORAL CHEMOTHERAPY PHARMACIST\"  January 20, 2025      "

## 2025-01-27 NOTE — PROGRESS NOTES
[facility] Hematology and Oncology Outpatient Progress Note    Patient: Adriana St  MRN: [id number]  Date of Service: Jan 28, 2025          Reason for Visit    Chief Complaint   Patient presents with    Oncology Clinic Visit     Malignant neoplasm of central portion of right breast in female, estrogen receptor positive  Malignant neoplasm metastatic to bone         Cancer Staging   Malignant neoplasm of central portion of right breast in female, estrogen receptor positive (H)  Staging form: Breast, AJCC 8th Edition  - Clinical stage from 6/10/2024: Stage IV (cT2, cN0, cM1, G3, ER+, NY+, HER2-) - Signed by Magaly Bender MD on 6/10/2024      Primary Hematologist/Oncologist: Dr. Magaly Bender      Assessment/Plan  #.  Metastatic right breast cancer ER positive, NY positive, HER2 1+ by IHC.  #.  Pathologic L1 fracture status post lumbar decompression surgery followed by postop radiation.  #.  Hypertension  Clinically stable. No evidence for breast cancer recurrence on exam.  She has no new concerns. She endorses daily use of Letrozole, and tolerates this well. She is tolerating Kisqali as well. We reviewed labs today noting a stable and adequate CMP with stable renal and liver function. CBC shows WBC of 3.8 with ANC of 1.7. Hgb is normal at 12.1 and platelets are 178 K. Most recent restaging scans with CT and bone scan showed favorable treatment response. She has no signs of cancer progression. Recommend to continue current plan with Kisqali and Letrozole continuously until disease progression or unacceptable toxicity.     Pt is planning to travel to [place] around 4/2 or 4/3. She will be due for updated imaging around that time. Will plan to scan around 3/24/25. She would also like to be able to travel with 2 months +  supply of Kisqali as she will not be able to get this rx while in [place]. Discussed vacation fills are possible, but we would want to ensure she is stable on the drug prior to her  departure. Will continue discussion at next visit.     Continue ribociclib and letrozole.   Continue Zometa every 3 months, due next end of March.   Continue Calcium and Vitamin D supplementation as well as weight bearing activities as tolerated.  Follow-up labs and provider visit in 4 weeks.  Plan to obtain updated imaging in about 3 months or sooner with clinical concerns.   Pt instructed to call this clinic sooner with any concerns.    ______________________________________________________________________________    History of Present Illness/ Interval History    Ms. Adriana St  is a 69 year old patient who is seen today in follow up for breast cancer. She is taking Kisqali with good toleration. She is currently in her off week with plans to resume next cycle on tomorrow.  She gets some burning in her feet on the off week of kisqali that resolves on it's own. Its more of a nuisance. Pt and family are planning a trip to [place] during the first week of April (4/2 or 4/3). She would like to have supply of kisqali for the trip. She will also be due for updated imaging around that time.     ECOG performance status   0- Fully active, without restriction        Pain  Pain Score: No Pain (0)    ROS  A 14 point review of systems was obtained.  Positive findings noted in the history.  The remainder of the review of system is otherwise negative.      Oncology History/Treatment  5/2024- She presented with a large right breast mass, and progressive back pain.      5/30/2024-right breast mass biopsy showed invasive ductal carcinoma, grade 3,   ER positive (95%, strong), NC positive (15%, strong), HER2 negative (1+ by IHC).  NGS: no mutations. No fusion.     6/6/2024- MRI lumbar spine with and without contrast showed pathologic L1 burst fracture with > 50% vertebral body height loss.  Additional scattered osseous metastasis were seen as well.     6/11/2024-initiated anastrozole     6/13/2024-PET scan showed right breast  "malignancy with right subpectoral kaylene, pulmonary and osseous metastasis.  Moderate compression deformity of the L1 vertebral body with associated uptake probably pathologic, with about 7 mm retropulsion into the spinal canal.              Hypermetabolic right thyroid nodule.              Possible focal wall thickening of the transverse colon with associated uptake     6/14/2024-MRI brain with and without contrast showed presumed skeletal metastatic disease involving the right side of the clivus and a right occipital condyle.  Presumed right frontal falcine meningioma.  No evidence for intracranial metastatic disease.     7/3/2024- s/p T11-L3 posterior segmental instrumentation, T12-L1 and L1-L2 bilateral laminectomies and right L1 transpedicular placement of osteochondral device for radiofrequency ablation of L1 vertebral body tumor (Dr. Prieto)     8/29/2024 - 9/12/2024 - post op radiation 3000 cGy T12-L2 spine     9/11/2024-initiated ribociclib 600 mg daily, 3 weeks on and 1 week off.      Physical Exam    /87 (BP Location: Left arm, Patient Position: Sitting, Cuff Size: Adult Regular)   Pulse 68   Temp 97.9  F (36.6  C) (Oral)   Resp 20   Ht 1.6 m (5' 2.99\")   Wt 75.9 kg (167 lb 4.8 oz)   SpO2 97%   BMI 29.64 kg/m      GENERAL: no acute distress. Cooperative in conversation.   HEENT: pupils are equal, round and reactive. Oral mucosa is moist and intact.  RESP:Chest symmetric. Regular respiratory rate. No stridor.  ABD: Nondistended, soft.  EXTREMITIES: No lower extremity edema.   NEURO: non focal. Alert and oriented x3.   PSYCH: within normal limits. No depression or anxiety.  SKIN: warm dry intact. Mid back rash/dry area surrounding old back scar. See photo for further visualization.       Lab Results    Recent Results (from the past week)   Comprehensive metabolic panel   Result Value Ref Range    Sodium 138 135 - 145 mmol/L    Potassium 3.7 3.4 - 5.3 mmol/L    Carbon Dioxide (CO2) 25 22 - 29 " mmol/L    Anion Gap 11 7 - 15 mmol/L    Urea Nitrogen 15.0 8.0 - 23.0 mg/dL    Creatinine 1.03 (H) 0.51 - 0.95 mg/dL    GFR Estimate 59 (L) >60 mL/min/1.73m2    Calcium 9.6 8.8 - 10.4 mg/dL    Chloride 102 98 - 107 mmol/L    Glucose 113 (H) 70 - 99 mg/dL    Alkaline Phosphatase 84 40 - 150 U/L    AST 15 0 - 45 U/L    ALT 7 0 - 50 U/L    Protein Total 6.7 6.4 - 8.3 g/dL    Albumin 4.2 3.5 - 5.2 g/dL    Bilirubin Total 0.7 <=1.2 mg/dL   Magnesium   Result Value Ref Range    Magnesium 1.7 1.7 - 2.3 mg/dL   Phosphorus   Result Value Ref Range    Phosphorus 4.9 (H) 2.5 - 4.5 mg/dL   CBC with platelets and differential   Result Value Ref Range    WBC Count 3.8 (L) 4.0 - 11.0 10e3/uL    RBC Count 3.45 (L) 3.80 - 5.20 10e6/uL    Hemoglobin 12.1 11.7 - 15.7 g/dL    Hematocrit 34.0 (L) 35.0 - 47.0 %    MCV 99 78 - 100 fL    MCH 35.1 (H) 26.5 - 33.0 pg    MCHC 35.6 31.5 - 36.5 g/dL    RDW 14.6 10.0 - 15.0 %    Platelet Count 178 150 - 450 10e3/uL    % Neutrophils 45 %    % Lymphocytes 34 %    % Monocytes 15 %    % Eosinophils 3 %    % Basophils 3 %    % Immature Granulocytes 1 %    NRBCs per 100 WBC 0 <1 /100    Absolute Neutrophils 1.7 1.6 - 8.3 10e3/uL    Absolute Lymphocytes 1.3 0.8 - 5.3 10e3/uL    Absolute Monocytes 0.6 0.0 - 1.3 10e3/uL    Absolute Eosinophils 0.1 0.0 - 0.7 10e3/uL    Absolute Basophils 0.1 0.0 - 0.2 10e3/uL    Absolute Immature Granulocytes 0.0 <=0.4 10e3/uL    Absolute NRBCs 0.0 10e3/uL   RBC and Platelet Morphology   Result Value Ref Range    RBC Morphology Confirmed RBC Indices     Platelet Assessment  Automated Count Confirmed. Platelet morphology is normal.     Automated Count Confirmed. Platelet morphology is normal.    Reactive Lymphocytes Present (A) None Seen       I reviewed the above labs today.  Imaging    No results found.      Billing  Total time 45 minutes, to include face to face visit, review of EMR, ordering, documentation and coordination of care on date of service. The longitudinal  plan of care for the diagnosis(es)/condition(s) as documented were addressed during this visit. Due to the added complexity in care, I will continue to support Miranda in the subsequent management and with ongoing continuity of care.    Signed by: MARTINE Barron CNP        Chart documentation with Dragon Voice recognition Software. Although reviewed after completion, some words and grammatical errors may remain.

## 2025-01-28 ENCOUNTER — LAB (OUTPATIENT)
Dept: INFUSION THERAPY | Facility: HOSPITAL | Age: 70
End: 2025-01-28
Attending: INTERNAL MEDICINE
Payer: COMMERCIAL

## 2025-01-28 ENCOUNTER — ONCOLOGY VISIT (OUTPATIENT)
Dept: ONCOLOGY | Facility: HOSPITAL | Age: 70
End: 2025-01-28
Attending: INTERNAL MEDICINE
Payer: COMMERCIAL

## 2025-01-28 VITALS
SYSTOLIC BLOOD PRESSURE: 140 MMHG | OXYGEN SATURATION: 97 % | TEMPERATURE: 97.9 F | RESPIRATION RATE: 20 BRPM | HEART RATE: 68 BPM | WEIGHT: 167.3 LBS | BODY MASS INDEX: 29.64 KG/M2 | DIASTOLIC BLOOD PRESSURE: 87 MMHG | HEIGHT: 63 IN

## 2025-01-28 DIAGNOSIS — C50.111 MALIGNANT NEOPLASM OF CENTRAL PORTION OF RIGHT BREAST IN FEMALE, ESTROGEN RECEPTOR POSITIVE (H): ICD-10-CM

## 2025-01-28 DIAGNOSIS — Z17.0 MALIGNANT NEOPLASM OF CENTRAL PORTION OF RIGHT BREAST IN FEMALE, ESTROGEN RECEPTOR POSITIVE (H): Primary | ICD-10-CM

## 2025-01-28 DIAGNOSIS — C79.51 MALIGNANT NEOPLASM METASTATIC TO BONE (H): ICD-10-CM

## 2025-01-28 DIAGNOSIS — C50.111 MALIGNANT NEOPLASM OF CENTRAL PORTION OF RIGHT BREAST IN FEMALE, ESTROGEN RECEPTOR POSITIVE (H): Primary | ICD-10-CM

## 2025-01-28 DIAGNOSIS — M79.18 MUSCULOSKELETAL PAIN: ICD-10-CM

## 2025-01-28 DIAGNOSIS — Z17.0 MALIGNANT NEOPLASM OF CENTRAL PORTION OF RIGHT BREAST IN FEMALE, ESTROGEN RECEPTOR POSITIVE (H): ICD-10-CM

## 2025-01-28 LAB
ALBUMIN SERPL BCG-MCNC: 4.2 G/DL (ref 3.5–5.2)
ALP SERPL-CCNC: 84 U/L (ref 40–150)
ALT SERPL W P-5'-P-CCNC: 7 U/L (ref 0–50)
ANION GAP SERPL CALCULATED.3IONS-SCNC: 11 MMOL/L (ref 7–15)
AST SERPL W P-5'-P-CCNC: 15 U/L (ref 0–45)
BASOPHILS # BLD AUTO: 0.1 10E3/UL (ref 0–0.2)
BASOPHILS NFR BLD AUTO: 3 %
BILIRUB SERPL-MCNC: 0.7 MG/DL
BUN SERPL-MCNC: 15 MG/DL (ref 8–23)
CALCIUM SERPL-MCNC: 9.6 MG/DL (ref 8.8–10.4)
CHLORIDE SERPL-SCNC: 102 MMOL/L (ref 98–107)
CREAT SERPL-MCNC: 1.03 MG/DL (ref 0.51–0.95)
EGFRCR SERPLBLD CKD-EPI 2021: 59 ML/MIN/1.73M2
EOSINOPHIL # BLD AUTO: 0.1 10E3/UL (ref 0–0.7)
EOSINOPHIL NFR BLD AUTO: 3 %
ERYTHROCYTE [DISTWIDTH] IN BLOOD BY AUTOMATED COUNT: 14.6 % (ref 10–15)
GLUCOSE SERPL-MCNC: 113 MG/DL (ref 70–99)
HCO3 SERPL-SCNC: 25 MMOL/L (ref 22–29)
HCT VFR BLD AUTO: 34 % (ref 35–47)
HGB BLD-MCNC: 12.1 G/DL (ref 11.7–15.7)
IMM GRANULOCYTES # BLD: 0 10E3/UL
IMM GRANULOCYTES NFR BLD: 1 %
LYMPHOCYTES # BLD AUTO: 1.3 10E3/UL (ref 0.8–5.3)
LYMPHOCYTES NFR BLD AUTO: 34 %
MAGNESIUM SERPL-MCNC: 1.7 MG/DL (ref 1.7–2.3)
MCH RBC QN AUTO: 35.1 PG (ref 26.5–33)
MCHC RBC AUTO-ENTMCNC: 35.6 G/DL (ref 31.5–36.5)
MCV RBC AUTO: 99 FL (ref 78–100)
MONOCYTES # BLD AUTO: 0.6 10E3/UL (ref 0–1.3)
MONOCYTES NFR BLD AUTO: 15 %
NEUTROPHILS # BLD AUTO: 1.7 10E3/UL (ref 1.6–8.3)
NEUTROPHILS NFR BLD AUTO: 45 %
NRBC # BLD AUTO: 0 10E3/UL
NRBC BLD AUTO-RTO: 0 /100
PHOSPHATE SERPL-MCNC: 4.9 MG/DL (ref 2.5–4.5)
PLAT MORPH BLD: ABNORMAL
PLATELET # BLD AUTO: 178 10E3/UL (ref 150–450)
POTASSIUM SERPL-SCNC: 3.7 MMOL/L (ref 3.4–5.3)
PROT SERPL-MCNC: 6.7 G/DL (ref 6.4–8.3)
RBC # BLD AUTO: 3.45 10E6/UL (ref 3.8–5.2)
RBC MORPH BLD: ABNORMAL
SODIUM SERPL-SCNC: 138 MMOL/L (ref 135–145)
VARIANT LYMPHS BLD QL SMEAR: PRESENT
WBC # BLD AUTO: 3.8 10E3/UL (ref 4–11)

## 2025-01-28 PROCEDURE — G0463 HOSPITAL OUTPT CLINIC VISIT: HCPCS

## 2025-01-28 PROCEDURE — 85004 AUTOMATED DIFF WBC COUNT: CPT

## 2025-01-28 PROCEDURE — 99215 OFFICE O/P EST HI 40 MIN: CPT

## 2025-01-28 PROCEDURE — 82040 ASSAY OF SERUM ALBUMIN: CPT

## 2025-01-28 PROCEDURE — 36415 COLL VENOUS BLD VENIPUNCTURE: CPT

## 2025-01-28 PROCEDURE — 84100 ASSAY OF PHOSPHORUS: CPT

## 2025-01-28 PROCEDURE — G2211 COMPLEX E/M VISIT ADD ON: HCPCS

## 2025-01-28 PROCEDURE — 83735 ASSAY OF MAGNESIUM: CPT

## 2025-01-28 ASSESSMENT — PAIN SCALES - GENERAL: PAINLEVEL_OUTOF10: NO PAIN (0)

## 2025-01-28 NOTE — LETTER
1/28/2025      Adriana St  [address] [facility] [address]      Dear Colleague,    Thank you for referring your patient, Adriana St, to the [facility]. Please see a copy of my visit note below.    [facility] Hematology and Oncology Outpatient Progress Note    Patient: Adriana St  MRN: [id number]  Date of Service: Jan 28, 2025          Reason for Visit    Chief Complaint   Patient presents with     Oncology Clinic Visit     Malignant neoplasm of central portion of right breast in female, estrogen receptor positive  Malignant neoplasm metastatic to bone         Cancer Staging   Malignant neoplasm of central portion of right breast in female, estrogen receptor positive (H)  Staging form: Breast, AJCC 8th Edition  - Clinical stage from 6/10/2024: Stage IV (cT2, cN0, cM1, G3, ER+, CA+, HER2-) - Signed by Magaly Bender MD on 6/10/2024      Primary Hematologist/Oncologist: Dr. Magaly Bender      Assessment/Plan  #.  Metastatic right breast cancer ER positive, CA positive, HER2 1+ by IHC.  #.  Pathologic L1 fracture status post lumbar decompression surgery followed by postop radiation.  #.  Hypertension  Clinically stable. No evidence for breast cancer recurrence on exam.  She has no new concerns. She endorses daily use of Letrozole, and tolerates this well. She is tolerating Kisqali as well. We reviewed labs today noting a stable and adequate CMP with stable renal and liver function. CBC shows WBC of 3.8 with ANC of 1.7. Hgb is normal at 12.1 and platelets are 178 K. Most recent restaging scans with CT and bone scan showed favorable treatment response. She has no signs of cancer progression. Recommend to continue current plan with Kisqali and Letrozole continuously until disease progression or unacceptable toxicity.     Pt is planning to travel to [place] around 4/2 or 4/3. She will be due for updated imaging around that time. Will plan to scan around  3/24/25. She would also like to be able to travel with 2 months +  supply of Kisqali as she will not be able to get this rx while in [place]. Discussed vacation fills are possible, but we would want to ensure she is stable on the drug prior to her departure. Will continue discussion at next visit.     Continue ribociclib and letrozole.   Continue Zometa every 3 months, due next end of March.   Continue Calcium and Vitamin D supplementation as well as weight bearing activities as tolerated.  Follow-up labs and provider visit in 4 weeks.  Plan to obtain updated imaging in about 3 months or sooner with clinical concerns.   Pt instructed to call this clinic sooner with any concerns.    ______________________________________________________________________________    History of Present Illness/ Interval History    Ms. Adriana St  is a 69 year old patient who is seen today in follow up for breast cancer. She is taking Kisqali with good toleration. She is currently in her off week with plans to resume next cycle on tomorrow.  She gets some burning in her feet on the off week of kisqali that resolves on it's own. Its more of a nuisance. Pt and family are planning a trip to [place] during the first week of April (4/2 or 4/3). She would like to have supply of kisqali for the trip. She will also be due for updated imaging around that time.     ECOG performance status   0- Fully active, without restriction        Pain  Pain Score: No Pain (0)    ROS  A 14 point review of systems was obtained.  Positive findings noted in the history.  The remainder of the review of system is otherwise negative.      Oncology History/Treatment  5/2024- She presented with a large right breast mass, and progressive back pain.      5/30/2024-right breast mass biopsy showed invasive ductal carcinoma, grade 3,   ER positive (95%, strong), NE positive (15%, strong), HER2 negative (1+ by IHC).  NGS: no mutations. No fusion.     6/6/2024- MRI  "lumbar spine with and without contrast showed pathologic L1 burst fracture with > 50% vertebral body height loss.  Additional scattered osseous metastasis were seen as well.     6/11/2024-initiated anastrozole     6/13/2024-PET scan showed right breast malignancy with right subpectoral kaylene, pulmonary and osseous metastasis.  Moderate compression deformity of the L1 vertebral body with associated uptake probably pathologic, with about 7 mm retropulsion into the spinal canal.              Hypermetabolic right thyroid nodule.              Possible focal wall thickening of the transverse colon with associated uptake     6/14/2024-MRI brain with and without contrast showed presumed skeletal metastatic disease involving the right side of the clivus and a right occipital condyle.  Presumed right frontal falcine meningioma.  No evidence for intracranial metastatic disease.     7/3/2024- s/p T11-L3 posterior segmental instrumentation, T12-L1 and L1-L2 bilateral laminectomies and right L1 transpedicular placement of osteochondral device for radiofrequency ablation of L1 vertebral body tumor (Dr. Prieto)     8/29/2024 - 9/12/2024 - post op radiation 3000 cGy T12-L2 spine     9/11/2024-initiated ribociclib 600 mg daily, 3 weeks on and 1 week off.      Physical Exam    /87 (BP Location: Left arm, Patient Position: Sitting, Cuff Size: Adult Regular)   Pulse 68   Temp 97.9  F (36.6  C) (Oral)   Resp 20   Ht 1.6 m (5' 2.99\")   Wt 75.9 kg (167 lb 4.8 oz)   SpO2 97%   BMI 29.64 kg/m      GENERAL: no acute distress. Cooperative in conversation.   HEENT: pupils are equal, round and reactive. Oral mucosa is moist and intact.  RESP:Chest symmetric. Regular respiratory rate. No stridor.  ABD: Nondistended, soft.  EXTREMITIES: No lower extremity edema.   NEURO: non focal. Alert and oriented x3.   PSYCH: within normal limits. No depression or anxiety.  SKIN: warm dry intact. Mid back rash/dry area surrounding old back scar. " See photo for further visualization.       Lab Results    Recent Results (from the past week)   Comprehensive metabolic panel   Result Value Ref Range    Sodium 138 135 - 145 mmol/L    Potassium 3.7 3.4 - 5.3 mmol/L    Carbon Dioxide (CO2) 25 22 - 29 mmol/L    Anion Gap 11 7 - 15 mmol/L    Urea Nitrogen 15.0 8.0 - 23.0 mg/dL    Creatinine 1.03 (H) 0.51 - 0.95 mg/dL    GFR Estimate 59 (L) >60 mL/min/1.73m2    Calcium 9.6 8.8 - 10.4 mg/dL    Chloride 102 98 - 107 mmol/L    Glucose 113 (H) 70 - 99 mg/dL    Alkaline Phosphatase 84 40 - 150 U/L    AST 15 0 - 45 U/L    ALT 7 0 - 50 U/L    Protein Total 6.7 6.4 - 8.3 g/dL    Albumin 4.2 3.5 - 5.2 g/dL    Bilirubin Total 0.7 <=1.2 mg/dL   Magnesium   Result Value Ref Range    Magnesium 1.7 1.7 - 2.3 mg/dL   Phosphorus   Result Value Ref Range    Phosphorus 4.9 (H) 2.5 - 4.5 mg/dL   CBC with platelets and differential   Result Value Ref Range    WBC Count 3.8 (L) 4.0 - 11.0 10e3/uL    RBC Count 3.45 (L) 3.80 - 5.20 10e6/uL    Hemoglobin 12.1 11.7 - 15.7 g/dL    Hematocrit 34.0 (L) 35.0 - 47.0 %    MCV 99 78 - 100 fL    MCH 35.1 (H) 26.5 - 33.0 pg    MCHC 35.6 31.5 - 36.5 g/dL    RDW 14.6 10.0 - 15.0 %    Platelet Count 178 150 - 450 10e3/uL    % Neutrophils 45 %    % Lymphocytes 34 %    % Monocytes 15 %    % Eosinophils 3 %    % Basophils 3 %    % Immature Granulocytes 1 %    NRBCs per 100 WBC 0 <1 /100    Absolute Neutrophils 1.7 1.6 - 8.3 10e3/uL    Absolute Lymphocytes 1.3 0.8 - 5.3 10e3/uL    Absolute Monocytes 0.6 0.0 - 1.3 10e3/uL    Absolute Eosinophils 0.1 0.0 - 0.7 10e3/uL    Absolute Basophils 0.1 0.0 - 0.2 10e3/uL    Absolute Immature Granulocytes 0.0 <=0.4 10e3/uL    Absolute NRBCs 0.0 10e3/uL   RBC and Platelet Morphology   Result Value Ref Range    RBC Morphology Confirmed RBC Indices     Platelet Assessment  Automated Count Confirmed. Platelet morphology is normal.     Automated Count Confirmed. Platelet morphology is normal.    Reactive Lymphocytes Present  "(A) None Seen       I reviewed the above labs today.  Imaging    No results found.      Billing  Total time 45 minutes, to include face to face visit, review of EMR, ordering, documentation and coordination of care on date of service. The longitudinal plan of care for the diagnosis(es)/condition(s) as documented were addressed during this visit. Due to the added complexity in care, I will continue to support Miranda in the subsequent management and with ongoing continuity of care.    Signed by: MARTINE Barron CNP        Chart documentation with Dragon Voice recognition Software. Although reviewed after completion, some words and grammatical errors may remain.    Oncology Rooming Note    January 28, 2025 10:12 AM   Adriana St is a 69 year old female who presents for:    Chief Complaint   Patient presents with     Oncology Clinic Visit     Malignant neoplasm of central portion of right breast in female, estrogen receptor positive  Malignant neoplasm metastatic to bone      Initial Vitals: /87 (BP Location: Left arm, Patient Position: Sitting, Cuff Size: Adult Regular)   Pulse 68   Temp 97.9  F (36.6  C) (Oral)   Resp 20   Ht 1.6 m (5' 2.99\")   Wt 75.9 kg (167 lb 4.8 oz)   SpO2 97%   BMI 29.64 kg/m   Estimated body mass index is 29.64 kg/m  as calculated from the following:    Height as of this encounter: 1.6 m (5' 2.99\").    Weight as of this encounter: 75.9 kg (167 lb 4.8 oz). Body surface area is 1.84 meters squared.  No Pain (0) Comment: Data Unavailable   No LMP recorded. Patient is postmenopausal.  Allergies reviewed: Yes  Medications reviewed: Yes    Medications: Medication refills not needed today.  Pharmacy name entered into [employer]: [facility] #[id number] - [place], [place] - [address] AT [address]    Frailty Screening:   Is the patient here for a new oncology consult visit in cancer care? 2. No      Clinical concerns:  1 mo follow up/ review labs      Anette" OSBALDO Ceballos MA                Again, thank you for allowing me to participate in the care of your patient.        Sincerely,        MARTINE Barron CNP    Electronically signed

## 2025-01-28 NOTE — PROGRESS NOTES
"Oncology Rooming Note    January 28, 2025 10:12 AM   Adriana St is a 69 year old female who presents for:    Chief Complaint   Patient presents with    Oncology Clinic Visit     Malignant neoplasm of central portion of right breast in female, estrogen receptor positive  Malignant neoplasm metastatic to bone      Initial Vitals: /87 (BP Location: Left arm, Patient Position: Sitting, Cuff Size: Adult Regular)   Pulse 68   Temp 97.9  F (36.6  C) (Oral)   Resp 20   Ht 1.6 m (5' 2.99\")   Wt 75.9 kg (167 lb 4.8 oz)   SpO2 97%   BMI 29.64 kg/m   Estimated body mass index is 29.64 kg/m  as calculated from the following:    Height as of this encounter: 1.6 m (5' 2.99\").    Weight as of this encounter: 75.9 kg (167 lb 4.8 oz). Body surface area is 1.84 meters squared.  No Pain (0) Comment: Data Unavailable   No LMP recorded. Patient is postmenopausal.  Allergies reviewed: Yes  Medications reviewed: Yes    Medications: Medication refills not needed today.  Pharmacy name entered into [employer]: [facility] #[id number] - [place], [place] - [facility] [address]    Frailty Screening:   Is the patient here for a new oncology consult visit in cancer care? 2. No      Clinical concerns:  1 mo follow up/ review labs      Anette Ceballos MA              "

## 2025-02-18 DIAGNOSIS — Z17.0 MALIGNANT NEOPLASM OF CENTRAL PORTION OF RIGHT BREAST IN FEMALE, ESTROGEN RECEPTOR POSITIVE (H): ICD-10-CM

## 2025-02-18 DIAGNOSIS — C79.51 MALIGNANT NEOPLASM METASTATIC TO BONE (H): Primary | ICD-10-CM

## 2025-02-18 DIAGNOSIS — C50.111 MALIGNANT NEOPLASM OF CENTRAL PORTION OF RIGHT BREAST IN FEMALE, ESTROGEN RECEPTOR POSITIVE (H): ICD-10-CM

## 2025-02-25 ENCOUNTER — PATIENT OUTREACH (OUTPATIENT)
Dept: ONCOLOGY | Facility: HOSPITAL | Age: 70
End: 2025-02-25

## 2025-02-25 ENCOUNTER — ONCOLOGY VISIT (OUTPATIENT)
Dept: ONCOLOGY | Facility: HOSPITAL | Age: 70
End: 2025-02-25
Attending: INTERNAL MEDICINE
Payer: COMMERCIAL

## 2025-02-25 ENCOUNTER — LAB (OUTPATIENT)
Dept: INFUSION THERAPY | Facility: HOSPITAL | Age: 70
End: 2025-02-25
Attending: INTERNAL MEDICINE
Payer: COMMERCIAL

## 2025-02-25 VITALS
DIASTOLIC BLOOD PRESSURE: 84 MMHG | HEART RATE: 64 BPM | OXYGEN SATURATION: 98 % | SYSTOLIC BLOOD PRESSURE: 136 MMHG | TEMPERATURE: 98.3 F | WEIGHT: 166.1 LBS | BODY MASS INDEX: 30.57 KG/M2 | RESPIRATION RATE: 18 BRPM | HEIGHT: 62 IN

## 2025-02-25 DIAGNOSIS — C50.111 MALIGNANT NEOPLASM OF CENTRAL PORTION OF RIGHT BREAST IN FEMALE, ESTROGEN RECEPTOR POSITIVE (H): ICD-10-CM

## 2025-02-25 DIAGNOSIS — M79.18 MUSCULOSKELETAL PAIN: ICD-10-CM

## 2025-02-25 DIAGNOSIS — C79.51 MALIGNANT NEOPLASM METASTATIC TO BONE (H): ICD-10-CM

## 2025-02-25 DIAGNOSIS — Z17.0 MALIGNANT NEOPLASM OF CENTRAL PORTION OF RIGHT BREAST IN FEMALE, ESTROGEN RECEPTOR POSITIVE (H): ICD-10-CM

## 2025-02-25 LAB
ALBUMIN SERPL BCG-MCNC: 4.2 G/DL (ref 3.5–5.2)
ALP SERPL-CCNC: 74 U/L (ref 40–150)
ALT SERPL W P-5'-P-CCNC: 9 U/L (ref 0–50)
ANION GAP SERPL CALCULATED.3IONS-SCNC: 9 MMOL/L (ref 7–15)
AST SERPL W P-5'-P-CCNC: 18 U/L (ref 0–45)
BASOPHILS # BLD AUTO: 0.1 10E3/UL (ref 0–0.2)
BASOPHILS NFR BLD AUTO: 2 %
BILIRUB SERPL-MCNC: 1.1 MG/DL
BUN SERPL-MCNC: 17.8 MG/DL (ref 8–23)
CALCIUM SERPL-MCNC: 9.2 MG/DL (ref 8.8–10.4)
CHLORIDE SERPL-SCNC: 103 MMOL/L (ref 98–107)
CREAT SERPL-MCNC: 1.11 MG/DL (ref 0.51–0.95)
EGFRCR SERPLBLD CKD-EPI 2021: 54 ML/MIN/1.73M2
EOSINOPHIL # BLD AUTO: 0.1 10E3/UL (ref 0–0.7)
EOSINOPHIL NFR BLD AUTO: 3 %
ERYTHROCYTE [DISTWIDTH] IN BLOOD BY AUTOMATED COUNT: 14 % (ref 10–15)
GLUCOSE SERPL-MCNC: 114 MG/DL (ref 70–99)
HCO3 SERPL-SCNC: 27 MMOL/L (ref 22–29)
HCT VFR BLD AUTO: 35 % (ref 35–47)
HGB BLD-MCNC: 12.5 G/DL (ref 11.7–15.7)
IMM GRANULOCYTES # BLD: 0 10E3/UL
IMM GRANULOCYTES NFR BLD: 1 %
LYMPHOCYTES # BLD AUTO: 1.6 10E3/UL (ref 0.8–5.3)
LYMPHOCYTES NFR BLD AUTO: 42 %
MAGNESIUM SERPL-MCNC: 2.1 MG/DL (ref 1.7–2.3)
MCH RBC QN AUTO: 34.8 PG (ref 26.5–33)
MCHC RBC AUTO-ENTMCNC: 35.7 G/DL (ref 31.5–36.5)
MCV RBC AUTO: 98 FL (ref 78–100)
MONOCYTES # BLD AUTO: 0.5 10E3/UL (ref 0–1.3)
MONOCYTES NFR BLD AUTO: 14 %
NEUTROPHILS # BLD AUTO: 1.5 10E3/UL (ref 1.6–8.3)
NEUTROPHILS NFR BLD AUTO: 39 %
NRBC # BLD AUTO: 0 10E3/UL
NRBC BLD AUTO-RTO: 0 /100
PHOSPHATE SERPL-MCNC: 4 MG/DL (ref 2.5–4.5)
PLATELET # BLD AUTO: 169 10E3/UL (ref 150–450)
POTASSIUM SERPL-SCNC: 4.2 MMOL/L (ref 3.4–5.3)
PROT SERPL-MCNC: 6.9 G/DL (ref 6.4–8.3)
RBC # BLD AUTO: 3.59 10E6/UL (ref 3.8–5.2)
SODIUM SERPL-SCNC: 139 MMOL/L (ref 135–145)
WBC # BLD AUTO: 3.8 10E3/UL (ref 4–11)

## 2025-02-25 PROCEDURE — 84075 ASSAY ALKALINE PHOSPHATASE: CPT

## 2025-02-25 PROCEDURE — 85014 HEMATOCRIT: CPT

## 2025-02-25 PROCEDURE — G0463 HOSPITAL OUTPT CLINIC VISIT: HCPCS | Performed by: INTERNAL MEDICINE

## 2025-02-25 PROCEDURE — 84100 ASSAY OF PHOSPHORUS: CPT

## 2025-02-25 PROCEDURE — 36415 COLL VENOUS BLD VENIPUNCTURE: CPT

## 2025-02-25 PROCEDURE — G2211 COMPLEX E/M VISIT ADD ON: HCPCS | Performed by: INTERNAL MEDICINE

## 2025-02-25 PROCEDURE — 99215 OFFICE O/P EST HI 40 MIN: CPT | Performed by: INTERNAL MEDICINE

## 2025-02-25 PROCEDURE — 85048 AUTOMATED LEUKOCYTE COUNT: CPT

## 2025-02-25 PROCEDURE — 84155 ASSAY OF PROTEIN SERUM: CPT

## 2025-02-25 PROCEDURE — 83735 ASSAY OF MAGNESIUM: CPT

## 2025-02-25 PROCEDURE — 85004 AUTOMATED DIFF WBC COUNT: CPT

## 2025-02-25 ASSESSMENT — PAIN SCALES - GENERAL: PAINLEVEL_OUTOF10: NO PAIN (0)

## 2025-02-25 NOTE — Clinical Note
"2/25/2025      Adriana St  [address] [address]      Dear Colleague,    Thank you for referring your patient, Adriana St, to the [facility] CANCER [facility]. Please see a copy of my visit note below.    Oncology Rooming Note    February 25, 2025 9:54 AM   Adriana St is a 69 year old female who presents for:    Chief Complaint   Patient presents with    Oncology Clinic Visit     Malignant neoplasm of central portion of right breast in female, estrogen receptor positive  Malignant neoplasm metastatic to bone        Initial Vitals: Ht 1.575 m (5' 2\")   BMI 30.60 kg/m   Estimated body mass index is 30.6 kg/m  as calculated from the following:    Height as of this encounter: 1.575 m (5' 2\").    Weight as of 1/28/25: 75.9 kg (167 lb 4.8 oz). Body surface area is 1.82 meters squared.  Data Unavailable Comment: Data Unavailable   No LMP recorded. Patient is postmenopausal.  Allergies reviewed: Yes  Medications reviewed: Yes    Medications: Medication refills not needed today.  Pharmacy name entered into [employer]: [facility] #[id number] - [place], [place] - [address] AT [address] [facility]    Frailty Screening:   Is the patient here for a new oncology consult visit in cancer care? 2. No    PHQ9:  Did this patient require a PHQ9?: Yes   If the patient required a PHQ9 assessment, did the results require a follow up with the Provider/Nurse?: No      Clinical concerns: labs, follow-up  provider was notified.      Marlen Hartley LPN              [facility] Hematology and Oncology Progress Note    Patient: Adriana St  MRN: [id number]  Date of Service: Feb 25, 2025         Reason for Visit    Chief Complaint   Patient presents with     Oncology Clinic Visit     Malignant neoplasm of central portion of right breast in female, estrogen receptor positive  Malignant neoplasm metastatic to bone          Assessment and Plan     Cancer Staging "   Malignant neoplasm of central portion of right breast in female, estrogen receptor positive (H)  Staging form: Breast, AJCC 8th Edition  - Clinical stage from 6/10/2024: Stage IV (cT2, cN0, cM1, G3, ER+, ME+, HER2-) - Signed by Magaly Bender MD on 6/10/2024      ECOG Performance    0 - Independent     Pain  Pain Score: No Pain (0)    #.  Metastatic right breast cancer ER positive, ME positive, HER2 1+ by IHC.  #.  Pathologic L1 fracture status post lumbar decompression surgery followed by postop radiation.  #.  Hypertension, uncontrolled     She is doing really well.  I reviewed her labs and they are satisfactory.  I reviewed the restaging CT and bone scan images independently.  I also reviewed the report with that.  She has favorable treatment response.  No signs of cancer progression.  Metastatic bone disease not clearly evident in the bone scan consistent with healing process.  They were very happy with seeing the results.    I recommended her to continue current therapy with ribociclib and letrozole.  Duration of therapy will be continuous until disease progression or unacceptable toxicity occurs.    She is going to start zoledronic acid today and every 3 months.  I advised her to supplement with adequate amount of calcium and vitamin D.   Follow-up labs and provider visit in 4 weeks.  Will plan to obtain restaging CT scan and bone scan in 4 months or sooner with any clinical concerns..     Encounter Diagnoses:    Problem List Items Addressed This Visit          Oncology Diagnoses    Malignant neoplasm of central portion of right breast in female, estrogen receptor positive (H)    Malignant neoplasm metastatic to bone (H)     Other Visit Diagnoses       Musculoskeletal pain                     CC: Gilmer Walters   ______________________________________________________________________________  Oncologic history  5/2024- She presented with a large right breast mass, and progressive back pain.      5/30/2024-right breast mass biopsy showed invasive ductal carcinoma, grade 3,   ER positive (95%, strong), CO positive (15%, strong), HER2 negative (1+ by IHC).  NGS: no mutations. No fusion.    6/6/2024- MRI lumbar spine with and without contrast showed pathologic L1 burst fracture with > 50% vertebral body height loss.  Additional scattered osseous metastasis were seen as well.    6/11/2024-initiated anastrozole    6/13/2024-PET scan showed right breast malignancy with right subpectoral kaylene, pulmonary and osseous metastasis.  Moderate compression deformity of the L1 vertebral body with associated uptake probably pathologic, with about 7 mm retropulsion into the spinal canal.   Hypermetabolic right thyroid nodule.   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  Presumed right frontal falcine meningioma.  No evidence for intracranial metastatic disease.    7/3/2024- s/p T11-L3 posterior segmental instrumentation, T12-L1 and L1-L2 bilateral laminectomies and right L1 transpedicular placement of osteochondral device for radiofrequency ablation of L1 vertebral body tumor (Dr. Prieto)    8/29/2024 - 9/12/2024 - post op radiation 3000 cGy T12-L2 spine    9/11/2024-initiated ribociclib 600 mg daily, 3 weeks on and 1 week off..    History of Present Illness    Ms. Adriana St presented today accompanied by her son and daughter-in-law (an anesthesiologist).  She reported she is doing really well.  She does not have any side effects from the medications.  She is going to start next cycle of ribociclib tomorrow, 2/26/2025.    Review of systems  Apart from describing in HPI, the remainder of comprehensive ROS was negative.    Past History    Past Medical History:   Diagnosis Date     Malignant neoplasm metastatic  "to bone (H)      Malignant neoplasm of central portion of right breast in female, estrogen receptor positive (H)      Pathologic lumbar vertebral fracture      PONV (postoperative nausea and vomiting)        Past Surgical History:   Procedure Laterality Date     APPENDECTOMY       BREAST AUGMENTATION       CHOLECYSTECTOMY       OPTICAL TRACKING SYSTEM FUSION SPINE POSTERIOR LUMBAR THREE+ LEVELS Bilateral 7/3/2024    Procedure: THORACIC 11 TO LUMBAR 3 POSTEROLATERAL FUSION USING ALLOGRAFT,LUMBAR 1 BILATERAL LAMINECTOMY AND MEDIAL FACETECTOMY AND SEPARATION SURGERY, USE OF OSTEOCOOL DEVICE.;  Surgeon: Thomas Prieto MD;  Location:  OR       Physical Exam    /84 (BP Location: Left arm, Patient Position: Sitting, Cuff Size: Adult Regular)   Pulse 64   Temp 98.3  F (36.8  C) (Oral)   Resp 18   Ht 1.575 m (5' 2\")   Wt 75.3 kg (166 lb 1.6 oz)   SpO2 98%   BMI 30.38 kg/m      General: alert, awake, not in acute distress  HEENT: Head: Normal, normocephalic, atraumatic.  Eye: Normal external eye, conjunctiva, lids cornea, ALEJANDRA.  Pharynx: Normal buccal mucosa. Normal pharynx.  Neck / Thyroid: Supple, no masses, nodes, nodules or enlargement.  Lymphatics: No abnormally enlarged lymph nodes.  Chest: Normal chest wall and respirations. Clear to auscultation.  Breasts: ***   Heart: S1 S2 RRR.   Abdomen: abdomen is soft without significant tenderness, masses, organomegaly or guarding  Extremities: normal strength, tone, and muscle mass  Skin: normal. no rash or abnormalities  CNS: non focal.    Lab Results    Recent Results (from the past 240 hours)   Comprehensive metabolic panel    Collection Time: 02/25/25  9:34 AM   Result Value Ref Range    Sodium 139 135 - 145 mmol/L    Potassium 4.2 3.4 - 5.3 mmol/L    Carbon Dioxide (CO2) 27 22 - 29 mmol/L    Anion Gap 9 7 - 15 mmol/L    Urea Nitrogen 17.8 8.0 - 23.0 mg/dL    Creatinine 1.11 (H) 0.51 - 0.95 mg/dL    GFR Estimate 54 (L) >60 mL/min/1.73m2    Calcium 9.2 8.8 - " 10.4 mg/dL    Chloride 103 98 - 107 mmol/L    Glucose 114 (H) 70 - 99 mg/dL    Alkaline Phosphatase 74 40 - 150 U/L    AST 18 0 - 45 U/L    ALT 9 0 - 50 U/L    Protein Total 6.9 6.4 - 8.3 g/dL    Albumin 4.2 3.5 - 5.2 g/dL    Bilirubin Total 1.1 <=1.2 mg/dL   Magnesium    Collection Time: 02/25/25  9:34 AM   Result Value Ref Range    Magnesium 2.1 1.7 - 2.3 mg/dL   Phosphorus    Collection Time: 02/25/25  9:34 AM   Result Value Ref Range    Phosphorus 4.0 2.5 - 4.5 mg/dL   CBC with platelets and differential    Collection Time: 02/25/25  9:34 AM   Result Value Ref Range    WBC Count 3.8 (L) 4.0 - 11.0 10e3/uL    RBC Count 3.59 (L) 3.80 - 5.20 10e6/uL    Hemoglobin 12.5 11.7 - 15.7 g/dL    Hematocrit 35.0 35.0 - 47.0 %    MCV 98 78 - 100 fL    MCH 34.8 (H) 26.5 - 33.0 pg    MCHC 35.7 31.5 - 36.5 g/dL    RDW 14.0 10.0 - 15.0 %    Platelet Count 169 150 - 450 10e3/uL    % Neutrophils 39 %    % Lymphocytes 42 %    % Monocytes 14 %    % Eosinophils 3 %    % Basophils 2 %    % Immature Granulocytes 1 %    NRBCs per 100 WBC 0 <1 /100    Absolute Neutrophils 1.5 (L) 1.6 - 8.3 10e3/uL    Absolute Lymphocytes 1.6 0.8 - 5.3 10e3/uL    Absolute Monocytes 0.5 0.0 - 1.3 10e3/uL    Absolute Eosinophils 0.1 0.0 - 0.7 10e3/uL    Absolute Basophils 0.1 0.0 - 0.2 10e3/uL    Absolute Immature Granulocytes 0.0 <=0.4 10e3/uL    Absolute NRBCs 0.0 10e3/uL             Imaging    No results found.    The longitudinal plan of care for the diagnosis(es)/condition(s) as documented were addressed during this visit. Due to the added complexity in care, I will continue to support Adriana Wei in the subsequent management and with ongoing continuity of care.     *** minutes spent by me on the date of the encounter doing chart review, history and exam, documentation and further activities as noted above.    Consent was obtained from the patient to use an AI documentation tool in the creation of this note.    Signed by: Magaly Bender,  MD      Again, thank you for allowing me to participate in the care of your patient.        Sincerely,        Magaly Bender MD    Electronically signed

## 2025-02-25 NOTE — PROGRESS NOTES
"Oncology Rooming Note    February 25, 2025 9:54 AM   Adriana St is a 69 year old female who presents for:    Chief Complaint   Patient presents with    Oncology Clinic Visit     Malignant neoplasm of central portion of right breast in female, estrogen receptor positive  Malignant neoplasm metastatic to bone        Initial Vitals: Ht 1.575 m (5' 2\")   BMI 30.60 kg/m   Estimated body mass index is 30.6 kg/m  as calculated from the following:    Height as of this encounter: 1.575 m (5' 2\").    Weight as of 1/28/25: 75.9 kg (167 lb 4.8 oz). Body surface area is 1.82 meters squared.  Data Unavailable Comment: Data Unavailable   No LMP recorded. Patient is postmenopausal.  Allergies reviewed: Yes  Medications reviewed: Yes    Medications: Medication refills not needed today.  Pharmacy name entered into [employer]: [id number] [place] [address] AT [facility] [address] [facility]    Frailty Screening:   Is the patient here for a new oncology consult visit in cancer care? 2. No    PHQ9:  Did this patient require a PHQ9?: Yes   If the patient required a PHQ9 assessment, did the results require a follow up with the Provider/Nurse?: No      Clinical concerns: labs, follow-up  provider was notified.      Marlen Hartley LPN            "

## 2025-02-25 NOTE — PROGRESS NOTES
[facility] Hematology and Oncology Progress Note    Patient: Adriana St  MRN: [id number]  Date of Service: Feb 25, 2025         Reason for Visit    Chief Complaint   Patient presents with    Oncology Clinic Visit     Malignant neoplasm of central portion of right breast in female, estrogen receptor positive  Malignant neoplasm metastatic to bone          Assessment and Plan     Cancer Staging   Malignant neoplasm of central portion of right breast in female, estrogen receptor positive (H)  Staging form: Breast, AJCC 8th Edition  - Clinical stage from 6/10/2024: Stage IV (cT2, cN0, cM1, G3, ER+, GA+, HER2-) - Signed by Magaly Bender MD on 6/10/2024      ECOG Performance    0 - Independent     Pain  Pain Score: No Pain (0)    #.  Metastatic right breast cancer ER positive, GA positive, HER2 1+ by IHC.  #.  Pathologic L1 fracture status post lumbar decompression surgery followed by postop radiation.  #.  Hypertension, uncontrolled     She is doing really well.  I reviewed her labs and they are satisfactory.  I reviewed the restaging CT and bone scan images independently.  I also reviewed the report with that.  She has favorable treatment response.  No signs of cancer progression.  Metastatic bone disease not clearly evident in the bone scan consistent with healing process.  They were very happy with seeing the results.    I recommended her to continue current therapy with ribociclib and letrozole.  Duration of therapy will be continuous until disease progression or unacceptable toxicity occurs.    She is going to start zoledronic acid today and every 3 months.  I advised her to supplement with adequate amount of calcium and vitamin D.   Follow-up labs and provider visit in 4 weeks.  Will plan to obtain restaging CT scan and bone scan in 4 months or sooner with any clinical concerns.    Assessment & Plan         Encounter Diagnoses:    Problem List Items Addressed This Visit          Oncology  Diagnoses    Malignant neoplasm of central portion of right breast in female, estrogen receptor positive (H)    Malignant neoplasm metastatic to bone (H)     Other Visit Diagnoses       Musculoskeletal pain                     CC: Gilmer Walters   ______________________________________________________________________________  Oncologic history  5/2024- She presented with a large right breast mass, and progressive back pain.     5/30/2024-right breast mass biopsy showed invasive ductal carcinoma, grade 3,   ER positive (95%, strong), NC positive (15%, strong), HER2 negative (1+ by IHC).  NGS: no mutations. No fusion.    6/6/2024- MRI lumbar spine with and without contrast showed pathologic L1 burst fracture with > 50% vertebral body height loss.  Additional scattered osseous metastasis were seen as well.    6/11/2024-initiated anastrozole    6/13/2024-PET scan showed right breast malignancy with right subpectoral kaylene, pulmonary and osseous metastasis.  Moderate compression deformity of the L1 vertebral body with associated uptake probably pathologic, with about 7 mm retropulsion into the spinal canal.   Hypermetabolic right thyroid nodule.   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  Presumed right frontal falcine meningioma.  No evidence for intracranial metastatic disease.    7/3/2024- s/p T11-L3 posterior segmental instrumentation, T12-L1 and L1-L2 bilateral laminectomies and right L1 transpedicular placement of osteochondral device for radiofrequency ablation of L1 vertebral body tumor (Dr. Prieto)    8/29/2024 - 9/12/2024 - post op radiation 3000 cGy T12-L2 spine    9/11/2024-initiated ribociclib 600 mg daily, 3 weeks on and 1 week off..    History of Present Illness    Ms. Adriana St presented today  "accompanied by her son and daughter-in-law (an anesthesiologist).  She reported she is doing really well.  She does not have any side effects from the medications.    History of Present Illness      She is going to start next cycle of ribociclib tomorrow, 2/26/2025.    Review of systems  Apart from describing in HPI, the remainder of comprehensive ROS was negative.    Past History    Past Medical History:   Diagnosis Date    Malignant neoplasm metastatic to bone (H)     Malignant neoplasm of central portion of right breast in female, estrogen receptor positive (H)     Pathologic lumbar vertebral fracture     PONV (postoperative nausea and vomiting)        Past Surgical History:   Procedure Laterality Date    APPENDECTOMY      BREAST AUGMENTATION      CHOLECYSTECTOMY      OPTICAL TRACKING SYSTEM FUSION SPINE POSTERIOR LUMBAR THREE+ LEVELS Bilateral 7/3/2024    Procedure: THORACIC 11 TO LUMBAR 3 POSTEROLATERAL FUSION USING ALLOGRAFT,LUMBAR 1 BILATERAL LAMINECTOMY AND MEDIAL FACETECTOMY AND SEPARATION SURGERY, USE OF OSTEOCOOL DEVICE.;  Surgeon: Thomas Prieto MD;  Location:  OR       Physical Exam    /84 (BP Location: Left arm, Patient Position: Sitting, Cuff Size: Adult Regular)   Pulse 64   Temp 98.3  F (36.8  C) (Oral)   Resp 18   Ht 1.575 m (5' 2\")   Wt 75.3 kg (166 lb 1.6 oz)   SpO2 98%   BMI 30.38 kg/m      General: alert, awake, not in acute distress  HEENT: Head: Normal, normocephalic, atraumatic.  Eye: Normal external eye, conjunctiva, lids cornea, ALEJANDRA.  Pharynx: Normal buccal mucosa. Normal pharynx.  Neck / Thyroid: Supple, no masses, nodes, nodules or enlargement.  Lymphatics: No abnormally enlarged lymph nodes.  Chest: Normal chest wall and respirations. Clear to auscultation.  Breasts: ***   Heart: S1 S2 RRR.   Abdomen: abdomen is soft without significant tenderness, masses, organomegaly or guarding  Extremities: normal strength, tone, and muscle mass  Skin: normal. no rash or " abnormalities  CNS: non focal.    Lab Results    Recent Results (from the past 240 hours)   Comprehensive metabolic panel    Collection Time: 02/25/25  9:34 AM   Result Value Ref Range    Sodium 139 135 - 145 mmol/L    Potassium 4.2 3.4 - 5.3 mmol/L    Carbon Dioxide (CO2) 27 22 - 29 mmol/L    Anion Gap 9 7 - 15 mmol/L    Urea Nitrogen 17.8 8.0 - 23.0 mg/dL    Creatinine 1.11 (H) 0.51 - 0.95 mg/dL    GFR Estimate 54 (L) >60 mL/min/1.73m2    Calcium 9.2 8.8 - 10.4 mg/dL    Chloride 103 98 - 107 mmol/L    Glucose 114 (H) 70 - 99 mg/dL    Alkaline Phosphatase 74 40 - 150 U/L    AST 18 0 - 45 U/L    ALT 9 0 - 50 U/L    Protein Total 6.9 6.4 - 8.3 g/dL    Albumin 4.2 3.5 - 5.2 g/dL    Bilirubin Total 1.1 <=1.2 mg/dL   Magnesium    Collection Time: 02/25/25  9:34 AM   Result Value Ref Range    Magnesium 2.1 1.7 - 2.3 mg/dL   Phosphorus    Collection Time: 02/25/25  9:34 AM   Result Value Ref Range    Phosphorus 4.0 2.5 - 4.5 mg/dL   CBC with platelets and differential    Collection Time: 02/25/25  9:34 AM   Result Value Ref Range    WBC Count 3.8 (L) 4.0 - 11.0 10e3/uL    RBC Count 3.59 (L) 3.80 - 5.20 10e6/uL    Hemoglobin 12.5 11.7 - 15.7 g/dL    Hematocrit 35.0 35.0 - 47.0 %    MCV 98 78 - 100 fL    MCH 34.8 (H) 26.5 - 33.0 pg    MCHC 35.7 31.5 - 36.5 g/dL    RDW 14.0 10.0 - 15.0 %    Platelet Count 169 150 - 450 10e3/uL    % Neutrophils 39 %    % Lymphocytes 42 %    % Monocytes 14 %    % Eosinophils 3 %    % Basophils 2 %    % Immature Granulocytes 1 %    NRBCs per 100 WBC 0 <1 /100    Absolute Neutrophils 1.5 (L) 1.6 - 8.3 10e3/uL    Absolute Lymphocytes 1.6 0.8 - 5.3 10e3/uL    Absolute Monocytes 0.5 0.0 - 1.3 10e3/uL    Absolute Eosinophils 0.1 0.0 - 0.7 10e3/uL    Absolute Basophils 0.1 0.0 - 0.2 10e3/uL    Absolute Immature Granulocytes 0.0 <=0.4 10e3/uL    Absolute NRBCs 0.0 10e3/uL             Imaging    No results found.    The longitudinal plan of care for the diagnosis(es)/condition(s) as documented were  addressed during this visit. Due to the added complexity in care, I will continue to support Adriana Wei in the subsequent management and with ongoing continuity of care.     *** minutes spent by me on the date of the encounter doing chart review, history and exam, documentation and further activities as noted above.    Consent was obtained from the patient to use an AI documentation tool in the creation of this note.    Signed by: Magaly Bender MD

## 2025-02-25 NOTE — PROGRESS NOTES
[facility]: Cancer Care Follow-Up Note                                    Discussion with Patient:                                                      Checked in with patient ahead of her clinic visit with Dr. Bender. Patient alone today.     Dates of Treatment:                                                      Infusion given in last 28 days       None          Treatment Plan Medications       Oral ONC Breast Cancer - Ribociclib / Aromatase Inhibitor       Take Home Medications       Medication Sig Start/End Day/Cycle Status    ribociclib (KISQALI) (600 MG DOSE) 200 MG tablet therapy pack Take 3 tablets (600 mg) by mouth every morning for 21 days. 2025 to 3/21/2025 Day 1, Cycle 7 - 2025 Released                            Assessment:                                                      Patient taking and tolerating ribociclib and letrozole.     She does report intermittent joint aches and the bottoms of feet intermittently feel like she is walking on hot sand. She said these symptoms are not constant and tolerable at this time.    Patient reports she is planning a trip to [place] the beginning of April.  She said her father  last year and her mother  recently so needs to go back to sign papers and take care of their things.  She said she is expecting to be gone about 2 months.  She has not bought plane tickets yet as she wants to discuss timing with Dr. Bender.     Intervention/Education provided during outreach:                                                       Patient denies needs from [facility]CC today and expressed appreciation for checking in with her.    Patient to follow up as scheduled at next appt    Signature:  Annmarie Nash RN 2025 10:15 AM

## 2025-03-05 ENCOUNTER — TELEPHONE (OUTPATIENT)
Dept: ONCOLOGY | Facility: HOSPITAL | Age: 70
End: 2025-03-05
Payer: COMMERCIAL

## 2025-03-05 NOTE — ORAL ONC MGMT
"Oral Chemotherapy Monitoring Program     Adriana Wei and son called in to discuss upcoming scripts for [place] trip. We will release 2 scripts for her which should cover her trip. They are wondering about one additional cycle just in case and I let them know I would ask Dr. Bender. They would like a mychart back. Provided SP number.    Monika Stark, PharmD, BCOP  Oral Chemotherapy Pharmacist  [facility] Phone: [phone]  In Basket Pools: \"N ORAL CHEMOTHERAPY PHARMACIST\" or \"[facility] ORAL CHEMOTHERAPY PHARMACIST\"  March 5, 2025       "

## 2025-03-15 ENCOUNTER — HEALTH MAINTENANCE LETTER (OUTPATIENT)
Age: 70
End: 2025-03-15

## 2025-03-19 PROBLEM — M75.41 SHOULDER IMPINGEMENT SYNDROME, RIGHT: Status: RESOLVED | Noted: 2024-10-31 | Resolved: 2025-03-19

## 2025-03-24 NOTE — PROGRESS NOTES
[facility] Hematology and Oncology Outpatient Progress Note    Patient: Adriana St  MRN: [id number]  Date of Service: Mar 25, 2025          Reason for Visit    Chief Complaint   Patient presents with    Oncology Clinic Visit     Malignant neoplasm metastatic to bone (H)  Malignant neoplasm of central portion of right breast in female, estrogen receptor positive (H)          Cancer Staging   Malignant neoplasm of central portion of right breast in female, estrogen receptor positive (H)  Staging form: Breast, AJCC 8th Edition  - Clinical stage from 6/10/2024: Stage IV (cT2, cN0, cM1, G3, ER+, IA+, HER2-) - Signed by Magaly Bender MD on 6/10/2024      Primary Hematologist/Oncologist: Dr. Magaly Bender      Assessment/Plan  #.  Metastatic right breast cancer ER positive, IA positive, HER2 1+ by IHC.  #.  Pathologic L1 fracture status post lumbar decompression surgery followed by postop radiation.  Clinically doing well.  No intolerable side effects from ribociclib and letrozole.  We reviewed labs and they are stable. Creatining is 1.10, stable. WBC today is 3.8 with ANC of 1.5. She has ongoing improvement in palpable mass. No new changes or concerns today. She is planning travel to [place] for 2 months. She will leave tomorrow. She has adequate supply of medication needed for travel. She has family members who work in oncology and can assist if she has needs. I recommend to continue current treatment regimen with plans to follow-up once she returns from [place]. She will be due for updated imaging at that time.  Continue Letrozole  Continue Ribociclib, next cycle to start on 3/26/25  Continue zoledronic acid every 3 months. Next dose due today.   CT CAP, Brain MRI, and bone scan prior to next visit.   Follow-up in about 10 weeks with labs and Dr. Bender for consideration of next cycle of Ibrance.  Patient instructed to notify this clinic with any questions or concerns in the interim.     #.  Joint Pain  and Foot Pain  Intermittent joint pain and foot pain, possibly related to treatment off-weeks.  Continue to monitor symptoms.  Consider trial of CBD balm    #.  Renal Function  Slight decrease in kidney function noted on recent labs. No recent use of NSAIDs. This is stable  Increase fluid intake.  Repeat labs at next visit to monitor kidney function.    #.  Eyelid Surgery  Patient planning to have eyelid surgery in [place].  Approved for surgery based on current health status  ______________________________________________________________________________    History of Present Illness/ Interval History    Ms. Adriana St  is a 69 year old patient who is seen today for follow up. She is feeling well. Ribociclib started 2/26/25 and is currently in her off week with plans to resume on 3/26/25. She is planning to leave for [place] tomorrow, and will be there through the end of May. Her feet pain is about the same, and always worst on her off week of treatment. She is considering trying CBD balm. She otherwise tolerates Kisqali well. She asks for accomodation letter for the airline. She is feeling excited and ready to travel. She has compression socks and we discussed staying hydrated during travel, switching positions with seating and to get up and move about the plane every few hours.     ECOG performance status   0- Fully active, without restriction      Distress Screening (within last 30 days)    No data recorded    Pain  Pain Score: Moderate Pain (4)    ROS  A 14 point review of systems was obtained.  Positive findings noted in the history.  The remainder of the review of system is otherwise negative.      Oncology History/Treatment  5/2024- She presented with a large right breast mass, and progressive back pain.     5/30/2024-right breast mass biopsy showed invasive ductal carcinoma, grade 3,   ER positive (95%, strong), [place] positive (15%, strong), HER2 negative (1+ by IHC).  NGS: no mutations. No  "fusion.    6/6/2024- MRI lumbar spine with and without contrast showed pathologic L1 burst fracture with > 50% vertebral body height loss.  Additional scattered osseous metastasis were seen as well.    6/11/2024-initiated anastrozole    6/13/2024-PET scan showed right breast malignancy with right subpectoral kaylene, pulmonary and osseous metastasis.  Moderate compression deformity of the L1 vertebral body with associated uptake probably pathologic, with about 7 mm retropulsion into the spinal canal.   Hypermetabolic right thyroid nodule.   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  Presumed right frontal falcine meningioma.  No evidence for intracranial metastatic disease.    7/3/2024- s/p T11-L3 posterior segmental instrumentation, T12-L1 and L1-L2 bilateral laminectomies and right L1 transpedicular placement of osteochondral device for radiofrequency ablation of L1 vertebral body tumor (Dr. Prieto)    8/29/2024 - 9/12/2024 - post op radiation 3000 cGy T12-L2 spine    9/11/2024-initiated ribociclib 600 mg daily, 3 weeks on and 1 week off..      Physical Exam    BP (!) 141/86   Pulse 66   Temp 97.8  F (36.6  C)   Resp 16   Ht 1.575 m (5' 2\")   Wt 76.2 kg (168 lb)   SpO2 96%   BMI 30.73 kg/m      GENERAL: no acute distress. Cooperative in conversation.   HEENT: pupils are equal, round and reactive. Oral mucosa is moist and intact.  RESP:Chest symmetric. Regular respiratory rate. No stridor.  ABD: Nondistended, soft.  EXTREMITIES: No lower extremity edema.   NEURO: non focal. Alert and oriented x3.   PSYCH: within normal limits. No depression or anxiety.  SKIN: warm dry intact      Lab Results    No results found for this or any previous visit (from the past week).  I reviewed the above labs today.  Imaging    No " results found.      Billing  Total time 45 minutes, to include face to face visit, review of EMR, ordering, documentation and coordination of care on date of service. The longitudinal plan of care for the diagnosis(es)/condition(s) as documented were addressed during this visit. Due to the added complexity in care, I will continue to support Adriana in the subsequent management and with ongoing continuity of care.    Signed by: MARTINE Barron CNP        Chart documentation with Dragon Voice recognition Software. Although reviewed after completion, some words and grammatical errors may remain.

## 2025-03-25 ENCOUNTER — ONCOLOGY VISIT (OUTPATIENT)
Dept: ONCOLOGY | Facility: HOSPITAL | Age: 70
End: 2025-03-25
Attending: INTERNAL MEDICINE
Payer: COMMERCIAL

## 2025-03-25 ENCOUNTER — LAB (OUTPATIENT)
Dept: INFUSION THERAPY | Facility: HOSPITAL | Age: 70
End: 2025-03-25
Attending: INTERNAL MEDICINE
Payer: COMMERCIAL

## 2025-03-25 VITALS
HEART RATE: 66 BPM | RESPIRATION RATE: 16 BRPM | TEMPERATURE: 97.8 F | BODY MASS INDEX: 30.91 KG/M2 | OXYGEN SATURATION: 96 % | WEIGHT: 168 LBS | DIASTOLIC BLOOD PRESSURE: 86 MMHG | SYSTOLIC BLOOD PRESSURE: 141 MMHG | HEIGHT: 62 IN

## 2025-03-25 DIAGNOSIS — C50.111 MALIGNANT NEOPLASM OF CENTRAL PORTION OF RIGHT BREAST IN FEMALE, ESTROGEN RECEPTOR POSITIVE (H): Primary | ICD-10-CM

## 2025-03-25 DIAGNOSIS — M79.18 MUSCULOSKELETAL PAIN: ICD-10-CM

## 2025-03-25 DIAGNOSIS — Z17.0 MALIGNANT NEOPLASM OF CENTRAL PORTION OF RIGHT BREAST IN FEMALE, ESTROGEN RECEPTOR POSITIVE (H): Primary | ICD-10-CM

## 2025-03-25 DIAGNOSIS — C79.51 MALIGNANT NEOPLASM METASTATIC TO BONE (H): Primary | ICD-10-CM

## 2025-03-25 DIAGNOSIS — C79.51 MALIGNANT NEOPLASM METASTATIC TO BONE (H): ICD-10-CM

## 2025-03-25 DIAGNOSIS — Z17.0 MALIGNANT NEOPLASM OF CENTRAL PORTION OF RIGHT BREAST IN FEMALE, ESTROGEN RECEPTOR POSITIVE (H): ICD-10-CM

## 2025-03-25 DIAGNOSIS — C50.111 MALIGNANT NEOPLASM OF CENTRAL PORTION OF RIGHT BREAST IN FEMALE, ESTROGEN RECEPTOR POSITIVE (H): ICD-10-CM

## 2025-03-25 LAB
ALBUMIN SERPL BCG-MCNC: 4.1 G/DL (ref 3.5–5.2)
CALCIUM SERPL-MCNC: 9.1 MG/DL (ref 8.8–10.4)
CREAT SERPL-MCNC: 1.1 MG/DL (ref 0.51–0.95)
EGFRCR SERPLBLD CKD-EPI 2021: 54 ML/MIN/1.73M2
PHOSPHATE SERPL-MCNC: 3.9 MG/DL (ref 2.5–4.5)

## 2025-03-25 PROCEDURE — 36415 COLL VENOUS BLD VENIPUNCTURE: CPT | Performed by: INTERNAL MEDICINE

## 2025-03-25 PROCEDURE — 82310 ASSAY OF CALCIUM: CPT | Performed by: INTERNAL MEDICINE

## 2025-03-25 PROCEDURE — 82040 ASSAY OF SERUM ALBUMIN: CPT | Performed by: INTERNAL MEDICINE

## 2025-03-25 PROCEDURE — 258N000003 HC RX IP 258 OP 636

## 2025-03-25 PROCEDURE — 84100 ASSAY OF PHOSPHORUS: CPT

## 2025-03-25 PROCEDURE — 99215 OFFICE O/P EST HI 40 MIN: CPT

## 2025-03-25 PROCEDURE — 82565 ASSAY OF CREATININE: CPT | Performed by: INTERNAL MEDICINE

## 2025-03-25 PROCEDURE — G2211 COMPLEX E/M VISIT ADD ON: HCPCS

## 2025-03-25 PROCEDURE — G0463 HOSPITAL OUTPT CLINIC VISIT: HCPCS

## 2025-03-25 PROCEDURE — 96365 THER/PROPH/DIAG IV INF INIT: CPT

## 2025-03-25 PROCEDURE — 250N000011 HC RX IP 250 OP 636: Mod: JW

## 2025-03-25 RX ORDER — HEPARIN SODIUM (PORCINE) LOCK FLUSH IV SOLN 100 UNIT/ML 100 UNIT/ML
5 SOLUTION INTRAVENOUS
OUTPATIENT
Start: 2025-03-25

## 2025-03-25 RX ORDER — HEPARIN SODIUM,PORCINE 10 UNIT/ML
5-20 VIAL (ML) INTRAVENOUS DAILY PRN
OUTPATIENT
Start: 2025-03-25

## 2025-03-25 RX ADMIN — ZOLEDRONIC ACID 3 MG: 4 INJECTION, SOLUTION, CONCENTRATE INTRAVENOUS at 12:06

## 2025-03-25 ASSESSMENT — PAIN SCALES - GENERAL: PAINLEVEL_OUTOF10: MODERATE PAIN (4)

## 2025-03-25 NOTE — LETTER
To Whom It May Concern,      I am writing to you on behalf of my patient, Adriana St who is scheduled to fly with your airline to [place]. My patient is currently undergoing treatment for metastatic breast cancer, a condition that significantly impacts their ability to travel without special assistance.     Due to the nature of this condition and its associated symptoms, it is essential that my patient receives the following support to ensure their safe and comfortable travel:     Special Seating Arrangement: Adriana St would benefit from an aisle seat to allow for easy access to the restroom and to accommodate any necessary medical equipment.     Assistance with Boarding and Disembarking: Due to fatigue and the potential for discomfort during travel, it would be greatly appreciated if your staff could assist with boarding and disembarking.     In-flight Medication/Rest Periods: Adriana St requires travel with essential prescription medications for her medical condition. It is important that she carry these medications and that she has access to them during the flight. Please accommodate her needs accordingly.We request that there be flexibility in allowing my patient to take medications as needed and to rest during the flight.     We would be grateful if you could make any necessary arrangements to accommodate these needs. Please feel free to contact me directly at [facility] if you require further details or documentation. We appreciate your understanding and support in ensuring that Kian Granados can travel safely for their medical needs.     Thank you for your attention to this matter.         Anette Antonio, MARTINE CNP on 3/25/2025 at 11:45 AM

## 2025-03-25 NOTE — LETTER
To Whom It May Concern,      I am writing to you on behalf of my patient, Adriana St who is scheduled to fly with your airline to [place]. My patient is currently undergoing treatment for metastatic breast cancer, a condition that significantly impacts their ability to travel without special assistance.     Due to the nature of this condition and its associated symptoms, it is essential that my patient receives the following support to ensure their safe and comfortable travel:     Special Seating Arrangement: Adriana St would benefit from an aisle seat to allow for easy access to the restroom and to accommodate any necessary medical equipment.     Assistance with Boarding and Disembarking: Due to fatigue and the potential for discomfort during travel, it would be greatly appreciated if your staff could assist with boarding and disembarking.     In-flight Medication/Rest Periods: Adriana St requires travel with essential prescription medications for her medical condition. It is important that she carry these medications and that she has access to them during the flight. Please accommodate her needs accordingly.We request that there be flexibility in allowing my patient to take medications as needed and to rest during the flight.     We would be grateful if you could make any necessary arrangements to accommodate these needs. Please feel free to contact me directly at [facility] if you require further details or documentation. We appreciate your understanding and support in ensuring that Adriana St can travel safely for their medical needs.     Thank you for your attention to this matter.       Anette Antonio, APRN CNP on 3/25/2025 at 11:53 AM

## 2025-03-25 NOTE — PROGRESS NOTES
"Oncology Rooming Note    March 25, 2025 11:09 AM   Adriana St is a 69 year old female who presents for:    Chief Complaint   Patient presents with    Oncology Clinic Visit     Malignant neoplasm metastatic to bone (H)  Malignant neoplasm of central portion of right breast in female, estrogen receptor positive (H)       Initial Vitals: BP (!) 141/86   Pulse 66   Temp 97.8  F (36.6  C)   Resp 16   Ht 1.575 m (5' 2\")   Wt 76.2 kg (168 lb)   SpO2 96%   BMI 30.73 kg/m   Estimated body mass index is 30.73 kg/m  as calculated from the following:    Height as of this encounter: 1.575 m (5' 2\").    Weight as of this encounter: 76.2 kg (168 lb). Body surface area is 1.83 meters squared.  Moderate Pain (4) Comment: Data Unavailable   No LMP recorded. Patient is postmenopausal.  Allergies reviewed: Yes  Medications reviewed: Yes    Medications: Medication refills not needed today.  Pharmacy name entered into "[employer]": [id number] - [place], [place] - [facility] [address] AT [facility] [address] [employer] [address] [facility]    Frailty Screening:   Is the patient here for a new oncology consult visit in cancer care? 2. No    PHQ9:  Did this patient require a PHQ9?:No       Clinical concerns: feet and ankles some pain.      Carmen Miranda LPN             "

## 2025-03-25 NOTE — PROGRESS NOTES
Infusion Nursing Note:  Adriana LAVON St presents today for Zometa.    Patient seen by provider today: Yes: LAKSHMI Antonio CNP   present during visit today: Not Applicable.    Note: Patient presents from provider visit in clinic for Zometa. Denies any new concerns or questions.      Intravenous Access:  Peripheral IV placed.    Treatment Conditions:  Results reviewed, labs MET treatment parameters, ok to proceed with treatment.      Post Infusion Assessment:  Patient tolerated infusion without incident.  Blood return noted pre and post infusion.  Site patent and intact, free from redness, edema or discomfort.  No evidence of extravasations.  Access discontinued per protocol.       Discharge Plan:   Patient and/or family verbalized understanding of discharge instructions and all questions answered.      Itzel Perez RN

## 2025-03-26 ENCOUNTER — PATIENT OUTREACH (OUTPATIENT)
Dept: ONCOLOGY | Facility: HOSPITAL | Age: 70
End: 2025-03-26
Payer: COMMERCIAL

## 2025-03-26 NOTE — PROGRESS NOTES
[facility]: Cancer Care                                                                                          Situation: Patient chart reviewed by care coordinator.    Background: Patient has history of metastatic right breast cancer ER positive, [place] positive, HER2 1+ by IHC.     Assessment: Patient saw Anette Antonio CNP in follow-up on 3/25/25. Refer to her note.  Patient continues to take ribociclib and letrozole. She received Zometa infusion yesterday. She is leaving for [place] tomorrow for 2 months.    Plan/Recommendations: Patient to follow-up with CT, NM Bone Scan, and MRI, and follow-up with Dr. Bender when she returns from [place]. Patient scheduled for CT, NM Bone Scan, and MRI on 6/2/25 and labs, Dr. Bender on 6/6/25.    Annmarie Nash RN March 26, 2025 7:27 AM

## 2025-06-02 ENCOUNTER — HOSPITAL ENCOUNTER (OUTPATIENT)
Dept: MRI IMAGING | Facility: CLINIC | Age: 70
Discharge: HOME | End: 2025-06-02
Payer: COMMERCIAL

## 2025-06-02 ENCOUNTER — HOSPITAL ENCOUNTER (OUTPATIENT)
Dept: NUCLEAR MEDICINE | Facility: CLINIC | Age: 70
Discharge: HOME | End: 2025-06-02
Payer: COMMERCIAL

## 2025-06-02 ENCOUNTER — HOSPITAL ENCOUNTER (OUTPATIENT)
Dept: CT IMAGING | Facility: CLINIC | Age: 70
Discharge: HOME | End: 2025-06-02
Payer: COMMERCIAL

## 2025-06-02 DIAGNOSIS — C79.51 MALIGNANT NEOPLASM METASTATIC TO BONE (H): ICD-10-CM

## 2025-06-02 DIAGNOSIS — M79.18 MUSCULOSKELETAL PAIN: ICD-10-CM

## 2025-06-02 DIAGNOSIS — Z17.0 MALIGNANT NEOPLASM OF CENTRAL PORTION OF RIGHT BREAST IN FEMALE, ESTROGEN RECEPTOR POSITIVE (H): ICD-10-CM

## 2025-06-02 DIAGNOSIS — C50.111 MALIGNANT NEOPLASM OF CENTRAL PORTION OF RIGHT BREAST IN FEMALE, ESTROGEN RECEPTOR POSITIVE (H): ICD-10-CM

## 2025-06-02 LAB
CREAT BLD-MCNC: 1.6 MG/DL (ref 0.5–1)
EGFRCR SERPLBLD CKD-EPI 2021: 35 ML/MIN/1.73M2

## 2025-06-02 PROCEDURE — 70553 MRI BRAIN STEM W/O & W/DYE: CPT

## 2025-06-02 PROCEDURE — 82565 ASSAY OF CREATININE: CPT

## 2025-06-02 PROCEDURE — A9503 TC99M MEDRONATE: HCPCS

## 2025-06-02 PROCEDURE — 78306 BONE IMAGING WHOLE BODY: CPT

## 2025-06-02 PROCEDURE — A9585 GADOBUTROL INJECTION: HCPCS

## 2025-06-02 PROCEDURE — 343N000001 HC RX 343 MED OP 636

## 2025-06-02 PROCEDURE — 250N000011 HC RX IP 250 OP 636

## 2025-06-02 PROCEDURE — 255N000002 HC RX 255 OP 636

## 2025-06-02 PROCEDURE — 74177 CT ABD & PELVIS W/CONTRAST: CPT

## 2025-06-02 RX ORDER — TC 99M MEDRONATE 20 MG/10ML
20-30 INJECTION, POWDER, LYOPHILIZED, FOR SOLUTION INTRAVENOUS ONCE
Status: COMPLETED | OUTPATIENT
Start: 2025-06-02 | End: 2025-06-02

## 2025-06-02 RX ORDER — IOPAMIDOL 755 MG/ML
75 INJECTION, SOLUTION INTRAVASCULAR ONCE
Status: COMPLETED | OUTPATIENT
Start: 2025-06-02 | End: 2025-06-02

## 2025-06-02 RX ORDER — IOPAMIDOL 755 MG/ML
90 INJECTION, SOLUTION INTRAVASCULAR ONCE
Status: DISCONTINUED | OUTPATIENT
Start: 2025-06-02 | End: 2025-06-02

## 2025-06-02 RX ORDER — GADOBUTROL 604.72 MG/ML
7.5 INJECTION INTRAVENOUS ONCE
Status: COMPLETED | OUTPATIENT
Start: 2025-06-02 | End: 2025-06-02

## 2025-06-02 RX ADMIN — TC 99M MEDRONATE 26.3 MILLICURIE: 20 INJECTION, POWDER, LYOPHILIZED, FOR SOLUTION INTRAVENOUS at 11:02

## 2025-06-02 RX ADMIN — GADOBUTROL 7.5 ML: 604.72 INJECTION INTRAVENOUS at 11:39

## 2025-06-02 RX ADMIN — IOPAMIDOL 75 ML: 755 INJECTION, SOLUTION INTRAVENOUS at 11:12

## 2025-06-06 ENCOUNTER — ONCOLOGY VISIT (OUTPATIENT)
Dept: ONCOLOGY | Facility: HOSPITAL | Age: 70
End: 2025-06-06
Attending: INTERNAL MEDICINE
Payer: COMMERCIAL

## 2025-06-06 VITALS
TEMPERATURE: 97.9 F | OXYGEN SATURATION: 96 % | SYSTOLIC BLOOD PRESSURE: 131 MMHG | WEIGHT: 168.1 LBS | RESPIRATION RATE: 16 BRPM | HEIGHT: 62 IN | DIASTOLIC BLOOD PRESSURE: 75 MMHG | HEART RATE: 61 BPM | BODY MASS INDEX: 30.93 KG/M2

## 2025-06-06 DIAGNOSIS — Z17.0 MALIGNANT NEOPLASM OF CENTRAL PORTION OF RIGHT BREAST IN FEMALE, ESTROGEN RECEPTOR POSITIVE (H): Primary | ICD-10-CM

## 2025-06-06 DIAGNOSIS — C50.111 MALIGNANT NEOPLASM OF CENTRAL PORTION OF RIGHT BREAST IN FEMALE, ESTROGEN RECEPTOR POSITIVE (H): Primary | ICD-10-CM

## 2025-06-06 DIAGNOSIS — C79.51 MALIGNANT NEOPLASM METASTATIC TO BONE (H): ICD-10-CM

## 2025-06-06 PROCEDURE — 99215 OFFICE O/P EST HI 40 MIN: CPT | Performed by: INTERNAL MEDICINE

## 2025-06-06 PROCEDURE — G0463 HOSPITAL OUTPT CLINIC VISIT: HCPCS | Performed by: INTERNAL MEDICINE

## 2025-06-06 PROCEDURE — G2211 COMPLEX E/M VISIT ADD ON: HCPCS | Performed by: INTERNAL MEDICINE

## 2025-06-06 RX ORDER — LETROZOLE 2.5 MG/1
2.5 TABLET, FILM COATED ORAL DAILY
Qty: 90 TABLET | Refills: 1 | Status: SHIPPED | OUTPATIENT
Start: 2025-06-06

## 2025-06-06 ASSESSMENT — PAIN SCALES - GENERAL: PAINLEVEL_OUTOF10: NO PAIN (0)

## 2025-06-06 NOTE — LETTER
"6/6/2025      Adriana St  [address] [facility] [address]      Dear Colleague,    Thank you for referring your patient, Adriana St, to the [facility]. Please see a copy of my visit note below.    Oncology Rooming Note    June 6, 2025 11:43 AM   Adriana St is a 69 year old female who presents for:    Chief Complaint   Patient presents with     Oncology Clinic Visit     Malignant neoplasm metastatic to bone (H)  Malignant neoplasm of central portion of right breast in female, estrogen receptor positive (H)       Initial Vitals: /75   Pulse 61   Temp 97.9  F (36.6  C)   Resp 16   Ht 1.575 m (5' 2\")   Wt 76.2 kg (168 lb 1.6 oz)   SpO2 96%   BMI 30.75 kg/m   Estimated body mass index is 30.75 kg/m  as calculated from the following:    Height as of this encounter: 1.575 m (5' 2\").    Weight as of this encounter: 76.2 kg (168 lb 1.6 oz). Body surface area is 1.83 meters squared.  No Pain (0) Comment: Data Unavailable   No LMP recorded. Patient is postmenopausal.  Allergies reviewed: Yes  Medications reviewed: Yes    Medications: Medication refills not needed today.  Pharmacy name entered into [facility]: [id number] - [place], [place] - [address] AT SEC OF [address] [facility]    Frailty Screening:   Is the patient here for a new oncology consult visit in cancer care? 2. No    PHQ9:  Did this patient require a PHQ9?: No      Clinical concerns: None      Carmen Miranda LPN               [facility] Hematology and Oncology Progress Note    Patient: Adriana St  MRN: [id number]  Date of Service: Jun 6, 2025         Reason for Visit    Chief Complaint   Patient presents with     Oncology Clinic Visit     Malignant neoplasm metastatic to bone (H)  Malignant neoplasm of central portion of right breast in female, estrogen receptor positive (H)         Assessment and Plan     Cancer Staging   Malignant neoplasm of " central portion of right breast in female, estrogen receptor positive (H)  Staging form: Breast, AJCC 8th Edition  - Clinical stage from 6/10/2024: Stage IV (cT2, cN0, cM1, G3, ER+, WY+, HER2-) - Signed by Magaly Bender MD on 6/10/2024      ECOG Performance    0 - Independent     Pain  Pain Score: No Pain (0)    #.  Metastatic right breast cancer ER positive, WY positive, HER2 1+ by IHC.  #.  Pathologic L1 fracture status post lumbar decompression surgery followed by postop radiation.  #.  Elevated creatinine    Reviewed labs and imaging results in detail.  - Breast tumor is showing signs of shrinkage, indicating a positive response to treatment.  - Bone scan reveals no new changes, with existing areas of uptake remaining stable, suggesting effective management of bone involvement.  - Kidney function is slightly elevated, potentially related to ribociclib use, but not currently alarming.  - Glucose levels are slightly elevated, requiring monitoring.  - Overall, the current treatment regimen appears to be effective, with no new adverse developments noted in imaging or lab results.    Plan  - Reduce ribociclib dosage to two tablets per day starting tomorrow to monitor potential side effects on kidney function.  - Recheck blood numbers and kidney function on June 18 or 19 to assess the impact of ribociclib.  - Monitor glucose levels with primary doctor due to fluctuating readings.  - Increase water intake to improve hydration.   -Follow-up labs and provider visit in about 2 weeks and alendronic acid infusion appointment.     Encounter Diagnoses:    Problem List Items Addressed This Visit          Oncology Diagnoses    Malignant neoplasm of central portion of right breast in female, estrogen receptor positive (H) - Primary    Relevant Medications    letrozole (FEMARA) 2.5 MG tablet    Other Relevant Orders    Infusion Appointment Request - Adult    Malignant neoplasm metastatic to bone (H)    Relevant Medications     letrozole (FEMARA) 2.5 MG tablet    Other Relevant Orders    Infusion Appointment Request - Adult            CC: Gilmer Walters   ______________________________________________________________________________  Oncologic history  5/2024- She presented with a large right breast mass, and progressive back pain.     5/30/2024-right breast mass biopsy showed invasive ductal carcinoma, grade 3,   ER positive (95%, strong), NE positive (15%, strong), HER2 negative (1+ by IHC).  NGS: no mutations. No fusion.    6/6/2024- MRI lumbar spine with and without contrast showed pathologic L1 burst fracture with > 50% vertebral body height loss.  Additional scattered osseous metastasis were seen as well.    6/11/2024-initiated anastrozole    6/13/2024-PET scan showed right breast malignancy with right subpectoral kaylene, pulmonary and osseous metastasis.  Moderate compression deformity of the L1 vertebral body with associated uptake probably pathologic, with about 7 mm retropulsion into the spinal canal.   Hypermetabolic right thyroid nodule.   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  Presumed right frontal falcine meningioma.  No evidence for intracranial metastatic disease.    7/3/2024- s/p T11-L3 posterior segmental instrumentation, T12-L1 and L1-L2 bilateral laminectomies and right L1 transpedicular placement of osteochondral device for radiofrequency ablation of L1 vertebral body tumor (Dr. Prieto)    8/29/2024 - 9/12/2024 - post op radiation 3000 cGy T12-L2 spine    9/11/2024-initiated ribociclib 600 mg daily, 3 weeks on and 1 week off..    History of Present Illness    History of Present Illness-The patient reports feeling good overall, despite experiencing soreness in the area of a breast tumor, which she notes seems to be  "shrinking and softer. She is relieved that there are no new spots on her body. However, she expresses concern about her creatinine levels, which have been slightly elevated since March 2025. She mentions being advised to take 3 grams of a supplement but is apprehensive about it due to her creatinine levels. Socially, she is currently in the [place] to assist her son, Rush, with his children following his divorce.   She plans to travel to [place] on July 4th with her family and plan to be there for about a month again.    She is currently in the second week of ribociclib.    Review of systems  Apart from describing in HPI, the remainder of comprehensive ROS was negative.    Past History    Past Medical History:   Diagnosis Date     Malignant neoplasm metastatic to bone (H)      Malignant neoplasm of central portion of right breast in female, estrogen receptor positive (H)      Pathologic lumbar vertebral fracture      PONV (postoperative nausea and vomiting)        Past Surgical History:   Procedure Laterality Date     APPENDECTOMY       BREAST AUGMENTATION       CHOLECYSTECTOMY       OPTICAL TRACKING SYSTEM FUSION SPINE POSTERIOR LUMBAR THREE+ LEVELS Bilateral 7/3/2024    Procedure: THORACIC 11 TO LUMBAR 3 POSTEROLATERAL FUSION USING ALLOGRAFT,LUMBAR 1 BILATERAL LAMINECTOMY AND MEDIAL FACETECTOMY AND SEPARATION SURGERY, USE OF OSTEOCOOL DEVICE.;  Surgeon: Thomas Prieto MD;  Location:  OR       Physical Exam    /75   Pulse 61   Temp 97.9  F (36.6  C)   Resp 16   Ht 1.575 m (5' 2\")   Wt 76.2 kg (168 lb 1.6 oz)   SpO2 96%   BMI 30.75 kg/m      General: alert, awake, not in acute distress  HEENT: Head: Normal, normocephalic, atraumatic.  Eye: Normal external eye, conjunctiva, lids cornea, ALEJANDRA.  Pharynx: Normal buccal mucosa. Normal pharynx.  Neck / Thyroid: Supple, no masses, nodes, nodules or enlargement.  Lymphatics: No abnormally enlarged lymph nodes.  Chest: Normal chest wall and respirations. " Clear to auscultation.  Breasts: There is induration in the upper inner quadrant of the left breast.  Overlying skin is normal.  Heart: S1 S2 RRR.   Abdomen: abdomen is soft without significant tenderness, masses, organomegaly or guarding  Extremities: normal strength, tone, and muscle mass  Skin: normal. no rash or abnormalities  CNS: non focal.    Lab Results    Recent Results (from the past 240 hours)   Creatinine POCT    Collection Time: 06/02/25 11:09 AM   Result Value Ref Range    Creatinine POCT 1.6 (H) 0.5 - 1.0 mg/dL    GFR, ESTIMATED POCT 35 (L) >60 mL/min/1.73m2   Comprehensive metabolic panel    Collection Time: 06/06/25 11:28 AM   Result Value Ref Range    Sodium 139 135 - 145 mmol/L    Potassium 4.5 3.4 - 5.3 mmol/L    Carbon Dioxide (CO2) 23 22 - 29 mmol/L    Anion Gap 11 7 - 15 mmol/L    Urea Nitrogen 26.9 (H) 8.0 - 23.0 mg/dL    Creatinine 1.17 (H) 0.51 - 0.95 mg/dL    GFR Estimate 51 (L) >60 mL/min/1.73m2    Calcium 9.0 8.8 - 10.4 mg/dL    Chloride 105 98 - 107 mmol/L    Glucose 148 (H) 70 - 99 mg/dL    Alkaline Phosphatase 60 40 - 150 U/L    AST 20 0 - 45 U/L    ALT 14 0 - 50 U/L    Protein Total 6.5 6.4 - 8.3 g/dL    Albumin 3.9 3.5 - 5.2 g/dL    Bilirubin Total 1.0 <=1.2 mg/dL   Magnesium    Collection Time: 06/06/25 11:28 AM   Result Value Ref Range    Magnesium 2.1 1.7 - 2.3 mg/dL   Phosphorus    Collection Time: 06/06/25 11:28 AM   Result Value Ref Range    Phosphorus 3.4 2.5 - 4.5 mg/dL   CBC with platelets and differential    Collection Time: 06/06/25 11:28 AM   Result Value Ref Range    WBC Count 3.5 (L) 4.0 - 11.0 10e3/uL    RBC Count 3.68 (L) 3.80 - 5.20 10e6/uL    Hemoglobin 12.2 11.7 - 15.7 g/dL    Hematocrit 36.3 35.0 - 47.0 %    MCV 99 78 - 100 fL    MCH 33.2 (H) 26.5 - 33.0 pg    MCHC 33.6 31.5 - 36.5 g/dL    RDW 15.0 10.0 - 15.0 %    Platelet Count 232 150 - 450 10e3/uL    % Neutrophils 58 %    % Lymphocytes 32 %    % Monocytes 6 %    % Eosinophils 2 %    % Basophils 2 %    %  Immature Granulocytes 1 %    NRBCs per 100 WBC 0 <1 /100    Absolute Neutrophils 2.0 1.6 - 8.3 10e3/uL    Absolute Lymphocytes 1.1 0.8 - 5.3 10e3/uL    Absolute Monocytes 0.2 0.0 - 1.3 10e3/uL    Absolute Eosinophils 0.1 0.0 - 0.7 10e3/uL    Absolute Basophils 0.1 0.0 - 0.2 10e3/uL    Absolute Immature Granulocytes 0.0 <=0.4 10e3/uL    Absolute NRBCs 0.0 10e3/uL   RBC and Platelet Morphology    Collection Time: 06/06/25 11:28 AM   Result Value Ref Range    RBC Morphology Confirmed RBC Indices     Platelet Assessment  Automated Count Confirmed. Platelet morphology is normal.     Automated Count Confirmed. Platelet morphology is normal.    Reactive Lymphocytes Present (A) None Seen             Imaging    CT Chest/Abdomen/Pelvis w Contrast  Result Date: 6/3/2025  EXAM: CT CHEST/ABDOMEN/PELVIS W CONTRAST LOCATION: [facility] DATE: 6/2/2025 INDICATION:  Malignant neoplasm of central portion of right breast in female, estrogen receptor positive (H). Malignant neoplasm of central portion of right breast in female, estrogen receptor positive (H). Malignant neoplasm metastatic to bone (H). Musculoskeletal pain. COMPARISON: 12/10/2024 TECHNIQUE: CT scan of the chest, abdomen, and pelvis was performed following injection of IV contrast. Multiplanar reformats were obtained. Dose reduction techniques were used. CONTRAST: Isovue 370 75 mL FINDINGS: LUNGS AND PLEURA: There is mild peripheral poorly fibrosis, similar to previous. A 2 mm nodule left lower lobe image 190 measures 3 mm previous. Other previously seen pulmonary nodules have resolved. No new nodules. MEDIASTINUM/AXILLAE: No mediastinal or axillary lymphadenopathy. A right subpectoral lymph node measures 9 mm on image 74, previously 9 mm. A superior right breast mass measures 2 x 1.1 cm, previously 3 x 1.7 cm. CORONARY ARTERY CALCIFICATION: Mild. HEPATOBILIARY: Cholecystectomy. No liver lesions. PANCREAS: Normal. SPLEEN: Normal. ADRENAL GLANDS:  Normal. KIDNEYS/BLADDER: Normal. BOWEL: Colonic diverticulosis without diverticulitis. LYMPH NODES: Normal. VASCULATURE: Normal. PELVIC ORGANS: Normal. MUSCULOSKELETAL: Stable compression fracture L1 with decompression and surrounding posterior fusion. Stable T4 and left eighth rib lesions.     IMPRESSION: 1.  Continued decrease in size of right breast mass and pulmonary nodules. 2.  No new lesions in the chest, abdomen, or pelvis.    MR Brain w/o & w Contrast  Result Date: 6/3/2025  EXAM: MR BRAIN W/O and W CONTRAST LOCATION: [facility] DATE: 6/2/2025 INDICATION:  Malignant neoplasm of central portion of right breast in female, estrogen receptor positive (H). Malignant neoplasm of central portion of right breast in female, estrogen receptor positive (H). Malignant neoplasm metastatic to bone (H), Musculoskeletal pain. COMPARISON: None. CONTRAST: 7.5 mL Gadavist TECHNIQUE: Routine multiplanar multisequence head MRI without and with intravenous contrast. FINDINGS: Small dural-based enhancing mass along the right anterior falx measuring about 1 cm, likely incidental meningioma, unchanged. Mild generalized parenchymal volume loss is present with scattered nonspecific white matter T2 hyperintensities likely representing mild chronic small vessel ischemic change. No evidence of recent ischemia, hemorrhage, significant mass effect, or hydrocephalus. Decreased enhancement involving the right clivus and right occipital condyle. Decreased enhancement within the C2 vertebra with mild residual, nonspecific.     IMPRESSION: 1.  No brain metastases identified. 2.  Stable presumed meningioma along the anterior falx. 3.  Decreased enhancement involving the right clivus, right occipital condyle, and C2 vertebra suggesting treated osseous metastases.    NM Bone Scan Whole Body  Result Date: 6/2/2025  EXAM: NM BONE SCAN WHOLE BODY LOCATION: [facility] DATE: 6/2/2025 INDICATION:   Malignant neoplasm of central portion of right breast female breast COMPARISON: CT the chest abdomen pelvis dated 6/2/2025 and nuclear medicine bone scan dated 12/10/2024. TECHNIQUE: 26.3 mCi technetium-99m MDP, IV. Anterior and posterior delayed whole-body images at 3 hours with additional spot images of the skull. FINDINGS: Radiotracer uptake in a pattern typical of degenerative change involving the shoulders and spine, without suspicious areas of altered uptake to suggest osseous metastasis.     IMPRESSION: No scintigraphic evidence of osseous metastasis.       The longitudinal plan of care for the diagnosis(es)/condition(s) as documented were addressed during this visit. Due to the added complexity in care, I will continue to support Adriana Wei in the subsequent management and with ongoing continuity of care.     45 minutes spent by me on the date of the encounter doing chart review, history and exam, documentation and further activities as noted above.    Consent was obtained from the patient to use an AI documentation tool in the creation of this note.    Signed by: Magaly Bender MD    Again, thank you for allowing me to participate in the care of your patient.        Sincerely,        Magaly Bender MD    Electronically signed

## 2025-06-06 NOTE — PROGRESS NOTES
"Oncology Rooming Note    June 6, 2025 11:43 AM   Adriana St is a 69 year old female who presents for:    Chief Complaint   Patient presents with    Oncology Clinic Visit     Malignant neoplasm metastatic to bone (H)  Malignant neoplasm of central portion of right breast in female, estrogen receptor positive (H)       Initial Vitals: /75   Pulse 61   Temp 97.9  F (36.6  C)   Resp 16   Ht 1.575 m (5' 2\")   Wt 76.2 kg (168 lb 1.6 oz)   SpO2 96%   BMI 30.75 kg/m   Estimated body mass index is 30.75 kg/m  as calculated from the following:    Height as of this encounter: 1.575 m (5' 2\").    Weight as of this encounter: 76.2 kg (168 lb 1.6 oz). Body surface area is 1.83 meters squared.  No Pain (0) Comment: Data Unavailable   No LMP recorded. Patient is postmenopausal.  Allergies reviewed: Yes  Medications reviewed: Yes    Medications: Medication refills not needed today.  Pharmacy name entered into [employer]: [id number] - [place], [place] - [facility] [address] AT [facility] [address]    Frailty Screening:   Is the patient here for a new oncology consult visit in cancer care? 2. No    PHQ9:  Did this patient require a PHQ9?: No      Clinical concerns: None      Carmen Miranda LPN             "

## 2025-06-16 ENCOUNTER — TELEPHONE (OUTPATIENT)
Dept: ONCOLOGY | Facility: HOSPITAL | Age: 70
End: 2025-06-16
Payer: COMMERCIAL

## 2025-06-16 NOTE — ORAL ONC MGMT
"Oral Chemotherapy Monitoring Program   Spoke with Adriana Wei. She took todays 400 mg dose. She has 13 pills left as of today. She will see Dr. Bender on Thursday to decide what they will do with dosing pending labs and will need to send a refill on for her. She is also going to [place] for one month (leaving July 4th to August 4th). Her and her son asked if we could call Thursday after their appt to discuss refill situation pending plan with Dr. Bender.    Monika Stark, PharmD, BCOP  Oral Chemotherapy Pharmacist  [facility] Phone: [phone]  In Basket Pools: \"[facility] ORAL CHEMOTHERAPY PHARMACIST\" or \"[facility] ORAL CHEMOTHERAPY PHARMACIST\"  June 16, 2025       "

## 2025-06-18 ENCOUNTER — TELEPHONE (OUTPATIENT)
Dept: ONCOLOGY | Facility: HOSPITAL | Age: 70
End: 2025-06-18
Payer: COMMERCIAL

## 2025-06-18 NOTE — TELEPHONE ENCOUNTER
PA Initiation    Medication: KISQALI (600 MG DOSE) 200 MG PO TBPK  Insurance Company: [facility] - Phone [phone] Fax [phone]  Pharmacy Filling the Rx: [facility] - [place], [place] - [address]  Start Date: 6/18/2025

## 2025-06-19 ENCOUNTER — ONCOLOGY VISIT (OUTPATIENT)
Dept: ONCOLOGY | Facility: HOSPITAL | Age: 70
End: 2025-06-19
Attending: INTERNAL MEDICINE
Payer: COMMERCIAL

## 2025-06-19 ENCOUNTER — TELEPHONE (OUTPATIENT)
Dept: ONCOLOGY | Facility: HOSPITAL | Age: 70
End: 2025-06-19

## 2025-06-19 ENCOUNTER — LAB (OUTPATIENT)
Dept: INFUSION THERAPY | Facility: HOSPITAL | Age: 70
End: 2025-06-19
Attending: INTERNAL MEDICINE
Payer: COMMERCIAL

## 2025-06-19 VITALS
DIASTOLIC BLOOD PRESSURE: 78 MMHG | RESPIRATION RATE: 15 BRPM | OXYGEN SATURATION: 98 % | SYSTOLIC BLOOD PRESSURE: 117 MMHG | HEIGHT: 62 IN | HEART RATE: 56 BPM | TEMPERATURE: 98.4 F | BODY MASS INDEX: 30.6 KG/M2 | WEIGHT: 166.3 LBS

## 2025-06-19 DIAGNOSIS — C50.111 MALIGNANT NEOPLASM OF CENTRAL PORTION OF RIGHT BREAST IN FEMALE, ESTROGEN RECEPTOR POSITIVE (H): Primary | ICD-10-CM

## 2025-06-19 DIAGNOSIS — C79.51 MALIGNANT NEOPLASM METASTATIC TO BONE (H): ICD-10-CM

## 2025-06-19 DIAGNOSIS — C50.111 MALIGNANT NEOPLASM OF CENTRAL PORTION OF RIGHT BREAST IN FEMALE, ESTROGEN RECEPTOR POSITIVE (H): ICD-10-CM

## 2025-06-19 DIAGNOSIS — Z17.0 MALIGNANT NEOPLASM OF CENTRAL PORTION OF RIGHT BREAST IN FEMALE, ESTROGEN RECEPTOR POSITIVE (H): ICD-10-CM

## 2025-06-19 DIAGNOSIS — Z51.11 ENCOUNTER FOR ANTINEOPLASTIC CHEMOTHERAPY: ICD-10-CM

## 2025-06-19 DIAGNOSIS — C79.51 MALIGNANT NEOPLASM METASTATIC TO BONE (H): Primary | ICD-10-CM

## 2025-06-19 DIAGNOSIS — Z17.0 MALIGNANT NEOPLASM OF CENTRAL PORTION OF RIGHT BREAST IN FEMALE, ESTROGEN RECEPTOR POSITIVE (H): Primary | ICD-10-CM

## 2025-06-19 LAB
ALBUMIN SERPL BCG-MCNC: 4.1 G/DL (ref 3.5–5.2)
ALP SERPL-CCNC: 62 U/L (ref 40–150)
ALT SERPL W P-5'-P-CCNC: 16 U/L (ref 0–50)
ANION GAP SERPL CALCULATED.3IONS-SCNC: 11 MMOL/L (ref 7–15)
AST SERPL W P-5'-P-CCNC: 22 U/L (ref 0–45)
BASOPHILS # BLD AUTO: 0.1 10E3/UL (ref 0–0.2)
BASOPHILS NFR BLD AUTO: 3 %
BILIRUB SERPL-MCNC: 1.2 MG/DL
BUN SERPL-MCNC: 20 MG/DL (ref 8–23)
CALCIUM SERPL-MCNC: 9.6 MG/DL (ref 8.8–10.4)
CHLORIDE SERPL-SCNC: 103 MMOL/L (ref 98–107)
CREAT SERPL-MCNC: 1.22 MG/DL (ref 0.51–0.95)
EGFRCR SERPLBLD CKD-EPI 2021: 48 ML/MIN/1.73M2
EOSINOPHIL # BLD AUTO: 0.2 10E3/UL (ref 0–0.7)
EOSINOPHIL NFR BLD AUTO: 4 %
ERYTHROCYTE [DISTWIDTH] IN BLOOD BY AUTOMATED COUNT: 15.5 % (ref 10–15)
GLUCOSE SERPL-MCNC: 140 MG/DL (ref 70–99)
HCO3 SERPL-SCNC: 25 MMOL/L (ref 22–29)
HCT VFR BLD AUTO: 36 % (ref 35–47)
HGB BLD-MCNC: 12.4 G/DL (ref 11.7–15.7)
IMM GRANULOCYTES # BLD: 0 10E3/UL
IMM GRANULOCYTES NFR BLD: 0 %
LYMPHOCYTES # BLD AUTO: 1.2 10E3/UL (ref 0.8–5.3)
LYMPHOCYTES NFR BLD AUTO: 34 %
MAGNESIUM SERPL-MCNC: 2 MG/DL (ref 1.7–2.3)
MCH RBC QN AUTO: 33.4 PG (ref 26.5–33)
MCHC RBC AUTO-ENTMCNC: 34.4 G/DL (ref 31.5–36.5)
MCV RBC AUTO: 97 FL (ref 78–100)
MONOCYTES # BLD AUTO: 0.3 10E3/UL (ref 0–1.3)
MONOCYTES NFR BLD AUTO: 9 %
NEUTROPHILS # BLD AUTO: 1.8 10E3/UL (ref 1.6–8.3)
NEUTROPHILS NFR BLD AUTO: 49 %
NRBC # BLD AUTO: 0 10E3/UL
NRBC BLD AUTO-RTO: 0 /100
PHOSPHATE SERPL-MCNC: 3.9 MG/DL (ref 2.5–4.5)
PLATELET # BLD AUTO: 189 10E3/UL (ref 150–450)
POTASSIUM SERPL-SCNC: 4.6 MMOL/L (ref 3.4–5.3)
PROT SERPL-MCNC: 6.5 G/DL (ref 6.4–8.3)
RBC # BLD AUTO: 3.71 10E6/UL (ref 3.8–5.2)
SODIUM SERPL-SCNC: 139 MMOL/L (ref 135–145)
WBC # BLD AUTO: 3.5 10E3/UL (ref 4–11)

## 2025-06-19 PROCEDURE — 85025 COMPLETE CBC W/AUTO DIFF WBC: CPT

## 2025-06-19 PROCEDURE — G0463 HOSPITAL OUTPT CLINIC VISIT: HCPCS | Performed by: INTERNAL MEDICINE

## 2025-06-19 PROCEDURE — 82947 ASSAY GLUCOSE BLOOD QUANT: CPT

## 2025-06-19 PROCEDURE — 83735 ASSAY OF MAGNESIUM: CPT

## 2025-06-19 PROCEDURE — 84100 ASSAY OF PHOSPHORUS: CPT

## 2025-06-19 PROCEDURE — 36415 COLL VENOUS BLD VENIPUNCTURE: CPT

## 2025-06-19 PROCEDURE — G2211 COMPLEX E/M VISIT ADD ON: HCPCS | Performed by: INTERNAL MEDICINE

## 2025-06-19 PROCEDURE — 99215 OFFICE O/P EST HI 40 MIN: CPT | Performed by: INTERNAL MEDICINE

## 2025-06-19 RX ORDER — HEPARIN SODIUM,PORCINE 10 UNIT/ML
5-20 VIAL (ML) INTRAVENOUS DAILY PRN
Status: CANCELLED | OUTPATIENT
Start: 2025-06-23

## 2025-06-19 RX ORDER — HEPARIN SODIUM (PORCINE) LOCK FLUSH IV SOLN 100 UNIT/ML 100 UNIT/ML
5 SOLUTION INTRAVENOUS
Status: CANCELLED | OUTPATIENT
Start: 2025-06-23

## 2025-06-19 ASSESSMENT — PAIN SCALES - GENERAL: PAINLEVEL_OUTOF10: NO PAIN (0)

## 2025-06-19 NOTE — TELEPHONE ENCOUNTER
Prior Authorization Approval    Medication: KISQALI (600 MG DOSE) 200 MG PO TBPK  Authorization Effective Date: 6/18/2025  Authorization Expiration Date: 6/18/2026  Approved Dose/Quantity: 63/28  Reference #: [id number]   Insurance Company: [employer] - Phone [phone] Fax [phone]  Expected CoPay: $ 25  Which Pharmacy is filling the prescription: [facility] - [place], [place] - [facility] [address]  Pharmacy Notified: Yes  Patient Notified: Yes

## 2025-06-19 NOTE — Clinical Note
"6/19/2025      Adriana St  [address] [facility] [address]      Dear Colleague,    Thank you for referring your patient, Adriana St, to the [facility]. Please see a copy of my visit note below.    Oncology Rooming Note    June 19, 2025 11:57 AM   Adriana St is a 69 year old female who presents for:    Chief Complaint   Patient presents with    Oncology Clinic Visit       Malignant neoplasm metastatic to bone   Malignant neoplasm of central portion of right breast in female, estrogen receptor positive       Initial Vitals: /78 (BP Location: Left arm, Patient Position: Sitting, Cuff Size: Adult Regular)   Pulse 56   Temp 98.4  F (36.9  C) (Temporal)   Resp 15   Ht 1.575 m (5' 2\")   Wt 75.4 kg (166 lb 4.8 oz)   SpO2 98%   BMI 30.42 kg/m   Estimated body mass index is 30.42 kg/m  as calculated from the following:    Height as of this encounter: 1.575 m (5' 2\").    Weight as of this encounter: 75.4 kg (166 lb 4.8 oz). Body surface area is 1.82 meters squared.  No Pain (0) Comment: Data Unavailable   No LMP recorded. Patient is postmenopausal.  Allergies reviewed: Yes  Medications reviewed: Yes    Medications: Medication refills not needed today.  Pharmacy name entered into [facility]: [id number] - [place], [place] - [address] AT [address]    Frailty Screening:   Is the patient here for a new oncology consult visit in cancer care? 2. No    PHQ9:  Did this patient require a PHQ9?: No    Clinical Concerns -   Follow up labs and medication review management (Kisqali) per pt.         Alee Valles MA              [facility] Progress Note    Patient: Adriana St  MRN: [id number]  Date of Service: Jun 19, 2025         Reason for Visit    Chief Complaint   Patient presents with     Oncology Clinic Visit       Malignant neoplasm metastatic to bone   Malignant neoplasm of central " portion of right breast in female, estrogen receptor positive         Assessment and Plan     Cancer Staging   Malignant neoplasm of central portion of right breast in female, estrogen receptor positive (H)  Staging form: Breast, AJCC 8th Edition  - Clinical stage from 6/10/2024: Stage IV (cT2, cN0, cM1, G3, ER+, NY+, HER2-) - Signed by Magaly Bender MD on 6/10/2024      ECOG Performance    0 - Independent     Pain  Pain Score: No Pain (0)    #.  Metastatic right breast cancer ER positive, NY positive, HER2 1+ by IHC.  #.  Pathologic L1 fracture status post lumbar decompression surgery followed by postop radiation.  #.  Elevated creatinine    Reviewed labs and imaging results in detail.  - Breast tumor is showing signs of shrinkage, indicating a positive response to treatment.  - Bone scan reveals no new changes, with existing areas of uptake remaining stable, suggesting effective management of bone involvement.  - Kidney function is slightly elevated, potentially related to ribociclib use, but not currently alarming.  - Glucose levels are slightly elevated, requiring monitoring.  - Overall, the current treatment regimen appears to be effective, with no new adverse developments noted in imaging or lab results.    Plan  - Reduce ribociclib dosage to two tablets per day starting tomorrow to monitor potential side effects on kidney function.  - Recheck blood numbers and kidney function on June 18 or 19 to assess the impact of ribociclib.  - Monitor glucose levels with primary doctor due to fluctuating readings.  - Increase water intake to improve hydration.   -Follow-up labs and provider visit in about 2 weeks and alendronic acid infusion appointment.     Encounter Diagnoses:    Problem List Items Addressed This Visit          Oncology Diagnoses    Malignant neoplasm of central portion of right breast in female, estrogen receptor positive (H) - Primary    Malignant neoplasm metastatic to bone (H)             CC: Gilmer Walters   ______________________________________________________________________________  Oncologic history  5/2024- She presented with a large right breast mass, and progressive back pain.     5/30/2024-right breast mass biopsy showed invasive ductal carcinoma, grade 3,   ER positive (95%, strong), WA positive (15%, strong), HER2 negative (1+ by IHC).  NGS: no mutations. No fusion.    6/6/2024- MRI lumbar spine with and without contrast showed pathologic L1 burst fracture with > 50% vertebral body height loss.  Additional scattered osseous metastasis were seen as well.    6/11/2024-initiated anastrozole    6/13/2024-PET scan showed right breast malignancy with right subpectoral kaylene, pulmonary and osseous metastasis.  Moderate compression deformity of the L1 vertebral body with associated uptake probably pathologic, with about 7 mm retropulsion into the spinal canal.   Hypermetabolic right thyroid nodule.   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  Presumed right frontal falcine meningioma.  No evidence for intracranial metastatic disease.    7/3/2024- s/p T11-L3 posterior segmental instrumentation, T12-L1 and L1-L2 bilateral laminectomies and right L1 transpedicular placement of osteochondral device for radiofrequency ablation of L1 vertebral body tumor (Dr. Prieto)    8/29/2024 - 9/12/2024 - post op radiation 3000 cGy T12-L2 spine    9/11/2024-initiated ribociclib 600 mg daily, 3 weeks on and 1 week off..    History of Present Illness    History of Present Illness-The patient reports feeling good overall, despite experiencing soreness in the area of a breast tumor, which she notes seems to be shrinking and softer. She is relieved that there are no new spots on her body. However, she expresses concern  "about her creatinine levels, which have been slightly elevated since March 2025. She mentions being advised to take 3 grams of a supplement but is apprehensive about it due to her creatinine levels. Socially, she is currently in the [place] to assist her son, Rush, with his children following his divorce.   She plans to travel to [place] on July 4th with her family and plan to be there for about a month again.    She is currently in the second week of ribociclib.    Review of systems  Apart from describing in HPI, the remainder of comprehensive ROS was negative.    Past History    Past Medical History:   Diagnosis Date     Malignant neoplasm metastatic to bone (H)      Malignant neoplasm of central portion of right breast in female, estrogen receptor positive (H)      Pathologic lumbar vertebral fracture      PONV (postoperative nausea and vomiting)        Past Surgical History:   Procedure Laterality Date     APPENDECTOMY       BREAST AUGMENTATION       CHOLECYSTECTOMY       OPTICAL TRACKING SYSTEM FUSION SPINE POSTERIOR LUMBAR THREE+ LEVELS Bilateral 7/3/2024    Procedure: THORACIC 11 TO LUMBAR 3 POSTEROLATERAL FUSION USING ALLOGRAFT,LUMBAR 1 BILATERAL LAMINECTOMY AND MEDIAL FACETECTOMY AND SEPARATION SURGERY, USE OF OSTEOCOOL DEVICE.;  Surgeon: Thomas Prieto MD;  Location:  OR       Physical Exam    /78 (BP Location: Left arm, Patient Position: Sitting, Cuff Size: Adult Regular)   Pulse 56   Temp 98.4  F (36.9  C) (Temporal)   Resp 15   Ht 1.575 m (5' 2\")   Wt 75.4 kg (166 lb 4.8 oz)   SpO2 98%   BMI 30.42 kg/m      General: alert, awake, not in acute distress  HEENT: Head: Normal, normocephalic, atraumatic.  Eye: Normal external eye, conjunctiva, lids cornea, ALEJANDRA.  Pharynx: Normal buccal mucosa. Normal pharynx.  Neck / Thyroid: Supple, no masses, nodes, nodules or enlargement.  Lymphatics: No abnormally enlarged lymph nodes.  Chest: Normal chest wall and respirations. Clear to " auscultation.  Breasts: There is induration in the upper inner quadrant of the left breast.  Overlying skin is normal.  Heart: S1 S2 RRR.   Abdomen: abdomen is soft without significant tenderness, masses, organomegaly or guarding  Extremities: normal strength, tone, and muscle mass  Skin: normal. no rash or abnormalities  CNS: non focal.    Lab Results    Recent Results (from the past 240 hours)   Comprehensive metabolic panel    Collection Time: 06/19/25 11:27 AM   Result Value Ref Range    Sodium 139 135 - 145 mmol/L    Potassium 4.6 3.4 - 5.3 mmol/L    Carbon Dioxide (CO2) 25 22 - 29 mmol/L    Anion Gap 11 7 - 15 mmol/L    Urea Nitrogen 20.0 8.0 - 23.0 mg/dL    Creatinine 1.22 (H) 0.51 - 0.95 mg/dL    GFR Estimate 48 (L) >60 mL/min/1.73m2    Calcium 9.6 8.8 - 10.4 mg/dL    Chloride 103 98 - 107 mmol/L    Glucose 140 (H) 70 - 99 mg/dL    Alkaline Phosphatase 62 40 - 150 U/L    AST 22 0 - 45 U/L    ALT 16 0 - 50 U/L    Protein Total 6.5 6.4 - 8.3 g/dL    Albumin 4.1 3.5 - 5.2 g/dL    Bilirubin Total 1.2 <=1.2 mg/dL   Magnesium    Collection Time: 06/19/25 11:27 AM   Result Value Ref Range    Magnesium 2.0 1.7 - 2.3 mg/dL   Phosphorus    Collection Time: 06/19/25 11:27 AM   Result Value Ref Range    Phosphorus 3.9 2.5 - 4.5 mg/dL   CBC with platelets and differential    Collection Time: 06/19/25 11:27 AM   Result Value Ref Range    WBC Count 3.5 (L) 4.0 - 11.0 10e3/uL    RBC Count 3.71 (L) 3.80 - 5.20 10e6/uL    Hemoglobin 12.4 11.7 - 15.7 g/dL    Hematocrit 36.0 35.0 - 47.0 %    MCV 97 78 - 100 fL    MCH 33.4 (H) 26.5 - 33.0 pg    MCHC 34.4 31.5 - 36.5 g/dL    RDW 15.5 (H) 10.0 - 15.0 %    Platelet Count 189 150 - 450 10e3/uL    % Neutrophils 49 %    % Lymphocytes 34 %    % Monocytes 9 %    % Eosinophils 4 %    % Basophils 3 %    % Immature Granulocytes 0 %    NRBCs per 100 WBC 0 <1 /100    Absolute Neutrophils 1.8 1.6 - 8.3 10e3/uL    Absolute Lymphocytes 1.2 0.8 - 5.3 10e3/uL    Absolute Monocytes 0.3 0.0 - 1.3  10e3/uL    Absolute Eosinophils 0.2 0.0 - 0.7 10e3/uL    Absolute Basophils 0.1 0.0 - 0.2 10e3/uL    Absolute Immature Granulocytes 0.0 <=0.4 10e3/uL    Absolute NRBCs 0.0 10e3/uL             Imaging    CT Chest/Abdomen/Pelvis w Contrast  Result Date: 6/3/2025  EXAM: CT CHEST/ABDOMEN/PELVIS W CONTRAST LOCATION: [facility] DATE: 6/2/2025 INDICATION:  Malignant neoplasm of central portion of right breast in female, estrogen receptor positive (H). Malignant neoplasm of central portion of right breast in female, estrogen receptor positive (H). Malignant neoplasm metastatic to bone (H). Musculoskeletal pain. COMPARISON: 12/10/2024 TECHNIQUE: CT scan of the chest, abdomen, and pelvis was performed following injection of IV contrast. Multiplanar reformats were obtained. Dose reduction techniques were used. CONTRAST: Isovue 370 75 mL FINDINGS: LUNGS AND PLEURA: There is mild peripheral poorly fibrosis, similar to previous. A 2 mm nodule left lower lobe image 190 measures 3 mm previous. Other previously seen pulmonary nodules have resolved. No new nodules. MEDIASTINUM/AXILLAE: No mediastinal or axillary lymphadenopathy. A right subpectoral lymph node measures 9 mm on image 74, previously 9 mm. A superior right breast mass measures 2 x 1.1 cm, previously 3 x 1.7 cm. CORONARY ARTERY CALCIFICATION: Mild. HEPATOBILIARY: Cholecystectomy. No liver lesions. PANCREAS: Normal. SPLEEN: Normal. ADRENAL GLANDS: Normal. KIDNEYS/BLADDER: Normal. BOWEL: Colonic diverticulosis without diverticulitis. LYMPH NODES: Normal. VASCULATURE: Normal. PELVIC ORGANS: Normal. MUSCULOSKELETAL: Stable compression fracture L1 with decompression and surrounding posterior fusion. Stable T4 and left eighth rib lesions.     IMPRESSION: 1.  Continued decrease in size of right breast mass and pulmonary nodules. 2.  No new lesions in the chest, abdomen, or pelvis.    MR Brain w/o & w Contrast  Result Date: 6/3/2025  EXAM: MR BRAIN W/O  and W CONTRAST LOCATION: [facility] DATE: 6/2/2025 INDICATION:  Malignant neoplasm of central portion of right breast in female, estrogen receptor positive (H). Malignant neoplasm of central portion of right breast in female, estrogen receptor positive (H). Malignant neoplasm metastatic to bone (H), Musculoskeletal pain. COMPARISON: None. CONTRAST: 7.5 mL Gadavist TECHNIQUE: Routine multiplanar multisequence head MRI without and with intravenous contrast. FINDINGS: Small dural-based enhancing mass along the right anterior falx measuring about 1 cm, likely incidental meningioma, unchanged. Mild generalized parenchymal volume loss is present with scattered nonspecific white matter T2 hyperintensities likely representing mild chronic small vessel ischemic change. No evidence of recent ischemia, hemorrhage, significant mass effect, or hydrocephalus. Decreased enhancement involving the right clivus and right occipital condyle. Decreased enhancement within the C2 vertebra with mild residual, nonspecific.     IMPRESSION: 1.  No brain metastases identified. 2.  Stable presumed meningioma along the anterior falx. 3.  Decreased enhancement involving the right clivus, right occipital condyle, and C2 vertebra suggesting treated osseous metastases.    NM Bone Scan Whole Body  Result Date: 6/2/2025  EXAM: NM BONE SCAN WHOLE BODY LOCATION: [facility] DATE: 6/2/2025 INDICATION:  Malignant neoplasm of central portion of right breast female breast COMPARISON: CT the chest abdomen pelvis dated 6/2/2025 and nuclear medicine bone scan dated 12/10/2024. TECHNIQUE: 26.3 mCi technetium-99m MDP, IV. Anterior and posterior delayed whole-body images at 3 hours with additional spot images of the skull. FINDINGS: Radiotracer uptake in a pattern typical of degenerative change involving the shoulders and spine, without suspicious areas of altered uptake to suggest osseous metastasis.      IMPRESSION: No scintigraphic evidence of osseous metastasis.       The longitudinal plan of care for the diagnosis(es)/condition(s) as documented were addressed during this visit. Due to the added complexity in care, I will continue to support Adriana Wei in the subsequent management and with ongoing continuity of care.     45 minutes spent by me on the date of the encounter doing chart review, history and exam, documentation and further activities as noted above.    Consent was obtained from the patient to use an AI documentation tool in the creation of this note.    Signed by: Magaly Bender MD      Again, thank you for allowing me to participate in the care of your patient.        Sincerely,        Magaly Bender MD    Electronically signed

## 2025-06-19 NOTE — PROGRESS NOTES
[facility] Hematology and Oncology Progress Note    Patient: Adriana St  MRN: [id number]  Date of Service: Jun 19, 2025         Reason for Visit    Chief Complaint   Patient presents with    Oncology Clinic Visit       Malignant neoplasm metastatic to bone   Malignant neoplasm of central portion of right breast in female, estrogen receptor positive         Assessment and Plan     Cancer Staging   Malignant neoplasm of central portion of right breast in female, estrogen receptor positive (H)  Staging form: Breast, AJCC 8th Edition  - Clinical stage from 6/10/2024: Stage IV (cT2, cN0, cM1, G3, ER+, VA+, HER2-) - Signed by Magaly Bender MD on 6/10/2024      ECOG Performance    0 - Independent     Pain  Pain Score: No Pain (0)    #.  Metastatic right breast cancer ER positive, VA positive, HER2 1+ by IHC.  #.  Pathologic L1 fracture status post lumbar decompression surgery followed by postop radiation.  #.  Elevated creatinine    Reviewed labs and imaging results in detail.  - Breast tumor is showing signs of shrinkage, indicating a positive response to treatment.  - Bone scan reveals no new changes, with existing areas of uptake remaining stable, suggesting effective management of bone involvement.  - Kidney function is slightly elevated, potentially related to ribociclib use, but not currently alarming.  - Glucose levels are slightly elevated, requiring monitoring.  - Overall, the current treatment regimen appears to be effective, with no new adverse developments noted in imaging or lab results.    Plan  - Reduce ribociclib dosage to two tablets per day starting tomorrow to monitor potential side effects on kidney function.  - Recheck blood numbers and kidney function on June 18 or 19 to assess the impact of ribociclib.  - Monitor glucose levels with primary doctor due to fluctuating readings.  - Increase water intake to improve hydration.   -Follow-up labs and provider visit in about 2 weeks  and alendronic acid infusion appointment.     Encounter Diagnoses:    Problem List Items Addressed This Visit          Oncology Diagnoses    Malignant neoplasm of central portion of right breast in female, estrogen receptor positive (H) - Primary    Relevant Orders    Infusion Appointment Request - Adult    Malignant neoplasm metastatic to bone (H)    Relevant Orders    Infusion Appointment Request - Adult            CC: Gilmer Walters   ______________________________________________________________________________  Oncologic history  5/2024- She presented with a large right breast mass, and progressive back pain.     5/30/2024-right breast mass biopsy showed invasive ductal carcinoma, grade 3,   ER positive (95%, strong), MN positive (15%, strong), HER2 negative (1+ by IHC).  NGS: no mutations. No fusion.    6/6/2024- MRI lumbar spine with and without contrast showed pathologic L1 burst fracture with > 50% vertebral body height loss.  Additional scattered osseous metastasis were seen as well.    6/11/2024-initiated anastrozole    6/13/2024-PET scan showed right breast malignancy with right subpectoral kaylene, pulmonary and osseous metastasis.  Moderate compression deformity of the L1 vertebral body with associated uptake probably pathologic, with about 7 mm retropulsion into the spinal canal.   Hypermetabolic right thyroid nodule.   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  Presumed right frontal falcine meningioma.  No evidence for intracranial metastatic disease.    7/3/2024- s/p T11-L3 posterior segmental instrumentation, T12-L1 and L1-L2 bilateral laminectomies and right L1 transpedicular placement of osteochondral device for radiofrequency ablation of L1 vertebral body tumor (Dr. Prieto)    8/29/2024 - 9/12/2024 -  post op radiation 3000 cGy T12-L2 spine    9/11/2024-initiated ribociclib 600 mg daily, 3 weeks on and 1 week off.    6/2025- changed ribociclib 400 mg daily, 3 weeks on and 1 week off due to rising Cr.    History of Present Illness    History of Present Illness-The patient reports feeling good overall, despite experiencing soreness in the area of a breast tumor, which she notes seems to be shrinking and softer. She is relieved that there are no new spots on her body. However, she expresses concern about her creatinine levels, which have been slightly elevated since March 2025. She mentions being advised to take 3 grams of a supplement but is apprehensive about it due to her creatinine levels. Socially, she is currently in the [place] to assist her son, Rush, with his children following his divorce.   She plans to travel to [place] on July 4th with her family and plan to be there for about a month again.    She is currently in the second week of ribociclib.    Review of systems  Apart from describing in HPI, the remainder of comprehensive ROS was negative.    Past History    Past Medical History:   Diagnosis Date    Malignant neoplasm metastatic to bone (H)     Malignant neoplasm of central portion of right breast in female, estrogen receptor positive (H)     Pathologic lumbar vertebral fracture     PONV (postoperative nausea and vomiting)        Past Surgical History:   Procedure Laterality Date    APPENDECTOMY      BREAST AUGMENTATION      CHOLECYSTECTOMY      OPTICAL TRACKING SYSTEM FUSION SPINE POSTERIOR LUMBAR THREE+ LEVELS Bilateral 7/3/2024    Procedure: THORACIC 11 TO LUMBAR 3 POSTEROLATERAL FUSION USING ALLOGRAFT,LUMBAR 1 BILATERAL LAMINECTOMY AND MEDIAL FACETECTOMY AND SEPARATION SURGERY, USE OF OSTEOCOOL DEVICE.;  Surgeon: Thomas Prieto MD;  Location:  OR       Physical Exam    /78 (BP Location: Left arm, Patient Position: Sitting, Cuff Size: Adult Regular)   Pulse 56   Temp 98.4  F (36.9  " C) (Temporal)   Resp 15   Ht 1.575 m (5' 2\")   Wt 75.4 kg (166 lb 4.8 oz)   SpO2 98%   BMI 30.42 kg/m      General: alert, awake, not in acute distress  HEENT: Head: Normal, normocephalic, atraumatic.  Eye: Normal external eye, conjunctiva, lids cornea, ALEJANDRA.  Pharynx: Normal buccal mucosa. Normal pharynx.  Neck / Thyroid: Supple, no masses, nodes, nodules or enlargement.  Lymphatics: No abnormally enlarged lymph nodes.  Chest: Normal chest wall and respirations. Clear to auscultation.  Breasts: There is induration in the upper inner quadrant of the left breast.  Overlying skin is normal.  Heart: S1 S2 RRR.   Abdomen: abdomen is soft without significant tenderness, masses, organomegaly or guarding  Extremities: normal strength, tone, and muscle mass  Skin: normal. no rash or abnormalities  CNS: non focal.    Lab Results    Recent Results (from the past 240 hours)   Comprehensive metabolic panel    Collection Time: 06/19/25 11:27 AM   Result Value Ref Range    Sodium 139 135 - 145 mmol/L    Potassium 4.6 3.4 - 5.3 mmol/L    Carbon Dioxide (CO2) 25 22 - 29 mmol/L    Anion Gap 11 7 - 15 mmol/L    Urea Nitrogen 20.0 8.0 - 23.0 mg/dL    Creatinine 1.22 (H) 0.51 - 0.95 mg/dL    GFR Estimate 48 (L) >60 mL/min/1.73m2    Calcium 9.6 8.8 - 10.4 mg/dL    Chloride 103 98 - 107 mmol/L    Glucose 140 (H) 70 - 99 mg/dL    Alkaline Phosphatase 62 40 - 150 U/L    AST 22 0 - 45 U/L    ALT 16 0 - 50 U/L    Protein Total 6.5 6.4 - 8.3 g/dL    Albumin 4.1 3.5 - 5.2 g/dL    Bilirubin Total 1.2 <=1.2 mg/dL   Magnesium    Collection Time: 06/19/25 11:27 AM   Result Value Ref Range    Magnesium 2.0 1.7 - 2.3 mg/dL   Phosphorus    Collection Time: 06/19/25 11:27 AM   Result Value Ref Range    Phosphorus 3.9 2.5 - 4.5 mg/dL   CBC with platelets and differential    Collection Time: 06/19/25 11:27 AM   Result Value Ref Range    WBC Count 3.5 (L) 4.0 - 11.0 10e3/uL    RBC Count 3.71 (L) 3.80 - 5.20 10e6/uL    Hemoglobin 12.4 11.7 - 15.7 " g/dL    Hematocrit 36.0 35.0 - 47.0 %    MCV 97 78 - 100 fL    MCH 33.4 (H) 26.5 - 33.0 pg    MCHC 34.4 31.5 - 36.5 g/dL    RDW 15.5 (H) 10.0 - 15.0 %    Platelet Count 189 150 - 450 10e3/uL    % Neutrophils 49 %    % Lymphocytes 34 %    % Monocytes 9 %    % Eosinophils 4 %    % Basophils 3 %    % Immature Granulocytes 0 %    NRBCs per 100 WBC 0 <1 /100    Absolute Neutrophils 1.8 1.6 - 8.3 10e3/uL    Absolute Lymphocytes 1.2 0.8 - 5.3 10e3/uL    Absolute Monocytes 0.3 0.0 - 1.3 10e3/uL    Absolute Eosinophils 0.2 0.0 - 0.7 10e3/uL    Absolute Basophils 0.1 0.0 - 0.2 10e3/uL    Absolute Immature Granulocytes 0.0 <=0.4 10e3/uL    Absolute NRBCs 0.0 10e3/uL             Imaging    CT Chest/Abdomen/Pelvis w Contrast  Result Date: 6/3/2025  EXAM: CT CHEST/ABDOMEN/PELVIS W CONTRAST LOCATION: [facility] DATE: 6/2/2025 INDICATION:  Malignant neoplasm of central portion of right breast in female, estrogen receptor positive (H). Malignant neoplasm of central portion of right breast in female, estrogen receptor positive (H). Malignant neoplasm metastatic to bone (H). Musculoskeletal pain. COMPARISON: 12/10/2024 TECHNIQUE: CT scan of the chest, abdomen, and pelvis was performed following injection of IV contrast. Multiplanar reformats were obtained. Dose reduction techniques were used. CONTRAST: Isovue 370 75 mL FINDINGS: LUNGS AND PLEURA: There is mild peripheral poorly fibrosis, similar to previous. A 2 mm nodule left lower lobe image 190 measures 3 mm previous. Other previously seen pulmonary nodules have resolved. No new nodules. MEDIASTINUM/AXILLAE: No mediastinal or axillary lymphadenopathy. A right subpectoral lymph node measures 9 mm on image 74, previously 9 mm. A superior right breast mass measures 2 x 1.1 cm, previously 3 x 1.7 cm. CORONARY ARTERY CALCIFICATION: Mild. HEPATOBILIARY: Cholecystectomy. No liver lesions. PANCREAS: Normal. SPLEEN: Normal. ADRENAL GLANDS: Normal. KIDNEYS/BLADDER:  Normal. BOWEL: Colonic diverticulosis without diverticulitis. LYMPH NODES: Normal. VASCULATURE: Normal. PELVIC ORGANS: Normal. MUSCULOSKELETAL: Stable compression fracture L1 with decompression and surrounding posterior fusion. Stable T4 and left eighth rib lesions.     IMPRESSION: 1.  Continued decrease in size of right breast mass and pulmonary nodules. 2.  No new lesions in the chest, abdomen, or pelvis.    MR Brain w/o & w Contrast  Result Date: 6/3/2025  EXAM: MR BRAIN W/O and W CONTRAST LOCATION: [facility] DATE: 6/2/2025 INDICATION:  Malignant neoplasm of central portion of right breast in female, estrogen receptor positive (H). Malignant neoplasm of central portion of right breast in female, estrogen receptor positive (H). Malignant neoplasm metastatic to bone (H), Musculoskeletal pain. COMPARISON: None. CONTRAST: 7.5 mL Gadavist TECHNIQUE: Routine multiplanar multisequence head MRI without and with intravenous contrast. FINDINGS: Small dural-based enhancing mass along the right anterior falx measuring about 1 cm, likely incidental meningioma, unchanged. Mild generalized parenchymal volume loss is present with scattered nonspecific white matter T2 hyperintensities likely representing mild chronic small vessel ischemic change. No evidence of recent ischemia, hemorrhage, significant mass effect, or hydrocephalus. Decreased enhancement involving the right clivus and right occipital condyle. Decreased enhancement within the C2 vertebra with mild residual, nonspecific.     IMPRESSION: 1.  No brain metastases identified. 2.  Stable presumed meningioma along the anterior falx. 3.  Decreased enhancement involving the right clivus, right occipital condyle, and C2 vertebra suggesting treated osseous metastases.    NM Bone Scan Whole Body  Result Date: 6/2/2025  EXAM: NM BONE SCAN WHOLE BODY LOCATION: [facility] DATE: 6/2/2025 INDICATION:  Malignant neoplasm of  central portion of right breast female breast COMPARISON: CT the chest abdomen pelvis dated 6/2/2025 and nuclear medicine bone scan dated 12/10/2024. TECHNIQUE: 26.3 mCi technetium-99m MDP, IV. Anterior and posterior delayed whole-body images at 3 hours with additional spot images of the skull. FINDINGS: Radiotracer uptake in a pattern typical of degenerative change involving the shoulders and spine, without suspicious areas of altered uptake to suggest osseous metastasis.     IMPRESSION: No scintigraphic evidence of osseous metastasis.       The longitudinal plan of care for the diagnosis(es)/condition(s) as documented were addressed during this visit. Due to the added complexity in care, I will continue to support Adriana Wei in the subsequent management and with ongoing continuity of care.     45 minutes spent by me on the date of the encounter doing chart review, history and exam, documentation and further activities as noted above.    Consent was obtained from the patient to use an AI documentation tool in the creation of this note.    Signed by: Magaly Bender MD

## 2025-06-19 NOTE — ORAL ONC MGMT
Oral Chemotherapy Monitoring Program   Spoke to Adriana regarding refill for ribociclib. She has enough left for 3 more days and then will start her week off on Monday. She will be completely out of pills then and needs a refill for her next cycle starting 6/30. She is then leaving for [place] July 4th until August 4th so will need another refill prior to leaving. She has already used up one vacation override and may not qualify for another one. We will try to get it approved now that her dose has been adjusted to 400 mg.   Released this month's rx to get it started and then we will try next week to get a vacation override but if it doesn't work Adriana may have to delay starting her cycle until after she returns from [place]. Her cycle is due to start 7/28 but she won't return until 8/4 so she will only be 1 week late which is reasonable if we can't get insurance override.   Adriana returns to [facility] next Monday 6/23 for Zometea and we will follow up with her then regarding the override.   Carmen Hare, PharmD  Oral Chemotherapy Pharmacist  [facility] Phone: [phone]  In Basket Pools:   [facility] ORAL CHEMOTHERAPY PHARMACIST   [facility] ORAL CHEMOTHERAPY PHARMACIST

## 2025-06-19 NOTE — TELEPHONE ENCOUNTER
Renewal approval information added to ERx:  [place] APPROVAL*CL (KISQALI (600 MG DOSE) 200 MG PO TBPK) IS APPROVED BY ([facility] - Phone [phone] Fax [phone]) EFFECTIVE (6/18/2025) THROUGH (6/18/2026)

## 2025-06-19 NOTE — PROGRESS NOTES
"Oncology Rooming Note    June 19, 2025 11:57 AM   Adriana St is a 69 year old female who presents for:    Chief Complaint   Patient presents with    Oncology Clinic Visit       Malignant neoplasm metastatic to bone   Malignant neoplasm of central portion of right breast in female, estrogen receptor positive       Initial Vitals: /78 (BP Location: Left arm, Patient Position: Sitting, Cuff Size: Adult Regular)   Pulse 56   Temp 98.4  F (36.9  C) (Temporal)   Resp 15   Ht 1.575 m (5' 2\")   Wt 75.4 kg (166 lb 4.8 oz)   SpO2 98%   BMI 30.42 kg/m   Estimated body mass index is 30.42 kg/m  as calculated from the following:    Height as of this encounter: 1.575 m (5' 2\").    Weight as of this encounter: 75.4 kg (166 lb 4.8 oz). Body surface area is 1.82 meters squared.  No Pain (0) Comment: Data Unavailable   No LMP recorded. Patient is postmenopausal.  Allergies reviewed: Yes  Medications reviewed: Yes    Medications: Medication refills not needed today.  Pharmacy name entered into [employer]: [id number] - [place], [place] - [address] AT [address]    Frailty Screening:   Is the patient here for a new oncology consult visit in cancer care? 2. No    PHQ9:  Did this patient require a PHQ9?: No    Clinical Concerns -   Follow up labs and medication review management (Kisqali) per pt.         Alee Valles MA            "

## 2025-06-23 ENCOUNTER — INFUSION THERAPY VISIT (OUTPATIENT)
Dept: INFUSION THERAPY | Facility: HOSPITAL | Age: 70
End: 2025-06-23
Attending: INTERNAL MEDICINE
Payer: COMMERCIAL

## 2025-06-23 VITALS
OXYGEN SATURATION: 98 % | RESPIRATION RATE: 16 BRPM | DIASTOLIC BLOOD PRESSURE: 67 MMHG | HEART RATE: 56 BPM | HEIGHT: 62 IN | BODY MASS INDEX: 30.42 KG/M2 | TEMPERATURE: 97.5 F | SYSTOLIC BLOOD PRESSURE: 132 MMHG

## 2025-06-23 DIAGNOSIS — C79.51 MALIGNANT NEOPLASM METASTATIC TO BONE (H): ICD-10-CM

## 2025-06-23 DIAGNOSIS — C50.111 MALIGNANT NEOPLASM OF CENTRAL PORTION OF RIGHT BREAST IN FEMALE, ESTROGEN RECEPTOR POSITIVE (H): Primary | ICD-10-CM

## 2025-06-23 DIAGNOSIS — Z17.0 MALIGNANT NEOPLASM OF CENTRAL PORTION OF RIGHT BREAST IN FEMALE, ESTROGEN RECEPTOR POSITIVE (H): Primary | ICD-10-CM

## 2025-06-23 PROCEDURE — 250N000011 HC RX IP 250 OP 636: Performed by: INTERNAL MEDICINE

## 2025-06-23 PROCEDURE — 96365 THER/PROPH/DIAG IV INF INIT: CPT

## 2025-06-23 PROCEDURE — 258N000003 HC RX IP 258 OP 636: Performed by: INTERNAL MEDICINE

## 2025-06-23 RX ADMIN — ZOLEDRONIC ACID 3 MG: 4 INJECTION, SOLUTION, CONCENTRATE INTRAVENOUS at 13:33

## 2025-06-23 NOTE — PROGRESS NOTES
"Infusion Nursing Note:  Adriana St presents today for zometa infusion.    Patient seen by provider today: No   present during visit today: Not Applicable.    Note: zometa infused.  /67   Pulse 56   Temp 97.5  F (36.4  C) (Temporal)   Resp 16   Ht 1.575 m (5' 2\")   SpO2 98%   BMI 30.42 kg/m  .    Premeds Given: NA    Intravenous Access:  Peripheral IV placed.    Treatment Conditions:  Results reviewed, labs MET treatment parameters, ok to proceed with treatment.      Post Infusion Assessment:  Patient tolerated infusion without incident.  Blood return noted pre and post infusion.  Site patent and intact, free from redness, edema or discomfort.  No evidence of extravasations.  Access discontinued per protocol.       Discharge Plan:   Discharge instructions reviewed with: Patient.  Patient and/or family verbalized understanding of discharge instructions and all questions answered.  AVS to patient via [employer].  Patient will return 8/14/25 for next appointment.   Patient discharged in stable condition accompanied by: self.  Departure Mode: Ambulatory.      Amy Moe RN    .  "

## 2025-06-23 NOTE — ORAL ONC MGMT
"Oral Chemotherapy Monitoring Program     Met with Adriana Wei while she was in infusion waiting to get her zoledronic acid infusion. Let her know we still do not know about if we can get that vacation override for her ribociclib yet, but they are at least working on the next fill of ribociclib that she'll need to start the cycle on 6/30 in the meantime. If insurance will not pay for the override then she'll just have to start the next cycle about a week late since she is not getting back until 8/4. I let her know clinically I'm not concerned if that is the case as delays can happen and only a week we would not anticipate making a difference in the long run, but if we can get her the medication all the better.   She will be in [place] helping with settling her parents estate still (as it is a long process), but will also get to spend some time with her siblings, 2 sons, and grandchildren that still live there.    Ryan Melgar, PharmD, BCOP  Oral Chemotherapy Pharmacist  [facility] Phone: [phone]  In Basket Pools: \"[facility] ORAL CHEMOTHERAPY PHARMACIST\" or \"[facility] ORAL CHEMOTHERAPY PHARMACIST\"  June 23, 2025       "

## 2025-06-28 ENCOUNTER — HEALTH MAINTENANCE LETTER (OUTPATIENT)
Age: 70
End: 2025-06-28

## 2025-07-19 ENCOUNTER — HEALTH MAINTENANCE LETTER (OUTPATIENT)
Age: 70
End: 2025-07-19

## 2025-08-09 ENCOUNTER — HEALTH MAINTENANCE LETTER (OUTPATIENT)
Age: 70
End: 2025-08-09

## 2025-08-14 ENCOUNTER — LAB (OUTPATIENT)
Dept: INFUSION THERAPY | Facility: HOSPITAL | Age: 70
End: 2025-08-14
Attending: INTERNAL MEDICINE
Payer: COMMERCIAL

## 2025-08-14 ENCOUNTER — PATIENT OUTREACH (OUTPATIENT)
Dept: ONCOLOGY | Facility: HOSPITAL | Age: 70
End: 2025-08-14

## 2025-08-14 ENCOUNTER — ONCOLOGY VISIT (OUTPATIENT)
Dept: ONCOLOGY | Facility: HOSPITAL | Age: 70
End: 2025-08-14
Attending: INTERNAL MEDICINE
Payer: COMMERCIAL

## 2025-08-14 VITALS
HEIGHT: 62 IN | RESPIRATION RATE: 16 BRPM | BODY MASS INDEX: 31.65 KG/M2 | OXYGEN SATURATION: 97 % | DIASTOLIC BLOOD PRESSURE: 89 MMHG | HEART RATE: 60 BPM | TEMPERATURE: 97.8 F | WEIGHT: 172 LBS | SYSTOLIC BLOOD PRESSURE: 141 MMHG

## 2025-08-14 DIAGNOSIS — M54.50 ACUTE RIGHT-SIDED LOW BACK PAIN WITHOUT SCIATICA: ICD-10-CM

## 2025-08-14 DIAGNOSIS — C79.51 MALIGNANT NEOPLASM METASTATIC TO BONE (H): Primary | ICD-10-CM

## 2025-08-14 DIAGNOSIS — Z17.0 MALIGNANT NEOPLASM OF CENTRAL PORTION OF RIGHT BREAST IN FEMALE, ESTROGEN RECEPTOR POSITIVE (H): ICD-10-CM

## 2025-08-14 DIAGNOSIS — C50.111 MALIGNANT NEOPLASM OF CENTRAL PORTION OF RIGHT BREAST IN FEMALE, ESTROGEN RECEPTOR POSITIVE (H): ICD-10-CM

## 2025-08-14 DIAGNOSIS — Z51.11 ENCOUNTER FOR ANTINEOPLASTIC CHEMOTHERAPY: ICD-10-CM

## 2025-08-14 DIAGNOSIS — C79.51 MALIGNANT NEOPLASM METASTATIC TO BONE (H): ICD-10-CM

## 2025-08-14 LAB
ALBUMIN SERPL BCG-MCNC: 4 G/DL (ref 3.5–5.2)
ALP SERPL-CCNC: 59 U/L (ref 40–150)
ALT SERPL W P-5'-P-CCNC: 18 U/L (ref 0–50)
ANION GAP SERPL CALCULATED.3IONS-SCNC: 12 MMOL/L (ref 7–15)
AST SERPL W P-5'-P-CCNC: 22 U/L (ref 0–45)
BASOPHILS # BLD AUTO: 0.08 10E3/UL (ref 0–0.2)
BASOPHILS NFR BLD AUTO: 2 %
BILIRUB SERPL-MCNC: 0.9 MG/DL
BUN SERPL-MCNC: 24.5 MG/DL (ref 8–23)
CALCIUM SERPL-MCNC: 9 MG/DL (ref 8.8–10.4)
CHLORIDE SERPL-SCNC: 105 MMOL/L (ref 98–107)
CREAT SERPL-MCNC: 1.12 MG/DL (ref 0.51–0.95)
EGFRCR SERPLBLD CKD-EPI 2021: 53 ML/MIN/1.73M2
EOSINOPHIL # BLD AUTO: 0.19 10E3/UL (ref 0–0.7)
EOSINOPHIL NFR BLD AUTO: 4.7 %
ERYTHROCYTE [DISTWIDTH] IN BLOOD BY AUTOMATED COUNT: 15 % (ref 10–15)
GLUCOSE SERPL-MCNC: 123 MG/DL (ref 70–99)
HCO3 SERPL-SCNC: 22 MMOL/L (ref 22–29)
HCT VFR BLD AUTO: 35.1 % (ref 35–47)
HGB BLD-MCNC: 12 G/DL (ref 11.7–15.7)
IMM GRANULOCYTES # BLD: <0.03 10E3/UL
IMM GRANULOCYTES NFR BLD: 0.5 %
LYMPHOCYTES # BLD AUTO: 1.12 10E3/UL (ref 0.8–5.3)
LYMPHOCYTES NFR BLD AUTO: 27.9 %
MAGNESIUM SERPL-MCNC: 2 MG/DL (ref 1.7–2.3)
MCH RBC QN AUTO: 32.8 PG (ref 26.5–33)
MCHC RBC AUTO-ENTMCNC: 34.2 G/DL (ref 31.5–36.5)
MCV RBC AUTO: 95.9 FL (ref 78–100)
MONOCYTES # BLD AUTO: 0.32 10E3/UL (ref 0–1.3)
MONOCYTES NFR BLD AUTO: 8 %
NEUTROPHILS # BLD AUTO: 2.28 10E3/UL (ref 1.6–8.3)
NEUTROPHILS NFR BLD AUTO: 56.9 %
NRBC # BLD AUTO: <0.03 10E3/UL
NRBC BLD AUTO-RTO: 0 /100
PHOSPHATE SERPL-MCNC: 3.8 MG/DL (ref 2.5–4.5)
PLAT MORPH BLD: ABNORMAL
PLATELET # BLD AUTO: 197 10E3/UL (ref 150–450)
POTASSIUM SERPL-SCNC: 4.1 MMOL/L (ref 3.4–5.3)
PROT SERPL-MCNC: 6.3 G/DL (ref 6.4–8.3)
RBC # BLD AUTO: 3.66 10E6/UL (ref 3.8–5.2)
RBC MORPH BLD: ABNORMAL
SODIUM SERPL-SCNC: 139 MMOL/L (ref 135–145)
VARIANT LYMPHS BLD QL SMEAR: PRESENT
WBC # BLD AUTO: 4.01 10E3/UL (ref 4–11)

## 2025-08-14 PROCEDURE — 80053 COMPREHEN METABOLIC PANEL: CPT

## 2025-08-14 PROCEDURE — 85004 AUTOMATED DIFF WBC COUNT: CPT

## 2025-08-14 PROCEDURE — 84100 ASSAY OF PHOSPHORUS: CPT

## 2025-08-14 PROCEDURE — G0463 HOSPITAL OUTPT CLINIC VISIT: HCPCS

## 2025-08-14 PROCEDURE — 36415 COLL VENOUS BLD VENIPUNCTURE: CPT

## 2025-08-14 PROCEDURE — 83735 ASSAY OF MAGNESIUM: CPT

## 2025-08-14 RX ORDER — HEPARIN SODIUM,PORCINE 10 UNIT/ML
5-20 VIAL (ML) INTRAVENOUS DAILY PRN
OUTPATIENT
Start: 2025-09-23

## 2025-08-14 RX ORDER — HEPARIN SODIUM (PORCINE) LOCK FLUSH IV SOLN 100 UNIT/ML 100 UNIT/ML
5 SOLUTION INTRAVENOUS
OUTPATIENT
Start: 2025-09-23

## 2025-08-14 ASSESSMENT — PAIN SCALES - GENERAL: PAINLEVEL_OUTOF10: MODERATE PAIN (4)

## 2025-08-21 DIAGNOSIS — Z17.0 MALIGNANT NEOPLASM OF CENTRAL PORTION OF RIGHT BREAST IN FEMALE, ESTROGEN RECEPTOR POSITIVE (H): ICD-10-CM

## 2025-08-21 DIAGNOSIS — C50.111 MALIGNANT NEOPLASM OF CENTRAL PORTION OF RIGHT BREAST IN FEMALE, ESTROGEN RECEPTOR POSITIVE (H): ICD-10-CM

## 2025-08-21 DIAGNOSIS — C79.51 MALIGNANT NEOPLASM METASTATIC TO BONE (H): Primary | ICD-10-CM

## (undated) DEVICE — MIDAS REX DISSECTING TOOL  14MH30

## (undated) DEVICE — BLADE BONE MILL STRK 3.2MM FINE 5400-702-000

## (undated) DEVICE — Device

## (undated) DEVICE — SU MONOCRYL 3-0 PS-2 27" Y427H

## (undated) DEVICE — MARKER SPHERES PASSIVE MEDT PACK 5 8801075

## (undated) DEVICE — DRAPE SHEET REV FOLD 3/4 9349

## (undated) DEVICE — PACK UNIVERSAL SPLIT 29131

## (undated) DEVICE — SU VICRYL 2-0 CT-2 CR 8X18" J726D

## (undated) DEVICE — ESU GROUND PAD ADULT W/CORD E7507

## (undated) DEVICE — TOOL DISSECT MIDAS MR8 14CM MATCH HEAD 3MM MR8-14MH30

## (undated) DEVICE — LINEN TOWEL PACK X5 5464

## (undated) DEVICE — SU MONOCRYL 4-0 PS-2 18" UND Y496G

## (undated) DEVICE — DRAIN ROUND W/RESERV KIT JACKSON PRATT 10FR 400ML SU130-402D

## (undated) DEVICE — SU VICRYL 0 CT-1 CR 8X18" J740D

## (undated) DEVICE — CATH TRAY FOLEY SURESTEP 16FR W/URINE MTR STATLK LF A303416A

## (undated) DEVICE — WIPES FOLEY CARE SURESTEP PROVON DFC100

## (undated) DEVICE — DRAPE VARI-LENS II MICROSCOPE 52X150" 6120VL2

## (undated) DEVICE — GLOVE BIOGEL PI MICRO INDICATOR UNDERGLOVE SZ 7.5 48975

## (undated) DEVICE — MANIFOLD NEPTUNE 4 PORT 700-20

## (undated) DEVICE — CUSHION INSERT LG PRONE VIEW JACKSON TABLE

## (undated) DEVICE — DRAPE MAYO STAND 23X54 8337

## (undated) DEVICE — PREP CHLORAPREP 26ML TINTED HI-LITE ORANGE 930815

## (undated) DEVICE — SOL WATER IRRIG 1000ML BOTTLE 2F7114

## (undated) DEVICE — NDL SPINAL 18GA 3.5" 405184

## (undated) DEVICE — KIT PATIENT JACKSON 1 SPK10118

## (undated) DEVICE — SPONGE SURGIFOAM 100 1974

## (undated) DEVICE — IMP SCR MEDT 5.5/6.0MM SOLERA 5.5X45MM MA 55840005545: Type: IMPLANTABLE DEVICE | Site: THORACIC | Status: NON-FUNCTIONAL

## (undated) DEVICE — PACK LARGE SPINE SNE15LSFSE

## (undated) DEVICE — DRAIN HEMOVAC RESERVOIR KIT 19FR 1/4" LG 00-2550-004-10

## (undated) DEVICE — GLOVE BIOGEL PI MICRO SZ 7.0 48570

## (undated) RX ORDER — VANCOMYCIN HYDROCHLORIDE 1 G/20ML
INJECTION, POWDER, LYOPHILIZED, FOR SOLUTION INTRAVENOUS
Status: DISPENSED
Start: 2024-07-03

## (undated) RX ORDER — PROPOFOL 10 MG/ML
INJECTION, EMULSION INTRAVENOUS
Status: DISPENSED
Start: 2024-07-03

## (undated) RX ORDER — ACETAMINOPHEN 500 MG
TABLET ORAL
Status: DISPENSED
Start: 2024-07-03

## (undated) RX ORDER — FENTANYL CITRATE 50 UG/ML
INJECTION, SOLUTION INTRAMUSCULAR; INTRAVENOUS
Status: DISPENSED
Start: 2024-07-03

## (undated) RX ORDER — CEFAZOLIN SODIUM/WATER 2 G/20 ML
SYRINGE (ML) INTRAVENOUS
Status: DISPENSED
Start: 2024-07-03

## (undated) RX ORDER — CEFAZOLIN SODIUM 1 G/3ML
INJECTION, POWDER, FOR SOLUTION INTRAMUSCULAR; INTRAVENOUS
Status: DISPENSED
Start: 2024-07-03

## (undated) RX ORDER — APREPITANT 40 MG/1
CAPSULE ORAL
Status: DISPENSED
Start: 2024-07-03

## (undated) RX ORDER — FENTANYL CITRATE 0.05 MG/ML
INJECTION, SOLUTION INTRAMUSCULAR; INTRAVENOUS
Status: DISPENSED
Start: 2024-07-03

## (undated) RX ORDER — HYDROMORPHONE HYDROCHLORIDE 1 MG/ML
INJECTION, SOLUTION INTRAMUSCULAR; INTRAVENOUS; SUBCUTANEOUS
Status: DISPENSED
Start: 2024-07-03

## (undated) RX ORDER — ONDANSETRON 2 MG/ML
INJECTION INTRAMUSCULAR; INTRAVENOUS
Status: DISPENSED
Start: 2024-07-03

## (undated) RX ORDER — DEXAMETHASONE SODIUM PHOSPHATE 4 MG/ML
INJECTION, SOLUTION INTRA-ARTICULAR; INTRALESIONAL; INTRAMUSCULAR; INTRAVENOUS; SOFT TISSUE
Status: DISPENSED
Start: 2024-07-03

## (undated) RX ORDER — BUPIVACAINE HYDROCHLORIDE AND EPINEPHRINE 2.5; 5 MG/ML; UG/ML
INJECTION, SOLUTION EPIDURAL; INFILTRATION; INTRACAUDAL; PERINEURAL
Status: DISPENSED
Start: 2024-07-03

## (undated) RX ORDER — GABAPENTIN 100 MG/1
CAPSULE ORAL
Status: DISPENSED
Start: 2024-07-03